# Patient Record
Sex: FEMALE | Race: WHITE | Employment: OTHER | URBAN - METROPOLITAN AREA
[De-identification: names, ages, dates, MRNs, and addresses within clinical notes are randomized per-mention and may not be internally consistent; named-entity substitution may affect disease eponyms.]

---

## 2017-03-01 ENCOUNTER — HOSPITAL ENCOUNTER (OUTPATIENT)
Dept: MAMMOGRAPHY | Facility: CLINIC | Age: 77
Discharge: HOME | End: 2017-03-01
Attending: FAMILY MEDICINE | Admitting: FAMILY MEDICINE
Payer: MEDICARE

## 2017-03-01 DIAGNOSIS — R92.0 BREAST MICROCALCIFICATION, MAMMOGRAPHIC: ICD-10-CM

## 2017-03-01 PROCEDURE — G0206 DX MAMMO INCL CAD UNI: HCPCS | Mod: LT

## 2017-09-26 ENCOUNTER — HOSPITAL ENCOUNTER (OUTPATIENT)
Dept: MAMMOGRAPHY | Facility: CLINIC | Age: 77
Discharge: HOME | End: 2017-09-26
Attending: FAMILY MEDICINE | Admitting: FAMILY MEDICINE
Payer: MEDICARE

## 2017-09-26 DIAGNOSIS — R92.0 BREAST MICROCALCIFICATION, MAMMOGRAPHIC: ICD-10-CM

## 2017-09-26 PROCEDURE — G0206 DX MAMMO INCL CAD UNI: HCPCS

## 2017-10-20 ENCOUNTER — APPOINTMENT (OUTPATIENT)
Dept: GENERAL RADIOLOGY | Facility: CLINIC | Age: 77
DRG: 286 | End: 2017-10-20
Attending: EMERGENCY MEDICINE
Payer: MEDICARE

## 2017-10-20 ENCOUNTER — TRANSFERRED RECORDS (OUTPATIENT)
Dept: HEALTH INFORMATION MANAGEMENT | Facility: CLINIC | Age: 77
End: 2017-10-20

## 2017-10-20 ENCOUNTER — HOSPITAL ENCOUNTER (INPATIENT)
Facility: CLINIC | Age: 77
LOS: 5 days | Discharge: HOME | DRG: 286 | End: 2017-10-25
Attending: EMERGENCY MEDICINE | Admitting: INTERNAL MEDICINE
Payer: MEDICARE

## 2017-10-20 DIAGNOSIS — I48.91 ATRIAL FIBRILLATION WITH RAPID VENTRICULAR RESPONSE (H): ICD-10-CM

## 2017-10-20 DIAGNOSIS — I48.91 ATRIAL FIBRILLATION (H): ICD-10-CM

## 2017-10-20 DIAGNOSIS — N30.00 ACUTE CYSTITIS WITHOUT HEMATURIA: ICD-10-CM

## 2017-10-20 DIAGNOSIS — I10 ESSENTIAL HYPERTENSION: ICD-10-CM

## 2017-10-20 DIAGNOSIS — I25.10 CORONARY ARTERY DISEASE INVOLVING NATIVE HEART WITHOUT ANGINA PECTORIS, UNSPECIFIED VESSEL OR LESION TYPE: ICD-10-CM

## 2017-10-20 DIAGNOSIS — R00.0 TACHYCARDIA INDUCED CARDIOMYOPATHY (H): ICD-10-CM

## 2017-10-20 DIAGNOSIS — E78.5 HYPERLIPIDEMIA LDL GOAL <70: ICD-10-CM

## 2017-10-20 DIAGNOSIS — I43 TACHYCARDIA INDUCED CARDIOMYOPATHY (H): ICD-10-CM

## 2017-10-20 DIAGNOSIS — I50.9 ACUTE ON CHRONIC CONGESTIVE HEART FAILURE, UNSPECIFIED CONGESTIVE HEART FAILURE TYPE: ICD-10-CM

## 2017-10-20 DIAGNOSIS — I50.41 ACUTE COMBINED SYSTOLIC AND DIASTOLIC CHF, NYHA CLASS 3 (H): Primary | ICD-10-CM

## 2017-10-20 LAB
ANION GAP SERPL CALCULATED.3IONS-SCNC: 7 MMOL/L (ref 3–14)
BASOPHILS # BLD AUTO: 0.1 10E9/L (ref 0–0.2)
BASOPHILS NFR BLD AUTO: 0.6 %
BUN SERPL-MCNC: 11 MG/DL (ref 7–30)
CALCIUM SERPL-MCNC: 8.7 MG/DL (ref 8.5–10.1)
CHLORIDE SERPL-SCNC: 101 MMOL/L (ref 94–109)
CO2 SERPL-SCNC: 26 MMOL/L (ref 20–32)
CREAT SERPL-MCNC: 0.75 MG/DL (ref 0.52–1.04)
DIFFERENTIAL METHOD BLD: NORMAL
EOSINOPHIL # BLD AUTO: 0.3 10E9/L (ref 0–0.7)
EOSINOPHIL NFR BLD AUTO: 3.1 %
ERYTHROCYTE [DISTWIDTH] IN BLOOD BY AUTOMATED COUNT: 13.2 % (ref 10–15)
GFR SERPL CREATININE-BSD FRML MDRD: 75 ML/MIN/1.7M2
GLUCOSE SERPL-MCNC: 101 MG/DL (ref 70–99)
HCT VFR BLD AUTO: 41.9 % (ref 35–47)
HGB BLD-MCNC: 13.8 G/DL (ref 11.7–15.7)
IMM GRANULOCYTES # BLD: 0 10E9/L (ref 0–0.4)
IMM GRANULOCYTES NFR BLD: 0.3 %
INR PPP: 1.99 (ref 0.86–1.14)
LYMPHOCYTES # BLD AUTO: 1.7 10E9/L (ref 0.8–5.3)
LYMPHOCYTES NFR BLD AUTO: 17.4 %
MAGNESIUM SERPL-MCNC: 2 MG/DL (ref 1.6–2.3)
MCH RBC QN AUTO: 32.8 PG (ref 26.5–33)
MCHC RBC AUTO-ENTMCNC: 32.9 G/DL (ref 31.5–36.5)
MCV RBC AUTO: 100 FL (ref 78–100)
MONOCYTES # BLD AUTO: 0.8 10E9/L (ref 0–1.3)
MONOCYTES NFR BLD AUTO: 7.8 %
NEUTROPHILS # BLD AUTO: 7.1 10E9/L (ref 1.6–8.3)
NEUTROPHILS NFR BLD AUTO: 70.8 %
NRBC # BLD AUTO: 0 10*3/UL
NRBC BLD AUTO-RTO: 0 /100
NT-PROBNP SERPL-MCNC: 7683 PG/ML (ref 0–1800)
PLATELET # BLD AUTO: 249 10E9/L (ref 150–450)
POTASSIUM SERPL-SCNC: 4.1 MMOL/L (ref 3.4–5.3)
RBC # BLD AUTO: 4.21 10E12/L (ref 3.8–5.2)
SODIUM SERPL-SCNC: 134 MMOL/L (ref 133–144)
TROPONIN I SERPL-MCNC: <0.015 UG/L (ref 0–0.04)
TSH SERPL DL<=0.005 MIU/L-ACNC: 2.32 MU/L (ref 0.4–4)
WBC # BLD AUTO: 10 10E9/L (ref 4–11)

## 2017-10-20 PROCEDURE — 96361 HYDRATE IV INFUSION ADD-ON: CPT

## 2017-10-20 PROCEDURE — 71020 XR CHEST 2 VW: CPT

## 2017-10-20 PROCEDURE — 96365 THER/PROPH/DIAG IV INF INIT: CPT

## 2017-10-20 PROCEDURE — 25000132 ZZH RX MED GY IP 250 OP 250 PS 637: Mod: GY | Performed by: INTERNAL MEDICINE

## 2017-10-20 PROCEDURE — 96375 TX/PRO/DX INJ NEW DRUG ADDON: CPT

## 2017-10-20 PROCEDURE — 25000132 ZZH RX MED GY IP 250 OP 250 PS 637: Mod: GY | Performed by: EMERGENCY MEDICINE

## 2017-10-20 PROCEDURE — 84484 ASSAY OF TROPONIN QUANT: CPT | Performed by: EMERGENCY MEDICINE

## 2017-10-20 PROCEDURE — 25000125 ZZHC RX 250: Performed by: EMERGENCY MEDICINE

## 2017-10-20 PROCEDURE — 84443 ASSAY THYROID STIM HORMONE: CPT | Performed by: EMERGENCY MEDICINE

## 2017-10-20 PROCEDURE — 93005 ELECTROCARDIOGRAM TRACING: CPT

## 2017-10-20 PROCEDURE — 83880 ASSAY OF NATRIURETIC PEPTIDE: CPT | Performed by: EMERGENCY MEDICINE

## 2017-10-20 PROCEDURE — 99285 EMERGENCY DEPT VISIT HI MDM: CPT | Mod: 25

## 2017-10-20 PROCEDURE — 85025 COMPLETE CBC W/AUTO DIFF WBC: CPT | Performed by: EMERGENCY MEDICINE

## 2017-10-20 PROCEDURE — A9270 NON-COVERED ITEM OR SERVICE: HCPCS | Mod: GY | Performed by: EMERGENCY MEDICINE

## 2017-10-20 PROCEDURE — 99223 1ST HOSP IP/OBS HIGH 75: CPT | Mod: AI | Performed by: INTERNAL MEDICINE

## 2017-10-20 PROCEDURE — 12000007 ZZH R&B INTERMEDIATE

## 2017-10-20 PROCEDURE — 85610 PROTHROMBIN TIME: CPT | Performed by: EMERGENCY MEDICINE

## 2017-10-20 PROCEDURE — 80048 BASIC METABOLIC PNL TOTAL CA: CPT | Performed by: EMERGENCY MEDICINE

## 2017-10-20 PROCEDURE — 25000128 H RX IP 250 OP 636: Performed by: EMERGENCY MEDICINE

## 2017-10-20 PROCEDURE — 83735 ASSAY OF MAGNESIUM: CPT | Performed by: EMERGENCY MEDICINE

## 2017-10-20 PROCEDURE — 96366 THER/PROPH/DIAG IV INF ADDON: CPT

## 2017-10-20 PROCEDURE — A9270 NON-COVERED ITEM OR SERVICE: HCPCS | Mod: GY | Performed by: INTERNAL MEDICINE

## 2017-10-20 RX ORDER — POTASSIUM CHLORIDE 1500 MG/1
20-40 TABLET, EXTENDED RELEASE ORAL
Status: DISCONTINUED | OUTPATIENT
Start: 2017-10-20 | End: 2017-10-25 | Stop reason: HOSPADM

## 2017-10-20 RX ORDER — AMOXICILLIN 250 MG
1-2 CAPSULE ORAL 2 TIMES DAILY PRN
Status: DISCONTINUED | OUTPATIENT
Start: 2017-10-20 | End: 2017-10-25 | Stop reason: HOSPADM

## 2017-10-20 RX ORDER — POLYETHYLENE GLYCOL 3350 17 G/17G
17 POWDER, FOR SOLUTION ORAL DAILY PRN
Status: DISCONTINUED | OUTPATIENT
Start: 2017-10-20 | End: 2017-10-25 | Stop reason: HOSPADM

## 2017-10-20 RX ORDER — POTASSIUM CHLORIDE 7.45 MG/ML
10 INJECTION INTRAVENOUS
Status: DISCONTINUED | OUTPATIENT
Start: 2017-10-20 | End: 2017-10-25 | Stop reason: HOSPADM

## 2017-10-20 RX ORDER — ONDANSETRON 2 MG/ML
4 INJECTION INTRAMUSCULAR; INTRAVENOUS EVERY 6 HOURS PRN
Status: DISCONTINUED | OUTPATIENT
Start: 2017-10-20 | End: 2017-10-25 | Stop reason: HOSPADM

## 2017-10-20 RX ORDER — ACETAMINOPHEN 500 MG
1000 TABLET ORAL EVERY 4 HOURS PRN
Status: DISCONTINUED | OUTPATIENT
Start: 2017-10-20 | End: 2017-10-20

## 2017-10-20 RX ORDER — DILTIAZEM HYDROCHLORIDE 5 MG/ML
10 INJECTION INTRAVENOUS ONCE
Status: COMPLETED | OUTPATIENT
Start: 2017-10-20 | End: 2017-10-20

## 2017-10-20 RX ORDER — POTASSIUM CHLORIDE 29.8 MG/ML
20 INJECTION INTRAVENOUS
Status: DISCONTINUED | OUTPATIENT
Start: 2017-10-20 | End: 2017-10-25 | Stop reason: HOSPADM

## 2017-10-20 RX ORDER — MECLIZINE HCL 12.5 MG 12.5 MG/1
12.5 TABLET ORAL 3 TIMES DAILY PRN
Status: DISCONTINUED | OUTPATIENT
Start: 2017-10-20 | End: 2017-10-25 | Stop reason: HOSPADM

## 2017-10-20 RX ORDER — WARFARIN SODIUM 2.5 MG/1
2.5 TABLET ORAL ONCE
Status: COMPLETED | OUTPATIENT
Start: 2017-10-20 | End: 2017-10-20

## 2017-10-20 RX ORDER — OXYCODONE HYDROCHLORIDE 5 MG/1
5 TABLET ORAL EVERY 4 HOURS PRN
Status: DISCONTINUED | OUTPATIENT
Start: 2017-10-20 | End: 2017-10-25 | Stop reason: HOSPADM

## 2017-10-20 RX ORDER — FUROSEMIDE 10 MG/ML
40 INJECTION INTRAMUSCULAR; INTRAVENOUS DAILY
Status: DISCONTINUED | OUTPATIENT
Start: 2017-10-21 | End: 2017-10-23

## 2017-10-20 RX ORDER — METOPROLOL TARTRATE 50 MG
50 TABLET ORAL 2 TIMES DAILY
Status: DISCONTINUED | OUTPATIENT
Start: 2017-10-20 | End: 2017-10-24

## 2017-10-20 RX ORDER — DILTIAZEM HYDROCHLORIDE 180 MG/1
180 CAPSULE, COATED, EXTENDED RELEASE ORAL DAILY
Status: DISCONTINUED | OUTPATIENT
Start: 2017-10-21 | End: 2017-10-24

## 2017-10-20 RX ORDER — NALOXONE HYDROCHLORIDE 0.4 MG/ML
.1-.4 INJECTION, SOLUTION INTRAMUSCULAR; INTRAVENOUS; SUBCUTANEOUS
Status: DISCONTINUED | OUTPATIENT
Start: 2017-10-20 | End: 2017-10-24

## 2017-10-20 RX ORDER — WARFARIN SODIUM 5 MG/1
5 TABLET ORAL ONCE
Status: DISCONTINUED | OUTPATIENT
Start: 2017-10-20 | End: 2017-10-20

## 2017-10-20 RX ORDER — FUROSEMIDE 10 MG/ML
20 INJECTION INTRAMUSCULAR; INTRAVENOUS ONCE
Status: COMPLETED | OUTPATIENT
Start: 2017-10-20 | End: 2017-10-20

## 2017-10-20 RX ORDER — ACETAMINOPHEN 325 MG/1
650 TABLET ORAL EVERY 4 HOURS PRN
Status: DISCONTINUED | OUTPATIENT
Start: 2017-10-20 | End: 2017-10-23

## 2017-10-20 RX ORDER — POTASSIUM CL/LIDO/0.9 % NACL 10MEQ/0.1L
10 INTRAVENOUS SOLUTION, PIGGYBACK (ML) INTRAVENOUS
Status: DISCONTINUED | OUTPATIENT
Start: 2017-10-20 | End: 2017-10-25 | Stop reason: HOSPADM

## 2017-10-20 RX ORDER — POTASSIUM CHLORIDE 1.5 G/1.58G
20-40 POWDER, FOR SOLUTION ORAL
Status: DISCONTINUED | OUTPATIENT
Start: 2017-10-20 | End: 2017-10-25 | Stop reason: HOSPADM

## 2017-10-20 RX ORDER — SIMVASTATIN 10 MG
10 TABLET ORAL AT BEDTIME
Status: DISCONTINUED | OUTPATIENT
Start: 2017-10-20 | End: 2017-10-23

## 2017-10-20 RX ORDER — ONDANSETRON 4 MG/1
4 TABLET, ORALLY DISINTEGRATING ORAL EVERY 6 HOURS PRN
Status: DISCONTINUED | OUTPATIENT
Start: 2017-10-20 | End: 2017-10-25 | Stop reason: HOSPADM

## 2017-10-20 RX ORDER — MAGNESIUM SULFATE HEPTAHYDRATE 40 MG/ML
4 INJECTION, SOLUTION INTRAVENOUS EVERY 4 HOURS PRN
Status: DISCONTINUED | OUTPATIENT
Start: 2017-10-20 | End: 2017-10-25 | Stop reason: HOSPADM

## 2017-10-20 RX ADMIN — DILTIAZEM HYDROCHLORIDE 10 MG: 5 INJECTION INTRAVENOUS at 18:49

## 2017-10-20 RX ADMIN — SODIUM CHLORIDE 500 ML: 9 INJECTION, SOLUTION INTRAVENOUS at 17:58

## 2017-10-20 RX ADMIN — FUROSEMIDE 20 MG: 10 INJECTION, SOLUTION INTRAVENOUS at 18:49

## 2017-10-20 RX ADMIN — OXYCODONE HYDROCHLORIDE 5 MG: 5 TABLET ORAL at 22:32

## 2017-10-20 RX ADMIN — METOPROLOL TARTRATE 50 MG: 50 TABLET, FILM COATED ORAL at 22:17

## 2017-10-20 RX ADMIN — WARFARIN SODIUM 2.5 MG: 2.5 TABLET ORAL at 22:17

## 2017-10-20 RX ADMIN — DILTIAZEM HYDROCHLORIDE 5 MG/HR: 5 INJECTION INTRAVENOUS at 19:38

## 2017-10-20 RX ADMIN — ACETAMINOPHEN 1000 MG: 500 TABLET, FILM COATED ORAL at 18:39

## 2017-10-20 RX ADMIN — SIMVASTATIN 10 MG: 10 TABLET, FILM COATED ORAL at 22:17

## 2017-10-20 RX ADMIN — DILTIAZEM HYDROCHLORIDE 10 MG: 5 INJECTION INTRAVENOUS at 17:58

## 2017-10-20 ASSESSMENT — ENCOUNTER SYMPTOMS
SHORTNESS OF BREATH: 1
ABDOMINAL PAIN: 1
DIFFICULTY URINATING: 0
CHILLS: 1
BLOOD IN STOOL: 0
DYSURIA: 0
LIGHT-HEADEDNESS: 1
COUGH: 1
APPETITE CHANGE: 1
FREQUENCY: 0
DIARRHEA: 1
HEMATURIA: 0

## 2017-10-20 NOTE — LETTER
Transition Communication Hand-off for Care Transitions to Next Level of Care Provider    Hand-off for Care Transitions to Next Level of Care Provider  Name: Em Richmond  : 1940  MRN #: [id number]  Reason for Hospitalization:  Atrial fibrillation (H) [I48.91]  Atrial fibrillation with rapid ventricular response (H) [I48.91]  Acute on chronic congestive heart failure, unspecified congestive heart failure type (H) [I50.9]  Admit Date/Time: 10/20/2017  5:12 PM  Discharge Date: 10/25/2017    Reason for Communication Hand-off Referral: Admission diagnoses: CHF    Discharge Plan:  Discharged to: Home with support                   Patient agreeable to post-hospital support suggestions:  Yes  Discharge Plan:             Patient is on new medications:   Yes   atorvastatin 40 MG tablet. Take 1 tablet (40 mg) by mouth daily   ciprofloxacin 250 MG tablet. Take 1 tablet (250 mg) by mouth 2 times daily for 7  Days   furosemide 40 MG tablet. Take 0.5 tablets (20 mg) by mouth daily   lisinopril 5 MG tablet. Take 0.5 tablets (2.5 mg) by mouth daily   metoprolol 25 MG 24 hr tablet. Take 3 tablets (75 mg) by mouth daily           MTM follow up recommended: No           Tel-Assurance program:  Accepted   Follow-up specialty is recommended: Yes. Follow up with Cardiology as scheduled below.   Follow-up plan:  Future Appointments  Date Time Provider Department Center   2017 10:00 AM RSCC DEVICE RM [facility]   2017 1:00 PM RU LAB LAB P [facility]   2017 1:50 PM Ying Oreilly APRN CNP [facility]   2017 9:30 AM RSCCECHO2 [facility]   2017 10:30 AM RU LAB LAB P [facility]   2017 2:45 PM Den Pickard MD [facility]     Any outstanding tests or procedures:  Holter Monitor to be placed on 2017 at 10am.     Radiology & Cardiology Orders     Future Labs/Procedures Complete By Expires    Holter Monitor 24 hour - Adult  2017 (Approximate) 10/25/2018     Echocardiogram  2017 (Approximate) 10/24/2018    Process Instructions:    Administration of IV contrast will be tailored to this examination per the appropriate written protocol listed in the Echocardiography department Protocol Book, or by the supervising Cardiologist. This may result in an order change.    Use of contrast is at the discretion of the supervising Cardiologist.    Scheduling Instructions:    Please call your clinic of choice to schedule this procedure:    [facility]:   [phone]  [facility]:   [phone]  [facility]:  [phone]  [facility]): [phone] (Friday)  [facility]:   [phone]  [facility]:  [phone]  [facility]): [phone]  [facility] [phone]    Comments:    Administration of IV contrast will be tailored to this examination per the appropriate written protocol listed in the Echocardiography department Protocol Book, or by the supervising Cardiologist. This may result in an order change.    Use of contrast is at the discretion of the supervising Cardiologist.        Referrals     Future Labs/Procedures    Follow-Up with Cardiac Advanced Practice Provider     Follow-Up with Cardiologist           Key Recommendations: CHF teaching completed on action plan, patient weights are done daily, observes a low salt diet and feels like the plan will be helpful.  We spoke briefly about medicaiton and she was placed on a diuretic at discharge.  We talked about CORE and why it might be helpful in the future. She has agreed to a Tele-monitoring program.     Communicated handoff via EPIC Comm Mgt to Dr. Arielle Leger's CC at [phone].      Rosalia Oreilly, RN, BSN, CTS  [facility]  [phone]    AVS/Discharge Summary is the source of truth; this is a helpful guide for improved communication of patient story

## 2017-10-20 NOTE — IP AVS SNAPSHOT
[facility] [facility]    [address]    [facility] [address]    Phone:  [phone]    Fax:  [phone]                                       After Visit Summary   10/20/2017    Em Richmond    MRN: [id number]           After Visit Summary Signature Page     I have received my discharge instructions, and my questions have been answered. I have discussed any challenges I see with this plan with the nurse or doctor.    ..........................................................................................................................................  Patient/Patient Representative Signature      ..........................................................................................................................................  Patient Representative Print Name and Relationship to Patient    ..................................................               ................................................  Date                                            Time    ..........................................................................................................................................  Reviewed by Signature/Title    ...................................................              ..............................................  Date                                                            Time

## 2017-10-20 NOTE — IP AVS SNAPSHOT
MRN:[id number]                      After Visit Summary   10/20/2017    Em Richmond    MRN: [id number]           Thank you!     Thank you for choosing [facility] for your care. Our goal is always to provide you with excellent care. Hearing back from our patients is one way we can continue to improve our services. Please take a few minutes to complete the written survey that you may receive in the mail after you visit. If you would like to speak to someone directly about your visit please contact Patient Relations at [phone]. Thank you!          Patient Information     Date Of Birth          1940        Designated Caregiver       Most Recent Value    Caregiver    Will someone help with your care after discharge? yes    Name of designated caregiver Carson    Phone number of caregiver [phone]    Caregiver address [address] [place] [address]      About your hospital stay     You were admitted on:  October 20, 2017 You last received care in the:  [facility] Medical Surgical    You were discharged on:  October 25, 2017        Reason for your hospital stay       Your symptoms of shortness of breath and cough were due to Heart Failure. That had come on due to the persistent rapid heart rate. With good heart rate control and treatment of your excess volume, you should do well.                  Who to Call     For medical emergencies, please call 911.  For non-urgent questions about your medical care, please call your primary care provider or clinic, [phone]          Attending Provider     Provider Specialty    Lloyd Abdi MD Emergency Medicine    Sandhya, Neeraj Landaverde MD Internal Medicine       Primary Care Provider Office Phone # Fax #    Arielle Leger [phone]      After Care Instructions     Activity       Your activity upon discharge: activity as tolerated            Diet       Follow this diet upon discharge:    2 gm NA Diet            Transitions 30-Day Tel-Assurance       You will receive a phone call for enrollment following hospital discharge. If you have not received a phone call within 24 hours to enroll, please call [phone].                  Follow-up Appointments     Follow-up and recommended labs and tests        Follow up with PMD in the next 2 weeks for routine post-hospital meeting.  Please also follow up with your regular INR clinic for recheck this week.                  Your next 10 appointments already scheduled     Nov 06, 2017 10:00 AM CST   Holter Monitor with RS[facility] DEVICE [facility] [place] ([facility])    [address]  [facility] [id number] [phone]           LOCATION - [address], [address] [place] [address] **Please check-in at the [place] Registration Office, [address], in the [facility] building. When you are finished registering, please go to [address] and have a seat.            Nov 13, 2017  1:00 PM CST   LAB with RU LAB   [facility] ([facility])    [address]  [facility] [id number] [phone]           Patient must bring picture ID. Patient should be prepared to give a urine specimen  Please do not eat 10-12 hours before your appointment if you are coming in fasting for labs on lipids, cholesterol, or glucose (sugar). Pregnant women should follow their Care Team instructions. Water with medications is okay. Do not drink coffee or other fluids. If you have concerns about taking  your medications, please ask at office or if scheduling via [employer], send a message by clicking on Secure Messaging, Message Your Care Team.            Nov 13, 2017  1:50 PM CST   Return Discharge with DOMINIK Javier CNP   [facility] ([facility])    [address]  [facility]  [address]   [phone]            Dec 04, 2017  9:30 AM CST   Ech Complete with [facility] ([facility])    [address]  [facility] [id number] [phone]           1. Please bring or wear a comfortable two-piece outfit. 2. You may eat, drink and take your normal medicines. 3. For any questions that cannot be answered, please contact the ordering physician ***Please check-in at the [place] Registration Office located in [address] in the [facility] building. When you are finished registering, please go to [address] and have a seat. The technician will call your name for the test.            Dec 04, 2017 10:30 AM CST   LAB with RU LAB   [facility] ([facility])    [address]  [facility] [id number] [phone]           Patient must bring picture ID. Patient should be prepared to give a urine specimen  Please do not eat 10-12 hours before your appointment if you are coming in fasting for labs on lipids, cholesterol, or glucose (sugar). Pregnant women should follow their Care Team instructions. Water with medications is okay. Do not drink coffee or other fluids. If you have concerns about taking  your medications, please ask at office or if scheduling via [employer], send a message by clicking on Secure Messaging, Message Your Care Team.            Dec 19, 2017  2:45 PM CST   Return Visit with Den Pickard MD   [facility] HEART [facility] ([facility])    [address]  [facility] [address] [phone]              Additional Services     Follow-Up with Cardiac Advanced Practice Provider           Follow-Up with Cardiologist                 Future tests that were ordered for you     Basic metabolic panel           Holter Monitor 24 hour - Adult           Echocardiogram       Administration of IV  "contrast will be tailored to this examination per the appropriate written protocol listed in the Echocardiography department Protocol Book, or by the supervising Cardiologist. This may result in an order change.    Use of contrast is at the discretion of the supervising Cardiologist.            Lipid Profile                 Warfarin Instruction     You have started taking a medicine called warfarin. This is a blood-thinning medicine (anticoagulant). It helps prevent and treat blood clots.      Before leaving the hospital, make sure you know how much to take and how long to take it.      You will need regular blood tests to make sure your blood is clotting safely. It is very important to see your doctor for regular blood tests.    Talk to your doctor before taking any new medicine (this includes over-the-counter drugs and herbal products). Many medicines can interact with warfarin. This may cause more bleeding or too much clotting.     Eating a lot of vitamin K--found in green, leafy vegetables--can change the way warfarin works in your body. Do NOT avoid these foods. Instead, try to eat the same amount each day.     Bleeding is the most common side-effect of warfarin. You may notice bleeding gums, a bloody nose, bruises and bleeding longer when you cut yourself. See a doctor at once if:   o You cough up blood  o You find blood in your stool (poop)  o You have a deep cut, or a cut that bleeds longer than 10 minutes   o You have a bad cut, hard fall, accident or hit your head (go to urgent care or the emergency room).    For women who can get pregnant: This medicine can harm an unborn baby. Be very careful not to get pregnant while taking this medicine. If you think you might be pregnant, call your doctor right away.    For more information, read \"Guide to Warfarin Therapy,  the booklet you received in the hospital.        General Recommendations To Control Heart Failure When You Get Home       Heart Failure " Instructions for Patients and Families: Please read and check off each of these important instructions as you do them when you get home.          Weight and Symptoms       ___ Put a scale in your bathroom.    ___ Post a weight chart or calendar next to your scale.    ___ Weigh yourself everyday as soon as you get up in the morning (before breakfast).  You should only be wearing your pajamas.  Write your weight on the chart/calendar.  ___ Bring your weight chart/calendar with you to all appointments.  ___ Call your doctor or nurse practitioner if you gain 2 pounds (in 1 day) or 5 pounds in (1 week) from your goal  good  weight.  Your good weight is also called your  dry  weight.  Your doctor or nurse will tell you what your good weight should be.    ___ Call your doctor or nurse practitioner if you have shortness of breath that gets worse over time, leg swelling or fatigue.       Medications and Diet       ___ Make sure to take your medication as prescribed.    ___ Bring a current list of your medication and all of your medicine bottles with you to all appointments.    ___ Limit fluids if you still have swelling or shortness of breath, or if your doctor tells you to do so.    ___ Eat less than 2000 mg of sodium (salt) every day. Read food labels, and do not add salt to meals. Remember, if you eat less salt you retain less fluid.  ___ Follow a heart healthy diet that is low in saturated fat.        Activity and Suggested Lifestyle Changes      ___ Stay active. Talk to your doctor about an exercise program that is safe for your heart.  ___ Stop smoking. Reduce alcohol use.      ___ Lose weight if you are overweight. Extra weight puts a lot of stress on the heart.                 Control for Leg Swelling     ___ Keep your legs elevated (raised) as needed for swelling. If swelling is uncomfortable or elevation doesn t help, ask your doctor about using ACE wraps or support stockings.        What is the C.O.R.E.  Clinic?  Cardiomyopathy  Optimization  Rehabilitation  Education    The C.O.R.E. Clinic is a heart failure specialty clinic within [facility].  It is an outpatient disease management program that is based on a phase-by-phase approach, which is tailored to each patient s individual needs.  The cardiologist, nurse practitioner, physician assistant and nurses provide an ongoing outpatient care and treatment plan that guides heart failure and cardiomyopathy patients from evaluation and education to stabilization. This team works with your current primary care doctor and cardiologist to help you:    - Avoid hospitalizations  - Slow the progression of the disease  - Improve length and quality of life  - Know who and when to call if heart failure symptoms appear  - Receive easy access to quality health care and advice  - Better understand your condition and treatment  - Decrease the tremendous cost burden of heart failure care  - Detect future heart problems before they become life threatening                                 Follow-up Appointments     ___ An appointment with the C.O.R.E. Clinic may already have been made for you (see section   Your next appointments already scheduled ).  If you have a question about your appointment, would like to speak with a C.O.R.E. nurse, or would like to become a C.O.R.E. Clinic patient, please call one of the following locations:     - [facility]):                                             [phone]  - [facility]):                                            [phone]  - [facility] ([place]):                 [phone]      ___ Your C.O.R.E. Clinic Team will continue to educate you on your heart failure and may adjust medications based on your vital signs, lab work, and how you are feeling.  Therefore, it is very important to bring the following to all C.O.R.E. appointments:    - An  "accurate list of your medications  - Your medicine bottles  - Your weight chart/calendar                   Pending Results     Date and Time Order Name Status Description    10/25/2017 1039 Urine Culture Aerobic Bacterial In process             Statement of Approval     Ordered          10/25/17 4632  I have reviewed and agree with all the recommendations and orders detailed in this document.  EFFECTIVE NOW     Approved and electronically signed by:  Moses Win MD             Admission Information     Date & Time Provider Department Dept. Phone    10/20/2017 Neeraj Arthur MD [place] [address] Medical Surgical [phone]      Your Vitals Were     Blood Pressure Pulse Temperature Respirations Height Weight    110/73 (BP Location: Left arm) 90 96.4  F (35.8  C) (Oral) 18 1.651 m (5' 5\") 83.9 kg (184 lb 14.4 oz)    Pulse Oximetry BMI (Body Mass Index)                96% 30.77 kg/m2          MyChart Information     [employer] lets you send messages to your doctor, view your test results, renew your prescriptions, schedule appointments and more. To sign up, go to [email or web address] . Click on \"Log in\" on the left side of the screen, which will take you to the Welcome page. Then click on \"Sign up Now\" on the right side of the page.     You will be asked to enter the access code listed below, as well as some personal information. Please follow the directions to create your username and password.     Your access code is: [email or web address]  Expires: 2018  2:00 PM     Your access code will  in 90 days. If you need help or a new code, please call your [facility] clinic or [phone].        Care EveryWhere ID     This is your Care EveryWhere ID. This could be used by other organizations to access your [facility] medical records  [id number]        Equal Access to Services     LUZ MARTINEZ : Cody Barbosa, consuelo turk, qamayrata scout means " ah. So [facility] [phone].    ATENCIÓN: Si andre savage, tiene a delgadillo disposición servicios gratuitos de asistencia lingüística. Jovon al [phone].    We comply with applicable federal civil rights laws and [place] laws. We do not discriminate on the basis of race, color, national origin, age, disability, sex, sexual orientation, or gender identity.               Review of your medicines      START taking        Dose / Directions    atorvastatin 40 MG tablet   Commonly known as:  LIPITOR   Used for:  Hyperlipidemia LDL goal <70, Coronary artery disease involving native heart without angina pectoris, unspecified vessel or lesion type        Dose:  40 mg   Take 1 tablet (40 mg) by mouth daily   Quantity:  30 tablet   Refills:  0       ciprofloxacin 250 MG tablet   Commonly known as:  CIPRO        Dose:  250 mg   Take 1 tablet (250 mg) by mouth 2 times daily for 7 days   Quantity:  14 tablet   Refills:  0       furosemide 40 MG tablet   Commonly known as:  LASIX        Dose:  20 mg   Take 0.5 tablets (20 mg) by mouth daily   Quantity:  30 tablet   Refills:  0       lisinopril 5 MG tablet   Commonly known as:  PRINIVIL/ZESTRIL        Dose:  2.5 mg   Take 0.5 tablets (2.5 mg) by mouth daily   Quantity:  30 tablet   Refills:  0       metoprolol 25 MG 24 hr tablet   Commonly known as:  TOPROL-XL        Dose:  75 mg   Take 3 tablets (75 mg) by mouth daily   Quantity:  30 tablet   Refills:  0         CONTINUE these medicines which may have CHANGED, or have new prescriptions. If we are uncertain of the size of tablets/capsules you have at home, strength may be listed as something that might have changed.        Dose / Directions    warfarin 5 MG tablet   Commonly known as:  COUMADIN   This may have changed:    - medication strength  - when to take this  - additional instructions        Dose:  2.5 mg   Take 0.5 tablets (2.5 mg) by mouth daily   Quantity:  30 tablet   Refills:  0         CONTINUE these medicines which have  NOT CHANGED        Dose / Directions    meclizine 12.5 MG tablet   Commonly known as:  ANTIVERT   Used for:  Labyrinthitis, bilateral        Dose:  12.5 mg   Take 1 tablet (12.5 mg) by mouth 3 times daily as needed for dizziness   Quantity:  60 tablet   Refills:  1       MULTIPLE VITAMINS PO        Dose:  1 tablet   Take 1 tablet by mouth daily   Refills:  0       OMEPRAZOLE PO        Dose:  20 mg   Take 20 mg by mouth daily as needed   Refills:  0       PAROXETINE HCL PO        Dose:  30 mg   Take 30 mg by mouth daily   Refills:  0       VITAMIN D3 PO        Dose:  2000 Units   Take 2,000 Units by mouth daily   Refills:  0         STOP taking     diltiazem 180 MG 24 hr ER beaded capsule   Commonly known as:  TIAZAC           SIMVASTATIN PO                Where to get your medicines      These medications were sent to [facility] [address] [facility]  [address] [facility] [address]     Phone:  [phone]     atorvastatin 40 MG tablet    ciprofloxacin 250 MG tablet    furosemide 40 MG tablet    lisinopril 5 MG tablet    metoprolol 25 MG 24 hr tablet         Some of these will need a paper prescription and others can be bought over the counter. Ask your nurse if you have questions.     You don't need a prescription for these medications     warfarin 5 MG tablet               ANTIBIOTIC INSTRUCTION     You've Been Prescribed an Antibiotic - Now What?  Your healthcare team thinks that you or your loved one might have an infection. Some infections can be treated with antibiotics, which are powerful, life-saving drugs. Like all medications, antibiotics have side effects and should only be used when necessary. There are some important things you should know about your antibiotic treatment.      Your healthcare team may run tests before you start taking an antibiotic.    Your team may take samples (e.g., from your blood, urine or other areas) to run tests to look for  bacteria. These test can be important to determine if you need an antibiotic at all and, if you do, which antibiotic will work best.      Within a few days, your healthcare team might change or even stop your antibiotic.    Your team may start you on an antibiotic while they are working to find out what is making you sick.    Your team might change your antibiotic because test results show that a different antibiotic would be better to treat your infection.    In some cases, once your team has more information, they learn that you do not need an antibiotic at all. They may find out that you don't have an infection, or that the antibiotic you're taking won't work against your infection. For example, an infection caused by a virus can't be treated with antibiotics. Staying on an antibiotic when you don't need it is more likely to be harmful than helpful.      You may experience side effects from your antibiotic.    Like all medications, antibiotics have side effects. Some of these can be serious.    Let you healthcare team know if you have any known allergies when you are admitted to the hospital.    One significant side effect of nearly all antibiotics is the risk of severe and sometimes deadly diarrhea caused by Clostridium difficile (C. Difficile). This occurs when a person takes antibiotics because some good germs are destroyed. Antibiotic use allows C. diificile to take over, putting patients at high risk for this serious infection.    As a patient or caregiver, it is important to understand your or your loved one's antibiotic treatment. It is especially important for caregivers to speak up when patients can't speak for themselves. Here are some important questions to ask your healthcare team.    What infection is this antibiotic treating and how do you know I have that infection?    What side effects might occur from this antibiotic?    How long will I need to take this antibiotic?    Is it safe to take this  antibiotic with other medications or supplements (e.g., vitamins) that I am taking?     Are there any special directions I need to know about taking this antibiotic? For example, should I take it with food?    How will I be monitored to know whether my infection is responding to the antibiotic?    What tests may help to make sure the right antibiotic is prescribed for me?      Information provided by:  www.cdc.gov/getsmart  U.S. Department of Health and Human Services  Centers for disease Control and Prevention  National Center for Emerging and Zoonotic Infectious Diseases  Division of Healthcare Quality Promotion         Protect others around you: Learn how to safely use, store and throw away your medicines at www.disposemymeds.org.             Medication List: This is a list of all your medications and when to take them. Check marks below indicate your daily home schedule. Keep this list as a reference.      Medications           Morning Afternoon Evening Bedtime As Needed    atorvastatin 40 MG tablet   Commonly known as:  LIPITOR   Take 1 tablet (40 mg) by mouth daily   Last time this was given:  40 mg on 10/25/2017  8:39 AM            Resume tomorrow                       ciprofloxacin 250 MG tablet   Commonly known as:  CIPRO   Take 1 tablet (250 mg) by mouth 2 times daily for 7 days                       Start tonight               furosemide 40 MG tablet   Commonly known as:  LASIX   Take 0.5 tablets (20 mg) by mouth daily   Last time this was given:  40 mg on 10/24/2017 10:15 AM            Resume tomorrow                       lisinopril 5 MG tablet   Commonly known as:  PRINIVIL/ZESTRIL   Take 0.5 tablets (2.5 mg) by mouth daily   Last time this was given:  2.5 mg on 10/25/2017  8:39 AM            Resume tomorrow                       meclizine 12.5 MG tablet   Commonly known as:  ANTIVERT   Take 1 tablet (12.5 mg) by mouth 3 times daily as needed for dizziness                                   metoprolol  25 MG 24 hr tablet   Commonly known as:  TOPROL-XL   Take 3 tablets (75 mg) by mouth daily   Last time this was given:  75 mg on 10/25/2017  8:39 AM            Resume tomorrow                       MULTIPLE VITAMINS PO   Take 1 tablet by mouth daily            Resume anytime                       OMEPRAZOLE PO   Take 20 mg by mouth daily as needed                                   PAROXETINE HCL PO   Take 30 mg by mouth daily   Last time this was given:  30 mg on 10/24/2017  3:05 PM                                VITAMIN D3 PO   Take 2,000 Units by mouth daily   Last time this was given:  2,000 Units on 10/25/2017  8:39 AM            Resume tomorrow                       warfarin 5 MG tablet   Commonly known as:  COUMADIN   Take 0.5 tablets (2.5 mg) by mouth daily   Last time this was given:  5 mg on 10/24/2017  6:50 PM                    Resume tonight

## 2017-10-20 NOTE — ED PROVIDER NOTES
History     Chief Complaint:  Shortness of Breath    HPI   Em Richmond is a 77 year old female on bloodthinners who presents to the emergency department today with shortness of breath. The patient reports she had shortness of breath on and off for a while, but it got worse and has been constant since she took a trip to [place] 2 weeks ago. Her symptoms are aggravated with activity and are associated with chest pain under left breast, nausea and loss of appetite, sweats, lightheadedness, chills, non-productive cough for the past month, abdominal pain, diarrhea which has been present for months but gotten worse recently, some left leg swelling, and waking up at night secondary to orthopnea. Patient denies changes in urination, dysuria, hematuria, blood in stool, and smoking. Of note, her daughter says the patient has been forgetful and forgetting words for the past year.    Cardiac/PE/DVT Risk Factors:  History of hypertension - Positve  History of hyperlipidemia - Positive  History of diabetes - Negative  History of smoking - Positive  Personal history of PE/DVT - Negative  Family history of PE/DVT - Postive  Family history of heart complications - Positive  Recent travel - Positive  Recent surgery - Negative  Other immobilizations - Positive-plane ride  Cancer - Negative    Allergies:  Sulfa Drugs    Medications:    Antivert  Coumadin  Simvastatin  Paroxetine  Tiazac  Omeprazole  Cholecalciferol    Past Medical History:    Syncope  Atrial fibrillation    Past Surgical History:    History reviewed. No pertinent past surgical history.    Family History:    Diabetes Mellitus  Colon Cancer    Social History:  The patient was accompanied to the ED by daughter.  Smoking Status: No  Alcohol Use: Yes  Marital Status:        Review of Systems   Constitutional: Positive for appetite change and chills.   Respiratory: Positive for cough and shortness of breath.    Cardiovascular: Positive for chest pain  (under left breast) and leg swelling (left).   Gastrointestinal: Positive for abdominal pain and diarrhea. Negative for blood in stool.   Genitourinary: Negative for decreased urine volume, difficulty urinating, dysuria, frequency and hematuria.   Neurological: Positive for light-headedness.     Physical Exam     Patient Vitals for the past 24 hrs:   BP Temp Temp src Pulse Heart Rate Resp SpO2   10/20/17 2053 - - - - 111 - -   10/20/17 2045 156/83 - - - 140 13 96 %   10/20/17 2000 113/81 - - - - - -   10/20/17 1945 (!) 138/91 - - - 138 13 95 %   10/20/17 1845 (!) 128/104 - - - - - -   10/20/17 1830 (!) 121/108 - - - - - -   10/20/17 1815 (!) 118/91 - - - 138 20 99 %   10/20/17 1800 128/73 - - - 141 25 94 %   10/20/17 1707 (!) 143/103 97  F (36.1  C) Oral 126 - 20 98 %     Physical Exam  Constitutional: Well developed, nontox appearance, appears somewhat winded  Head: Atraumatic.   Mouth/Throat: Oropharynx is clear and moist.   Neck:  no stridor  Eyes: no scleral icterus  Cardiovascular:  irregularly irregular tachycardia, 2+ bilat radial pulses  Pulmonary/Chest: nml resp effort, Clear BS bilat  Abdominal: ND, +BS, soft, NT, no rebound or guarding   : no CVA tenderness bilat  Ext: moderate pitting edema on bilateral lower extremities, WWP  Neurological: A&O, symmetric facies, moves ext x4  Skin: Skin is warm and dry.   Psychiatric: Behavior is normal. Thought content normal.   Nursing note and vitals reviewed.      Emergency Department Course   ECG:  Indication: Shortness of breath  Completed at 1725.  Read at 1736.   Atrial flutter with rapid ventricular response  Abnormal ECG  Rate 1434 bpm. DC interval *. QRS duration 74. QT/QTc 324/500. P-R-T axes * 19 80.    Imaging:  Radiology findings were communicated with the patient and family who voiced understanding of the findings.  XR Chest 2 Views  IMPRESSION:   1. Pulmonary vascular congestion. No focal infiltrate.  2. Elevated right hemidiaphragm with slightly  blunted right  costophrenic angle due to fluid or thickened pleura.  Report per radiology     Laboratory:  Laboratory findings were communicated with the patient and family who voiced understanding of the findings.  CBC: AWNL (WBC 10.0, HGB 13.8, )  BMP: Glucose 101(H) o/w WNL (Creatinine 0.75)  Troponin (Collected 1724 ): <0.015  TSH with free T4 reflex: 2.32  N-terminal Pro BNP: 7683 (H)  INR: 1.99 (H)    Interventions:  1758: Cardizem 10mg IV  1839: Tylenol 1,000 mg PO  1849: Lasix 20 mg IV   1849: Cardizem 10mg IV  1938: Cardizem 5mg IV     Emergency Department Course:  Past medical records, nursing notes, and vitals reviewed.  1730: I performed an exam of the patient and obtained history, as documented above.  IV inserted and blood drawn.   Above interventions provided.   The patient was sent for a XR while in the emergency department, findings above.   1945: Findings and plan explained to the Patient and daughter who consents to admission. Discussed the patient with Dr. Arthur, who will admit the patient to a Cardiac Telemetry bed for further monitoring, evaluation, and treatment.  I personally reviewed the laboratory results with the Patient and daughter and answered all related questions prior to admission.      Impression & Plan       Medical Decision Making:  Em Richmond is a 77 year old female presenting with shortness of breath.     Differential diagnosis includes congestive heat failure, afibrillation with RVR, pneumonia, pneumothorax, shortness of breath not otherwise specified. The patient appears clinically volume overloaded. EKG significant for afib with RVR as noted. Chest X-ray is significant for vascular congestion. Prior to chest X-ray the patient was initially given a small amount of IV fluid for her afib with RVR but that was stopped shortly thereafter. She was given Diltiazem boluses x2 with transient improvement in her heart rate. She was subsequently started on a Diltiazem  infusion and given Lasix as noted above. The patient was admitted to the hospitalist service on cardiac tele for further treatment of her congestive heart failure exacerbation and rate control. Her BNP was elevated consistent with a CHF exacerbation. The patient was consulted on results, diagnosis, and disposition. She is understanding and agreeable to plan. Admitted in stable condition.     Critical care time 30 minutes    Diagnosis:    ICD-10-CM   1. Atrial fibrillation with rapid ventricular response (H) I48.91   2. Acute on chronic congestive heart failure, unspecified congestive heart failure type (H) I50.9   3. Atrial fibrillation (H) I48.91       Disposition:  Admitted to Cardiac Telemetry    Scribe Disclosure:  Yoly KAMINSKI, am serving as a scribe at 5:30 PM on 10/20/2017 to document services personally performed by Lloyd Abdi MD based on my observations and the provider's statements to me.     10/20/2017   [facility] EMERGENCY DEPARTMENT       Lloyd Abdi MD  10/21/17 1126

## 2017-10-20 NOTE — ED NOTES
Pt sent over from clinic today for A fib with RVR. Pt complaining of shortness of breath, states it has been going on for a long time but is worse lately. Pt denies any chest pain or any other complaints.

## 2017-10-21 ENCOUNTER — APPOINTMENT (OUTPATIENT)
Dept: CARDIOLOGY | Facility: CLINIC | Age: 77
DRG: 286 | End: 2017-10-21
Attending: INTERNAL MEDICINE
Payer: MEDICARE

## 2017-10-21 LAB
ANION GAP SERPL CALCULATED.3IONS-SCNC: 4 MMOL/L (ref 3–14)
BUN SERPL-MCNC: 13 MG/DL (ref 7–30)
CALCIUM SERPL-MCNC: 8.5 MG/DL (ref 8.5–10.1)
CHLORIDE SERPL-SCNC: 104 MMOL/L (ref 94–109)
CO2 SERPL-SCNC: 29 MMOL/L (ref 20–32)
CREAT SERPL-MCNC: 0.82 MG/DL (ref 0.52–1.04)
ERYTHROCYTE [DISTWIDTH] IN BLOOD BY AUTOMATED COUNT: 13.2 % (ref 10–15)
GFR SERPL CREATININE-BSD FRML MDRD: 68 ML/MIN/1.7M2
GLUCOSE SERPL-MCNC: 115 MG/DL (ref 70–99)
HCT VFR BLD AUTO: 38.1 % (ref 35–47)
HGB BLD-MCNC: 12.3 G/DL (ref 11.7–15.7)
INR PPP: 1.83 (ref 0.86–1.14)
MAGNESIUM SERPL-MCNC: 2.2 MG/DL (ref 1.6–2.3)
MCH RBC QN AUTO: 32.8 PG (ref 26.5–33)
MCHC RBC AUTO-ENTMCNC: 32.3 G/DL (ref 31.5–36.5)
MCV RBC AUTO: 102 FL (ref 78–100)
PLATELET # BLD AUTO: 228 10E9/L (ref 150–450)
POTASSIUM SERPL-SCNC: 4.9 MMOL/L (ref 3.4–5.3)
RBC # BLD AUTO: 3.75 10E12/L (ref 3.8–5.2)
SODIUM SERPL-SCNC: 137 MMOL/L (ref 133–144)
WBC # BLD AUTO: 6.8 10E9/L (ref 4–11)

## 2017-10-21 PROCEDURE — 80048 BASIC METABOLIC PNL TOTAL CA: CPT | Performed by: INTERNAL MEDICINE

## 2017-10-21 PROCEDURE — A9270 NON-COVERED ITEM OR SERVICE: HCPCS | Mod: GY | Performed by: INTERNAL MEDICINE

## 2017-10-21 PROCEDURE — 40000264 ECHO COMPLETE WITH LUMASON

## 2017-10-21 PROCEDURE — 83735 ASSAY OF MAGNESIUM: CPT | Performed by: INTERNAL MEDICINE

## 2017-10-21 PROCEDURE — 25000132 ZZH RX MED GY IP 250 OP 250 PS 637: Mod: GY | Performed by: INTERNAL MEDICINE

## 2017-10-21 PROCEDURE — 94660 CPAP INITIATION&MGMT: CPT

## 2017-10-21 PROCEDURE — 40000275 ZZH STATISTIC RCP TIME EA 10 MIN

## 2017-10-21 PROCEDURE — 25000128 H RX IP 250 OP 636: Performed by: INTERNAL MEDICINE

## 2017-10-21 PROCEDURE — 12000007 ZZH R&B INTERMEDIATE

## 2017-10-21 PROCEDURE — 36415 COLL VENOUS BLD VENIPUNCTURE: CPT | Performed by: INTERNAL MEDICINE

## 2017-10-21 PROCEDURE — 25500064 ZZH RX 255 OP 636: Performed by: INTERNAL MEDICINE

## 2017-10-21 PROCEDURE — 93306 TTE W/DOPPLER COMPLETE: CPT | Mod: 26 | Performed by: INTERNAL MEDICINE

## 2017-10-21 PROCEDURE — 85610 PROTHROMBIN TIME: CPT | Performed by: INTERNAL MEDICINE

## 2017-10-21 PROCEDURE — 99233 SBSQ HOSP IP/OBS HIGH 50: CPT | Performed by: INTERNAL MEDICINE

## 2017-10-21 PROCEDURE — 85027 COMPLETE CBC AUTOMATED: CPT | Performed by: INTERNAL MEDICINE

## 2017-10-21 RX ORDER — WARFARIN SODIUM 5 MG/1
5 TABLET ORAL
Status: COMPLETED | OUTPATIENT
Start: 2017-10-21 | End: 2017-10-21

## 2017-10-21 RX ADMIN — SULFUR HEXAFLUORIDE 2 ML: KIT at 12:45

## 2017-10-21 RX ADMIN — FUROSEMIDE 40 MG: 10 INJECTION, SOLUTION INTRAVENOUS at 08:30

## 2017-10-21 RX ADMIN — PAROXETINE HYDROCHLORIDE 30 MG: 20 TABLET, FILM COATED ORAL at 14:03

## 2017-10-21 RX ADMIN — VITAMIN D, TAB 1000IU (100/BT) 2000 UNITS: 25 TAB at 08:11

## 2017-10-21 RX ADMIN — ACETAMINOPHEN 650 MG: 325 TABLET, FILM COATED ORAL at 16:29

## 2017-10-21 RX ADMIN — WARFARIN SODIUM 5 MG: 5 TABLET ORAL at 17:49

## 2017-10-21 RX ADMIN — METOPROLOL TARTRATE 50 MG: 50 TABLET, FILM COATED ORAL at 20:00

## 2017-10-21 RX ADMIN — ACETAMINOPHEN 650 MG: 325 TABLET, FILM COATED ORAL at 08:11

## 2017-10-21 RX ADMIN — METOPROLOL TARTRATE 50 MG: 50 TABLET, FILM COATED ORAL at 08:11

## 2017-10-21 RX ADMIN — SIMVASTATIN 10 MG: 10 TABLET, FILM COATED ORAL at 20:00

## 2017-10-21 RX ADMIN — DILTIAZEM HYDROCHLORIDE 180 MG: 180 CAPSULE, EXTENDED RELEASE ORAL at 08:11

## 2017-10-21 ASSESSMENT — ACTIVITIES OF DAILY LIVING (ADL)
ADLS_ACUITY_SCORE: 9
ADLS_ACUITY_SCORE: 11
ADLS_ACUITY_SCORE: 9

## 2017-10-21 NOTE — PROVIDER NOTIFICATION
Hospitalist paged.  Pt on cardizem gtt at 5mg/hr.  HR cont afib in 70s.  /44. Question continuing cardizem gtt or DC as HR more controlled.  IV infiltrated for second time this shift.  Flyer paged to come try place new IV.  Will continue to monitor this patient.

## 2017-10-21 NOTE — H&P
"[facility]  Hospitalist Admission Note  Name: Em Richmond    MRN: [id number]  YOB: 1940    Age: 77 year old  Date of admission: 10/20/2017  Primary care provider: Arielle Leger    Chief Complaint:  Shortness of breath    Assessment and Plan:   1.   Afib with RVR: hx paroxsymal afib.  Is on diltiazem 180mg XR daily for this.  She was on metoprolol, bu stopped it a few months ago.  Here with likely CHF and afib with RVR rates to 140s. Unclear if CHF and untreated triggering her afib with RVR or vice versa.  Started on dilt gtt.  Is on warfarin.  -continue dilt gtt  -continue po dilt 180mg XR daily  -start metoprolol 50mg bid  -TTE  -warfarin pharmD to dose    2.   Dyspnea on exertion, pulmonary HTN and possible CHF: sob that is worse on exertion for at least 3 months per patient's daughter.  The patient cannot walk to the mailbox and back or she would be to sob.  Suspect that the acute change leading to presentation is her afib with RVR with HR in 140s.  This is likely triggered by her pulmonary HTN due to untreated JERSON and possible CHF now.  She is volume overloaded on exam and will benefit from diuresis.  Her last TTE in care everywhere form 8/2017 may not actually be her study.  She said there was a problem with someone else with the same name.  Indeed the \"study site\" listed in the report is for [facility] and this is not where she had it done.  If it is her results then she has severe pulmonary HTN and RV failure.  This is quite possible given her non-compliance to CPAP  -TTE tomorrow  -would try to look into the previous TTE form 8/2017 further and try to confirm if they are her test results or not  -lasix given in ED.  40mg IV daily for tomorrow.  May need increase if not adequate UOP.  Daily weights and strict I/O    3.   JERSON: has a cpap, but has not been using for a few months as it is uncomfortable. Has pulmonary HTN on previous TTEs and I think this is a large " driving factor in her acute on chronic dyspnea and leg swelling.  Discussed at length the importance of getting to see her sleep clinic again as it has been a few years and that she needs to start treating this.  -cpap hs ordered for her  -very close follow up with her sleep clinic    4.   HTN: pta on diltiazem, but for afib.  DBP elevated here over 100.  Adding metoprolol as above    5.   Chest pain: reports L sided chest pain under L breast for over one year.  It is non-exertional.  Sharp in nature.  Doubt cardiac etiology.  Troponin negative.  Is being worked up in outpt setting with mammogram    6.   Anxiety: continue pta paxil    DVT Prophylaxis: Warfarin  Code Status: Full Code  FEN: 2g Na restriction  Discharge Dispo: home  Estimated Disch Date / # of Days until Disch: admit to inpatient for IV diuresis and dilt gtt.  Expect minimal 2 night stay      History of Present Illness:  Em Richmond is a 77 year old female with PMH including afib on warfarin, JERSON not using cpap, pulmonary HTN, anxiety, and HTN who presents with shortness of breath.  She has been having dyspnea on exertion and fatigue for the past 3 months at least.  She has not been using cpap for at least this long because the mask does not fit right and she is a side sleeper.  She went to [place] a couple weeks ago and felt sob there.  This has worsened since coming home.  Her leg swelling is much worse as well.  She has noticed that for a few days her pulse on her BP cuff is reading 140-150.  She does have a sharp stabbing pain under her L breast, but this has been there for a year and is not exertional.  She does not eat a low Na diet.      History obtained from patient, medical record, and from Dr. Abdi in the emergency department.  Patient presenting with afib with RVR with HR in 140-150 range.  She has elevated BNP and crackles on exam along with LE edema.  Clinically with CHF so given 20mg IV lasix.  Also started on diltiazem gtt.  Admit to inpatient for IV diuresis and HR control.     Past Medical History reviewed:  afib  JERSON  Pulmonary HTN  Anxiety  HTN    Past Surgical History reviewed:  No past surgical history on file.     Social History reviewed:  Former smoker  Occasional etoh    Family History reviewed:  Brother with CAD, HTN, HLD, DM, colon cancer  Sister with HTN and colon cancer  Father with CAD and colon cancer  Mother with CAD and DM    Allergies:  Allergies   Allergen Reactions     Sulfa Drugs      Medications:  Prescriptions Prior to Admission   Medication Sig Dispense Refill Last Dose     meclizine (ANTIVERT) 12.5 MG tablet Take 1 tablet (12.5 mg) by mouth 3 times daily as needed for dizziness 60 tablet 1      Warfarin Sodium (COUMADIN PO) Take 2.5 mg by mouth On MWF and 5 mg ROW   10/19/2017 at Unknown time     SIMVASTATIN PO Take 10 mg by mouth At Bedtime    10/19/2017 at Unknown time     PAROXETINE HCL PO Take 30 mg by mouth daily    10/19/2017 at 1400     diltiazem (TIAZAC) 180 MG CP24 Take 1 capsule by mouth daily   10/19/2017 at am     OMEPRAZOLE PO Take 20 mg by mouth daily as needed    6/20/2014 at am     MULTIPLE VITAMINS PO Take 1 tablet by mouth daily   10/20/2017 at Unknown time     Cholecalciferol (VITAMIN D3 PO) Take 2,000 Units by mouth daily    10/20/2017 at Unknown time     Review of Systems:  A Comprehensive greater than 10 system review of systems was carried out.  Pertinent positives and negatives are noted above.  Otherwise negative.     Physical Exam:  Blood pressure 113/81, pulse 126, temperature 97  F (36.1  C), temperature source Oral, resp. rate 13, SpO2 95 %.  Wt Readings from Last 1 Encounters:   06/20/14 83.9 kg (185 lb)     Exam:  Constitutional: Awake, NAD  Eyes: sclera white   HEENT:  MMM  Respiratory: crackles noted bilaterally, no wheeze  Cardiovascular: irregularly irregular rhythm.  Tachycardic.  No murmur  GI: non-tender, not distended, bowel sounds present  Skin: no rash or lesions,  acyanotic  Musculoskeletal/extremities: 1+ bilateral LE edema  Neurologic: A&O, speech clear, strength and light touch sensation grossly normal  Psychiatric: anxious    Lab and imaging data personally reviewed:  Labs:    Recent Labs  Lab 10/20/17  1724   WBC 10.0   HGB 13.8   HCT 41.9             Recent Labs  Lab 10/20/17  1724      POTASSIUM 4.1   CHLORIDE 101   CO2 26   ANIONGAP 7   *   BUN 11   CR 0.75   GFRESTIMATED 75   GFRESTBLACK >90   ALFIE 8.7       Recent Labs  Lab 10/20/17  1724   NTBNPI 7683*       Recent Labs  Lab 10/20/17  1724   INR 1.99*       Recent Labs  Lab 10/20/17  1724   TROPI <0.015     TSH 2.32  Mg 2.0    EKG:  afib with RVR    Imaging:  Recent Results (from the past 24 hour(s))   XR Chest 2 Views    Narrative    CHEST TWO VIEWS    10/20/2017 6:26 PM     HISTORY: Shortness of breath    COMPARISON: 10/29/2014.    FINDINGS: Elevated right hemidiaphragm, more pronounced than on the  prior study. Blunted right costophrenic angle due to fluid or  thickened pleura, also new. Heart at upper limits normal in size.  There is pulmonary vascular congestion that was not present on the  prior study. No focal infiltrates.      Impression    IMPRESSION:   1. Pulmonary vascular congestion. No focal infiltrate.  2. Elevated right hemidiaphragm with slightly blunted right  costophrenic angle due to fluid or thickened pleura.    MD Neeraj SALAZAR MD  Hospitalist  [facility]

## 2017-10-21 NOTE — PROGRESS NOTES
[facility]    Hospitalist Progress Note  Name: Em Richmond    MRN: [id number]  Provider: Josette Nolan MD  Date of Service: 10/21/2017    Assessment & Plan   Summary of Stay: Em Richmond is a 77 year old female who presented with shortness of breath and   admitted on 10/20/2017 for CHF and Afib with RVR.    1.   Afib with RVR: hx paroxsymal afib.    -- PTA  on diltiazem 180mg XR daily for this.    -- She was on metoprolol, bu stopped it a few months ago.    -- IN ED was in afib with RVR rates to 140s. Unclear if CHF and untreated triggering her afib with RVR or vice versa.    --ON warfarin for anticoagulation  -- dilt gtt weaned off  --continue po dilt 180mg XR daily  -- re start metoprolol 50mg bid  -- TTE pending  -- warfarin pharmD to dose     2.   Dyspnea on exertion, pulmonary HTN and possible CHF:   --sob that is worse on exertion for at least 3 months per patient's daughter.    -- Likely  acute change leading to presentation is her afib with RVR with HR in 140s.    -- possibly triggered by her pulmonary HTN due to untreated JERSON and possible CHF now.    -- She was volume overloaded on exam on admission   --TTE Pending  --lasix 40mg IV today  Will follow response  -- Daily weights and strict I/O     3.   JERSON: has a cpap, but has not been using for a few months as it is uncomfortable.   --Has pulmonary HTN on previous TTEs and could be the  driving factor in her acute on chronic dyspnea and leg swelling.    --cpap hs ordered for her  -- close follow up with her sleep clinic on discharge     4.   HTN: pta on diltiazem, but for afib.  DBP elevated here over 100.    --Addedmetoprolol as above     5.   Chest pain: reports L sided chest pain under L breast for over one year.    --It is non-exertional.  Sharp in nature.  Doubt cardiac etiology.  Troponin negative.   -- Is being worked up in outpt setting with mammogram     6.   Anxiety: continue pta paxil      DVT Prophylaxis:  Warfarin  Code Status: Full Code    Disposition: Expected discharge in 2-3 days to home      Interval History   SOB has improved. No cp, no palp, no cough, no fever or chills. Review of all other systems negative    -Data reviewed today: I reviewed all new labs and imaging reports over the last 24 hours. I personally reviewed no images or EKG's today.    Physical Exam   Temp: 97  F (36.1  C) Temp src: Oral BP: 132/68 Pulse: 113 Heart Rate: 92 Resp: 20 SpO2: 97 % O2 Device: Nasal cannula Oxygen Delivery: 2 LPM  Vitals:    10/20/17 2317   Weight: 86.7 kg (191 lb 3.2 oz)     Vital Signs with Ranges  Temp:  [97  F (36.1  C)-98.3  F (36.8  C)] 97  F (36.1  C)  Pulse:  [101-126] 113  Heart Rate:  [] 92  Resp:  [10-25] 20  BP: (112-156)/() 132/68  SpO2:  [94 %-99 %] 97 %  I/O last 3 completed shifts:  In: 775 [P.O.:600; I.V.:175]  Out: 200 [Urine:200]      GEN:  Alert, oriented x 3, appears comfortable, NAD.  HEENT:  Normocephalic/atraumatic, no scleral icterus, no nasal discharge, mouth moist.  CV:  Regular rate and rhythm, no murmur or JVD.  S1 + S2 noted, no S3 or S4.  LUNGS:  Bibasilar crackles.  Symmetric chest rise on inhalation noted.  ABD:  Active bowel sounds, soft, non-tender/non-distended.  No rebound/guarding/rigidity.  EXT:  + edema.  No cyanosis.  No joint synovitis noted.  SKIN:  Dry to touch, no exanthems noted in the visualized areas.    Medications     Warfarin Therapy Reminder       - MEDICATION INSTRUCTIONS -         cholecalciferol (vitamin D3) tablet 2,000 Units  2,000 Units Oral Daily     diltiazem  180 mg Oral Daily     PARoxetine (PAXIL) tablet 30 mg  30 mg Oral Daily     simvastatin (ZOCOR) tablet 10 mg  10 mg Oral At Bedtime     furosemide  40 mg Intravenous Daily     metoprolol  50 mg Oral BID     Data       Recent Labs  Lab 10/21/17  0735 10/20/17  1724   WBC 6.8 10.0   HGB 12.3 13.8   HCT 38.1 41.9   * 100    249       Recent Labs  Lab 10/21/17  0735 10/20/17  9690     134   POTASSIUM 4.9 4.1   CHLORIDE 104 101   CO2 29 26   ANIONGAP 4 7   * 101*   BUN 13 11   CR 0.82 0.75   GFRESTIMATED 68 75   GFRESTBLACK 82 >90   ALFIE 8.5 8.7     No results for input(s): CULT in the last 168 hours.    Recent Labs  Lab 10/20/17  1724   NTBNPI 7683*     No results for input(s): AST, ALT, GGT, ALKPHOS, BILITOTAL, BILICONJ, BILIDIRECT, BETH in the last 168 hours.    Invalid input(s): BILIRUBININDIRECT    Recent Labs  Lab 10/21/17  0735 10/20/17  1724   INR 1.83* 1.99*     No results for input(s): LACT in the last 168 hours.  No results for input(s): CHOL, HDL, LDL, TRIG, CHOLHDLRATIO in the last 168 hours.    Recent Labs  Lab 10/20/17  1724   TROPI <0.015     No results for input(s): COLOR, APPEARANCE, URINEGLC, URINEBILI, URINEKETONE, SG, UBLD, URINEPH, PROTEIN, UROBILINOGEN, NITRITE, LEUKEST, RBCU, WBCU in the last 168 hours.    Recent Results (from the past 24 hour(s))   XR Chest 2 Views    Narrative    CHEST TWO VIEWS    10/20/2017 6:26 PM     HISTORY: Shortness of breath    COMPARISON: 10/29/2014.    FINDINGS: Elevated right hemidiaphragm, more pronounced than on the  prior study. Blunted right costophrenic angle due to fluid or  thickened pleura, also new. Heart at upper limits normal in size.  There is pulmonary vascular congestion that was not present on the  prior study. No focal infiltrates.      Impression    IMPRESSION:   1. Pulmonary vascular congestion. No focal infiltrate.  2. Elevated right hemidiaphragm with slightly blunted right  costophrenic angle due to fluid or thickened pleura.    DAVID AUSTIN MD

## 2017-10-21 NOTE — PHARMACY-ANTICOAGULATION SERVICE
Clinical Pharmacy - Warfarin Dosing Consult     Pharmacy has been consulted to manage this patient s warfarin therapy.  Indication: Atrial Fibrillation  Therapy Goal: INR 2-3  Warfarin Prior to Admission: Yes  Warfarin PTA Regimen: 2.5 mg MWF and 5 mg ROW  Dose Comments: 2.5mg tonight    INR   Date Value Ref Range Status   10/20/2017 1.99 (H) 0.86 - 1.14 Final   06/21/2014 2.36 (H) 0.86 - 1.14 Final     Pharmacy will monitor Em Richmond daily and order warfarin doses to achieve specified goal.      Please contact pharmacy as soon as possible if the warfarin needs to be held for a procedure or if the warfarin goals change.

## 2017-10-21 NOTE — ED NOTES
[facility]  ED Nurse Handoff Report      ED Chief Complaint:  Shortness of Breath     HPI   Em Richmond is a 77 year old female on bloodthinners who presents to the emergency department today with shortness of breath. The patient reports she had shortness of breath on and off for a while, but it got worse and has been constant since she took a trip to [place] 2 weeks ago. Her symptoms are aggravated with activity and are associated with chest pain under left breast, nausea and loss of appetite, sweats, lightheadedness, chills, non-productive cough for the past month, abdominal pain, diarrhea which has been present for months but gotten worse recently, some left leg swelling, and waking up at night secondary to orthopnea. Patient denies changes in urination, dysuria, hematuria, blood in stool, and smoking. Of note, her daughter says the patient has been forgetful and forgetting words for the past year.    Chief complaint: Shortness of Breath  ED Diagnosis:   Final diagnoses:   Atrial fibrillation with rapid ventricular response (H)   Acute on chronic congestive heart failure, unspecified congestive heart failure type (H)   Atrial fibrillation (H)     Allergies:   Allergies   Allergen Reactions     Sulfa Drugs        Code Status: Full Code  Activity level - Baseline/Home:  Independent. Activity Level - Current:   Stand with Assist. Lift room needed: No. Bariatric: No   Needed: No   Isolation: No. Infection: Not Applicable.     Vital Signs:   Vitals:    10/20/17 1815 10/20/17 1830 10/20/17 1845 10/20/17 1945   BP: (!) 118/91 (!) 121/108 (!) 128/104 (!) 138/91   Pulse:       Resp: 20   13   Temp:       TempSrc:       SpO2: 99%   95%       Cardiac Rhythm:  A-Fib with RVR     Pain level: 0-10 Pain Scale: 0  Patient confused: No. Patient Falls Risk: Yes.   Elimination Status: Has voided   Patient Report - Initial Complaint: Shortness of Breath.   Focused Assessment:    17:45 Cardiac  Cardiac - Cardiac WDL:  WDL except Cardiac Comment: Patient denies any chest pain, but does have shortness of breath.   Review of Systems (Cardiac) - Cardiac Signs/Symptoms: shortness of breath  Chest Pain Assessment - Rating (0-10): 0  Cardiac Monitoring - EKG Monitoring: Yes Cardiac Regularity: Irregular Cardiac Rhythm: Atrial fibrillation MB    17:45 Respiratory Respiratory - Respiratory WDL:  WDL except; all (Patient reports 3 weeks of shortness of breath, worsening, first with activity and now at rest. ) Rhythm/Pattern, Respiratory: shortness of breath reported         Tests Performed: Blood Work, EKG, Chest X-Ray  Abnormal Results:   Labs Ordered and Resulted from Time of ED Arrival Up to the Time of Departure from the ED   BASIC METABOLIC PANEL - Abnormal; Notable for the following:        Result Value    Glucose 101 (*)     All other components within normal limits   NT PROBNP INPATIENT - Abnormal; Notable for the following:     N-Terminal Pro BNP Inpatient 7683 (*)     All other components within normal limits   INR - Abnormal; Notable for the following:     INR 1.99 (*)     All other components within normal limits   CBC WITH PLATELETS DIFFERENTIAL   TROPONIN I   TSH WITH FREE T4 REFLEX   MAGNESIUM   STRICT INTAKE AND OUTPUT     XR Chest 2 Views   Preliminary Result   IMPRESSION:    1. Pulmonary vascular congestion. No focal infiltrate.   2. Elevated right hemidiaphragm with slightly blunted right   costophrenic angle due to fluid or thickened pleura.        Treatments provided: Diltiazem Bolus and Drip, Tylenol, Lasix.  Family Comments: Daughter at Bedside.  OBS brochure/video discussed/provided to patient:  N/A  ED Medications:   Medications   acetaminophen (TYLENOL) tablet 1,000 mg (1,000 mg Oral Given 10/20/17 1839)   diltiazem (CARDIZEM) 125 mg in NaCl 0.9 % 125 mL infusion (5 mg/hr Intravenous New Bag 10/20/17 1938)   diltiazem (CARDIZEM) injection 10 mg (10 mg Intravenous Given 10/20/17 1758)    diltiazem (CARDIZEM) injection 10 mg (10 mg Intravenous Given 10/20/17 1849)   furosemide (LASIX) injection 20 mg (20 mg Intravenous Given 10/20/17 1849)     Drips infusing:  Yes-Diltiazem at 5 mg/hr.   For the majority of the shift, the patient's behavior Green. Interventions performed were N/A.     Severe Sepsis OR Septic Shock Diagnosis Present: No      ED Nurse Name/Phone Number: Mago Mehta,   8:09 PM  RECEIVING UNIT ED HANDOFF REVIEW    Above ED Nurse Handoff Report was reviewed: Yes  Reviewed by: Rhonda Gasca on October 20, 2017 at 8:56 PM

## 2017-10-21 NOTE — PLAN OF CARE
Problem: Patient Care Overview  Goal: Plan of Care/Patient Progress Review  Outcome: No Change  Pt A&Ox4 but forgetful.  VSS. Denied chest pain.  Reported 8-9/10 coccyx pain from prior fall, ordered a coccyx pillow to sit on, received one 5mg OxyIR for that with relief.  Was initially on cardizem gtt at 5mg/hr due to uncont afib. HR trended down into 70s cont afib on tele, blood pressures stable.  Pt did have two IVs infiltrate overnight. Flyers placed new IV. Hospitalist was paged, informed of IV infiltrates and asked if we should continue cardizem drip with HR more controlled.  Dr. Bess gave order to stop cardizem drip and to just monitor HR on telemetry.  See prior note. Pt remains controlled afib in 70/80s.  On 2L NC, not baseline, using in place of Cpap.  DILLON noted.  +4 bowel sounds, last BM on 10/19/17. Up SBA to bathroom.  Plan: needs Echo, IV lasix, montior HR on telemetry, f/u with sleep clinic due to SA and better fitting mask.  Will continue to monitor this patient.

## 2017-10-21 NOTE — PHARMACY-ADMISSION MEDICATION HISTORY
Admission medication history interview status for this patient is complete. See [facility] admission navigator for allergy information, prior to admission medications and immunization status.     Medication history interview source(s):Patient and Family  Medication history resources (including written lists, pill bottles, clinic record):med list  Primary pharmacy:[facility]    Changes made to PTA medication list:  Added: -  Deleted: -  Changed: vit d to 2000 units qd, paxil to 30 mg qd, warfarin 2.5 mg MWF and 5 mg ROW, omeprazole to PRN    Actions taken by pharmacist (provider contacted, etc):None     Additional medication history information:None    Medication reconciliation/reorder completed by provider prior to medication history? No    Do you take OTC medications (eg tylenol, ibuprofen, fish oil, eye/ear drops, etc)? Yes(Y/N)    For patients on insulin therapy: no (Y/N)  Lantus/levemir/NPH/Mix 70/30 dose:   (Y/N) (see Med list for doses)   Sliding scale Novolog Y/N  If Yes, do you have a baseline novolog pre-meal dose:  units with meals  Patients eat three meals a day:   Y/N    How many episodes of hypoglycemia do you have per week: _______  How many missed doses do you have per week: ______  How many times do you check your blood glucose per day: _______   Any Barriers to therapy - Be specific :  cost of medications, comfortable with giving injections (if applicable), comfortable and confident with current diabetes regimen: Y/N ______________      Prior to Admission medications    Medication Sig Last Dose Taking? Auth Provider   Warfarin Sodium (COUMADIN PO) Take 2.5 mg by mouth On MWF and 5 mg ROW 10/19/2017 at Unknown time Yes Reported, Patient   SIMVASTATIN PO Take 10 mg by mouth At Bedtime  10/19/2017 at Unknown time Yes Reported, Patient   PAROXETINE HCL PO Take 30 mg by mouth daily  10/19/2017 at 1400 Yes Reported, Patient   diltiazem (TIAZAC) 180 MG CP24 Take 1 capsule by mouth daily 10/19/2017 at am Yes  Unknown, Entered By History   OMEPRAZOLE PO Take 20 mg by mouth daily as needed   Yes Unknown, Entered By History   MULTIPLE VITAMINS PO Take 1 tablet by mouth daily 10/20/2017 at Unknown time Yes Unknown, Entered By History   Cholecalciferol (VITAMIN D3 PO) Take 2,000 Units by mouth daily  10/20/2017 at Unknown time Yes Unknown, Entered By History   meclizine (ANTIVERT) 12.5 MG tablet Take 1 tablet (12.5 mg) by mouth 3 times daily as needed for dizziness   Anurag Arguello MD

## 2017-10-21 NOTE — PLAN OF CARE
Problem: Patient Care Overview  Goal: Plan of Care/Patient Progress Review  Pain: No c/o pain this shift  LOC: Alert and oriented.  Forgetful at times.  Mobility: SBA  Lungs: DILLON.  97% 2L  Tele: a-fib cvr. Rates in the 80's - 90's  GI: 2 gram NA diet .Had loose stool x1.  Will collect specimen if she has another loose stool.  : voiding without difficulty  IV: PIV right hand saline locked  Other: Continue coumadin, IV lasix, PO cardizem, metoprolol per orders.  Daily weights. Echo done today

## 2017-10-22 LAB
ANION GAP SERPL CALCULATED.3IONS-SCNC: 6 MMOL/L (ref 3–14)
BASOPHILS # BLD AUTO: 0.1 10E9/L (ref 0–0.2)
BASOPHILS NFR BLD AUTO: 0.8 %
BUN SERPL-MCNC: 14 MG/DL (ref 7–30)
CALCIUM SERPL-MCNC: 8.4 MG/DL (ref 8.5–10.1)
CHLORIDE SERPL-SCNC: 103 MMOL/L (ref 94–109)
CO2 SERPL-SCNC: 28 MMOL/L (ref 20–32)
CREAT SERPL-MCNC: 0.75 MG/DL (ref 0.52–1.04)
DIFFERENTIAL METHOD BLD: ABNORMAL
EOSINOPHIL # BLD AUTO: 0.3 10E9/L (ref 0–0.7)
EOSINOPHIL NFR BLD AUTO: 4 %
ERYTHROCYTE [DISTWIDTH] IN BLOOD BY AUTOMATED COUNT: 13.1 % (ref 10–15)
GFR SERPL CREATININE-BSD FRML MDRD: 75 ML/MIN/1.7M2
GLUCOSE SERPL-MCNC: 106 MG/DL (ref 70–99)
HCT VFR BLD AUTO: 38.7 % (ref 35–47)
HGB BLD-MCNC: 12.8 G/DL (ref 11.7–15.7)
IMM GRANULOCYTES # BLD: 0 10E9/L (ref 0–0.4)
IMM GRANULOCYTES NFR BLD: 0.4 %
INR PPP: 1.89 (ref 0.86–1.14)
LYMPHOCYTES # BLD AUTO: 1.6 10E9/L (ref 0.8–5.3)
LYMPHOCYTES NFR BLD AUTO: 19.8 %
MCH RBC QN AUTO: 33.3 PG (ref 26.5–33)
MCHC RBC AUTO-ENTMCNC: 33.1 G/DL (ref 31.5–36.5)
MCV RBC AUTO: 101 FL (ref 78–100)
MONOCYTES # BLD AUTO: 0.6 10E9/L (ref 0–1.3)
MONOCYTES NFR BLD AUTO: 7.1 %
NEUTROPHILS # BLD AUTO: 5.6 10E9/L (ref 1.6–8.3)
NEUTROPHILS NFR BLD AUTO: 67.9 %
NRBC # BLD AUTO: 0 10*3/UL
NRBC BLD AUTO-RTO: 0 /100
PLATELET # BLD AUTO: 202 10E9/L (ref 150–450)
POTASSIUM SERPL-SCNC: 3.8 MMOL/L (ref 3.4–5.3)
RBC # BLD AUTO: 3.84 10E12/L (ref 3.8–5.2)
SODIUM SERPL-SCNC: 137 MMOL/L (ref 133–144)
WBC # BLD AUTO: 8.3 10E9/L (ref 4–11)

## 2017-10-22 PROCEDURE — 12000007 ZZH R&B INTERMEDIATE

## 2017-10-22 PROCEDURE — 99233 SBSQ HOSP IP/OBS HIGH 50: CPT | Performed by: INTERNAL MEDICINE

## 2017-10-22 PROCEDURE — 25000128 H RX IP 250 OP 636: Performed by: INTERNAL MEDICINE

## 2017-10-22 PROCEDURE — 85025 COMPLETE CBC W/AUTO DIFF WBC: CPT | Performed by: INTERNAL MEDICINE

## 2017-10-22 PROCEDURE — 25000132 ZZH RX MED GY IP 250 OP 250 PS 637: Mod: GY | Performed by: INTERNAL MEDICINE

## 2017-10-22 PROCEDURE — 99223 1ST HOSP IP/OBS HIGH 75: CPT | Performed by: INTERNAL MEDICINE

## 2017-10-22 PROCEDURE — A9270 NON-COVERED ITEM OR SERVICE: HCPCS | Mod: GY | Performed by: INTERNAL MEDICINE

## 2017-10-22 PROCEDURE — 36415 COLL VENOUS BLD VENIPUNCTURE: CPT | Performed by: INTERNAL MEDICINE

## 2017-10-22 PROCEDURE — 94660 CPAP INITIATION&MGMT: CPT

## 2017-10-22 PROCEDURE — 40000275 ZZH STATISTIC RCP TIME EA 10 MIN

## 2017-10-22 PROCEDURE — 80048 BASIC METABOLIC PNL TOTAL CA: CPT | Performed by: INTERNAL MEDICINE

## 2017-10-22 PROCEDURE — 85610 PROTHROMBIN TIME: CPT | Performed by: INTERNAL MEDICINE

## 2017-10-22 RX ORDER — CALCIUM CARBONATE 500 MG/1
500-1000 TABLET, CHEWABLE ORAL
Status: DISCONTINUED | OUTPATIENT
Start: 2017-10-22 | End: 2017-10-23

## 2017-10-22 RX ORDER — LISINOPRIL 5 MG/1
5 TABLET ORAL DAILY
Status: DISCONTINUED | OUTPATIENT
Start: 2017-10-22 | End: 2017-10-24

## 2017-10-22 RX ORDER — WARFARIN SODIUM 3 MG/1
6 TABLET ORAL
Status: DISCONTINUED | OUTPATIENT
Start: 2017-10-22 | End: 2017-10-22

## 2017-10-22 RX ADMIN — FUROSEMIDE 40 MG: 10 INJECTION, SOLUTION INTRAVENOUS at 10:06

## 2017-10-22 RX ADMIN — METOPROLOL TARTRATE 50 MG: 50 TABLET, FILM COATED ORAL at 10:06

## 2017-10-22 RX ADMIN — VITAMIN D, TAB 1000IU (100/BT) 2000 UNITS: 25 TAB at 10:06

## 2017-10-22 RX ADMIN — DILTIAZEM HYDROCHLORIDE 180 MG: 180 CAPSULE, EXTENDED RELEASE ORAL at 10:06

## 2017-10-22 RX ADMIN — ACETAMINOPHEN 650 MG: 325 TABLET, FILM COATED ORAL at 15:45

## 2017-10-22 RX ADMIN — PAROXETINE HYDROCHLORIDE 30 MG: 20 TABLET, FILM COATED ORAL at 13:50

## 2017-10-22 RX ADMIN — METOPROLOL TARTRATE 50 MG: 50 TABLET, FILM COATED ORAL at 21:07

## 2017-10-22 RX ADMIN — ASPIRIN 325 MG: 325 TABLET, DELAYED RELEASE ORAL at 17:53

## 2017-10-22 RX ADMIN — SIMVASTATIN 10 MG: 10 TABLET, FILM COATED ORAL at 21:07

## 2017-10-22 ASSESSMENT — ACTIVITIES OF DAILY LIVING (ADL)
ADLS_ACUITY_SCORE: 9

## 2017-10-22 NOTE — PLAN OF CARE
Problem: Patient Care Overview  Goal: Plan of Care/Patient Progress Review  Pain: No c/o pain this shift  LOC: Alert and oriented.  Forgetful at times.  Mobility: SBA  Lungs: DILLON.  98 RA  Tele: a-fib cvr. Rates in the 80's - 90's  GI: 2 gram NA diet  : voiding without difficulty  IV: PIV right hand saline locked  Other: Continue coumadin, IV lasix, lisinopril, PO cardizem, metoprolol per orders.  Daily weights. Cardiology consult today.  Angiogram tomorrow.  Angio video watched.  CHF education given to pt

## 2017-10-22 NOTE — PROGRESS NOTES
"[facility]    Hospitalist Progress Note    Date of Service (when I saw the patient): 10/22/2017    Assessment & Plan   Em Richmond is a 77 year old female who was admitted on 10/20/2017. With progressive dyspnea on exertion    Summary:Dyspnea on exertion, due to systolic heart failure. The cause of the heart failure is not determined at this time  afib with rapid ventricular response, better, INR is still subtherapeutic  Obstructive sleep apnea, the nasal Cpap mask bothers her nose so she doesn't use it. Needs to see her sleep doctor as an outpatient and get that corrected  Chest pain, likely not cardiac  Abdominal pain and \"gas\", could be non-ulcer dyspepsia,      What I did today:  -Reviewed the chart. Did a history and exam. Started Lisinopril, discussed low sodium diet and the need to get the Cpap mask corrected and use the apparatus nightly  Asked cardiology to see her about a repeat non-exercise stress test or an angiogram. Had an angiogram in 2003 and nuclear stress test in 2005, both results archived so not available today.     -    -      DVT Prophylaxis: Warfarin  Code Status: Full Code    Disposition: Expected discharge to be determined once CHF is controlled and the cause of the systolic heart failure is known.    Benito Elizondo MD    Interval History   Mrs. Richmond is a 76 yo woman who has had dyspnea, particularly on exertion, for the last months. She does have some non-descript chest pain under her left breast at times, never with activity, usually in bed. Some edema and two pillow orthopnea or late. History of stress test and cath in 2005 and 2003 respectively. Also has history of JERSON, mask bothers her nose so she doesn't use it.  grocery shops and buys low sodium foods but she likes to add salt at the table. No neuro symptoms other than trouble remembering names.   No head aches,  Occasional abdominal pain for which she takes a PPI prn. Helps. Bowels ok, bladder " frequent, no dysuria.     -Data reviewed today: I reviewed all new labs and imaging results over the last 24 hours. I personally reviewed the chest x-ray image(s) showing pulmonary vascular congestion, heart size normal, elevated right hemidiaphragm with slight blunting of the right CP angle..    Physical Exam   Temp: 97.9  F (36.6  C) Temp src: Oral BP: 141/65   Heart Rate: 105 Resp: 18 SpO2: 98 % O2 Device: None (Room air) Oxygen Delivery: 1 LPM  Vitals:    10/20/17 2317 10/22/17 0600   Weight: 86.7 kg (191 lb 3.2 oz) 85.2 kg (187 lb 12.8 oz)     Vital Signs with Ranges  Temp:  [96.4  F (35.8  C)-98.3  F (36.8  C)] 97.9  F (36.6  C)  Heart Rate:  [] 105  Resp:  [18] 18  BP: (111-141)/(65-94) 141/65  SpO2:  [94 %-99 %] 98 %  I/O last 3 completed shifts:  In: 1010 [P.O.:1010]  Out: 1525 [Urine:1525]    Constitutional: Alert, cooperative , weight down 1.5 kg since admission, I&Os only -500ccs.    Respiratory: Clear to A&P  Cardiovascular: irregularly irregular rhythm, variable S1, normal S2, soft S3, 1 mm of ankle edema. Pulses 2+ at ankles. Rhythm strip shows afib/flutter with controlled rate  GI: not tender to palpation, normal bowel sounds  Skin/Integumen: no obvious rashes.  Other:      Medications     Warfarin Therapy Reminder       - MEDICATION INSTRUCTIONS -         lisinopril  5 mg Oral Daily     cholecalciferol (vitamin D3) tablet 2,000 Units  2,000 Units Oral Daily     diltiazem  180 mg Oral Daily     PARoxetine (PAXIL) tablet 30 mg  30 mg Oral Daily     simvastatin (ZOCOR) tablet 10 mg  10 mg Oral At Bedtime     furosemide  40 mg Intravenous Daily     metoprolol  50 mg Oral BID       Data     Recent Labs  Lab 10/22/17  0716 10/21/17  0735 10/20/17  1724   WBC 8.3 6.8 10.0   HGB 12.8 12.3 13.8   * 102* 100    228 249   INR 1.89* 1.83* 1.99*    137 134   POTASSIUM 3.8 4.9 4.1   CHLORIDE 103 104 101   CO2 28 29 26   BUN 14 13 11   CR 0.75 0.82 0.75   ANIONGAP 6 4 7   ALFIE 8.4* 8.5 8.7    * 115* 101*   TROPI  --   --  <0.015       Imaging:  No results found for this or any previous visit (from the past 24 hour(s)).

## 2017-10-22 NOTE — CONSULTS
Care Transition Initial Assessment - RN    Reason For Consult: care coordination/care conference   Met with: Patient.    DATA   Active Problems:    Atrial fibrillation with rapid ventricular response (H)       Cognitive Status: awake, alert and oriented.  [facility]  Primary Care MD Name: Ar[facility]  Contact information and PCP information verified: Yes    Lives With: spouse     Insurance concerns: No Insurance issues identified    ASSESSMENT  Patient currently receives the following services:  NONE       Identified issues/concerns regarding health management: CPap mash replacement or fit for a new one, we discussed the 1800# on her machine and how she can order online or ask if they have home assessment rover that might be able to come out and fit her for a mask.    CHF teaching completed on action plan, patient wgts are done daily, watches a low salt diet and feels like the plan will be helpful.  We spoke briefly about medicaiton and she doesn't take a diuretic at this time.  We talked about CORE and why it might be helpful in the future.     is going to bring in her Cpap to see if our mask will fit her machine.        PLAN  Financial costs for the patient include NONE .  Patient given options and choices for discharge NONE .  Patient/family is agreeable to the plan?  Yes: HOME  Patient anticipates discharging to HOME .        Patient anticipates needs for home equipment: No  Discharge Planner   Appointments: Dr. Leger with [facility] in [place] Oct. 30th @1863.    Care  (CTS) will continue to follow as needed.    Katy Juarez RN BSN   Float Pool RN  RN Care Coordinator  [facility]   [phone]

## 2017-10-22 NOTE — PROGRESS NOTES
RT Note:    Patient wore CPAP until around 0200 then requested to keep off per RN.    Vilma Conner, RT 10/22/2017, 3:31 AM

## 2017-10-22 NOTE — PLAN OF CARE
Problem: Patient Care Overview  Goal: Plan of Care/Patient Progress Review  Outcome: Improving  A&O, VSS, up with SBA, Tele Afib CVR, HR 70-80, on oral Diltiazem and Metoprolol, prn Tylenol for generalized pain, CPAP @hs. Pt sl confused over night. Woke up wondering where she was, stated that she was looking for the kitchen.  Pt oriented on further assessment, she attributes to sleeping hard. Pt removed BIPAP around 130 am, refused to wear again.

## 2017-10-22 NOTE — CONSULTS
[facility]    Cardiology Consultation     Date of Admission:  10/20/2017    Assessment & Plan   Em Richmond is a 77 year old female who was admitted on 10/20/2017.    Impression  1. Rapid atrial fibrillation  At this time, patient noticed that her pulse rate was high and was more symptomatic with shortness of breath over the last few months. She had stopped her metoprolol as blood pressures were running low. Now with intravenous diltiazem bolus and oral diltiazem as well as reinitiation of metoprolol,  pulse rate has improved. She is on Coumadin but INR is subtherapeutic. I would hold off Coumadin today as we will plan for coronary angiography tomorrow for ruling out coronary artery disease. See discussion below. Continue to titrate metoprolol and diltiazem for better rate control although if EF continues to be low, diltiazem would not be an ideal rate control medication for her.    2. New cardiomyopathy  This was ordered an echo done yesterday. Ejection fraction is 30-35%. I think it is reasonable to do a coronary angiography to rule out severe coronary artery disease.Risks and benefits of left heart catheterization and coronary angiogram were discussed with the patient in detail. 0.1-0.3% (for diagnostic angio) and 1-2% (for PCI)  risk of stroke, MI, death, emergent bypass for diagnostic angio, risk of contrast induced allergic reaction, renal dysfunction, vascular complications were discussed. Pros and cons of bare metal Vs drug eluting stents was discussed. Patient understands and wishes to proceed with it.  Lisinopril has been added to her regimen. This should be titrated as able. Importance of salt and fluid restriction was discussed to decrease symptoms of heart failure.  We should consider right heart catheterization given evidence of pulmonary hypertension on echo.The risks and benefits of right heart catheterization with or without vasodilator study including risks of (0.1% -0.3%)  bleeding, infection, death, arrhythmias, pulmonary artery rupture, etc were discussed with patient and he agrees to proceed with it. Initial troponin was negative this admission.      3. Acute systolic heart failure  On intravenous Lasix. Switch to by mouth tomorrow. N-terminal proBNP and chest x-ray showed pulmonary congestion. There is also evidence of right hemidiaphragm elevation which may quantitate due to chronic shortness of breath. Eventually, she will be better off with a long-acting metoprolol than metoprolol tartrate.  She should also benefit from outpatient PFTs to assess for any underlying lung parenchymal issues and see the effects of elevated right hemidiaphragm.    Adi Romeo MD    Primary Care Physician   Arielle Leger    Reason for Consult   Reason for consult: I was asked by hospitalist to evaluate this patient for new cardiomyopathy and rapid atrial fibrillation.    History of Present Illness   Em Richmond is a 77 year old female with PMH including afib on warfarin, JERSON not regularly using cpap, pulmonary HTN, anxiety, and HTN who presents with shortness of breath.    She has been having dyspnea on exertion and fatigue for the past 5 years and increased forward last 3 months at least.  She has not been using cpap for at least this long because the mask does not fit right and she is a side sleeper.  She went to [place] a couple weeks ago and felt sob there.  This has worsened since coming home.  She has noticed that for a few days her pulse on her BP cuff is reading 140-150.  She does have a sharp stabbing pain under her L breast, but this occurs at nighttime and not with exertion.   Because of the symptoms, she came the emergency room and was noted with atrial fibrillation with RVR with heart rates in 140-150 range. Earlier this year, she has stopped her metoprolol as upper pressures were somewhat low. She is continued on diltiazem.    She has elevated BNP and crackles  on exam along with LE edema. in the emergency room, she was given 20mg IV lasix.  Also started on diltiazem gtt. Admit to inpatient for IV diuresis and HR control.    She has had no previous coronary angiography. An echocardiogram in 2014 showed normal LV systolic function. I reviewed her care everywhere and there were 2 echocardiograms from [facility] that showed severe pulmonary hypertension and one of them was done in [place], [place]. However, she claims that she's never been to [facility] or had echo at [facility]. This appears to be some error.    She is on Coumadin but her INR today is 1.8 and subtherapeutic. She is taking Coumadin for at least 10 years.    Patient Active Problem List   Diagnosis     Atrial fibrillation (H)     Syncope     Atrial fibrillation with rapid ventricular response (H)   Hypertension  Sleep apnea  GERD  Vertigo    Past Medical History   I have reviewed this patient's medical history and updated it with pertinent information if needed.   Past Medical History:   Diagnosis Date     Atrial fibrillation (H)        Past Surgical History   No pertinent past surgical history.    Prior to Admission Medications   Prior to Admission Medications   Prescriptions Last Dose Informant Patient Reported? Taking?   Cholecalciferol (VITAMIN D3 PO) 10/20/2017 at Unknown time  Yes Yes   Sig: Take 2,000 Units by mouth daily    MULTIPLE VITAMINS PO 10/20/2017 at Unknown time  Yes Yes   Sig: Take 1 tablet by mouth daily   OMEPRAZOLE PO   Yes Yes   Sig: Take 20 mg by mouth daily as needed    PAROXETINE HCL PO 10/19/2017 at 1400  Yes Yes   Sig: Take 30 mg by mouth daily    SIMVASTATIN PO 10/19/2017 at Unknown time  Yes Yes   Sig: Take 10 mg by mouth At Bedtime    Warfarin Sodium (COUMADIN PO) 10/19/2017 at Unknown time  Yes Yes   Sig: Take 2.5 mg by mouth On MWF and 5 mg ROW   diltiazem (TIAZAC) 180 MG CP24 10/19/2017 at am  Yes Yes   Sig: Take 1 capsule by mouth daily   meclizine (ANTIVERT) 12.5 MG  "tablet   No Yes   Sig: Take 1 tablet (12.5 mg) by mouth 3 times daily as needed for dizziness      Facility-Administered Medications: None     Current Facility-Administered Medications   Medication Dose Route Frequency     lisinopril  5 mg Oral Daily     warfarin  6 mg Oral ONCE at 18:00     cholecalciferol (vitamin D3) tablet 2,000 Units  2,000 Units Oral Daily     diltiazem  180 mg Oral Daily     PARoxetine (PAXIL) tablet 30 mg  30 mg Oral Daily     simvastatin (ZOCOR) tablet 10 mg  10 mg Oral At Bedtime     furosemide  40 mg Intravenous Daily     metoprolol  50 mg Oral BID     Current Facility-Administered Medications   Medication Last Rate     Warfarin Therapy Reminder       - MEDICATION INSTRUCTIONS -       Allergies   Allergies   Allergen Reactions     Sulfa Drugs        Social History     smoked for a few years more than 30 years ago.    Family History   Mother had atrial fibrillation    Review of Systems   The comprehensive 10 point Review of Systems is negative other than noted in the HPI or here.     Physical Exam   Vital Signs with Ranges  Temp:  [96.4  F (35.8  C)-98.3  F (36.8  C)] 97.9  F (36.6  C)  Heart Rate:  [] 86  Resp:  [18] 18  BP: (102-141)/(61-94) 102/61  SpO2:  [94 %-99 %] 95 %  Wt Readings from Last 4 Encounters:   10/22/17 85.2 kg (187 lb 12.8 oz)   06/20/14 83.9 kg (185 lb)     I/O last 3 completed shifts:  In: 1010 [P.O.:1010]  Out: 1525 [Urine:1525]      Vitals: /61  Pulse 113  Temp 97.9  F (36.6  C) (Oral)  Resp 18  Ht 1.651 m (5' 5\")  Wt 85.2 kg (187 lb 12.8 oz)  SpO2 95%  BMI 31.25 kg/m2    Constitutional:   alert     Eyes:   extra-ocular muscles intact     ENT:   normocepalic, without obvious abnormality     Neck:   jugular venous distention present 10 cm above sternal notch     Hematologic / Lymphatic:   no cervical lymphadenopathy     Lungs:   clear to auscultation     Cardiovascular:   irregularly irregular rhythm and no murmur noted     Abdomen:   soft and " non-distended     Musculoskeletal:   full range of motion noted     Neurologic:   Motor Exam:  moves all extremities well and symmetrically     Neuropsychiatric:   Orientation: oriented to self, place, time and situation         Recent Labs  Lab 10/20/17  1724   TROPI <0.015         Recent Labs  Lab 10/22/17  0716 10/21/17  0735 10/20/17  1724   WBC 8.3 6.8 10.0   HGB 12.8 12.3 13.8   * 102* 100    228 249   INR 1.89* 1.83* 1.99*    137 134   POTASSIUM 3.8 4.9 4.1   CHLORIDE 103 104 101   CO2 28 29 26   BUN 14 13 11   CR 0.75 0.82 0.75   GFRESTIMATED 75 68 75   GFRESTBLACK >90 82 >90   ANIONGAP 6 4 7   ALFIE 8.4* 8.5 8.7   * 115* 101*   TROPI  --   --  <0.015     No results for input(s): CHOL, HDL, LDL, TRIG, CHOLHDLRATIO in the last 30612 hours.    Recent Labs  Lab 10/22/17  0716 10/21/17  0735 10/20/17  1724   WBC 8.3 6.8 10.0   HGB 12.8 12.3 13.8   HCT 38.7 38.1 41.9   * 102* 100    228 249     No results for input(s): PH, PHV, PO2, PO2V, SAT, PCO2, PCO2V, HCO3, HCO3V in the last 168 hours.    Recent Labs  Lab 10/20/17  1724   NTBNPI [id number]*     No results for input(s): DD in the last 168 hours.  No results for input(s): SED, CRP in the last 168 hours.    Recent Labs  Lab 10/22/17  0716 10/21/17  0735 10/20/17  1724    228 249       Recent Labs  Lab 10/20/17  1724   TSH 2.32     No results for input(s): COLOR, APPEARANCE, URINEGLC, URINEBILI, URINEKETONE, SG, UBLD, URINEPH, PROTEIN, UROBILINOGEN, NITRITE, LEUKEST, RBCU, WBCU in the last 168 hours.    Imaging:  Recent Results (from the past 48 hour(s))   XR Chest 2 Views    Narrative    CHEST TWO VIEWS    10/20/2017 6:26 PM     HISTORY: Shortness of breath    COMPARISON: 10/29/2014.    FINDINGS: Elevated right hemidiaphragm, more pronounced than on the  prior study. Blunted right costophrenic angle due to fluid or  thickened pleura, also new. Heart at upper limits normal in size.  There is pulmonary vascular congestion  that was not present on the  prior study. No focal infiltrates.      Impression    IMPRESSION:   1. Pulmonary vascular congestion. No focal infiltrate.  2. Elevated right hemidiaphragm with slightly blunted right  costophrenic angle due to fluid or thickened pleura.    DAVID AUSTIN MD       Echo:  No results found for this or any previous visit (from the past 4320 hour(s)).     EKG was reviewed by me and revealed atrial fibrillation with rapid ventricular rate.

## 2017-10-22 NOTE — PLAN OF CARE
Problem: Patient Care Overview  Goal: Plan of Care/Patient Progress Review  A&O, VSS, up with SBA, Tele Afib CVR, HR mid 80's, on oral Diltiazem and Metoprolol, prn Tylenol for generalized pain, CPAP @hs, Troponin negative, will continue with POC.

## 2017-10-23 ENCOUNTER — APPOINTMENT (OUTPATIENT)
Dept: CARDIOLOGY | Facility: CLINIC | Age: 77
DRG: 286 | End: 2017-10-23
Attending: INTERNAL MEDICINE
Payer: MEDICARE

## 2017-10-23 LAB
ANION GAP SERPL CALCULATED.3IONS-SCNC: 7 MMOL/L (ref 3–14)
BUN SERPL-MCNC: 20 MG/DL (ref 7–30)
CALCIUM SERPL-MCNC: 8.9 MG/DL (ref 8.5–10.1)
CHLORIDE SERPL-SCNC: 103 MMOL/L (ref 94–109)
CO2 BLDCOV-SCNC: 28 MMOL/L (ref 21–28)
CO2 SERPL-SCNC: 28 MMOL/L (ref 20–32)
CREAT SERPL-MCNC: 0.8 MG/DL (ref 0.52–1.04)
GFR SERPL CREATININE-BSD FRML MDRD: 70 ML/MIN/1.7M2
GLUCOSE SERPL-MCNC: 95 MG/DL (ref 70–99)
INR PPP: 1.74 (ref 0.86–1.14)
INTERPRETATION ECG - MUSE: NORMAL
LACTATE BLD-SCNC: 0.5 MMOL/L (ref 0.7–2.1)
PCO2 BLDV: 44 MM HG (ref 40–50)
PH BLDV: 7.41 PH (ref 7.32–7.43)
PO2 BLDV: 32 MM HG (ref 25–47)
POTASSIUM SERPL-SCNC: 4.2 MMOL/L (ref 3.4–5.3)
SAO2 % BLDV FROM PO2: 61 %
SODIUM SERPL-SCNC: 138 MMOL/L (ref 133–144)

## 2017-10-23 PROCEDURE — 93460 R&L HRT ART/VENTRICLE ANGIO: CPT

## 2017-10-23 PROCEDURE — A9270 NON-COVERED ITEM OR SERVICE: HCPCS | Mod: GY | Performed by: INTERNAL MEDICINE

## 2017-10-23 PROCEDURE — 25000128 H RX IP 250 OP 636: Performed by: INTERNAL MEDICINE

## 2017-10-23 PROCEDURE — 25000132 ZZH RX MED GY IP 250 OP 250 PS 637: Mod: GY | Performed by: INTERNAL MEDICINE

## 2017-10-23 PROCEDURE — 12000007 ZZH R&B INTERMEDIATE

## 2017-10-23 PROCEDURE — 99152 MOD SED SAME PHYS/QHP 5/>YRS: CPT | Mod: 59 | Performed by: INTERNAL MEDICINE

## 2017-10-23 PROCEDURE — 27210946 ZZH KIT HC TOTES DISP CR8

## 2017-10-23 PROCEDURE — 25000132 ZZH RX MED GY IP 250 OP 250 PS 637: Mod: GY | Performed by: NURSE PRACTITIONER

## 2017-10-23 PROCEDURE — 80048 BASIC METABOLIC PNL TOTAL CA: CPT | Performed by: INTERNAL MEDICINE

## 2017-10-23 PROCEDURE — 36415 COLL VENOUS BLD VENIPUNCTURE: CPT | Performed by: INTERNAL MEDICINE

## 2017-10-23 PROCEDURE — 27210856 ZZH ACCESS HEART CATH CR2

## 2017-10-23 PROCEDURE — 25000125 ZZHC RX 250: Performed by: INTERNAL MEDICINE

## 2017-10-23 PROCEDURE — C1769 GUIDE WIRE: HCPCS

## 2017-10-23 PROCEDURE — 93460 R&L HRT ART/VENTRICLE ANGIO: CPT | Mod: 26 | Performed by: INTERNAL MEDICINE

## 2017-10-23 PROCEDURE — 93005 ELECTROCARDIOGRAM TRACING: CPT

## 2017-10-23 PROCEDURE — 27210741 ZZH BALLOON TIP PRESSURE CR6

## 2017-10-23 PROCEDURE — C1894 INTRO/SHEATH, NON-LASER: HCPCS

## 2017-10-23 PROCEDURE — 99232 SBSQ HOSP IP/OBS MODERATE 35: CPT | Mod: 25 | Performed by: INTERNAL MEDICINE

## 2017-10-23 PROCEDURE — 25000128 H RX IP 250 OP 636

## 2017-10-23 PROCEDURE — 85610 PROTHROMBIN TIME: CPT | Performed by: INTERNAL MEDICINE

## 2017-10-23 PROCEDURE — 82803 BLOOD GASES ANY COMBINATION: CPT

## 2017-10-23 PROCEDURE — 27210827 ZZH KIT ACIST INJECTOR CR6

## 2017-10-23 PROCEDURE — 40000275 ZZH STATISTIC RCP TIME EA 10 MIN

## 2017-10-23 PROCEDURE — 99233 SBSQ HOSP IP/OBS HIGH 50: CPT | Performed by: INTERNAL MEDICINE

## 2017-10-23 PROCEDURE — 27210807 ZZH SHEATH CR6

## 2017-10-23 PROCEDURE — 27210742 ZZH CATH CR1

## 2017-10-23 PROCEDURE — 4A023N8 MEASUREMENT OF CARDIAC SAMPLING AND PRESSURE, BILATERAL, PERCUTANEOUS APPROACH: ICD-10-PCS | Performed by: INTERNAL MEDICINE

## 2017-10-23 PROCEDURE — 94660 CPAP INITIATION&MGMT: CPT

## 2017-10-23 PROCEDURE — 93010 ELECTROCARDIOGRAM REPORT: CPT | Performed by: INTERNAL MEDICINE

## 2017-10-23 PROCEDURE — 99152 MOD SED SAME PHYS/QHP 5/>YRS: CPT

## 2017-10-23 PROCEDURE — 99153 MOD SED SAME PHYS/QHP EA: CPT

## 2017-10-23 PROCEDURE — 83605 ASSAY OF LACTIC ACID: CPT

## 2017-10-23 PROCEDURE — A9270 NON-COVERED ITEM OR SERVICE: HCPCS | Mod: GY | Performed by: NURSE PRACTITIONER

## 2017-10-23 PROCEDURE — B2111ZZ FLUOROSCOPY OF MULTIPLE CORONARY ARTERIES USING LOW OSMOLAR CONTRAST: ICD-10-PCS | Performed by: INTERNAL MEDICINE

## 2017-10-23 RX ORDER — FUROSEMIDE 40 MG
40 TABLET ORAL DAILY
Status: DISCONTINUED | OUTPATIENT
Start: 2017-10-24 | End: 2017-10-24

## 2017-10-23 RX ORDER — POTASSIUM CHLORIDE 7.45 MG/ML
10 INJECTION INTRAVENOUS
Status: DISCONTINUED | OUTPATIENT
Start: 2017-10-23 | End: 2017-10-23 | Stop reason: HOSPADM

## 2017-10-23 RX ORDER — PROMETHAZINE HYDROCHLORIDE 25 MG/ML
6.25-25 INJECTION, SOLUTION INTRAMUSCULAR; INTRAVENOUS EVERY 4 HOURS PRN
Status: DISCONTINUED | OUTPATIENT
Start: 2017-10-23 | End: 2017-10-23 | Stop reason: HOSPADM

## 2017-10-23 RX ORDER — PROTAMINE SULFATE 10 MG/ML
25-100 INJECTION, SOLUTION INTRAVENOUS EVERY 5 MIN PRN
Status: DISCONTINUED | OUTPATIENT
Start: 2017-10-23 | End: 2017-10-23 | Stop reason: HOSPADM

## 2017-10-23 RX ORDER — LIDOCAINE HYDROCHLORIDE 10 MG/ML
1-10 INJECTION, SOLUTION INFILTRATION; PERINEURAL
Status: COMPLETED | OUTPATIENT
Start: 2017-10-23 | End: 2017-10-23

## 2017-10-23 RX ORDER — NITROGLYCERIN 5 MG/ML
100-500 VIAL (ML) INTRAVENOUS
Status: COMPLETED | OUTPATIENT
Start: 2017-10-23 | End: 2017-10-23

## 2017-10-23 RX ORDER — NITROGLYCERIN 0.4 MG/1
0.4 TABLET SUBLINGUAL EVERY 5 MIN PRN
Status: DISCONTINUED | OUTPATIENT
Start: 2017-10-23 | End: 2017-10-23 | Stop reason: HOSPADM

## 2017-10-23 RX ORDER — EPINEPHRINE 1 MG/ML
0.3 INJECTION, SOLUTION, CONCENTRATE INTRAVENOUS
Status: DISCONTINUED | OUTPATIENT
Start: 2017-10-23 | End: 2017-10-23 | Stop reason: HOSPADM

## 2017-10-23 RX ORDER — ASPIRIN 81 MG/1
81-324 TABLET, CHEWABLE ORAL
Status: DISCONTINUED | OUTPATIENT
Start: 2017-10-23 | End: 2017-10-23 | Stop reason: HOSPADM

## 2017-10-23 RX ORDER — CLOPIDOGREL BISULFATE 75 MG/1
75 TABLET ORAL
Status: DISCONTINUED | OUTPATIENT
Start: 2017-10-23 | End: 2017-10-23 | Stop reason: HOSPADM

## 2017-10-23 RX ORDER — POTASSIUM CHLORIDE 1500 MG/1
20 TABLET, EXTENDED RELEASE ORAL
Status: DISCONTINUED | OUTPATIENT
Start: 2017-10-23 | End: 2017-10-23 | Stop reason: HOSPADM

## 2017-10-23 RX ORDER — NITROGLYCERIN 20 MG/100ML
.07-2 INJECTION INTRAVENOUS CONTINUOUS PRN
Status: DISCONTINUED | OUTPATIENT
Start: 2017-10-23 | End: 2017-10-23 | Stop reason: HOSPADM

## 2017-10-23 RX ORDER — HEPARIN SODIUM 1000 [USP'U]/ML
1000-10000 INJECTION, SOLUTION INTRAVENOUS; SUBCUTANEOUS EVERY 5 MIN PRN
Status: DISCONTINUED | OUTPATIENT
Start: 2017-10-23 | End: 2017-10-23 | Stop reason: HOSPADM

## 2017-10-23 RX ORDER — NALOXONE HYDROCHLORIDE 0.4 MG/ML
0.4 INJECTION, SOLUTION INTRAMUSCULAR; INTRAVENOUS; SUBCUTANEOUS EVERY 5 MIN PRN
Status: DISCONTINUED | OUTPATIENT
Start: 2017-10-23 | End: 2017-10-23 | Stop reason: HOSPADM

## 2017-10-23 RX ORDER — PHENYLEPHRINE HCL IN 0.9% NACL 1 MG/10 ML
20-100 SYRINGE (ML) INTRAVENOUS
Status: DISCONTINUED | OUTPATIENT
Start: 2017-10-23 | End: 2017-10-23 | Stop reason: HOSPADM

## 2017-10-23 RX ORDER — CLOPIDOGREL 300 MG/1
300-600 TABLET, FILM COATED ORAL
Status: DISCONTINUED | OUTPATIENT
Start: 2017-10-23 | End: 2017-10-23 | Stop reason: HOSPADM

## 2017-10-23 RX ORDER — SODIUM CHLORIDE 9 MG/ML
INJECTION, SOLUTION INTRAVENOUS CONTINUOUS
Status: DISCONTINUED | OUTPATIENT
Start: 2017-10-23 | End: 2017-10-24

## 2017-10-23 RX ORDER — DEXTROSE MONOHYDRATE 25 G/50ML
12.5-5 INJECTION, SOLUTION INTRAVENOUS EVERY 30 MIN PRN
Status: DISCONTINUED | OUTPATIENT
Start: 2017-10-23 | End: 2017-10-23 | Stop reason: HOSPADM

## 2017-10-23 RX ORDER — ASPIRIN 81 MG/1
81 TABLET ORAL DAILY
Status: DISCONTINUED | OUTPATIENT
Start: 2017-10-24 | End: 2017-10-25 | Stop reason: HOSPADM

## 2017-10-23 RX ORDER — ASPIRIN 325 MG
325 TABLET ORAL
Status: DISCONTINUED | OUTPATIENT
Start: 2017-10-23 | End: 2017-10-23 | Stop reason: HOSPADM

## 2017-10-23 RX ORDER — DOPAMINE HYDROCHLORIDE 160 MG/100ML
2-20 INJECTION, SOLUTION INTRAVENOUS CONTINUOUS PRN
Status: DISCONTINUED | OUTPATIENT
Start: 2017-10-23 | End: 2017-10-23 | Stop reason: HOSPADM

## 2017-10-23 RX ORDER — LORAZEPAM 0.5 MG/1
0.5 TABLET ORAL
Status: DISCONTINUED | OUTPATIENT
Start: 2017-10-23 | End: 2017-10-23 | Stop reason: HOSPADM

## 2017-10-23 RX ORDER — HEPARIN SODIUM 1000 [USP'U]/ML
INJECTION, SOLUTION INTRAVENOUS; SUBCUTANEOUS
Status: COMPLETED
Start: 2017-10-23 | End: 2017-10-23

## 2017-10-23 RX ORDER — WARFARIN SODIUM 2.5 MG/1
2.5 TABLET ORAL
Status: COMPLETED | OUTPATIENT
Start: 2017-10-23 | End: 2017-10-23

## 2017-10-23 RX ORDER — LIDOCAINE 40 MG/G
CREAM TOPICAL
Status: DISCONTINUED | OUTPATIENT
Start: 2017-10-23 | End: 2017-10-23 | Stop reason: HOSPADM

## 2017-10-23 RX ORDER — VERAPAMIL HYDROCHLORIDE 2.5 MG/ML
INJECTION, SOLUTION INTRAVENOUS
Status: DISCONTINUED
Start: 2017-10-23 | End: 2017-10-23 | Stop reason: HOSPADM

## 2017-10-23 RX ORDER — LIDOCAINE HYDROCHLORIDE 10 MG/ML
30 INJECTION, SOLUTION EPIDURAL; INFILTRATION; INTRACAUDAL; PERINEURAL
Status: DISCONTINUED | OUTPATIENT
Start: 2017-10-23 | End: 2017-10-23 | Stop reason: HOSPADM

## 2017-10-23 RX ORDER — VERAPAMIL HYDROCHLORIDE 2.5 MG/ML
1-2.5 INJECTION, SOLUTION INTRAVENOUS
Status: COMPLETED | OUTPATIENT
Start: 2017-10-23 | End: 2017-10-23

## 2017-10-23 RX ORDER — PROTAMINE SULFATE 10 MG/ML
1-5 INJECTION, SOLUTION INTRAVENOUS
Status: DISCONTINUED | OUTPATIENT
Start: 2017-10-23 | End: 2017-10-23 | Stop reason: HOSPADM

## 2017-10-23 RX ORDER — ONDANSETRON 2 MG/ML
4 INJECTION INTRAMUSCULAR; INTRAVENOUS EVERY 4 HOURS PRN
Status: DISCONTINUED | OUTPATIENT
Start: 2017-10-23 | End: 2017-10-23 | Stop reason: HOSPADM

## 2017-10-23 RX ORDER — CALCIUM CARBONATE 500 MG/1
500-1000 TABLET, CHEWABLE ORAL
Status: DISCONTINUED | OUTPATIENT
Start: 2017-10-23 | End: 2017-10-25 | Stop reason: HOSPADM

## 2017-10-23 RX ORDER — NALOXONE HYDROCHLORIDE 0.4 MG/ML
.1-.4 INJECTION, SOLUTION INTRAMUSCULAR; INTRAVENOUS; SUBCUTANEOUS
Status: DISCONTINUED | OUTPATIENT
Start: 2017-10-23 | End: 2017-10-25 | Stop reason: HOSPADM

## 2017-10-23 RX ORDER — ADENOSINE 3 MG/ML
12-12000 INJECTION, SOLUTION INTRAVENOUS
Status: DISCONTINUED | OUTPATIENT
Start: 2017-10-23 | End: 2017-10-23 | Stop reason: HOSPADM

## 2017-10-23 RX ORDER — DIPHENHYDRAMINE HYDROCHLORIDE 50 MG/ML
25-50 INJECTION INTRAMUSCULAR; INTRAVENOUS
Status: DISCONTINUED | OUTPATIENT
Start: 2017-10-23 | End: 2017-10-23 | Stop reason: HOSPADM

## 2017-10-23 RX ORDER — METHYLPREDNISOLONE SODIUM SUCCINATE 125 MG/2ML
125 INJECTION, POWDER, LYOPHILIZED, FOR SOLUTION INTRAMUSCULAR; INTRAVENOUS
Status: DISCONTINUED | OUTPATIENT
Start: 2017-10-23 | End: 2017-10-23 | Stop reason: HOSPADM

## 2017-10-23 RX ORDER — FENTANYL CITRATE 50 UG/ML
INJECTION, SOLUTION INTRAMUSCULAR; INTRAVENOUS
Status: DISCONTINUED
Start: 2017-10-23 | End: 2017-10-23 | Stop reason: HOSPADM

## 2017-10-23 RX ORDER — HEPARIN SODIUM 1000 [USP'U]/ML
INJECTION, SOLUTION INTRAVENOUS; SUBCUTANEOUS
Status: DISCONTINUED
Start: 2017-10-23 | End: 2017-10-23 | Stop reason: HOSPADM

## 2017-10-23 RX ORDER — NIFEDIPINE 10 MG/1
10 CAPSULE ORAL
Status: DISCONTINUED | OUTPATIENT
Start: 2017-10-23 | End: 2017-10-23 | Stop reason: HOSPADM

## 2017-10-23 RX ORDER — METOPROLOL TARTRATE 1 MG/ML
5 INJECTION, SOLUTION INTRAVENOUS EVERY 5 MIN PRN
Status: DISCONTINUED | OUTPATIENT
Start: 2017-10-23 | End: 2017-10-23 | Stop reason: HOSPADM

## 2017-10-23 RX ORDER — ATORVASTATIN CALCIUM 40 MG/1
40 TABLET, FILM COATED ORAL DAILY
Status: DISCONTINUED | OUTPATIENT
Start: 2017-10-23 | End: 2017-10-25 | Stop reason: HOSPADM

## 2017-10-23 RX ORDER — NICARDIPINE HYDROCHLORIDE 2.5 MG/ML
100 INJECTION INTRAVENOUS
Status: DISCONTINUED | OUTPATIENT
Start: 2017-10-23 | End: 2017-10-23 | Stop reason: HOSPADM

## 2017-10-23 RX ORDER — PRASUGREL 10 MG/1
10-60 TABLET, FILM COATED ORAL
Status: DISCONTINUED | OUTPATIENT
Start: 2017-10-23 | End: 2017-10-23 | Stop reason: HOSPADM

## 2017-10-23 RX ORDER — LIDOCAINE 40 MG/G
CREAM TOPICAL
Status: DISCONTINUED | OUTPATIENT
Start: 2017-10-23 | End: 2017-10-25 | Stop reason: HOSPADM

## 2017-10-23 RX ORDER — LORAZEPAM 2 MG/ML
0.5 INJECTION INTRAMUSCULAR
Status: DISCONTINUED | OUTPATIENT
Start: 2017-10-23 | End: 2017-10-23 | Stop reason: HOSPADM

## 2017-10-23 RX ORDER — MORPHINE SULFATE 2 MG/ML
1-2 INJECTION, SOLUTION INTRAMUSCULAR; INTRAVENOUS EVERY 5 MIN PRN
Status: DISCONTINUED | OUTPATIENT
Start: 2017-10-23 | End: 2017-10-23 | Stop reason: HOSPADM

## 2017-10-23 RX ORDER — BUPIVACAINE HYDROCHLORIDE 2.5 MG/ML
1-10 INJECTION, SOLUTION EPIDURAL; INFILTRATION; INTRACAUDAL
Status: DISCONTINUED | OUTPATIENT
Start: 2017-10-23 | End: 2017-10-23 | Stop reason: HOSPADM

## 2017-10-23 RX ORDER — ENALAPRILAT 1.25 MG/ML
1.25-2.5 INJECTION INTRAVENOUS
Status: DISCONTINUED | OUTPATIENT
Start: 2017-10-23 | End: 2017-10-23 | Stop reason: HOSPADM

## 2017-10-23 RX ORDER — NITROGLYCERIN 5 MG/ML
100-200 VIAL (ML) INTRAVENOUS
Status: DISCONTINUED | OUTPATIENT
Start: 2017-10-23 | End: 2017-10-23 | Stop reason: HOSPADM

## 2017-10-23 RX ORDER — FLUMAZENIL 0.1 MG/ML
0.2 INJECTION, SOLUTION INTRAVENOUS
Status: ACTIVE | OUTPATIENT
Start: 2017-10-23 | End: 2017-10-24

## 2017-10-23 RX ORDER — NITROGLYCERIN 5 MG/ML
VIAL (ML) INTRAVENOUS
Status: DISCONTINUED
Start: 2017-10-23 | End: 2017-10-23 | Stop reason: HOSPADM

## 2017-10-23 RX ORDER — NALOXONE HYDROCHLORIDE 0.4 MG/ML
.2-.4 INJECTION, SOLUTION INTRAMUSCULAR; INTRAVENOUS; SUBCUTANEOUS
Status: ACTIVE | OUTPATIENT
Start: 2017-10-23 | End: 2017-10-24

## 2017-10-23 RX ORDER — FENTANYL CITRATE 50 UG/ML
25-50 INJECTION, SOLUTION INTRAMUSCULAR; INTRAVENOUS
Status: DISCONTINUED | OUTPATIENT
Start: 2017-10-23 | End: 2017-10-23 | Stop reason: HOSPADM

## 2017-10-23 RX ORDER — SODIUM NITROPRUSSIDE 25 MG/ML
100-200 INJECTION INTRAVENOUS
Status: DISCONTINUED | OUTPATIENT
Start: 2017-10-23 | End: 2017-10-23 | Stop reason: HOSPADM

## 2017-10-23 RX ORDER — ATROPINE SULFATE 0.1 MG/ML
0.5 INJECTION INTRAVENOUS EVERY 5 MIN PRN
Status: ACTIVE | OUTPATIENT
Start: 2017-10-23 | End: 2017-10-24

## 2017-10-23 RX ORDER — FENTANYL CITRATE 50 UG/ML
25-50 INJECTION, SOLUTION INTRAMUSCULAR; INTRAVENOUS
Status: ACTIVE | OUTPATIENT
Start: 2017-10-23 | End: 2017-10-24

## 2017-10-23 RX ORDER — LORAZEPAM 2 MG/ML
.5-2 INJECTION INTRAMUSCULAR EVERY 4 HOURS PRN
Status: DISCONTINUED | OUTPATIENT
Start: 2017-10-23 | End: 2017-10-23 | Stop reason: HOSPADM

## 2017-10-23 RX ORDER — SODIUM CHLORIDE 9 MG/ML
INJECTION, SOLUTION INTRAVENOUS CONTINUOUS
Status: DISCONTINUED | OUTPATIENT
Start: 2017-10-23 | End: 2017-10-23 | Stop reason: HOSPADM

## 2017-10-23 RX ORDER — ACETAMINOPHEN 325 MG/1
325-650 TABLET ORAL EVERY 4 HOURS PRN
Status: DISCONTINUED | OUTPATIENT
Start: 2017-10-23 | End: 2017-10-25 | Stop reason: HOSPADM

## 2017-10-23 RX ORDER — IOPAMIDOL 755 MG/ML
100 INJECTION, SOLUTION INTRAVASCULAR ONCE
Status: COMPLETED | OUTPATIENT
Start: 2017-10-23 | End: 2017-10-23

## 2017-10-23 RX ORDER — FLUMAZENIL 0.1 MG/ML
0.2 INJECTION, SOLUTION INTRAVENOUS
Status: DISCONTINUED | OUTPATIENT
Start: 2017-10-23 | End: 2017-10-23 | Stop reason: HOSPADM

## 2017-10-23 RX ORDER — FUROSEMIDE 10 MG/ML
20-100 INJECTION INTRAMUSCULAR; INTRAVENOUS
Status: DISCONTINUED | OUTPATIENT
Start: 2017-10-23 | End: 2017-10-23 | Stop reason: HOSPADM

## 2017-10-23 RX ORDER — HYDRALAZINE HYDROCHLORIDE 20 MG/ML
10-20 INJECTION INTRAMUSCULAR; INTRAVENOUS
Status: DISCONTINUED | OUTPATIENT
Start: 2017-10-23 | End: 2017-10-23 | Stop reason: HOSPADM

## 2017-10-23 RX ORDER — DOBUTAMINE HYDROCHLORIDE 200 MG/100ML
2-20 INJECTION INTRAVENOUS CONTINUOUS PRN
Status: DISCONTINUED | OUTPATIENT
Start: 2017-10-23 | End: 2017-10-23 | Stop reason: HOSPADM

## 2017-10-23 RX ORDER — ATROPINE SULFATE 0.1 MG/ML
.5-1 INJECTION INTRAVENOUS
Status: DISCONTINUED | OUTPATIENT
Start: 2017-10-23 | End: 2017-10-23 | Stop reason: HOSPADM

## 2017-10-23 RX ORDER — HYDROCODONE BITARTRATE AND ACETAMINOPHEN 5; 325 MG/1; MG/1
1-2 TABLET ORAL EVERY 4 HOURS PRN
Status: DISCONTINUED | OUTPATIENT
Start: 2017-10-23 | End: 2017-10-25 | Stop reason: HOSPADM

## 2017-10-23 RX ORDER — POTASSIUM CHLORIDE 29.8 MG/ML
20 INJECTION INTRAVENOUS
Status: DISCONTINUED | OUTPATIENT
Start: 2017-10-23 | End: 2017-10-23 | Stop reason: HOSPADM

## 2017-10-23 RX ADMIN — CALCIUM CARBONATE (ANTACID) CHEW TAB 500 MG 1000 MG: 500 CHEW TAB at 12:12

## 2017-10-23 RX ADMIN — IOPAMIDOL 45 ML: 755 INJECTION, SOLUTION INTRAVASCULAR at 16:15

## 2017-10-23 RX ADMIN — FUROSEMIDE 40 MG: 10 INJECTION, SOLUTION INTRAVENOUS at 08:55

## 2017-10-23 RX ADMIN — LISINOPRIL 5 MG: 5 TABLET ORAL at 08:55

## 2017-10-23 RX ADMIN — METOPROLOL TARTRATE 50 MG: 50 TABLET, FILM COATED ORAL at 08:59

## 2017-10-23 RX ADMIN — MIDAZOLAM 1 MG: 1 INJECTION INTRAMUSCULAR; INTRAVENOUS at 16:57

## 2017-10-23 RX ADMIN — LIDOCAINE HYDROCHLORIDE 5 ML: 10 INJECTION, SOLUTION EPIDURAL; INFILTRATION; INTRACAUDAL; PERINEURAL at 16:56

## 2017-10-23 RX ADMIN — SODIUM CHLORIDE: 9 INJECTION, SOLUTION INTRAVENOUS at 18:50

## 2017-10-23 RX ADMIN — VITAMIN D, TAB 1000IU (100/BT) 2000 UNITS: 25 TAB at 08:55

## 2017-10-23 RX ADMIN — VERAPAMIL HYDROCHLORIDE 2.5 MG: 2.5 INJECTION, SOLUTION INTRAVENOUS at 16:54

## 2017-10-23 RX ADMIN — SODIUM CHLORIDE: 9 INJECTION, SOLUTION INTRAVENOUS at 12:13

## 2017-10-23 RX ADMIN — ACETAMINOPHEN 650 MG: 325 TABLET, FILM COATED ORAL at 21:56

## 2017-10-23 RX ADMIN — FENTANYL CITRATE 25 MCG: 50 INJECTION INTRAMUSCULAR; INTRAVENOUS at 16:57

## 2017-10-23 RX ADMIN — METOPROLOL TARTRATE 50 MG: 50 TABLET, FILM COATED ORAL at 21:56

## 2017-10-23 RX ADMIN — HEPARIN SODIUM 10000 UNITS: 1000 INJECTION, SOLUTION INTRAVENOUS; SUBCUTANEOUS at 16:44

## 2017-10-23 RX ADMIN — MIDAZOLAM 0.5 MG: 1 INJECTION INTRAMUSCULAR; INTRAVENOUS at 17:31

## 2017-10-23 RX ADMIN — DILTIAZEM HYDROCHLORIDE 180 MG: 180 CAPSULE, EXTENDED RELEASE ORAL at 08:55

## 2017-10-23 RX ADMIN — CALCIUM CARBONATE (ANTACID) CHEW TAB 500 MG 1000 MG: 500 CHEW TAB at 00:00

## 2017-10-23 RX ADMIN — FENTANYL CITRATE 25 MCG: 50 INJECTION INTRAMUSCULAR; INTRAVENOUS at 17:32

## 2017-10-23 RX ADMIN — PAROXETINE HYDROCHLORIDE 30 MG: 20 TABLET, FILM COATED ORAL at 15:38

## 2017-10-23 RX ADMIN — ATORVASTATIN CALCIUM 40 MG: 40 TABLET, FILM COATED ORAL at 12:13

## 2017-10-23 RX ADMIN — NITROGLYCERIN 200 MCG: 5 INJECTION, SOLUTION INTRAVENOUS at 16:54

## 2017-10-23 RX ADMIN — MIDAZOLAM 0.5 MG: 1 INJECTION INTRAMUSCULAR; INTRAVENOUS at 17:38

## 2017-10-23 RX ADMIN — HEPARIN SODIUM 5000 UNITS: 1000 INJECTION, SOLUTION INTRAVENOUS; SUBCUTANEOUS at 17:32

## 2017-10-23 RX ADMIN — WARFARIN SODIUM 2.5 MG: 2.5 TABLET ORAL at 21:56

## 2017-10-23 RX ADMIN — ASPIRIN 325 MG: 325 TABLET, DELAYED RELEASE ORAL at 08:55

## 2017-10-23 ASSESSMENT — ACTIVITIES OF DAILY LIVING (ADL)
ADLS_ACUITY_SCORE: 11
ADLS_ACUITY_SCORE: 11
ADLS_ACUITY_SCORE: 9
ADLS_ACUITY_SCORE: 11
ADLS_ACUITY_SCORE: 11

## 2017-10-23 NOTE — PLAN OF CARE
Problem: Patient Care Overview  Goal: Plan of Care/Patient Progress Review  Outcome: No Change     Dx: A-Fib RVR              Hx: A-Fib, JERSON, HTN, and Anxiety  Tele: A-Fib CVR  Assessment: VSS. A.O x 4. Denies SOB/CP/Pain. Saline Locked. +2 edema BLE. SBA. CPAP throughout night. Complained of gas, relieved w/ PO Tums. Angio this AM to r/o CAD.    Plan: Continue POC.  D/C home once CHF is controlled and cause of systolic heart failure is known.

## 2017-10-23 NOTE — PLAN OF CARE
Problem: Cardiac: Heart Failure (Adult)  Goal: Signs and Symptoms of Listed Potential Problems Will be Absent, Minimized or Managed (Cardiac: Heart Failure)  Signs and symptoms of listed potential problems will be absent, minimized or managed by discharge/transition of care (reference Cardiac: Heart Failure (Adult) CPG).   A&O, forgetful, up with SBA, Tele Afib CVR, po Cardizem and Metoprolol, prn Tylenol for HA, Cardio following, Coronary Angiogram tomorrow, NPO at midnight, will continue with POC.

## 2017-10-23 NOTE — PROGRESS NOTES
Cardiology Progress Note  DOMINIK Chaudhry          Assessment and Plan:   Admit (10/20) 3 mo hx of progressive SOB, worsened after trip this past week, sharp resting CP.  Evidence of Afib with RVR, acute systolic HF exacerbation w/ new decline in LVEF    PMH:  Afib, untreated JERSON, PHTN, HTN    Acute Systolic HF exacerbation/Bi-Ventricular dysfunction  -BNP 7600/pulmonary vascular congestion/LVEF 30-35%(global), mild RVD/RVE, mild PHTN,   -etiology unclear:  Ischemia to be r/o  ?tachymediated  -improved breathing w/ diuresis  -(weight down 4lb)  Weight pending today  -(I=O)  -lisinopril & Metoprolol XR added  -Plan for RHC & LHC today.  Did receive IV lasix 40mg this am  INR 1.7  -No ETOH discussed  (she drinks 2-3 glasses wine/day)      PAF:  -previous hx.    -RVR on admit (140 bpm)  -recently stopped Metoprolol due to soft SBPs, but has stayed on Diltiazem  -remains in Afib with CVR since admit w/o symptoms  (70-80 bpm)  -chronic warfarin (INR was subtherapeutic on admit & held for angio)  -prefer weaning off Diltiazem and increasing Metoprolol XR due to HF    Atypical CP:  -R/O for MI  -no previous hx of CAD, but multiple CVRF  -Plan for coronary angiogram today  -bblocker/ASA/statin    Mixed Hyperlipidemia:  -recent   -will change Simvastatin to Atorvastatin    HTN:  -BP now controlled    Untreated JERSON  -difficulty getting used to CPAP mask               Interval History:   Still some SOB w/ activity  No orthopnea  Denies palpitations/CP                Medications:       sodium chloride (PF)  3 mL Intracatheter Q8H     lisinopril  5 mg Oral Daily     cholecalciferol (vitamin D3) tablet 2,000 Units  2,000 Units Oral Daily     diltiazem  180 mg Oral Daily     PARoxetine (PAXIL) tablet 30 mg  30 mg Oral Daily     simvastatin (ZOCOR) tablet 10 mg  10 mg Oral At Bedtime     furosemide  40 mg Intravenous Daily     metoprolol  50 mg Oral BID            Physical Exam:   Blood pressure 132/83, pulse 113,  "temperature 96.9  F (36.1  C), temperature source Oral, resp. rate 16, height 1.651 m (5' 5\"), weight 85.2 kg (187 lb 12.8 oz), SpO2 93 %.  Wt Readings from Last 3 Encounters:   10/22/17 85.2 kg (187 lb 12.8 oz)   06/20/14 83.9 kg (185 lb)     I/O last 3 completed shifts:  In: 360 [P.O.:360]  Out: 750 [Urine:750]    CONST:  Alert and oriented  NAD  LUNGS:  CTA bilat  CARDIO:  Irreg, Irreg  S1, S2  Distant heart tones  ABD:  obese  EXT:  1+DP bilat  Trace ankle edema bilat           Data:   TELE:  Afib  HR 80 bpm    CBC    Recent Labs  Lab 10/22/17  0716 10/21/17  0735   WBC 8.3 6.8   HGB 12.8 12.3    228       BMP    Recent Labs  Lab 10/23/17  0638 10/22/17  0716 10/21/17  0735 10/20/17  1724    137 137 134   POTASSIUM 4.2 3.8 4.9 4.1   CHLORIDE 103 103 104 101   ALFIE 8.9 8.4* 8.5 8.7   CO2 28 28 29 26   BUN 20 14 13 11   CR 0.80 0.75 0.82 0.75   GLC 95 106* 115* 101*     No results for input(s): CHOL, HDL, LDL, TRIG, CHOLHDLRATIO in the last 99445 hours.    TROP  Lab Results   Component Value Date    TROPI <0.015 10/20/2017    TROPI <0.012 06/20/2014    TROPONIN <0.07 12/20/2005       BNP    Recent Labs  Lab 10/20/17  1724   NTBNPI 7683*             "

## 2017-10-23 NOTE — PROGRESS NOTES
"[facility]  Hospitalist Progress Note  Moses Win MD 10/23/2017    Reason for Stay (Diagnosis): A fib with RVR assoc with mixed diastolic and systolic heart failure         Assessment and Plan:      Summary of Stay: Em Richmond is a 77 year old female came to attention on 10/20/2017 with shortness of breath due to CHF. She was found to bein A fib with RVR and Echo revealed severely depressed LV function.     By history, the patient recalls rapid HR for months at least.     Problem List:   1. Acute Systolic Heart Failure thought due to tachycardia-induced cardiomyopathy. EF 30-35%. On Diuretic, B-blocker and ACEI.   2. A fib with RVR. Controlled rate with Metoprolol 50 mg BID and Diltiazem 24 hr 180 mg daily.  3. JERSON, poor compliance with CPAP.    PLAN:  1. Cor angio today.  2. Switch to oral Furosemide.   3. On oral meds for rate control.   4. Cont to titrate up on ACEI.     Addendum: Cor Angio shows trivial CAD and normal Pulmonary Pressures but decreased cardiac output and index.       DVT Prophylaxis: Warfarin  Code Status: Full Code  Discharge Dispo: Home expected.   Estimated Disch Date / # of Days until Disch: likely tomorrow.        Interval History (Subjective):      Chart reviewed, pt interviewed.      I spent time with family today, explaining the results of Left heart results and preliminarily of Right heart cath results.   Pt is concerned about weakness and severe DILLON.   Eating ok. No N/V/D/C.                   Physical Exam:      Last Vital Signs:  /83 (BP Location: Left arm)  Pulse 113  Temp 96.9  F (36.1  C) (Oral)  Resp 16  Ht 1.651 m (5' 5\")  Wt 85.2 kg (187 lb 12.8 oz)  SpO2 93%  BMI 31.25 kg/m2    I/O last 3 completed shifts:  In: 360 [P.O.:360]  Out: 1550 [Urine:1550]    Constitutional: Awake, alert, cooperative, no apparent distress   Respiratory: Clear to auscultation bilaterally, no crackles or wheezing   Cardiovascular: IRRIR and no murmur noted "   Abdomen: Normal bowel sounds, soft, non-distended, non-tender   Skin: No rashes, no cyanosis, dry to touch   Neuro: Alert and oriented x3, no weakness, numbness, memory loss   Extremities: Trace edema.   Other(s):        All other systems: Negative          Medications:      All current medications were reviewed with changes reflected in problem list.         Data:      All new lab and imaging data was reviewed.   Labs/Imaging:  Results for orders placed or performed during the hospital encounter of 10/20/17 (from the past 24 hour(s))   INR   Result Value Ref Range    INR 1.74 (H) 0.86 - 1.14   Basic metabolic panel   Result Value Ref Range    Sodium 138 133 - 144 mmol/L    Potassium 4.2 3.4 - 5.3 mmol/L    Chloride 103 94 - 109 mmol/L    Carbon Dioxide 28 20 - 32 mmol/L    Anion Gap 7 3 - 14 mmol/L    Glucose 95 70 - 99 mg/dL    Urea Nitrogen 20 7 - 30 mg/dL    Creatinine 0.80 0.52 - 1.04 mg/dL    GFR Estimate 70 >60 mL/min/1.7m2    GFR Estimate If Black 85 >60 mL/min/1.7m2    Calcium 8.9 8.5 - 10.1 mg/dL   ISTAT gases lactate sylwia POCT   Result Value Ref Range    Ph Venous 7.41 7.32 - 7.43 pH    PCO2 Venous 44 40 - 50 mm Hg    PO2 Venous 32 25 - 47 mm Hg    Bicarbonate Venous 28 21 - 28 mmol/L    O2 Sat Venous 61 %    Lactic Acid 0.5 (L) 0.7 - 2.1 mmol/L   Inpatient Coronary Angiography Adult Order    Narrative    Procedure  1-right heart catheterization with thermal dilution cardiac output and  pulmonary artery oxygen sampling  2-left heart catheterization  3-selective left and right coronary angiography    CLINICAL DATA:  77-year-old woman presents with acute systolic heart failure. She is  found to have ejection fraction of around 35%, whereas it was normal 3  years ago. She undergoes further evaluation for etiology and  management of her cardiomyopathy and heart failure    Catheterization results  1-mild coronary disease, no hemodynamically significant or culprit  lesions.  -Left main coronary artery. Short,  normal in caliber and appearance  -Left anterior descending. Type III LAD. Average caliber. Moderate  proximal calcification. There is 25% focal mid irregularity. There is  diffuse apical 30% irregularity. 2 major diagonals and septals are  normal in appearance  -Left circumflex coronary. Nondominant dominant. Small first, modest  second third and large fourth marginals. Average caliber. Focal 20%  irregularity at the origin of the second marginal. Trivial distal  irregularity. Marginals are all essentially normal in appearance  -Right coronary. Morphologically dominant. Arises and courses  normally. Normal PDA, small posterolateral system. Focal 20-30%  proximal and minimal mid irregularity. Normal distal system    2-Right heart catheterization.  -Right atrial pressure mean 12  -Right ventricular pressure 35/13  -Pulmonary artery pressure 32/19 (24)  -Pulmonary catheter wedge pressure  =  -/17 (15)  -thermal dilution cardiac output/index 2.93/1.53.  -Pulmonary artery oxygen saturation 61% with an aortic oxygen  saturation of 90%-estimated Scott cardiac output 3.5/index 1.8    3. Left heart catheterization. Left ventricular pressure 134/13. No  gradient across aortic valve.    Comment-  This is a nonischemic cardiomyopathy. Further management will be  further cardiology consult service    Catheterization procedure  -Right internal jugular access with ultrasound guidance for the 7  [place] sheath.  -Right heart catheterization with 7 [place] Rousseau Sancho catheter with  thermal dilution cardiac output and pulmonary artery oxygen sampling  -Right radial access - 6 [place] slender glide sheath. Anticoagulation  with heparin  -Selective left and right coronary angiography with a 5 [place] Danish  catheter. Left heart catheterization with a 5 [place] Danish catheter  -Contrast total 45 cc [id number]  -Fluoroscopy time 3.3 minutes  -Conscious sedation used throughout under my supervision. There was  continuous oximetry, hemodynamic,  and patient monitoring by myself and  the staff. Total sedation time 69 minutes. 2 mg of Versed and 50 mcg  of fentanyl IV. No sedation complications  -Attempted the procedure catheters were removed. Radial sheath removed  and hemostasis obtained with a TR band. Right internal jugular line  removed and hemostasis obtained with local compression. Patient  hemodynamically and rhythmically stable on transfer back to the care  unit.    KRYS EASTON MD

## 2017-10-23 NOTE — PLAN OF CARE
Problem: Cardiac: Heart Failure (Adult)  Goal: Signs and Symptoms of Listed Potential Problems Will be Absent, Minimized or Managed (Cardiac: Heart Failure)  Signs and symptoms of listed potential problems will be absent, minimized or managed by discharge/transition of care (reference Cardiac: Heart Failure (Adult) CPG).   Outcome: Improving  Tele A-fib CVR. BP hypotensive at times. See below. A&OX4. Denies dyspnea or chest pain. Lung sounds clear. 1+ edema to bilateral LE's. Ambulates with standby assist. Awaiting to be taken to cath lab. Will likely discharge tomorrow pending cath lab results.            10/23/17 1227   Vital Signs   BP 96/56

## 2017-10-24 PROBLEM — I43 TACHYCARDIA INDUCED CARDIOMYOPATHY (H): Status: ACTIVE | Noted: 2017-10-24

## 2017-10-24 PROBLEM — I10 ESSENTIAL HYPERTENSION: Status: ACTIVE | Noted: 2017-10-24

## 2017-10-24 PROBLEM — I50.21 ACUTE SYSTOLIC CONGESTIVE HEART FAILURE (H): Status: ACTIVE | Noted: 2017-10-24

## 2017-10-24 PROBLEM — I50.41: Status: ACTIVE | Noted: 2017-10-24

## 2017-10-24 PROBLEM — R00.0 TACHYCARDIA INDUCED CARDIOMYOPATHY (H): Status: ACTIVE | Noted: 2017-10-24

## 2017-10-24 LAB — INR PPP: 1.71 (ref 0.86–1.14)

## 2017-10-24 PROCEDURE — 12000007 ZZH R&B INTERMEDIATE

## 2017-10-24 PROCEDURE — 25000132 ZZH RX MED GY IP 250 OP 250 PS 637: Mod: GY | Performed by: INTERNAL MEDICINE

## 2017-10-24 PROCEDURE — 36415 COLL VENOUS BLD VENIPUNCTURE: CPT | Performed by: INTERNAL MEDICINE

## 2017-10-24 PROCEDURE — 99207 ZZC CDG-MDM COMPONENT: MEETS MODERATE - UP CODED: CPT | Performed by: INTERNAL MEDICINE

## 2017-10-24 PROCEDURE — A9270 NON-COVERED ITEM OR SERVICE: HCPCS | Mod: GY | Performed by: INTERNAL MEDICINE

## 2017-10-24 PROCEDURE — 99232 SBSQ HOSP IP/OBS MODERATE 35: CPT | Performed by: INTERNAL MEDICINE

## 2017-10-24 PROCEDURE — A9270 NON-COVERED ITEM OR SERVICE: HCPCS | Mod: GY | Performed by: NURSE PRACTITIONER

## 2017-10-24 PROCEDURE — 99233 SBSQ HOSP IP/OBS HIGH 50: CPT | Performed by: INTERNAL MEDICINE

## 2017-10-24 PROCEDURE — 25000132 ZZH RX MED GY IP 250 OP 250 PS 637: Mod: GY | Performed by: NURSE PRACTITIONER

## 2017-10-24 PROCEDURE — 25000125 ZZHC RX 250: Performed by: INTERNAL MEDICINE

## 2017-10-24 PROCEDURE — 85610 PROTHROMBIN TIME: CPT | Performed by: INTERNAL MEDICINE

## 2017-10-24 RX ORDER — METOPROLOL TARTRATE 50 MG
50 TABLET ORAL 2 TIMES DAILY
Qty: 60 TABLET | Refills: 0 | Status: SHIPPED | OUTPATIENT
Start: 2017-10-24 | End: 2017-10-25

## 2017-10-24 RX ORDER — WARFARIN SODIUM 5 MG/1
5 TABLET ORAL
Status: COMPLETED | OUTPATIENT
Start: 2017-10-24 | End: 2017-10-24

## 2017-10-24 RX ORDER — LISINOPRIL 2.5 MG/1
2.5 TABLET ORAL DAILY
Status: DISCONTINUED | OUTPATIENT
Start: 2017-10-25 | End: 2017-10-25 | Stop reason: HOSPADM

## 2017-10-24 RX ORDER — LISINOPRIL 5 MG/1
5 TABLET ORAL DAILY
Qty: 30 TABLET | Refills: 0 | Status: SHIPPED | OUTPATIENT
Start: 2017-10-24 | End: 2017-10-25

## 2017-10-24 RX ORDER — FUROSEMIDE 40 MG
40 TABLET ORAL DAILY
Qty: 30 TABLET | Refills: 0 | Status: SHIPPED | OUTPATIENT
Start: 2017-10-24 | End: 2017-10-25

## 2017-10-24 RX ORDER — FUROSEMIDE 20 MG
20 TABLET ORAL DAILY
Status: DISCONTINUED | OUTPATIENT
Start: 2017-10-25 | End: 2017-10-24

## 2017-10-24 RX ADMIN — ACETAMINOPHEN 650 MG: 325 TABLET, FILM COATED ORAL at 04:26

## 2017-10-24 RX ADMIN — WARFARIN SODIUM 5 MG: 5 TABLET ORAL at 18:50

## 2017-10-24 RX ADMIN — ONDANSETRON 4 MG: 4 TABLET, ORALLY DISINTEGRATING ORAL at 08:07

## 2017-10-24 RX ADMIN — FUROSEMIDE 40 MG: 40 TABLET ORAL at 10:15

## 2017-10-24 RX ADMIN — VITAMIN D, TAB 1000IU (100/BT) 2000 UNITS: 25 TAB at 10:15

## 2017-10-24 RX ADMIN — ACETAMINOPHEN 650 MG: 325 TABLET, FILM COATED ORAL at 15:27

## 2017-10-24 RX ADMIN — PAROXETINE HYDROCHLORIDE 30 MG: 20 TABLET, FILM COATED ORAL at 15:05

## 2017-10-24 RX ADMIN — LISINOPRIL 5 MG: 5 TABLET ORAL at 10:15

## 2017-10-24 RX ADMIN — ASPIRIN 81 MG: 81 TABLET, COATED ORAL at 10:15

## 2017-10-24 RX ADMIN — ACETAMINOPHEN 650 MG: 325 TABLET, FILM COATED ORAL at 08:07

## 2017-10-24 RX ADMIN — ATORVASTATIN CALCIUM 40 MG: 40 TABLET, FILM COATED ORAL at 10:15

## 2017-10-24 RX ADMIN — ACETAMINOPHEN 650 MG: 325 TABLET, FILM COATED ORAL at 20:44

## 2017-10-24 RX ADMIN — METOPROLOL SUCCINATE 75 MG: 25 TABLET, EXTENDED RELEASE ORAL at 12:37

## 2017-10-24 ASSESSMENT — ACTIVITIES OF DAILY LIVING (ADL)
ADLS_ACUITY_SCORE: 10
ADLS_ACUITY_SCORE: 11
ADLS_ACUITY_SCORE: 11
ADLS_ACUITY_SCORE: 10
ADLS_ACUITY_SCORE: 10
ADLS_ACUITY_SCORE: 11

## 2017-10-24 NOTE — PLAN OF CARE
Problem: Patient Care Overview  Goal: Plan of Care/Patient Progress Review  Outcome: Improving  Pt had diagnostic right radial angio today, arm board applied upon arrival to the floor. TR band off, bandaid in place. Tele Afib CVR. Pt alert and oriented x4 but forgetful and confused at times. Switched from IV lasix to oral starting tomorrow. 2.5mg coumadin given tonight. Plan to D/C tomorrow. Transfers SBA.

## 2017-10-24 NOTE — PROGRESS NOTES
Cardiology Progress Note  DOMINIK Chaudhry          Assessment and Plan:   Admit (10/20) 3 mo hx of progressive SOB, worsened after trip this past week, sharp resting CP.  Evidence of Afib with RVR, acute systolic HF exacerbation w/ new decline in LVEF    PMH:  Afib, untreated JERSON, PHTN, HTN    Acute Systolic HF exacerbation/Bi-Ventricular dysfunction  -BNP 7600/pulmonary vascular congestion/LVEF 30-35%(global), mild RVD/RVE, mild PHTN,   -etiology unclear:  Likely tachymediated  -(10/23) cath r/o significant obstructive CAD.  RHC: mild PHTN/NL wedge/ low CI  -appears euvolemic  ? Need for home lasix  -(weight down 6lb total)    -(-500 OP overnight)  -will change Lopressor to Toprol 75mg in am and stop Diltiazem  -No ETOH discussed/low salt diet  (she drinks 2-3 glasses wine/day)  -Home soon  F/U w/ [facility] MELISSA in 2 wks with BMP and ECHO, lipid panel, OV with Dr. Romeo in 6 wks.    PAF:  - Afib w/ RVR on admit  -Persistent since admit (asymptomatic)  -recently self-stopped Metoprolol   -Metoprolol added back w/ good HR control past few days  Also on Dilitiazem  -chronic warfarin   -ambulate pt in [place] and assess HR/symptoms.  -Has HR monitor at home.  Pt to call w/ HRs>100bpm for prolonged periods    Atypical CP:  -no significant obstructive CAD per cath  -bblocker/ASA/statin    Mixed Hyperlipidemia:  -recent   -Simvastatin changed to Atorvastatin    HTN:  -BP now controlled    Untreated JERSON  -difficulty getting used to CPAP mask               Interval History:    No orthopnea  Denies palpitations/CP                Medications:       atorvastatin  40 mg Oral Daily     sodium chloride (PF)  3 mL Intracatheter Q8H     aspirin EC  81 mg Oral Daily     furosemide  40 mg Oral Daily     lisinopril  5 mg Oral Daily     cholecalciferol (vitamin D3) tablet 2,000 Units  2,000 Units Oral Daily     diltiazem  180 mg Oral Daily     PARoxetine (PAXIL) tablet 30 mg  30 mg Oral Daily     metoprolol  50 mg Oral BID            " Physical Exam:   Blood pressure 107/62, pulse 90, temperature 97.4  F (36.3  C), temperature source Oral, resp. rate 16, height 1.651 m (5' 5\"), weight 84.3 kg (185 lb 14.4 oz), SpO2 93 %.  Wt Readings from Last 3 Encounters:   10/24/17 84.3 kg (185 lb 14.4 oz)   06/20/14 83.9 kg (185 lb)     I/O last 3 completed shifts:  In: 253 [P.O.:250; I.V.:3]  Out: 850 [Urine:850]    CONST:  Alert and oriented  NAD  LUNGS:  CTA bilat  CARDIO:  Irreg, Irreg  S1, S2  Distant heart tones  ABD:  obese  EXT:  1+DP bilat  Trace ankle edema bilat           Data:   TELE:  Afib  HR 80 bpm    CBC    Recent Labs  Lab 10/22/17  0716 10/21/17  0735   WBC 8.3 6.8   HGB 12.8 12.3    228       BMP    Recent Labs  Lab 10/23/17  0638 10/22/17  0716 10/21/17  0735 10/20/17  1724    137 137 134   POTASSIUM 4.2 3.8 4.9 4.1   CHLORIDE 103 103 104 101   ALFIE 8.9 8.4* 8.5 8.7   CO2 28 28 29 26   BUN 20 14 13 11   CR 0.80 0.75 0.82 0.75   GLC 95 106* 115* 101*     No results for input(s): CHOL, HDL, LDL, TRIG, CHOLHDLRATIO in the last 38243 hours.    TROP  Lab Results   Component Value Date    TROPI <0.015 10/20/2017    TROPI <0.012 06/20/2014    TROPONIN <0.07 12/20/2005       BNP    Recent Labs  Lab 10/20/17  1724   NTBNPI [id number]*             "

## 2017-10-24 NOTE — PROGRESS NOTES
"[facility]  Hospitalist Progress Note  Moses Win MD 10/24/2017    Reason for Stay (Diagnosis): A fib with RVR assoc with mixed diastolic and systolic heart failure         Assessment and Plan:      Summary of Stay: Em Richmond is a 77 year old female came to attention on 10/20/2017 with shortness of breath due to CHF. She was found to bein A fib with RVR and Echo revealed severely depressed LV function.     By history, the patient recalls rapid HR for months at least.     Problem List:   1. Acute Systolic Heart Failure thought due to tachycardia-induced cardiomyopathy. EF 30-35%. On Diuretic, B-blocker and ACEI.   2. A fib with RVR. Controlled rate with Metoprolol 50 mg BID and Diltiazem 24 hr 180 mg daily, but with depressed LV function will try to titrate up on metoprolol.  3. JERSON, poor compliance with CPAP.  4. New, mild hypotension today.    PLAN:  1. Cor angio yesterday as noted.  2. Switched to oral Furosemide today.   3. On oral meds for rate control: stop Dilt and increase metoprolol per cards   4. Cont to titrate up on ACEI as melvin.   5. Will have pt follow up with Cardiac Rehab    DVT Prophylaxis: Warfarin  Code Status: Full Code  Discharge Dispo: Home expected.   Estimated Disch Date / # of Days until Disch: likely tomorrow.        Interval History (Subjective):      Chart reviewed, pt interviewed.      Today pt repots feeling \"punk\". No energy, nausea, weak.   Eating ok. No N/V/D/C.                   Physical Exam:      Last Vital Signs:  BP 92/64 (BP Location: Left arm)  Pulse 90  Temp 97  F (36.1  C) (Oral)  Resp 16  Ht 1.651 m (5' 5\")  Wt 84.3 kg (185 lb 14.4 oz)  SpO2 96%  BMI 30.94 kg/m2    I/O last 3 completed shifts:  In: 953 [P.O.:950; I.V.:3]  Out: 50 [Urine:50]    Constitutional: Awake, alert, cooperative, no apparent distress   Respiratory: Clear to auscultation bilaterally, no crackles or wheezing   Cardiovascular: IRRIR and no murmur noted   Abdomen: Normal " bowel sounds, soft, non-distended, non-tender   Skin: No rashes, no cyanosis, dry to touch   Neuro: Alert and oriented x3, no weakness, numbness, memory loss   Extremities: Trace edema.   Other(s):        All other systems: Negative          Medications:      All current medications were reviewed with changes reflected in problem list.         Data:      All new lab and imaging data was reviewed.   Labs/Imaging:  Results for orders placed or performed during the hospital encounter of 10/20/17 (from the past 24 hour(s))   EKG 12-lead, complete   Result Value Ref Range    Interpretation ECG Click View Image link to view waveform and result    INR   Result Value Ref Range    INR 1.71 (H) 0.86 - 1.14

## 2017-10-24 NOTE — PLAN OF CARE
Problem: Patient Care Overview  Goal: Plan of Care/Patient Progress Review  Outcome: Improving  Tele A-fib CVR. BP hypotensive. See below. Diltiazem dc'd, lasix and lisinopril dose decreased. A&OX4 but very forgetful. Complains of mild headache. Complained of nausea this morning, which resolved without intervention. Small amount of bruising present to angiogram site. Gait appears slightly unsteady. PT consult ordered. Patient will likely discharge home vs TCU tomorrow.            10/24/17 1018 10/24/17 1122 10/24/17 1222   Vital Signs   /60 (!) 89/50 104/68

## 2017-10-24 NOTE — PLAN OF CARE
Problem: Cardiac: Heart Failure (Adult)  Goal: Signs and Symptoms of Listed Potential Problems Will be Absent, Minimized or Managed (Cardiac: Heart Failure)  Signs and symptoms of listed potential problems will be absent, minimized or managed by discharge/transition of care (reference Cardiac: Heart Failure (Adult) CPG).   Outcome: Improving  AOX4, VSS with low grade temp, tylenol administered this shift. Ambulates with assist of one. Tele- A Fib CVR. No hematoma noted on the right side of the neck. Right arm angio site CDI with armboard on, CMS intact. Calls appropriately.

## 2017-10-25 VITALS
OXYGEN SATURATION: 96 % | SYSTOLIC BLOOD PRESSURE: 110 MMHG | HEIGHT: 65 IN | DIASTOLIC BLOOD PRESSURE: 73 MMHG | TEMPERATURE: 96.4 F | RESPIRATION RATE: 18 BRPM | HEART RATE: 90 BPM | BODY MASS INDEX: 30.81 KG/M2 | WEIGHT: 184.9 LBS

## 2017-10-25 PROBLEM — N30.00 ACUTE CYSTITIS WITHOUT HEMATURIA: Status: ACTIVE | Noted: 2017-10-25

## 2017-10-25 LAB
ALBUMIN UR-MCNC: 30 MG/DL
ANION GAP SERPL CALCULATED.3IONS-SCNC: 6 MMOL/L (ref 3–14)
APPEARANCE UR: ABNORMAL
BACTERIA #/AREA URNS HPF: ABNORMAL /HPF
BILIRUB UR QL STRIP: NEGATIVE
BUN SERPL-MCNC: 14 MG/DL (ref 7–30)
CALCIUM SERPL-MCNC: 8.3 MG/DL (ref 8.5–10.1)
CHLORIDE SERPL-SCNC: 101 MMOL/L (ref 94–109)
CO2 SERPL-SCNC: 30 MMOL/L (ref 20–32)
COLOR UR AUTO: ABNORMAL
CREAT SERPL-MCNC: 0.76 MG/DL (ref 0.52–1.04)
ERYTHROCYTE [DISTWIDTH] IN BLOOD BY AUTOMATED COUNT: 12.9 % (ref 10–15)
GFR SERPL CREATININE-BSD FRML MDRD: 74 ML/MIN/1.7M2
GLUCOSE SERPL-MCNC: 116 MG/DL (ref 70–99)
GLUCOSE UR STRIP-MCNC: NEGATIVE MG/DL
HCT VFR BLD AUTO: 37.7 % (ref 35–47)
HGB BLD-MCNC: 12.6 G/DL (ref 11.7–15.7)
HGB UR QL STRIP: ABNORMAL
INR PPP: 1.84 (ref 0.86–1.14)
INTERPRETATION ECG - MUSE: NORMAL
KETONES UR STRIP-MCNC: NEGATIVE MG/DL
LEUKOCYTE ESTERASE UR QL STRIP: ABNORMAL
MAGNESIUM SERPL-MCNC: 2.2 MG/DL (ref 1.6–2.3)
MCH RBC QN AUTO: 32.9 PG (ref 26.5–33)
MCHC RBC AUTO-ENTMCNC: 33.4 G/DL (ref 31.5–36.5)
MCV RBC AUTO: 98 FL (ref 78–100)
MUCOUS THREADS #/AREA URNS LPF: PRESENT /LPF
NITRATE UR QL: NEGATIVE
PH UR STRIP: 6 PH (ref 5–7)
PLATELET # BLD AUTO: 198 10E9/L (ref 150–450)
POTASSIUM SERPL-SCNC: 4 MMOL/L (ref 3.4–5.3)
RBC # BLD AUTO: 3.83 10E12/L (ref 3.8–5.2)
RBC #/AREA URNS AUTO: 20 /HPF (ref 0–2)
SODIUM SERPL-SCNC: 137 MMOL/L (ref 133–144)
SOURCE: ABNORMAL
SP GR UR STRIP: 1.01 (ref 1–1.03)
SQUAMOUS #/AREA URNS AUTO: 2 /HPF (ref 0–1)
TRANS CELLS #/AREA URNS HPF: 3 /HPF (ref 0–1)
UROBILINOGEN UR STRIP-MCNC: 4 MG/DL (ref 0–2)
WBC # BLD AUTO: 9.3 10E9/L (ref 4–11)
WBC #/AREA URNS AUTO: >182 /HPF (ref 0–2)
WBC CLUMPS #/AREA URNS HPF: PRESENT /HPF
YEAST #/AREA URNS HPF: ABNORMAL /HPF

## 2017-10-25 PROCEDURE — 36415 COLL VENOUS BLD VENIPUNCTURE: CPT | Performed by: INTERNAL MEDICINE

## 2017-10-25 PROCEDURE — 99239 HOSP IP/OBS DSCHRG MGMT >30: CPT | Performed by: INTERNAL MEDICINE

## 2017-10-25 PROCEDURE — A9270 NON-COVERED ITEM OR SERVICE: HCPCS | Mod: GY | Performed by: INTERNAL MEDICINE

## 2017-10-25 PROCEDURE — 87186 SC STD MICRODIL/AGAR DIL: CPT | Performed by: INTERNAL MEDICINE

## 2017-10-25 PROCEDURE — 81001 URINALYSIS AUTO W/SCOPE: CPT | Performed by: NURSE PRACTITIONER

## 2017-10-25 PROCEDURE — 85610 PROTHROMBIN TIME: CPT | Performed by: INTERNAL MEDICINE

## 2017-10-25 PROCEDURE — 87088 URINE BACTERIA CULTURE: CPT | Performed by: INTERNAL MEDICINE

## 2017-10-25 PROCEDURE — A9270 NON-COVERED ITEM OR SERVICE: HCPCS | Mod: GY | Performed by: NURSE PRACTITIONER

## 2017-10-25 PROCEDURE — 87086 URINE CULTURE/COLONY COUNT: CPT | Performed by: INTERNAL MEDICINE

## 2017-10-25 PROCEDURE — 25000132 ZZH RX MED GY IP 250 OP 250 PS 637: Mod: GY | Performed by: INTERNAL MEDICINE

## 2017-10-25 PROCEDURE — 83735 ASSAY OF MAGNESIUM: CPT | Performed by: INTERNAL MEDICINE

## 2017-10-25 PROCEDURE — 85027 COMPLETE CBC AUTOMATED: CPT | Performed by: INTERNAL MEDICINE

## 2017-10-25 PROCEDURE — 99232 SBSQ HOSP IP/OBS MODERATE 35: CPT | Performed by: INTERNAL MEDICINE

## 2017-10-25 PROCEDURE — 25000132 ZZH RX MED GY IP 250 OP 250 PS 637: Mod: GY | Performed by: NURSE PRACTITIONER

## 2017-10-25 PROCEDURE — 40000893 ZZH STATISTIC PT IP EVAL DEFER: Performed by: PHYSICAL THERAPIST

## 2017-10-25 PROCEDURE — 80048 BASIC METABOLIC PNL TOTAL CA: CPT | Performed by: INTERNAL MEDICINE

## 2017-10-25 RX ORDER — FUROSEMIDE 40 MG
20 TABLET ORAL DAILY
Qty: 30 TABLET | Refills: 0 | Status: ON HOLD | OUTPATIENT
Start: 2017-10-25 | End: 2017-11-12

## 2017-10-25 RX ORDER — CIPROFLOXACIN 250 MG/1
250 TABLET, FILM COATED ORAL 2 TIMES DAILY
Qty: 14 TABLET | Refills: 0 | Status: SHIPPED | OUTPATIENT
Start: 2017-10-25 | End: 2017-11-01

## 2017-10-25 RX ORDER — WARFARIN SODIUM 5 MG/1
5 TABLET ORAL
Status: DISCONTINUED | OUTPATIENT
Start: 2017-10-25 | End: 2017-10-25 | Stop reason: HOSPADM

## 2017-10-25 RX ORDER — LISINOPRIL 5 MG/1
2.5 TABLET ORAL DAILY
Qty: 30 TABLET | Refills: 0 | Status: ON HOLD | OUTPATIENT
Start: 2017-10-25 | End: 2017-11-06

## 2017-10-25 RX ORDER — METOPROLOL SUCCINATE 25 MG/1
75 TABLET, EXTENDED RELEASE ORAL DAILY
Qty: 30 TABLET | Refills: 0 | Status: ON HOLD | OUTPATIENT
Start: 2017-10-25 | End: 2017-11-06

## 2017-10-25 RX ORDER — WARFARIN SODIUM 5 MG/1
2.5 TABLET ORAL DAILY
Qty: 30 TABLET | Status: ON HOLD
Start: 2017-10-25 | End: 2017-11-06

## 2017-10-25 RX ORDER — ATORVASTATIN CALCIUM 40 MG/1
40 TABLET, FILM COATED ORAL DAILY
Qty: 30 TABLET | Refills: 0 | Status: ON HOLD | OUTPATIENT
Start: 2017-10-25 | End: 2017-11-12

## 2017-10-25 RX ADMIN — VITAMIN D, TAB 1000IU (100/BT) 2000 UNITS: 25 TAB at 08:39

## 2017-10-25 RX ADMIN — ASPIRIN 81 MG: 81 TABLET, COATED ORAL at 08:39

## 2017-10-25 RX ADMIN — ATORVASTATIN CALCIUM 40 MG: 40 TABLET, FILM COATED ORAL at 08:39

## 2017-10-25 RX ADMIN — METOPROLOL SUCCINATE 75 MG: 25 TABLET, EXTENDED RELEASE ORAL at 08:39

## 2017-10-25 RX ADMIN — LISINOPRIL 2.5 MG: 2.5 TABLET ORAL at 08:39

## 2017-10-25 ASSESSMENT — ACTIVITIES OF DAILY LIVING (ADL)
ADLS_ACUITY_SCORE: 10
ADLS_ACUITY_SCORE: 11

## 2017-10-25 NOTE — PHARMACY-ANTICOAGULATION SERVICE
Clinical Pharmacy- Warfarin Discharge Note  This patient is currently on warfarin for the treatment of Atrial fibrillation.  INR Goal= 2-3      Anticoagulation Dose History     Recent Dosing and Labs Latest Ref Rng & Units 6/21/2014 10/20/2017 10/21/2017 10/22/2017 10/23/2017 10/24/2017 10/25/2017    Warfarin 2.5 mg - - 2.5 mg - - 2.5 mg - -    Warfarin 5 mg - - - 5 mg - - 5 mg -    INR 0.86 - 1.14 2.36(H) 1.99(H) 1.83(H) 1.89(H) 1.74(H) 1.71(H) 1.84(H)          Agree with discharging the patient on a warfarin regimen of 2.5 mg daily. Note, receiving Cipro.   INR to be checked at clinic later this week.

## 2017-10-25 NOTE — DISCHARGE SUMMARY
Patient discharged at 1511. All discharge instructions provided, including information on new medications. Patient verbalizes understanding. Discharge instructions explained to patient's  as well. Patient taken home by .

## 2017-10-25 NOTE — PROGRESS NOTES
"Cardiology Progress Note  DOMINIK Chaudhry          Assessment and Plan:   Admit (10/20) 3 mo hx of progressive SOB, worsened after trip this past week, sharp resting CP.  Evidence of Afib with RVR, acute systolic HF exacerbation w/ new decline in LVEF    PMH:  Afib, untreated JERSON, PHTN, HTN    Acute Systolic HF exacerbation/Bi-Ventricular dysfunction  -BNP 7600/pulmonary vascular congestion/LVEF 30-35%(global), mild RVD/RVE, mild PHTN,   -etiology unclear:  Likely tachymediated  -(10/23) cath r/o significant obstructive CAD.  RHC: mild PHTN/NL wedge/ low CI  -appears euvolemic    -(weight down 7lb total)    191lb--> 184lb  -(-400 OP overnight)  -Toprol & low dose Lisinopril have been started cautious due to transient hypotension yest likely r/t fasting.  -No ETOH discussed/low salt diet  (she drinks 2-3 glasses wine/day)  -Home today  F/U w/ J SHERRON Oreilly on 11/13 at 1:00 (labs) 1:50 (OV)   -ECHO 12/4/17 @ 9:30am and lipid panel 10:30am  Office visit with Neeraj on 12/19/17 @ 2:45pm    Persistent Afib  - Afib w/ RVR on admit.  No previous hx known, but did admit to palpitations for several months  -recently self-stopped Metoprolol   -Diltiazem changed to Toprol 75mg  HRs mainly <100 bpm this am  (May need 50mg BID)  -chronic warfarin   -Outpt Holter 11/6/17 @ 10:00am     Atypical CP:  -no significant obstructive CAD per cath  -bblocker/ASA/statin    Mixed Hyperlipidemia:  -recent   -Simvastatin changed to Atorvastatin    HTN:  -BP now controlled    Untreated JERSON  -difficulty getting used to CPAP mask               Interval History:    \"I think I have a urinary infection\"  Previous hx of multiple UTIs No orthopnea  Denies palpitations/CP  Transient hypotension yest resolved                Medications:       metoprolol  75 mg Oral Daily     lisinopril  2.5 mg Oral Daily     atorvastatin  40 mg Oral Daily     sodium chloride (PF)  3 mL Intracatheter Q8H     aspirin EC  81 mg Oral Daily     cholecalciferol " "(vitamin D3) tablet 2,000 Units  2,000 Units Oral Daily     PARoxetine (PAXIL) tablet 30 mg  30 mg Oral Daily            Physical Exam:   Blood pressure 110/73, pulse 90, temperature 96.4  F (35.8  C), temperature source Oral, resp. rate 18, height 1.651 m (5' 5\"), weight 83.9 kg (184 lb 14.4 oz), SpO2 96 %.  Wt Readings from Last 3 Encounters:   10/25/17 83.9 kg (184 lb 14.4 oz)   06/20/14 83.9 kg (185 lb)     I/O last 3 completed shifts:  In: 940 [P.O.:940]  Out: 400 [Urine:400]    CONST:  Alert and oriented  NAD  LUNGS:  CTA bilat  CARDIO:  Irreg, Irreg  S1, S2  Distant heart tones  ABD:  obese  EXT:  1+DP bilat  Trace ankle edema bilat           Data:   TELE:  Afib  HR 80 bpm    CBC    Recent Labs  Lab 10/25/17  0735 10/22/17  0716   WBC 9.3 8.3   HGB 12.6 12.8    202       BMP    Recent Labs  Lab 10/25/17  0735 10/23/17  0638 10/22/17  0716 10/21/17  0735    138 137 137   POTASSIUM 4.0 4.2 3.8 4.9   CHLORIDE 101 103 103 104   ALFIE 8.3* 8.9 8.4* 8.5   CO2 30 28 28 29   BUN 14 20 14 13   CR 0.76 0.80 0.75 0.82   * 95 106* 115*     No results for input(s): CHOL, HDL, LDL, TRIG, CHOLHDLRATIO in the last 08566 hours.    TROP  Lab Results   Component Value Date    TROPI <0.015 10/20/2017    TROPI <0.012 06/20/2014    TROPONIN <0.07 12/20/2005       BNP    Recent Labs  Lab 10/20/17  1724   NTBNPI [id number]*             "

## 2017-10-25 NOTE — PLAN OF CARE
Problem: Patient Care Overview  Goal: Plan of Care/Patient Progress Review  Outcome: Improving  VS - BP 96/62, 92/64, C/O headache, PRN tylenol given, Tele Afib CVR. A/Ox4 but forgetful. Continues oral metoprolol, coumadin. Wearing CPAP at night. Transfers SBA. Possible DC home tomorrow.

## 2017-10-25 NOTE — DISCHARGE INSTRUCTIONS
You have a follow up appointment with Dr. Arielle Leger at the [facility] on Monday, 10/30/17 at 1:35pm.

## 2017-10-25 NOTE — PLAN OF CARE
Problem: Cardiac: Heart Failure (Adult)  Goal: Signs and Symptoms of Listed Potential Problems Will be Absent, Minimized or Managed (Cardiac: Heart Failure)  Signs and symptoms of listed potential problems will be absent, minimized or managed by discharge/transition of care (reference Cardiac: Heart Failure (Adult) CPG).   Outcome: Improving  AOx4, forgetful.VSS with low grade temp. Up with SBA.  Denies pain. Tele- A-Fib with RVR. Slept well. Possible discharge today.

## 2017-10-25 NOTE — PLAN OF CARE
Problem: Patient Care Overview  Goal: Plan of Care/Patient Progress Review     PT:  Orders received.  Pt admitted with SOB due to CHF.  Noted plan for d/c home this afternoon.  Mobility screen completed, pt able to ambulate into hallway x 125ft indep with no AD.  Tolerated well with no SOB, dizziness, or chest pain/pressure.  No need for skilled PT eval prior to d/c home, do not anticipate difficulty with stairs at home.  Advised pt to continue working on progressive home walking program as tolerated, monitor symptoms.  Will complete PT orders, safe for d/c home from mobility standpoint.

## 2017-10-25 NOTE — PLAN OF CARE
Problem: Cardiac: Heart Failure (Adult)  Goal: Signs and Symptoms of Listed Potential Problems Will be Absent, Minimized or Managed (Cardiac: Heart Failure)  Signs and symptoms of listed potential problems will be absent, minimized or managed by discharge/transition of care (reference Cardiac: Heart Failure (Adult) CPG).   Outcome: Improving  VSS. A&OX4. Tele A-fib CVR. Denies chest pain or dyspnea. Lung sounds clear. Ambulates with standby assist. Complained of burning with urination. UA sent. Results variable. Awaiting discharge orders from MD. Will likely discharge sometime this afternoon.

## 2017-10-25 NOTE — DISCHARGE SUMMARY
[facility] Discharge Summary    Em Richmond MRN# [id number]   Age: 77 year old YOB: 1940     Date of Admission:  10/20/2017  Date of Discharge::  10/25/2017  Admitting Physician:  Neeraj Arthur MD  Discharge Physician:  Moses Win MD     Home clinic: [facility]          Admission Diagnoses:   Atrial fibrillation (H) [I48.91]  Atrial fibrillation with rapid ventricular response (H) [I48.91]  Acute on chronic congestive heart failure, unspecified congestive heart failure type (H) [I50.9]          Discharge Diagnosis:   Principal Problem:    Acute combined systolic and diastolic CHF, NYHA class 3 (H)  Active Problems:    Atrial fibrillation with rapid ventricular response (H)    Essential hypertension    Tachycardia induced cardiomyopathy (H)    Acute cystitis without hematuria            Procedures:   CXR  VQ scan  Echocardiogram.        Interpretation Summary  Left ventricular systolic function is moderately reduced.  The visual ejection fraction is estimated at 30-35%.  Mildly decreased right ventricular systolic function  Right ventricular systolic pressure is elevated, consistent with mild  pulmonary hypertension.  EF lower than in 2014. The study was technically difficult.    Coronary Angiogram and Right Heart Catheterization:      Catheterization results  1-mild coronary disease, no hemodynamically significant or culprit  lesions.  -Left main coronary artery. Short, normal in caliber and appearance  -Left anterior descending. Type III LAD. Average caliber. Moderate  proximal calcification. There is 25% focal mid irregularity. There is  diffuse apical 30% irregularity. 2 major diagonals and septals are  normal in appearance  -Left circumflex coronary. Nondominant dominant. Small first, modest  second third and large fourth marginals. Average caliber. Focal 20%  irregularity at the origin of the second marginal. Trivial distal  irregularity. Marginals  are all essentially normal in appearance  -Right coronary. Morphologically dominant. Arises and courses  normally. Normal PDA, small posterolateral system. Focal 20-30%  proximal and minimal mid irregularity. Normal distal system    2-Right heart catheterization.  -Right atrial pressure mean 12  -Right ventricular pressure 35/13  -Pulmonary artery pressure 32/19 (24)  -Pulmonary catheter wedge pressure  =  -/17 (15)  -thermal dilution cardiac output/index 2.93/1.53.  -Pulmonary artery oxygen saturation 61% with an aortic oxygen  saturation of 90%-estimated Scott cardiac output 3.5/index 1.8    3. Left heart catheterization. Left ventricular pressure 134/13. No  gradient across aortic valve.          Medications Prior to Admission:     Prescriptions Prior to Admission   Medication Sig Dispense Refill Last Dose     meclizine (ANTIVERT) 12.5 MG tablet Take 1 tablet (12.5 mg) by mouth 3 times daily as needed for dizziness 60 tablet 1      Warfarin Sodium (COUMADIN PO) Take 2.5 mg by mouth On MWF and 5 mg ROW   10/19/2017 at Unknown time     PAROXETINE HCL PO Take 30 mg by mouth daily    10/19/2017 at 1400     OMEPRAZOLE PO Take 20 mg by mouth daily as needed    6/20/2014 at am     MULTIPLE VITAMINS PO Take 1 tablet by mouth daily   10/20/2017 at Unknown time     Cholecalciferol (VITAMIN D3 PO) Take 2,000 Units by mouth daily    10/20/2017 at Unknown time     [DISCONTINUED] SIMVASTATIN PO Take 10 mg by mouth At Bedtime    10/19/2017 at Unknown time     [DISCONTINUED] diltiazem (TIAZAC) 180 MG CP24 Take 1 capsule by mouth daily   10/19/2017 at am             Discharge Medications:     Current Discharge Medication List      START taking these medications    Details   atorvastatin (LIPITOR) 40 MG tablet Take 1 tablet (40 mg) by mouth daily  Qty: 30 tablet, Refills: 0    Associated Diagnoses: Hyperlipidemia LDL goal <70; Coronary artery disease involving native heart without angina pectoris, unspecified vessel or lesion type       lisinopril (PRINIVIL/ZESTRIL) 5 MG tablet Take 0.5 tablets (2.5 mg) by mouth daily  Qty: 30 tablet, Refills: 0    Associated Diagnoses: Acute combined systolic and diastolic CHF, NYHA class 3 (H); Essential hypertension      metoprolol (TOPROL-XL) 25 MG 24 hr tablet Take 3 tablets (75 mg) by mouth daily  Qty: 30 tablet, Refills: 0    Associated Diagnoses: Atrial fibrillation with rapid ventricular response (H)      furosemide (LASIX) 40 MG tablet Take 0.5 tablets (20 mg) by mouth daily  Qty: 30 tablet, Refills: 0    Associated Diagnoses: Acute combined systolic and diastolic CHF, NYHA class 3 (H)      ciprofloxacin (CIPRO) 250 MG tablet Take 1 tablet (250 mg) by mouth 2 times daily for 7 days  Qty: 14 tablet, Refills: 0    Associated Diagnoses: Acute cystitis without hematuria         CONTINUE these medications which have NOT CHANGED    Details   meclizine (ANTIVERT) 12.5 MG tablet Take 1 tablet (12.5 mg) by mouth 3 times daily as needed for dizziness  Qty: 60 tablet, Refills: 1    Associated Diagnoses: Labyrinthitis, bilateral      Warfarin Sodium (COUMADIN PO) Take 2.5 mg by mouth DAILY      PAROXETINE HCL PO Take 30 mg by mouth daily       OMEPRAZOLE PO Take 20 mg by mouth daily as needed       MULTIPLE VITAMINS PO Take 1 tablet by mouth daily      Cholecalciferol (VITAMIN D3 PO) Take 2,000 Units by mouth daily          STOP taking these medications       SIMVASTATIN PO Comments:   Reason for Stopping:         diltiazem (TIAZAC) 180 MG CP24 Comments:   Reason for Stopping:                     Consultations:   Consultation during this admission received from cardiology          Hospital Course:   Em Richmond is a 77 year old female came to attention on 10/20/2017 with shortness of breath due to CHF. She was found to be in A fib with RVR and Echo revealed severely depressed LV function. By history, the patient recalls rapid HR for months at least and ultimately this fits well with what appears to be a  "tachycardia-induced cardiomyopathy.     Ms. Richmond underwent right and left heart catheterizations after initial heart rate slowing with dilt and metoprolol. She was noted to have mild, non-occlusive CAD and Pulm HTN probably due to LV failure. She has done well with titration and adjustment of multiple medications.     On the last hospital day, Ms. Richmond complained of dysuria and was found to have a \"positive UA\". She will be treated empirically with Cipro 250 mg BID for 7 days, pending culture.     /73 (BP Location: Left arm)  Pulse 90  Temp 96.4  F (35.8  C) (Oral)  Resp 18  Ht 1.651 m (5' 5\")  Wt 83.9 kg (184 lb 14.4 oz)  SpO2 96%  BMI 30.77 kg/m2  Alert, coherent in NAD.   COR: IRRIR, no murmur  Chest: no wheeze or rales. Good air entry bilat.  Abd: soft, NTND  Extrem: no pitting edema.          Discharge Instructions and Follow-Up:   Discharge diet: Low salt   Discharge activity: Activity as tolerated  Pt is a candidate for Cardiac Rehab if she wishes.   Discharge follow-up: Follow up with Cardiology as indicated in their notes.  Pt is encouraged to have INR rechecked this week.            Discharge Disposition:   Discharged to home      Attestation:  I have reviewed today's vital signs, notes, medications, labs and imaging.  Total time: 45 minutes    Moses Win MD     "

## 2017-10-25 NOTE — PROGRESS NOTES
Respiratory Therapy    Patient was taken off of CPAP around 0300 this morning because we had a respiratory distress patient come in and required the BIPAP machine. Patient has home machine, but will stay off for the night because she plans to DC tomorrow. RT to follow.    Eleni Dumas RRT  10/25/2017

## 2017-10-26 ENCOUNTER — TELEPHONE (OUTPATIENT)
Dept: CARDIOLOGY | Facility: CLINIC | Age: 77
End: 2017-10-26

## 2017-10-26 ENCOUNTER — CARE COORDINATION (OUTPATIENT)
Dept: CARDIOLOGY | Facility: CLINIC | Age: 77
End: 2017-10-26

## 2017-10-26 NOTE — TELEPHONE ENCOUNTER
Pt's family called in stating when Pt was D/C she did not receive warfarin/coumadin and was told by D/C nurse did not need it any longer.  Per D/C it says Pt is to be on medication coumadin/warfarin at 2.5 mg a day. Pt also on Cipro and to see anticoagulation nurse for INR's. Family denies knowing any of this and Pt did not have coumadin yesterday and has no medication for today.  Called to unit spoke with charge Nurse and explained issue and that Pt went home with out medication and asked it be addressed. JW May RN

## 2017-10-26 NOTE — PROGRESS NOTES
Called patient's  [facility]arson to discuss any post hospital d/c questions he may have, review medication changes, and confirm f/u appts. Patient's  denied any questions regarding new medications or changes to some of patient's current medications that patient was taking prior to admission. Patient's  denied that patient had any SOB, chest pain, or light headedness. RN confirmed with patient's  that patient has an apt scheduled on 11/6/17 for holter monitor placement and on 11/13/17 with MELISSA Ying Oreilly (1pm lab and 150pm with MELISSA). Patient's  advised to call clinic with any cardiac related questions or concerns prior to patient's apt on 11/13/17. Patient's  verbalized understanding and agreed with plan.

## 2017-10-26 NOTE — PROGRESS NOTES
Hand-off for Care Transitions to Next Level of Care Provider  Name: Em Richmond  : 1940  MRN #: [id number]  Reason for Hospitalization:  Atrial fibrillation (H) [I48.91]  Atrial fibrillation with rapid ventricular response (H) [I48.91]  Acute on chronic congestive heart failure, unspecified congestive heart failure type (H) [I50.9]  Admit Date/Time: 10/20/2017  5:12 PM  Discharge Date: 10/25/2017    Reason for Communication Hand-off Referral: Admission diagnoses: CHF    Discharge Plan:  Discharged to: Home with support                   Patient agreeable to post-hospital support suggestions:  Yes  Discharge Plan:             Patient is on new medications:   Yes   atorvastatin 40 MG tablet. Take 1 tablet (40 mg) by mouth daily   ciprofloxacin 250 MG tablet. Take 1 tablet (250 mg) by mouth 2 times daily for 7  Days   furosemide 40 MG tablet. Take 0.5 tablets (20 mg) by mouth daily   lisinopril 5 MG tablet. Take 0.5 tablets (2.5 mg) by mouth daily   metoprolol 25 MG 24 hr tablet. Take 3 tablets (75 mg) by mouth daily           MTM follow up recommended: No           Tel-[employer] program:  Accepted   Follow-up specialty is recommended: Yes. Follow up with Cardiology as scheduled below.   Follow-up plan:  Future Appointments  Date Time Provider Department Center   2017 10:00 AM [facility] DEVICE RM [facility]ARS [facility]   2017 1:00 PM RU LAB [facility]   2017 1:50 PM Ying Oreilly APRN CNP [facility]   2017 9:30 AM [id number] [facility]   2017 10:30 AM RU LAB [facility]   2017 2:45 PM Den Pickard MD [facility]     Any outstanding tests or procedures:  Holter Monitor to be placed on 2017 at 10am.     Radiology & Cardiology Orders     Future Labs/Procedures Complete By Expires    Holter Monitor 24 hour - Adult  2017 (Approximate) 10/25/2018    Echocardiogram  2017 (Approximate) 10/24/2018    Process Instructions:    Administration  of IV contrast will be tailored to this examination per the appropriate written protocol listed in the Echocardiography department Protocol Book, or by the supervising Cardiologist. This may result in an order change.    Use of contrast is at the discretion of the supervising Cardiologist.    Scheduling Instructions:    Please call your clinic of choice to schedule this procedure:    [facility]:   [phone]  [facility]:   [phone]  [facility]:  [phone]  [facility]): [phone] (Friday)  [facility]:   [phone]  [facility]:  [phone]  [facility]): [phone]  [facility] [phone]    Comments:    Administration of IV contrast will be tailored to this examination per the appropriate written protocol listed in the Echocardiography department Protocol Book, or by the supervising Cardiologist. This may result in an order change.    Use of contrast is at the discretion of the supervising Cardiologist.        Referrals     Future Labs/Procedures    Follow-Up with Cardiac Advanced Practice Provider     Follow-Up with Cardiologist           Key Recommendations: CHF teaching completed on action plan, patient weights are done daily, observes a low salt diet and feels like the plan will be helpful.  We spoke briefly about medicaiton and she was placed on a diuretic at discharge.  We talked about CORE and why it might be helpful in the future. She has agreed to a Tele-monitoring program.     Communicated handoff via EPIC Comm Mgt to Dr. Arielle Leger's CC at [phone].      Rosalia Oreilly, RN, BSN, CTS  [facility]  [phone]

## 2017-10-27 LAB
BACTERIA SPEC CULT: ABNORMAL
Lab: ABNORMAL
SPECIMEN SOURCE: ABNORMAL

## 2017-11-05 ENCOUNTER — HOSPITAL ENCOUNTER (INPATIENT)
Facility: CLINIC | Age: 77
LOS: 7 days | Discharge: NURSING, REHAB OR LONG-TERM CARE | DRG: 208 | End: 2017-11-13
Attending: EMERGENCY MEDICINE | Admitting: INTERNAL MEDICINE
Payer: MEDICARE

## 2017-11-05 ENCOUNTER — APPOINTMENT (OUTPATIENT)
Dept: GENERAL RADIOLOGY | Facility: CLINIC | Age: 77
DRG: 208 | End: 2017-11-05
Attending: EMERGENCY MEDICINE
Payer: MEDICARE

## 2017-11-05 ENCOUNTER — APPOINTMENT (OUTPATIENT)
Dept: CT IMAGING | Facility: CLINIC | Age: 77
DRG: 208 | End: 2017-11-05
Attending: EMERGENCY MEDICINE
Payer: MEDICARE

## 2017-11-05 DIAGNOSIS — I48.20 CHRONIC ATRIAL FIBRILLATION (H): ICD-10-CM

## 2017-11-05 DIAGNOSIS — I50.9 CONGESTIVE HEART FAILURE, UNSPECIFIED CONGESTIVE HEART FAILURE CHRONICITY, UNSPECIFIED CONGESTIVE HEART FAILURE TYPE: ICD-10-CM

## 2017-11-05 DIAGNOSIS — J96.01 ACUTE RESPIRATORY FAILURE WITH HYPOXIA (H): ICD-10-CM

## 2017-11-05 DIAGNOSIS — E87.20 ACIDOSIS: ICD-10-CM

## 2017-11-05 DIAGNOSIS — J96.01 ACUTE RESPIRATORY FAILURE WITH HYPOXEMIA (H): Primary | ICD-10-CM

## 2017-11-05 DIAGNOSIS — K80.20 CALCULUS OF GALLBLADDER WITHOUT CHOLECYSTITIS WITHOUT OBSTRUCTION: ICD-10-CM

## 2017-11-05 LAB
APTT PPP: 47 SEC (ref 22–37)
FLUAV+FLUBV AG SPEC QL: NEGATIVE
FLUAV+FLUBV AG SPEC QL: NEGATIVE
INTERPRETATION ECG - MUSE: NORMAL
LACTATE BLD-SCNC: 10.5 MMOL/L (ref 0.7–2)
SPECIMEN SOURCE: NORMAL
TROPONIN I SERPL-MCNC: <0.015 UG/L (ref 0–0.04)

## 2017-11-05 PROCEDURE — 94002 VENT MGMT INPAT INIT DAY: CPT

## 2017-11-05 PROCEDURE — 83690 ASSAY OF LIPASE: CPT | Performed by: EMERGENCY MEDICINE

## 2017-11-05 PROCEDURE — 80053 COMPREHEN METABOLIC PANEL: CPT | Performed by: EMERGENCY MEDICINE

## 2017-11-05 PROCEDURE — 25000128 H RX IP 250 OP 636: Performed by: EMERGENCY MEDICINE

## 2017-11-05 PROCEDURE — 83880 ASSAY OF NATRIURETIC PEPTIDE: CPT | Performed by: EMERGENCY MEDICINE

## 2017-11-05 PROCEDURE — 85730 THROMBOPLASTIN TIME PARTIAL: CPT | Performed by: EMERGENCY MEDICINE

## 2017-11-05 PROCEDURE — 40000276 ZZH STATISTIC RCP TIME ED VENT EA 10 MIN

## 2017-11-05 PROCEDURE — 84443 ASSAY THYROID STIM HORMONE: CPT | Performed by: EMERGENCY MEDICINE

## 2017-11-05 PROCEDURE — 99291 CRITICAL CARE FIRST HOUR: CPT | Mod: 25

## 2017-11-05 PROCEDURE — 87040 BLOOD CULTURE FOR BACTERIA: CPT | Performed by: EMERGENCY MEDICINE

## 2017-11-05 PROCEDURE — 93005 ELECTROCARDIOGRAM TRACING: CPT

## 2017-11-05 PROCEDURE — 25000125 ZZHC RX 250: Performed by: EMERGENCY MEDICINE

## 2017-11-05 PROCEDURE — 96361 HYDRATE IV INFUSION ADD-ON: CPT

## 2017-11-05 PROCEDURE — 83605 ASSAY OF LACTIC ACID: CPT | Performed by: EMERGENCY MEDICINE

## 2017-11-05 PROCEDURE — 40000275 ZZH STATISTIC RCP TIME EA 10 MIN

## 2017-11-05 PROCEDURE — 71260 CT THORAX DX C+: CPT

## 2017-11-05 PROCEDURE — 84484 ASSAY OF TROPONIN QUANT: CPT | Performed by: EMERGENCY MEDICINE

## 2017-11-05 PROCEDURE — 85025 COMPLETE CBC W/AUTO DIFF WBC: CPT | Performed by: EMERGENCY MEDICINE

## 2017-11-05 PROCEDURE — 5A1945Z RESPIRATORY VENTILATION, 24-96 CONSECUTIVE HOURS: ICD-10-PCS | Performed by: HOSPITALIST

## 2017-11-05 PROCEDURE — 87804 INFLUENZA ASSAY W/OPTIC: CPT | Performed by: EMERGENCY MEDICINE

## 2017-11-05 PROCEDURE — 40000986 XR CHEST PORT 1 VW

## 2017-11-05 PROCEDURE — 99292 CRITICAL CARE ADDL 30 MIN: CPT

## 2017-11-05 PROCEDURE — 85610 PROTHROMBIN TIME: CPT | Performed by: EMERGENCY MEDICINE

## 2017-11-05 RX ORDER — PROPOFOL 10 MG/ML
INJECTION, EMULSION INTRAVENOUS
Status: COMPLETED
Start: 2017-11-05 | End: 2017-11-06

## 2017-11-05 RX ORDER — LEVOFLOXACIN 5 MG/ML
750 INJECTION, SOLUTION INTRAVENOUS ONCE
Status: COMPLETED | OUTPATIENT
Start: 2017-11-05 | End: 2017-11-06

## 2017-11-05 RX ORDER — SODIUM CHLORIDE 9 MG/ML
1000 INJECTION, SOLUTION INTRAVENOUS CONTINUOUS
Status: DISCONTINUED | OUTPATIENT
Start: 2017-11-05 | End: 2017-11-06

## 2017-11-05 RX ORDER — IOPAMIDOL 755 MG/ML
71 INJECTION, SOLUTION INTRAVASCULAR ONCE
Status: COMPLETED | OUTPATIENT
Start: 2017-11-05 | End: 2017-11-05

## 2017-11-05 RX ORDER — SODIUM CHLORIDE, SODIUM LACTATE, POTASSIUM CHLORIDE, CALCIUM CHLORIDE 600; 310; 30; 20 MG/100ML; MG/100ML; MG/100ML; MG/100ML
INJECTION, SOLUTION INTRAVENOUS CONTINUOUS
Status: DISCONTINUED | OUTPATIENT
Start: 2017-11-05 | End: 2017-11-06

## 2017-11-05 RX ADMIN — IOPAMIDOL 71 ML: 755 INJECTION, SOLUTION INTRAVENOUS at 23:59

## 2017-11-05 RX ADMIN — SODIUM CHLORIDE, POTASSIUM CHLORIDE, SODIUM LACTATE AND CALCIUM CHLORIDE 1000 ML: 600; 310; 30; 20 INJECTION, SOLUTION INTRAVENOUS at 23:48

## 2017-11-05 RX ADMIN — SODIUM CHLORIDE 95 ML: 9 INJECTION, SOLUTION INTRAVENOUS at 23:59

## 2017-11-05 NOTE — IP AVS SNAPSHOT
` `           [facility] C[facility]: [phone]                 INTERAGENCY TRANSFER FORM - NOTES (H&P, Discharge Summary, Consults, Procedures, Therapies)   2017                    Hospital Admission Date: 2017  EM DORMAN   : 1940  Sex: Female        Patient PCP Information     Provider PCP Type    Arielle Leger MD General         History & Physicals      H&P by Santi Terrazas MD at 2017  3:05 AM     Author:  Santi Terrazas MD Service:  ICU Author Type:  Physician    Filed:  2017  6:50 AM Date of Service:  2017  3:05 AM Creation Time:  2017  2:48 AM    Status:  Addendum :  Santi Terrazas MD (Physician)         [facility]   CRITICAL CARE ADMISSION/CONSULTATION    Em Dorman MRN: [id number]  1940  Date of Admission:2017          HPI   Em Dorman[RC1.1] is[RC1.2] a 77[RC1.1]F w[RC1.2]ith significant history for HFrEF (EF 35%), Afib, HTN who is brought in by EMS for progressive dyspnea.[RC1.1]   - Family called EMS due to progressive dyspnea since recent discharge from [facility]. Additionally has been having abdominal pain, nausea and had emesis prior to transport. Denies[RC1.2] chest pain, fever, chills, or[RC1.1] hematemesis/[RC1.3]hematochezia.[RC1.1] Found to be hypoxic (SpO2 70%) on EMS arrival and subsequently intubated in the field.  - Recently[RC1.2] at [RC1.1]-[RC1.2][facility] for CHF and afib w/ RVR from 10/20-[RC1.1]. She had[RC1.2] an extensive cardiac w/u including TTE and RHC/LHC. Her CDM was presumed 2/2 tachycardia-induced. She was discharged with diuretics and cipro for UTI.[RC1.1]   - In the[RC1.2] ED, she had lactic acidosis of ~10, SHUN, elevated nt-pro BNP, elevated transaminases,bili and lipase, and supratherapeutic INR. CT chest demonstrated areas of interstitial edema w/o focal consolidation. US abdomen showed 3cm gallstone with thickened GB wall but not distended. The CBD  was mildly dilated 0.9cm. She was treated for presumed sepsis and given vanco, zosyn and levo. She has femoral TLC placed by ED.[RC1.1]   - Awaiting transfer[RC1.2] to ICU for further evaluation and management.          Past Medical History:[RC1.1]      Past Medical History:   Diagnosis Date     Atrial fibrillation (H)[RC1.4]     Non-ischemic CDM          Past Surgical History:[RC1.1]    No past surgical history on file.[RC1.4]         Social History:[RC1.1]     Social History   Substance Use Topics     Smoking status: Not on file     Smokeless tobacco: Not on file     Alcohol use Not on file[RC1.4]            Family History:[RC1.1]   No family history on file.[RC1.4]          Allergies:   Please see allergy list which was reviewed this admission.         Medications:[RC1.1]       phytonadione  10 mg Intravenous Once     sodium chloride 0.9%  1,000 mL Intravenous Once     levofloxacin  750 mg Intravenous Once     vancomycin (VANCOCIN) IV  1,750 mg Intravenous Once[RC1.4]          Review of Systems:   Unable to obtain due to critical illness.          Physical Examination:[RC1.1]   Temp:  [95.5  F (35.3  C)-97.9  F (36.6  C)] 97.3  F (36.3  C)  Pulse:  [137] 137  Heart Rate:  [105-151] 138  Resp:  [12-70] 14  BP: ()/() 98/77  FiO2 (%):  [100 %] 100 %  SpO2:  [78 %-100 %] 98 %[RC1.4]    Intake/Output Summary (Last 24 hours) at 11/06/17 0249  Last data filed at 11/06/17 0237   Gross per 24 hour   Intake             2646 ml   Output                0 ml   Net             2646 ml[RC1.1]     Wt Readings from Last 4 Encounters:   11/05/17 88.2 kg (194 lb 7.1 oz)   10/25/17 83.9 kg (184 lb 14.4 oz)   06/20/14 83.9 kg (185 lb)     BP - Mean:  [] 88  Ventilation Mode: CMV/AC  FiO2 (%): 100 %  Rate Set (breaths/minute): 14 breaths/min  Tidal Volume Set (mL): 550 mL  PEEP (cm H2O): 5 cmH2O  Oxygen Concentration (%): 100 %  Resp: 14    Recent Labs  Lab 11/06/17  0049   PH 7.16*   PCO2 44   PO2 213*   HCO3 16*    O2PER 100[RC1.4]     GEN: intubated, sedated  HEENT: head ncat, sclera anicteric, OP patent, trachea midline   PULM: unlabored synchronous with vent, clear anteriorly    CV/COR: RRR S1S2 no gallop,  No rub, no murmur  ABD: soft nontender, hypoactive bowel sounds, no mass  EXT:  Edema   warm  NEURO: grossly intact  SKIN: no obvious rash[RC1.1]  BEDSIDE US: No pericardial effusion. IVC 2.1cm w/o respiratory variation. LV function depressed (Est. <20%). No hydronephrosis.[RC1.3]       Assessment and plan :[RC1.1]   1. Acute hypoxemic respiratory failure  2. Decompensated CHF   3. SHUN   4. Acute liver injury 2/2 #2 vs biliary occlusion  5. Lactic acidosis 2/2 #4 vs sepsis[RC1.5]     Neurology/Psychiatry/Pain/Sedation:   1.[RC1.1] Sedation for vent synchrony. Given labile BP, will pursue versed and fentanyl for sedation to keep RASS -2 to -3.[RC1.5]     Cardiovascular/Hemodynamics[RC1.1]/[RC1.5]Ventilator Management[RC1.1]/[RC1.5]Renal/[RC1.1]GI/ID[RC1.5]  1.[RC1.1] Acute hypoxemic respiratory failure 2/2 sepsis[RC1.5] and/or[RC1.3] decompensated CHF[RC1.5] in setting of afib and non-ischemic CDM[RC1.3].[RC1.5] She[RC1.6] is easy to ventilate and oxygenate. Hemodynamically stable, however bedside US concerning for worsening systolic function.  Elevated pro-BNP >30,000. Will get formal TTE and cycle biomarkers. It is uncertain if her decompensation is also 2/2 sepsis likely from biliary source. There seems to be a recent history of increasing nausea and vomiting per family and given US report; cholecystitis and/or choledocholithiasis is certainly of concern. She is currently on broad spectrum abx to cover biliary source and will get surgery and GI to consult in am. Could narrow abx coverage to zosyn if GI source is more apparent, will leave this up to am team. If[RC1.3] hypotensive[RC1.6], will start levophe[RC1.3]d[RC1.6] given that I suspect she has been well resuscitated per bedside US.[RC1.3] She might also benefit  from inotrope and will consider dobutamine as well. Could consider RHC in am to help assess volume status and guide pressor therapy.[RC1.6] Check TSH for completeness.[RC1.3]   2.[RC1.1] Afib w/ RVR. Would hold off on rate controlling agents now given soft BP. Start amio bolus and gtt.   3. SHUN. Cont to watch. She did receive IV contrast in ED.[RC1.3]     Other  1. Nutrition: NPO for now.   2. Endocrine: ICU glucose/insulin protocol   3. Hematology/oncology: INR reversed with vitamin K in ED. Recheck INR. When <2, will place on heparin gtt.[RC1.5]     Disposition/Code Status/Other  1.[RC1.1] Critically ill with respiratory failure 2/2 cardiac vs sepsis. Family updated at bedside.[RC1.3]   2. Code:[RC1.1] Full[RC1.5]    ICU Prophylaxis:   1. DVT: mechanical  2. VAP: HOB 30 degrees, chlorhexidine rinse  3. Stress Ulcer:[RC1.1] H2 Blocker[RC1.5]   4. Restraints: Nonviolent soft two point restraints required and necessary for patient safety and continued cares and good effect as patient continues to pull at necessary lines, tubes despite education and distraction. Will readdress daily.   5. IV Access - central access required and necessary for continued patient cares  6. Feeding - NPO    I have personally reviewed the daily labs, imaging studies, cultures and discussed the case with referring physician and consulting physicians.     This patient is critically ill and I have provided 30 minutes of critical care time (excluding procedures) on November 6, 2017.     Santi Terrazas MD   Critical Care Staff  [phone]         Data:   All data and imaging reviewed     ROUTINE ICU LABS (Last four results)  CMP[RC1.1]  Recent Labs  Lab 11/05/17  2315   *   POTASSIUM 4.8   CHLORIDE 99   CO2 15*   ANIONGAP 18*   *   BUN 23   CR 1.20*   GFRESTIMATED 44*   GFRESTBLACK 53*   ALFIE 8.3*   PROTTOTAL 8.2   ALBUMIN 2.9*   BILITOTAL 1.8*   ALKPHOS 405*   AST 1480*   *[RC1.4]     CBC[RC1.1]  Recent Labs  Lab 11/05/17  5388    WBC 11.8*   RBC 4.18   HGB 13.5   HCT 41.5   MCV 99   MCH 32.3   MCHC 32.5   RDW 13.1   [RC1.4]     INR[RC1.1]  Recent Labs  Lab 11/05/17  2315   INR 3.72*[RC1.4]     Arterial Blood Gas[RC1.1]  Recent Labs  Lab 11/06/17  0049   PH 7.16*   PCO2 44   PO2 213*   HCO3 16*   O2PER 100[RC1.4]       All cultures:[RC1.1]  No results for input(s): CULT in the last 168 hours.[RC1.4]  Recent Results (from the past 24 hour(s))   Chest  XR, 1 view PORTABLE    Narrative    CHEST 2 VIEWS   11/5/2017 11:29 PM     HISTORY: Endotracheal tube placement.    COMPARISON: 10/20/2017.    FINDINGS: An endotracheal tube is present with distal tube tip  projecting over the mid trachea. An enteric drainage tube is present  with distal tube tip not visualized, but at least to the gastric body.  Hypoinflated lungs. Mild hazy and interstitial opacities within  central aspects of both lungs. Probable mild cardiomegaly.  Atherosclerotic  calcification in the thoracic aorta.      Impression    IMPRESSION:   1. An endotracheal tube is present with distal tube tip projecting  over the mid trachea.  2. An enteric drainage tube is present with distal tip at least to the  gastric body.  3. Mild hazy and interstitial opacities in the central aspect of both  lungs. These are nonspecific, but most likely relate to pulmonary  edema. An infectious or inflammatory process could have a similar  appearance.   Abdomen US, limited (RUQ only)    Narrative    ULTRASOUND ABDOMEN LIMITED RIGHT UPPER QUADRANT   11/6/2017 2:06 AM     HISTORY: Elevated liver function tests and alkaline phosphatase.    COMPARISON: 12/22/2009-CT chest, abdomen and pelvis.    FINDINGS: Normal hepatic echogenicity. 1.8 cm hypoechoic lesion in the  left lobe of the liver, most likely a cyst. 2.8 cm gallstone within a  nondistended gallbladder. Moderate diffuse gallbladder wall thickening  and edema. The ultrasound technologist reports that the patient has  focal tenderness over the  gallbladder. No intrahepatic biliary  dilatation. The common duct is mildly dilated, measuring 0.9 cm in  diameter. The right kidney has normal size and echogenicity, measuring  11.4 cm in length. No right intrarenal collecting system dilatation,  calculi or masses. A trace amount of perihepatic free fluid.      Impression    IMPRESSION:   1. 3 cm gallstone within a nondistended and moderately thick-walled  and edematous gallbladder. Additionally, the ultrasound technologist  reports that the patient has focal tenderness over the gallbladder.  These findings are not convincing for acute cholecystitis, but it  cannot be excluded. If there is clinical concern for acute  cholecystitis, a HIDA scan could be considered for further evaluation.  2. Mild dilatation of the common duct, which measures 0.9 cm in  diameter. No intrahepatic biliary dilatation.  3. A trace amount of perihepatic free fluid.     Interpretation Summary TTE 10/21/17      Left ventricular systolic function is moderately reduced.  The visual ejection fraction is estimated at 30-35%.  Mildly decreased right ventricular systolic function  Right ventricular systolic pressure is elevated, consistent with mild  pulmonary hypertension.  EF lower than in 2014. The study was technically difficult.  _____________________________________________________________________________     10/20/17 Heart Catheterization results  1-mild coronary disease, no hemodynamically significant or culprit  lesions.  -Left main coronary artery. Short, normal in caliber and appearance  -Left anterior descending. Type III LAD. Average caliber. Moderate  proximal calcification. There is 25% focal mid irregularity. There is  diffuse apical 30% irregularity. 2 major diagonals and septals are  normal in appearance  -Left circumflex coronary. Nondominant dominant. Small first, modest  second third and large fourth marginals. Average caliber. Focal 20%  irregularity at the origin of the  second marginal. Trivial distal  irregularity. Marginals are all essentially normal in appearance  -Right coronary. Morphologically dominant. Arises and courses  normally. Normal PDA, small posterolateral system. Focal 20-30%  proximal and minimal mid irregularity. Normal distal system    2-Right heart catheterization.  -Right atrial pressure mean 12  -Right ventricular pressure 35/13  -Pulmonary artery pressure 32/19 (24)  -Pulmonary catheter wedge pressure  =  -/17 (15)  -thermal dilution cardiac output/index 2.93/1.53.  -Pulmonary artery oxygen saturation 61% with an aortic oxygen  saturation of 90%-estimated Scott cardiac output 3.5/index 1.8    3. Left heart catheterization. Left ventricular pressure 134/13. No  gradient across aortic valve.    Comment-  This is a nonischemic cardiomyopathy. Further management will be  further cardiology consult service[RC1.1]               Revision History        User Key Date/Time User Provider Type Action    > [N/A] 11/6/2017  6:50 AM Santi Terrazas MD Physician Addend     RC1.6 11/6/2017  6:49 AM Santi Terrazas MD Physician Addend     RC1.2 11/6/2017  5:15 AM Santi Terrazas MD Physician Addend     RC1.3 11/6/2017  5:05 AM Santi Terrazas MD Physician Sign     RC1.5 11/6/2017  3:19 AM Santi Terrazas MD Physician      RC1.4 11/6/2017  2:49 AM Santi Terrazas MD Physician      RC1.1 11/6/2017  2:48 AM Santi Terrazas MD Physician                      Discharge Summaries      Discharge Summaries by Heather Ferrera MD at 11/13/2017 10:33 AM     Author:  Heather Ferrera MD Service:  Hospitalist Author Type:  Physician    Filed:  11/13/2017 10:33 AM Date of Service:  11/13/2017 10:33 AM Creation Time:  11/13/2017 10:32 AM    Status:  Signed :  Heather Ferrera MD (Physician)         Discharge Summary    Em Richmond MRN# [id number]   YOB: 1940 Age: 77 year old     Date of Admission:  11/5/2017  Date of Discharge:  11/13/2017  Admitting  "Physician:  Santi Terrazas MD  Discharge Physician:  Heather Frerera MD Pager [phone] Office [phone]  Discharging Service:  [facility] Hospitalist Service     Primary Provider: Arielle Leger          Discharge Diagnosis:   See problem-oriented hospital course for diagnoses addressed during this hospital stay.             Discharge Disposition:   Discharged to nursing home          Condition on Discharge:   Discharge condition: Stable   Discharge vitals: Blood pressure 132/85, pulse 83, temperature 97.6  F (36.4  C), temperature source Oral, resp. rate 20, height 1.651 m (5' 5\"), weight 82.1 kg (181 lb), SpO2 95 %.   Code status on discharge: Full Code          Discharge Medications:      Em Richmond   Home Medication Instructions TODD:[id number]    Printed on:11/12/17 1600   Medication Information                      aspirin 81 MG chewable tablet  Take 1 tablet (81 mg) by mouth daily             atorvastatin (LIPITOR) 40 MG tablet  Take 1 tablet (40 mg) by mouth daily             Cholecalciferol (VITAMIN D3 PO)  Take 2,000 Units by mouth daily              furosemide (LASIX) 20 MG tablet  Take 1 tablet (20 mg) by mouth every 24 hours             furosemide (LASIX) 40 MG tablet  Take 1 tablet (40 mg) by mouth daily             lisinopril (PRINIVIL/ZESTRIL) 5 MG tablet  Take 1 tablet (5 mg) by mouth daily             meclizine (ANTIVERT) 12.5 MG tablet  Take 1 tablet (12.5 mg) by mouth 3 times daily as needed for dizziness             metoprolol (TOPROL-XL) 25 MG 24 hr tablet  Take 3 tablets (75 mg) by mouth 2 times daily             MULTIPLE VITAMINS PO  Take 1 tablet by mouth daily             OMEPRAZOLE PO  Take 20 mg by mouth daily as needed              PAROXETINE HCL PO  Take 30 mg by mouth daily              WARFARIN SODIUM PO  Take by mouth daily 5 mg Tu/Th/Su; 2.5 mg AOD                  Consultations:   Cardiology  GI        Brief Hx and Hospital Course:   Em Richmond is a 77-year-old " female with a past medical history significant for nonischemic cardiomyopathy, chronic atrial fibrillation, gastroesophageal reflux disease, anxiety, pulmonary hypertension, obesity and obstructive sleep apnea who was admitted on 11/06/2017 due to acute hypoxic respiratory failure requiring intubation in the setting of cardiogenic shock secondary to atrial fibrillation with rapid ventricular response and decompensated nonischemic cardiomyopathy with pulmonary edema. The patient was transferred out of the ICU on 11/7 as he was extubated and off pressors.     Acute hypoxic respiratory failure, requiring intubation secondary to cardiogenic shock in the setting of decompensated systolic congestive heart failure with pulmonary edema and atrial fibrillation with rapid ventricular response. Improved a lot. Sating well on RA. Only mild sob with activity.  - Cont diuretic/bb/acei..       Acute on chronic nonischemic cardiomyopathy with pulmonary edema: weight is down.  Sating well on RA. No peripheral edema this am.  - Cont lasix 40 mg in am and 20 mg in the afternoon..  - Cont BB/ACEI.  -  Low salt diet.  - daily weights. I/Os.  - F/U with cardiology..    Chronic atrial fibrillation with rapid ventricular response: rate controlled. Was on amiodarone drip in the ICU. Then digoxin.  - Cont metoprolol succinate 75 mg daily (titrate as needed).  - Cont coumadin.    Acute kidney injury: due to shock/chf. resolved.     GI issues: shock liver, gallbladder edema and borderline common bile duct dilation:. US showed CBD CBD dilatation. MRCP did not show.    LFT's are improving. The elevated LFTs are likely due passive congestion and hypoperfusion following cardiogenic shock and pulmonary edema. Was followed by GI in the hospital.  - LFT in 1-2 weeks.  - Hold statin and resume once LFT normalize.    Pt is being discharged to NH in stable conditions..       DISCHARGE EXAM:   Temp:  [97.6  F (36.4  C)-98.1  F (36.7  C)] 97.6  F (36.4   C)  Pulse:  [83] 83  Heart Rate:  [71-92] 91  Resp:  [18-20] 20  BP: (100-132)/(68-85) 132/85  SpO2:  [92 %-96 %] 95 %  Wt Readings from Last 5 Encounters:   11/12/17 82.1 kg (181 lb)   10/25/17 83.9 kg (184 lb 14.4 oz)   06/20/14 83.9 kg (185 lb)     Constitutional: Awake, alert, cooperative, no apparent distress    Lungs: Clear to auscultation bilaterally, no crackles or wheezing    Cardiovascular: Regular rate and rhythm, normal S1 and S2, and no murmur noted   Abdomen: Normal bowel sounds, soft, non-distended, non-tender   Skin: No rashes, no cyanosis, no edema   Other:           Pertinent Tests and Procedures:   TTE  MRCP  US abdomen             Pending Results:   none          Discharge Instructions and Follow-Up:   Discharge diet:   Active Diet Order      2 Gram Sodium Diet      Advance Diet as Tolerated   Discharge activity: Activity as tolerated   Discharge follow-up: Follow up with NH provider  Follow up with cardiology.   Outpatient therapy: None    Home Care agency: None    Supplies and equipment: None   Lines and drains: None    Wound care: None   Other instructions: None      More than 30 minutes were required for coordination of care for this discharge,.          Procedures / Labs / Imaging:     Results for orders placed or performed during the hospital encounter of 11/05/17   Chest  XR, 1 view PORTABLE    Narrative    CHEST 2 VIEWS   11/5/2017 11:29 PM     HISTORY: Endotracheal tube placement.    COMPARISON: 10/20/2017.    FINDINGS: An endotracheal tube is present with distal tube tip  projecting over the mid trachea. An enteric drainage tube is present  with distal tube tip not visualized, but at least to the gastric body.  Hypoinflated lungs. Mild hazy and interstitial opacities within  central aspects of both lungs. Probable mild cardiomegaly.  Atherosclerotic  calcification in the thoracic aorta.      Impression    IMPRESSION:   1. An endotracheal tube is present with distal tube tip  projecting  over the mid trachea.  2. An enteric drainage tube is present with distal tip at least to the  gastric body.  3. Mild hazy and interstitial opacities in the central aspect of both  lungs. These are nonspecific, but most likely relate to pulmonary  edema. An infectious or inflammatory process could have a similar  appearance.    GERSON BHAKTA MD   Chest CT, IV contrast only - PE protocol    Narrative    CT CHEST WITH CONTRAST   11/6/2017 12:38 AM     HISTORY: Respiratory failure.    COMPARISON: 12/20/2005.    TECHNIQUE: Following the uneventful administration of 71 mL Isovue-370  intravenous contrast, helical sections were acquired through the lungs  according to the pulmonary embolism protocol. Coronal reconstructions  were generated. Radiation dose for this scan was reduced using  automated exposure control, adjustment of the mA and/or kV according  to the patient's size, or iterative reconstruction technique.    FINDINGS: The central pulmonary arteries are mildly large in caliber.  No visualized pulmonary emboli. The thoracic aorta is normal in  caliber. Atherosclerotic calcification in the thoracic aorta and  coronary arteries. Moderate cardiomegaly.    An endotracheal tube is present with distal tip in the mid trachea. An  enteric drainage tube is present with distal tip not visualized, but  at least to the gastric body. Mild groundglass opacities and  interstitial thickening in the upper and mid lungs bilaterally. Very  small right pleural effusion with adjacent atelectasis. Atelectasis is  present in the dependent aspect of the left lower lobe. No left  pleural or pericardial effusion. Several mildly enlarged mediastinal  lymph nodes. For example, there is a 1.6 x 1.5 cm lower right  paratracheal lymph node (series 7, image 55).    Scan through the upper abdomen is unremarkable.      Impression    IMPRESSION:   1. Mild groundglass opacities and interstitial thickening in the upper  and mid lungs  bilaterally. This is nonspecific, but could relate to  interstitial pulmonary edema.  2. Very small right pleural effusion.  3. Several nonspecific mildly enlarged mediastinal lymph nodes.  4. Moderate cardiomegaly.  5. No visualized pulmonary embolus.    GERSON BHAKTA MD   Abdomen US, limited (RUQ only)    Narrative    ULTRASOUND ABDOMEN LIMITED RIGHT UPPER QUADRANT   11/6/2017 2:06 AM     HISTORY: Elevated liver function tests and alkaline phosphatase.    COMPARISON: 12/22/2009 - CT chest, abdomen and pelvis.    FINDINGS: Normal hepatic echogenicity. 1.8 cm hypoechoic lesion in the  left lobe of the liver, most likely a cyst. 2.8 cm gallstone within a  nondistended gallbladder. Moderate diffuse gallbladder wall thickening  and edema. The ultrasound technologist reports that the patient has  focal tenderness over the gallbladder. No intrahepatic biliary  dilatation. The common duct is mildly dilated, measuring 0.9 cm in  diameter. The right kidney has normal size and echogenicity, measuring  11.4 cm in length. No right intrarenal collecting system dilatation,  calculi or masses. A trace amount of perihepatic free fluid.      Impression    IMPRESSION:   1. 3 cm gallstone within a nondistended, moderately thick-walled and  edematous gallbladder. Additionally, the ultrasound technologist  reports that the patient has focal tenderness over the gallbladder.  These findings are not convincing for acute cholecystitis, but it  cannot be excluded. If there is a clinical concern for acute  cholecystitis, a HIDA scan could be considered for further evaluation.  2. Mild dilatation of the common duct, which measures 0.9 cm in  diameter. No intrahepatic biliary dilatation.  3. A trace amount of perihepatic free fluid.    GERSON BHAKTA MD   MR Abdomen MRCP w/o Contast & w Recon    Narrative    MR ABDOMEN MRCP WITHOUT CONTRAST WITH RECON 11/6/2017 6:50 PM     HISTORY: Evaluate for choledocholithiasis.     TECHNIQUE:   Multisequence, multiplanar imaging is performed through  the biliary system. Patient was on respirator therefore pre and  postcontrast imaging through the liver is unable to be obtained.    FINDINGS:   Abdomen: Limited evaluation of the biliary system is performed.  Gallbladder is distended with large gallstone near the gallbladder  neck as described on recent ultrasound report from 11/6/2017. Repeated  attempts at obtaining adequate MRCP images were performed with series  2000 being the best data set. This coronal sequence with heavy T2  weighting shows no evidence of intrahepatic or common bile duct  dilatation. No evidence of an intraductal stone. No pancreatic ductal  dilatation is evident. Abnormal T2 signal is noted likely relating to  trace ascites. Free pelvic fluid is noted on series 701, image 21. No  evidence of hydronephrosis. T2 hyperintense liver lesion on series  601, image 15 is most likely the cyst seen on prior ultrasound.      Impression    IMPRESSION: Limited exam due to respiratory motion artifact in this  intubated patient. No evidence of intrahepatic or common bile duct  dilatation. No evidence of an obstructing common bile duct stone.  Cholelithiasis as seen on prior ultrasound. Trace ascites and free  pelvic fluid is noted. No hydronephrosis.      OUSMANE DUNN MD   CBC with platelets differential   Result Value Ref Range    WBC 11.8 (H) 4.0 - 11.0 10e9/L    RBC Count 4.18 3.8 - 5.2 10e12/L    Hemoglobin 13.5 11.7 - 15.7 g/dL    Hematocrit 41.5 35.0 - 47.0 %    MCV 99 78 - 100 fl    MCH 32.3 26.5 - 33.0 pg    MCHC 32.5 31.5 - 36.5 g/dL    RDW 13.1 10.0 - 15.0 %    Platelet Count 322 150 - 450 10e9/L    Diff Method Manual Differential     % Neutrophils 69.5 %    % Lymphocytes 18.5 %    % Monocytes 6.0 %    % Eosinophils 2.0 %    % Basophils 0.5 %    % Myelocytes 0.5 %    % Plasma Cells 3.0 %    Absolute Neutrophil 8.2 1.6 - 8.3 10e9/L    Absolute Lymphocytes 2.2 0.8 - 5.3 10e9/L    Absolute  Monocytes 0.7 0.0 - 1.3 10e9/L    Absolute Eosinophils 0.2 0.0 - 0.7 10e9/L    Absolute Basophils 0.1 0.0 - 0.2 10e9/L    Absolute Myelocytes 0.1 (H) 0 10e9/L    Absolute Blasts 0.1 (H) 0 10e9/L    Absolute Plasma Cells 0.4 (H) 0 10e9/L    RBC Morphology Normal     Platelet Estimate Confirming automated cell count    INR   Result Value Ref Range    INR 3.72 (H) 0.86 - 1.14   Partial thromboplastin time   Result Value Ref Range    PTT 47 (H) 22 - 37 sec   Comprehensive metabolic panel   Result Value Ref Range    Sodium 132 (L) 133 - 144 mmol/L    Potassium 4.8 3.4 - 5.3 mmol/L    Chloride 99 94 - 109 mmol/L    Carbon Dioxide 15 (L) 20 - 32 mmol/L    Anion Gap 18 (H) 3 - 14 mmol/L    Glucose 122 (H) 70 - 99 mg/dL    Urea Nitrogen 23 7 - 30 mg/dL    Creatinine 1.20 (H) 0.52 - 1.04 mg/dL    GFR Estimate 44 (L) >60 mL/min/1.7m2    GFR Estimate If Black 53 (L) >60 mL/min/1.7m2    Calcium 8.3 (L) 8.5 - 10.1 mg/dL    Bilirubin Total 1.8 (H) 0.2 - 1.3 mg/dL    Albumin 2.9 (L) 3.4 - 5.0 g/dL    Protein Total 8.2 6.8 - 8.8 g/dL    Alkaline Phosphatase 405 (H) 40 - 150 U/L     (HH) 0 - 50 U/L    AST 1480 (HH) 0 - 45 U/L   Lactic acid whole blood   Result Value Ref Range    Lactic Acid 10.5 (HH) 0.7 - 2.0 mmol/L   Troponin I   Result Value Ref Range    Troponin I ES <0.015 0.000 - 0.045 ug/L   Nt probnp inpatient (BNP)   Result Value Ref Range    N-Terminal Pro BNP Inpatient 56044 (H) 0 - 1800 pg/mL   Blood gas arterial   Result Value Ref Range    pH Arterial 7.16 (LL) 7.35 - 7.45 pH    pCO2 Arterial 44 35 - 45 mm Hg    pO2 Arterial 213 (H) 80 - 105 mm Hg    Bicarbonate Arterial 16 (L) 21 - 28 mmol/L    Base Deficit Art 12.8 mmol/L    FIO2 100    Lactic acid   Result Value Ref Range    Lactic Acid 6.7 (HH) 0.7 - 2.0 mmol/L   Lipase   Result Value Ref Range    Lipase 455 (H) 73 - 393 U/L   CBC with platelets   Result Value Ref Range    WBC 15.6 (H) 4.0 - 11.0 10e9/L    RBC Count 3.89 3.8 - 5.2 10e12/L    Hemoglobin 12.6  11.7 - 15.7 g/dL    Hematocrit 37.8 35.0 - 47.0 %    MCV 97 78 - 100 fl    MCH 32.4 26.5 - 33.0 pg    MCHC 33.3 31.5 - 36.5 g/dL    RDW 13.2 10.0 - 15.0 %    Platelet Count 230 150 - 450 10e9/L   Comprehensive metabolic panel   Result Value Ref Range    Sodium 131 (L) 133 - 144 mmol/L    Potassium 5.1 3.4 - 5.3 mmol/L    Chloride 101 94 - 109 mmol/L    Carbon Dioxide 17 (L) 20 - 32 mmol/L    Anion Gap 13 3 - 14 mmol/L    Glucose 114 (H) 70 - 99 mg/dL    Urea Nitrogen 23 7 - 30 mg/dL    Creatinine 0.84 0.52 - 1.04 mg/dL    GFR Estimate 66 >60 mL/min/1.7m2    GFR Estimate If Black 79 >60 mL/min/1.7m2    Calcium 7.6 (L) 8.5 - 10.1 mg/dL    Bilirubin Total 2.3 (H) 0.2 - 1.3 mg/dL    Albumin 2.2 (L) 3.4 - 5.0 g/dL    Protein Total 6.3 (L) 6.8 - 8.8 g/dL    Alkaline Phosphatase 314 (H) 40 - 150 U/L    ALT 1890 (HH) 0 - 50 U/L    AST 3720 (HH) 0 - 45 U/L   Magnesium   Result Value Ref Range    Magnesium 1.7 1.6 - 2.3 mg/dL   Phosphorus   Result Value Ref Range    Phosphorus 5.0 (H) 2.5 - 4.5 mg/dL   Calcium ionized whole blood   Result Value Ref Range    Calcium Ionized Whole Blood 4.3 (L) 4.4 - 5.2 mg/dL   Lactic acid whole blood   Result Value Ref Range    Lactic Acid 5.6 (HH) 0.7 - 2.0 mmol/L   Lipase   Result Value Ref Range    Lipase 189 73 - 393 U/L   Ammonia   Result Value Ref Range    Ammonia 72 (H) 10 - 50 umol/L   INR   Result Value Ref Range    INR 5.26 (HH) 0.86 - 1.14   Procalcitonin   Result Value Ref Range    Procalcitonin 0.38 ng/ml   Troponin I   Result Value Ref Range    Troponin I ES 0.020 0.000 - 0.045 ug/L   CK total   Result Value Ref Range    CK Total 66 30 - 225 U/L   Glucose by meter   Result Value Ref Range    Glucose 117 (H) 70 - 99 mg/dL   TSH   Result Value Ref Range    TSH 4.78 (H) 0.40 - 4.00 mU/L   TSH with free T4 reflex (Add on or recollect)   Result Value Ref Range    TSH 1.75 0.40 - 4.00 mU/L   Blood gas arterial and oxyhgb   Result Value Ref Range    pH Arterial 7.19 (LL) 7.35 - 7.45 pH     pCO2 Arterial 44 35 - 45 mm Hg    pO2 Arterial 115 (H) 80 - 105 mm Hg    Bicarbonate Arterial 17 (L) 21 - 28 mmol/L    Oxyhemoglobin Arterial 96 92 - 100 %    Base Deficit Art 11.3 mmol/L   Bilirubin direct   Result Value Ref Range    Bilirubin Direct 1.4 (H) 0.0 - 0.2 mg/dL   Blood gas arterial with oxyhemoglobin (AM Draw)   Result Value Ref Range    pH Arterial Canceled, Test credited 7.35 - 7.45 pH    pCO2 Arterial Canceled, Test credited 35 - 45 mm Hg    pO2 Arterial Canceled, Test credited 80 - 105 mm Hg    Bicarbonate Arterial Canceled, Test credited 21 - 28 mmol/L    Oxyhemoglobin Arterial Canceled, Test credited 92 - 100 %    Base Excess Art Canceled, Test credited mmol/L    Base Deficit Art Canceled, Test credited mmol/L   Calcium ionized whole blood   Result Value Ref Range    Calcium Ionized Whole Blood 4.2 (L) 4.4 - 5.2 mg/dL   CBC with platelets differential   Result Value Ref Range    WBC 12.4 (H) 4.0 - 11.0 10e9/L    RBC Count 3.61 (L) 3.8 - 5.2 10e12/L    Hemoglobin 11.7 11.7 - 15.7 g/dL    Hematocrit 34.4 (L) 35.0 - 47.0 %    MCV 95 78 - 100 fl    MCH 32.4 26.5 - 33.0 pg    MCHC 34.0 31.5 - 36.5 g/dL    RDW 13.0 10.0 - 15.0 %    Platelet Count 217 150 - 450 10e9/L    Diff Method Automated Method     % Neutrophils 85.1 %    % Lymphocytes 11.0 %    % Monocytes 2.8 %    % Eosinophils 0.2 %    % Basophils 0.3 %    % Immature Granulocytes 0.6 %    Nucleated RBCs 0 0 /100    Absolute Neutrophil 10.6 (H) 1.6 - 8.3 10e9/L    Absolute Lymphocytes 1.4 0.8 - 5.3 10e9/L    Absolute Monocytes 0.4 0.0 - 1.3 10e9/L    Absolute Eosinophils 0.0 0.0 - 0.7 10e9/L    Absolute Basophils 0.0 0.0 - 0.2 10e9/L    Abs Immature Granulocytes 0.1 0 - 0.4 10e9/L    Absolute Nucleated RBC 0.0     Ovalocytes Slight     Platelet Estimate Confirming automated cell count    Comprehensive metabolic panel   Result Value Ref Range    Sodium 131 (L) 133 - 144 mmol/L    Potassium 4.6 3.4 - 5.3 mmol/L    Chloride 101 94 - 109 mmol/L     Carbon Dioxide 14 (L) 20 - 32 mmol/L    Anion Gap 16 (H) 3 - 14 mmol/L    Glucose 138 (H) 70 - 99 mg/dL    Urea Nitrogen 24 7 - 30 mg/dL    Creatinine 0.83 0.52 - 1.04 mg/dL    GFR Estimate 67 >60 mL/min/1.7m2    GFR Estimate If Black 81 >60 mL/min/1.7m2    Calcium 7.8 (L) 8.5 - 10.1 mg/dL    Bilirubin Total 2.1 (H) 0.2 - 1.3 mg/dL    Albumin 2.1 (L) 3.4 - 5.0 g/dL    Protein Total 6.1 (L) 6.8 - 8.8 g/dL    Alkaline Phosphatase 292 (H) 40 - 150 U/L    ALT 2858 (HH) 0 - 50 U/L    AST 6218 (HH) 0 - 45 U/L   INR   Result Value Ref Range    INR 4.83 (H) 0.86 - 1.14   Lactic acid whole blood   Result Value Ref Range    Lactic Acid 4.4 (HH) 0.7 - 2.0 mmol/L   Magnesium   Result Value Ref Range    Magnesium 1.7 1.6 - 2.3 mg/dL   Phosphorus   Result Value Ref Range    Phosphorus 4.0 2.5 - 4.5 mg/dL   Bilirubin direct   Result Value Ref Range    Bilirubin Direct 1.1 (H) 0.0 - 0.2 mg/dL   Blood gas arterial with oxyhemoglobin (AM Draw)   Result Value Ref Range    pH Arterial 7.39 7.35 - 7.45 pH    pCO2 Arterial 28 (L) 35 - 45 mm Hg    pO2 Arterial 105 80 - 105 mm Hg    Bicarbonate Arterial 17 (L) 21 - 28 mmol/L    Oxyhemoglobin Arterial 97 92 - 100 %    Base Deficit Art 6.8 mmol/L     *Note: Due to a large number of results and/or encounters for the requested time period, some results have not been displayed. A complete set of results can be found in Results Review.[AH1.1]          Revision History        User Key Date/Time User Provider Type Action    > AH1.1 11/13/2017 10:33 AM Heather Ferrera MD Physician Sign                     Consult Notes      Consults signed by Moses Green MD at 11/6/2017  1:59 PM      Author:  Moses Green MD Service:  Cardiology Author Type:  Physician    Filed:  11/6/2017  1:59 PM Date of Service:  11/6/2017 11:34 AM Creation Time:  11/6/2017 12:40 PM    Status:  Signed :  Moses Green MD (Physician)         CARDIOLOGY CONSULTATION         REQUESTING PHYSICIAN:  Hospitalist Service       PRIMARY CARE PHYSICIAN:  Arielle Leger      INDICATION FOR CARDIAC CONSULTATION:  Congestive heart failure.      Dear Doctors:        It is my pleasure to see your patient, Em Richmond who is a very pleasant 77-year-old female patient.  This patient was only discharged just over a week ago from [facility].  At that time the patient had a full cardiac workup.  She was noted to have a drop in her ejection fraction from previously.  Her echocardiogram in 2014 showed an ejection fraction of 60% to 65%.  Echocardiography at [facility] on 10/21 showed that the EF had dropped to 30% to 35%.  There was mild to moderate tricuspid regurgitation, mild to moderate mitral regurgitation.  This patient has a history of chronic atrial fibrillation.  The patient had coronary angiography performed on 10/23 because the ejection fraction had dropped.  The coronary angiogram showed only mild coronary artery disease with no stenosis greater than 30%.  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  The patient was reported on history to have stopped her metoprolol succinate medication so it was felt that possibly the faster ventricular rate caused the cardiomyopathy.  With that background in mind the patient was admitted this morning with progressively increasing shortness of breath, nausea and vomiting and was found to be markedly hypoxic with O2 saturations of 70%.  The patient was intubated in the field.  There have been no reports of chest pain or fevers or chills.  The patient was found to have significant lactic acidosis.  Her proBNP was raised.  She had a supratherapeutic INR.  Her liver function tests are extremely abnormal with the ALT at 1890, the AST at 3720.  This morning they have gone even higher with an ALT of 2858 and an  AST of 6218.  The troponin was in the normal range at 0.020.  ProBNP was significantly raised at 36,488.  The 12-lead electrocardiogram revealed that the patient was in atrial flutter with a ventricular rate of 131 beats per minute.  Her last EKG on 10/23 prior to this admission showed atrial fibrillation with a controlled ventricular rate at 75 beats per minute.  Imaging studies on admission showed that there were hazy interstitial opacities in the central aspects of both lungs and while they were nonspecific, felt most likely to be related to pulmonary edema.  CT of the chest which was performed in the early hours this morning showed mild ground glass opacities and interstitial thickening in the upper and mid lungs bilaterally.  Again it was felt to be nonspecific but could be related to interstitial pulmonary edema.  Several nonspecific mildly enlarged mediastinal lymph nodes were noted.  On admission, the patient has been noted to be afebrile, temperature at 10:15 was 99 degrees Fahrenheit.  The patient was significantly hypotensive and because of that she was started on Levophed.  Intravenous amiodarone was used to control the ventricular rate.  At present, she is in atrial fibrillation and the ventricular rate is around 104 beats per minute oleg.  The patient is ventilated.  Of note, the patient's white cell count was raised on admission at 15.6 with a hemoglobin of 12.6.  So far none of the cultures have come back.  Her INR was raised as I mentioned at 5.26.      PAST MEDICAL HISTORY:     1.  Cardiomyopathy.    2.  Atrial fibrillation with rapid ventricular response.  The atrial fibrillation is chronic.   3.  Pulmonary hypertension.    4.  Obstructive sleep apnea.   5.  Anxiety.   6.  Essential hypertension.      PAST SURGICAL HISTORY:  None.      FAMILY HISTORY:  Brother has coronary artery disease.  Father with coronary artery disease, mother coronary artery disease.  Her father had colonic cancer and  sister had colonic cancer.      SOCIAL HISTORY:  She is a former smoker.  Occasionally uses alcohol.      CURRENT MEDICATIONS:     1.  Amiodarone bolus plus infusion.     2.  Calcium gluconate intravenously.     3.  Famotidine 20 mg IV q.12 hours.     4.  Levofloxacin 750 mg intravenously once.   5.  Vitamin K10 mg.     6.  Zosyn 4.5 grams intravenously q.6 h.   7.  Vancomycin 1750 mg intravenously once.    8.  Acetylcysteine intravenously q.4 h.     9.  Dobutamine infusion.    10.  Norepinephrine infusion which has been held at present.      ALLERGIES:  Sulfa drugs.      REVIEW OF SYSTEMS:  Review of systems is unavailable from the patient as the patient is sedated and orally intubated.      PHYSICAL EXAMINATION:   GENERAL:  She is a moderately obese patient.  She is unresponsive and sedated on the ventilator.   VITAL SIGNS:  Her blood pressure at present is 98/59, her pulse is 97 beats per minute, atrial fibrillation.  Her temperature is 99 degrees.  Her oxygen saturations are 93%.  She is on the ventilator.   HEENT:  Revealed that her jugular venous pulse is not raised.  Her carotids are normal with no bruits.  She has normal facial symmetry.  She has an endotracheal tube in her mouth.   HEART:  Sounds 1 variable, heart sound 2 normal, no murmurs heard.   CHEST:  Clear to percussion and auscultation with transmitted sounds from the ventilator.   ABDOMEN:  Reveals moderate truncal obesity.   EXTREMITIES:  Femoral pulses are 1+.  No peripheral edema noted.  She moves all 4.  Her peripheries, her feet feel warm, her hands feel cold.   SKIN:  No obvious skin lesions noted.  She is unconscious and not orientated to time, place and person.      LABORATORY INVESTIGATIONS:  Sodium is low at 131, potassium is 4.6, BUN is 24, creatinine is 0.83, calcium 7.8, magnesium was 1.7.  Albumin is significantly low at 2.1, total protein low at 6.1, alkaline phosphatase is raised at 293, bilirubin raised at 2.1, ALT 2858, AST 6218,  direct bilirubin raised at 1.1.  Ionized calcium low at 4.2.  Lactic acid raised at 4.4, glucose is raised at 138.  White cell count raised at 12.4, hemoglobin 11.3, platelet count is 217,000.  PCO2 is low at 28, pO2 is 105, pH is 7.38.      IMPRESSION:     1.  The physical findings at present are consistent with congestive heart failure with interstitial pulmonary edema.  However, it is unusual that this patient who was doing well when she was discharged on 10/27 which is 1 week ago with good rate control, good medical therapy for cardiomyopathy and no significant coronary artery disease would suddenly deteriorate rapidly like this, requiring intubation and showing evidence of shock liver and hypotension.  This is certainly an unusual situation in a patient who, as I said,  was otherwise quite stable.  Her ventricular rate was well controlled when she was discharged and she is on good medical therapy for this so it does appear that the atrial fibrillation rate has picked up, possibly due to congestive heart failure or some other intercurrent illness.  It is odd that she would develop cardiogenic shock given that her myocardial perfusion is good and she is on good medical therapy for her atrial fibrillation.   2.  Atrial fibrillation, chronic.  Ventricular rate is being appropriately and well controlled with amiodarone.   3.  Hypotension.  At present she is not on pressers.  The Levophed has been held.   4.  Raised white cell count.   5.  Evidence of hepatic congestion and/or shock.        PLAN:  At this time she hemodynamically is being maintained with pressers.  Her respiratory status is being maintained with the ventilator.  Her ventricular rate is reasonably well controlled with intravenous amiodarone.  What we do not know at this stage is what her ejection fraction is and we are waiting the results of the echocardiogram.  It appears likely that the patient will require diuresis but I would initially await the  results of the echocardiogram.  We are also awaiting the results of cultures at this stage.  We will follow this patient closely and as soon as I see the results of the echocardiogram, I will make further adjustments if needed.       It has been pleasure to be involved in the care of this very nice patient.         MOSES CENTENO MD, [facility]             D: 2017 11:34   T: 2017 12:39   MT: LE      Name:     EM DORMAN   MRN:      [id number]        Account:       [id number]   :      1940           Consult Date:  2017      Document: [id number]       cc: Arielle Leger MD[KF1.1]        Revision History        User Key Date/Time User Provider Type Action    > KF1.1 2017  1:59 PM Moses Green MD Physician Sign     [N/A] 2017 12:40 PM Moses Green MD Physician Edit            Consults by Gertrudis Vazquez PA-C at 2017  9:51 AM     Author:  Gertrudis Vazquez PA-C Service:  Surgery Author Type:  Physician Assistant - C    Filed:  2017 11:09 AM Date of Service:  2017  9:51 AM Creation Time:  2017  9:51 AM    Status:  Attested :  Gertrudis Vazquez PA-C (Physician Assistant - C)    Cosigner:  Sabas Herrera MD at 2017 11:56 AM         Consult Orders:    1. Surgery General IP Consult: Patient to be seen: Routine - within 24 hours; cholecystitis; Consultant may enter orders: Yes [[id number]] ordered by Santi Terrazas MD at 17 0447           Attestation signed by Sabas Herrera MD at 2017 11:56 AM        Physician Attestation   Sabas KAMINSKI, saw and evaluated Em Dorman as part of a shared visit.  I have reviewed and discussed with the advanced practice provider their history, physical and plan.    I personally reviewed the vital signs, medications, labs and imaging.    My key history or physical exam findings: history does not suggest  cholecystsitis, more likely edema and elevated lft's due to pump problem and hepatic congestion    Key management decisions made by me: await MRCP, could perform HIDA as alternative, not a surgical candidate, if necessary a perc laina tube could be placed    Sabas Herrera  Date of Service (when I saw the patient): 11/06/17                               General Surgery Consultation    Em Richmond MRN#: [id number]   Age: 77 year old YOB: 1940     Date of Admission:          11/5/2017  Reason for consult: Choleycstitis   Surgeon:      Sabas Herrera MD   Requesting provider:     Santi Terrazas MD            History of Present Illness:   This patient is a 77 year old  female with a PMH significant for heart failure and Afib on coumadin who was brought in by EMS to [facility] last night for progressive dyspnea following a recent hospitalization for CHF and Afib with RVR. Patient was found to have elevated LFTs, so an abdominal ultrasound was ordered which revealed gallbladder wall thickening, a 3cm gallstone within the edematous gallbladder, and sonographic Nicholas sign. We were consulted, as well as gastroenterology, for evaluation of these findings.    Labs are significant for an ALT of 2,858; AST of 6,218; Total bilirubin of 2.1, direct bilirubin of 1.1, Alk Phos of 292, lactic acid of 4.4, lipase of 189, WBC of 12.4, INR of 4.83. ALT, AST, Alk Phos have all increased from last night. Total bilirubin, direct bilirubin and lactic acid have decreased slightly since initial labs last night.    History is obtained from the chart. Patient is intubated and sedated; no family is present.           Past Medical History:   Atrial fibrillation  Obstructive sleep apnea  Congestive heart failure  Hypertension  Anxiety   Pulmonary hypertension          Past Surgical History:   No past surgical history on file.         Medications:     Prior to Admission medications    Medication Sig Start Date End  Date Taking? Authorizing Provider   WARFARIN SODIUM PO Take by mouth daily 5 mg Tu/Th/Su; 2.5 mg AOD   Yes Unknown, Entered By History   METOPROLOL SUCCINATE ER PO Take 100 mg by mouth daily   Yes Unknown, Entered By History   atorvastatin (LIPITOR) 40 MG tablet Take 1 tablet (40 mg) by mouth daily 10/25/17  Yes Moses Win MD   furosemide (LASIX) 40 MG tablet Take 0.5 tablets (20 mg) by mouth daily 10/25/17  Yes Moses Win MD   meclizine (ANTIVERT) 12.5 MG tablet Take 1 tablet (12.5 mg) by mouth 3 times daily as needed for dizziness 6/21/14  Yes Anurag Arguello MD   PAROXETINE HCL PO Take 30 mg by mouth daily    Yes Reported, Patient   OMEPRAZOLE PO Take 20 mg by mouth daily as needed    Yes Unknown, Entered By History   MULTIPLE VITAMINS PO Take 1 tablet by mouth daily   Yes Unknown, Entered By History   Cholecalciferol (VITAMIN D3 PO) Take 2,000 Units by mouth daily    Yes Unknown, Entered By History            Allergies:     Allergies   Allergen Reactions     Sulfa Drugs             Social History:     Social History   Substance Use Topics     Smoking status: Previous smoker, currently nonsmoker     Smokeless tobacco: Not on file     Alcohol use Occasional             Family History:   POSITIVE for colon cancer. No other pertinent family history.          Review of Systems:   Brief ROS is negative other than noted in the HPI.         Physical Exam:   Blood pressure 90/47, pulse 132, temperature 98.2  F (36.8  C), temperature source Bladder, resp. rate 24, weight 90.8 kg (200 lb 2.8 oz), SpO2 90 %.  I/O last 3 completed shifts:  In: 2871.92 [I.V.:2871.92]  Out: 250 [Urine:250]    General - intubated and sedated  Head - normocephalic, atrumatic  Eyes - pupils equally round and reactive to light, scant scleral icterus  Respiratory: on ventilator  Abdomen - Obese, soft, does not endorse any tenderness but is quite sedated and not responding to noxious stimuli right now; abdomen is nondistended, +bowel  sounds, no masses or organomegaly palpated  Extremities - warm with edema  Neurologic - unable to assess due to sedation            Data:   Labs:  Recent Labs   Lab Test  11/06/17 0700 11/06/17 0440 11/05/17   2315   WBC  12.4*  15.6*  11.8*   HGB  11.7  12.6  13.5   HCT  34.4*  37.8  41.5   PLT  217  230  322     Recent Labs   Lab Test  11/06/17 0700 11/06/17 0440 11/05/17   2315   POTASSIUM  4.6  5.1  4.8   CHLORIDE  101  101  99   CO2  14*  17*  15*   BUN  24  23  23   CR  0.83  0.84  1.20*     Recent Labs   Lab Test  11/06/17 0700 11/06/17 0440 11/05/17 2315   BILITOTAL  2.1*  2.3*  1.8*   ALT  2858*  1890*  769*   AST  6218*  3720*  1480*   ALKPHOS  292*  314*  405*   LIPASE   --   189  455*     Recent Labs   Lab Test  11/06/17 0700 11/06/17 0440 11/05/17 2315 06/20/14   1605   INR  4.83*  5.26*  3.72*   < >  2.43*   PTT   --    --   47*   --   39*    < > = values in this interval not displayed.     Recent Labs   Lab Test  11/06/17   0700  11/06/17   0440  11/05/17   2315  10/25/17   0735   ALFIE  7.8*  7.6*  8.3*  8.3*   PHOS  4.0  5.0*   --    --    MAG  1.7  1.7   --   2.2     Recent Labs   Lab Test  11/06/17 0700 11/06/17 0440  11/05/17   2315  10/25/17   1039   06/20/14   1720   ANIONGAP  16*  13  18*   --    < >   --    PROTEIN   --    --    --   30*   --   Negative   ALBUMIN  2.1*  2.2*  2.9*   --    --    --     < > = values in this interval not displayed.       Ultrasound of the abdomen:   IMPRESSION:   1. 3 cm gallstone within a nondistended, moderately thick-walled and  edematous gallbladder. Additionally, the ultrasound technologist  reports that the patient has focal tenderness over the gallbladder.  These findings are not convincing for acute cholecystitis, but it  cannot be excluded. If there is a clinical concern for acute  cholecystitis, a HIDA scan could be considered for further evaluation.  2. Mild dilatation of the common duct, which measures 0.9 cm  in  diameter. No intrahepatic biliary dilatation.  3. A trace amount of perihepatic free fluid.      EKG: Complete; See Chart         Assessment:   Em Richmond is a 77 year old female with multiple acute medical issues with lactic acidosis vs sepsis with possible acute cholecystitis         Plan:   Given ultrasound findings, cholecystitis is possible source for patient's lactic acidosis[LF1.1] and leukocytosis[LF1.2]. Patient is clearly too ill for any surgical intervention.[LF1.1] Her[LF1.2] elevated LFTs[LF1.1] may be due to her CHF[LF1.2] resulting in acute liver failure,[LF1.1] although[LF1.2] patient's[LF1.1] total bilirubin is[LF1.2] relatively low and stable[LF1.1].  Agree with MRCP,[LF1.2] if patient is stable enough for transfer.[LF1.1] We will await these results.[LF1.2] HIDA scan[LF1.1] could be considered[LF1.2] to further assess for cholecystitis and need for cholecystostomy tube placement[LF1.1], pending MRCP[LF1.2].[LF1.1] Continue Zosyn.[LF1.3] We will follow along.[LF1.2]       I will discuss the patient's history, physical exam, and plan with Dr. Herrera who will independently interview and examine the patient and make any additional recommendations as appropriate. Thank you very much for this consultation.        Gertrudis Vazquez PA-C  [facility]  [phone]    Pager: [email or web address] (7am - 4pm)[LF1.1]          Revision History        User Key Date/Time User Provider Type Action    > LF1.2 11/6/2017 11:09 AM Gertrudis Vazquez PA-C Physician Assistant - C Sign     LF1.3 11/6/2017 10:37 AM Gertrudis Vazquez PA-C Physician Assistant - C Sign     LF1.1 11/6/2017 10:11 AM Gertrudis Vazquez PA-C Physician Assistant - C Sign            Consults by Ilene Alicea MD at 11/6/2017  9:06 AM     Author:  Ilene Alicea MD Service:  Gastroenterology Author Type:  Physician    Filed:  11/6/2017 10:47 AM Date of Service:  11/6/2017  9:06 AM  "Creation Time:  11/6/2017  9:03 AM    Status:  Addendum :  Ilene Alicea MD (Physician)     Consult Orders:    1. Gastroenterology IP Consult: evaluation for ercp in patient with suspected choledocholithiasis; Consultant may enter orders: Yes; Patient to be seen: Routine - within 24 hours; Requested Clinic/Group: [id number] [[id number]] ordered by Santi Terrazas MD at 11/06/17 0447                [facility]  Gastroenterology Consultation    Em Richmond  [address]  [place] [address]  77 year old female    Admission Date/Time: 11/5/2017  Primary Care Provider: Arielle Leger    We were asked to see the patient in consultation by Dr. Terrazas for evaluation of elevated liver function tests.        HPI:  Em Richmond is a 77 year old female who has a PMH significant for atrial fibrillation on warfarin, JERSON not on CPAP, pulmonary HTN, anxiety, and HTN who was recently admitted to [facility] from 10/20-10/25 for shortness of breath.  She was diagnosed with CHF at that time, and noted to have an EF of 35%.   During that stay she underwent a CXR, VQ scan, right heart cath and ECHO.  She was discharged on Lipitor, lisinopril, metoprolol, furosemide, and cipro.  Warfarin was continued as well.      The patient then represented to [facility] on 11/5.  It was noted that the patient had not been in \"great health\" per the family upon reviewing the chart.  Her heart rate had been variable and she had noted fatigue with ambulation.  She significantly worsened yesterday and EMS was called.  The patient was found alert but unable to speak or walk.  She was SOB.  She was tachycardic in the 160s.  O2 sat's were around 70%.  She was intubated.  Family also reported abdominal pain, nausea and vomiting.      On evaluation it was noted that sodium was low at 132, creatinine at 1.20, tbili at 1.8, alp at 405, ALT at 769, and AST at 1480.  Lactic acid was at 10.5, BNP at 44[address].  Lactic acid has " subsequently improved to 4.4, however liver enzymes continued to climb.  Currently Tbili is 2.1, ALP at 292, ALT at 2858, AST 6218.  WBC was 11.8 on admission, increased to 15.6 and is now at 12.4.  Plts are normal at 217k.  INR on admission was at 3.72, increased to 5.26 and is now at 4.83.  The patient was given Vitamin K.      Patient is currently intubated and sedated so any history is obtained from the chart.  Family is not present.        ROS: A comprehensive ten point review of systems was negative aside from those in mentioned in the HPI.      MEDICATIONS:   No current facility-administered medications on file prior to encounter.   Current Outpatient Prescriptions on File Prior to Encounter:  atorvastatin (LIPITOR) 40 MG tablet Take 1 tablet (40 mg) by mouth daily   furosemide (LASIX) 40 MG tablet Take 0.5 tablets (20 mg) by mouth daily   meclizine (ANTIVERT) 12.5 MG tablet Take 1 tablet (12.5 mg) by mouth 3 times daily as needed for dizziness   PAROXETINE HCL PO Take 30 mg by mouth daily    OMEPRAZOLE PO Take 20 mg by mouth daily as needed    MULTIPLE VITAMINS PO Take 1 tablet by mouth daily   Cholecalciferol (VITAMIN D3 PO) Take 2,000 Units by mouth daily    [DISCONTINUED] warfarin (COUMADIN) 5 MG tablet Take 0.5 tablets (2.5 mg) by mouth daily       ALLERGIES:   Allergies   Allergen Reactions     Sulfa Drugs        PMH:    afib  JERSON  Pulmonary HTN  Anxiety  HTN  CHF    PSH:  NONE      SOCIAL HISTORY: Former smoker but has quit.  Only occasional ETOH consumption.        FAMILY HISTORY:  Strong family history of colon CA      PHYSICAL EXAM:   BP 90/47  Pulse 132  Temp 98.2  F (36.8  C) (Bladder)  Resp 24  Wt 90.8 kg (200 lb 2.8 oz)  SpO2 90%  BMI 33.31 kg/m2    Constitutional: intubated  Cardiovascular: RRR, normal S1 and S2, no r/c/g/m  Respiratory: CTAB  Head: Normocephalic. Atraumatic.    Neck: Neck supple. No adenopathy. Thyroid symmetric, normal size, trachea midline  Eyes:  PERRL, mild  icterus  ENT: Hearing adequate, pharynx normal without erythema or exudate  Abdomen:   Auscultation: +BS  Appearance: normal  Palpation: soft, nontender  NEURO: cannot assess  SKIN: no suspicious lesions or rashes  LYMPH:   anterior cervical: no adenopathy  posterior cervical: no adenopathy  supraclavicular: no adenopathy          ADDITIONAL COMMENTS:   I reviewed the patient's new clinical lab test results.   Recent Labs   Lab Test  11/06/17   0700 11/06/17 0440  11/05/17   2315   WBC  12.4*  15.6*  11.8*   HGB  11.7  12.6  13.5   MCV  95  97  99   PLT  217  230  322   INR  4.83*  5.26*  3.72*     Recent Labs   Lab Test  11/06/17   0700 11/06/17 0440  11/05/17   2315   NA  131*  131*  132*   POTASSIUM  4.6  5.1  4.8   CHLORIDE  101  101  99   CO2  14*  17*  15*   BUN  24  23  23   CR  0.83  0.84  1.20*   ANIONGAP  16*  13  18*   ALFIE  7.8*  7.6*  8.3*   GLC  138*  114*  122*     Recent Labs   Lab Test  11/06/17   0700 11/06/17   0440  11/05/17   2315  10/25/17   1039  06/20/14   1720   ALBUMIN  2.1*  2.2*  2.9*   --    --    BILITOTAL  2.1*  2.3*  1.8*   --    --    ALT  2858*  1890*  769*   --    --    AST  6218*  3720*  1480*   --    --    ALKPHOS  292*  314*  405*   --    --    PROTEIN   --    --    --   30*  Negative   LIPASE   --   189  455*   --    --              .    CONSULTATION ASSESSMENT AND PLAN:    Active Problems:  Acute respiratory failure with hypoxemia (H)  Possible acute liver failure    Assessment: 77 year old female with PMH as per HPI, admitted with shortness of breath and general decline since discharge from [facility] on 10/25.  Intubated on arrival and labs revealed multiple abnormalities.  INR was 3.72 on admission and increased to above 5 without any additional doses of coumadin.  INR partially improved with Vitamin K down to 4.[AF1.1]8.[AF1.2]  Labs are concerning for acute liver failure, which may be on the basis of ischemic hepatopathy from hypotension, in conjunction with passive  congestion and possibly sepsis (seems less likely given lack of fever or significant WBC).[AF1.1]  It is reassuring however that the total bilirubin is stable and not increasing significantly.[AF1.2]    Plan:   -INR and LFTs [id number][AF1.1] for now[AF1.2]  -Start acetylcysteine protocol--continue even if tylenol level is negative given concern for acute liver failure.  Acetylcysteine has been shown to add benefit in the setting of acute liver failure even if not from Tylenol  -If concern for biliary obstruction (ALT/AST not consistent with obstruction, far too high), patient will need cholecystotomy tube as she is far too ill for ERCP  -MRCP today to assess for biliary obstruction--this seems unlikely[AF1.1] given only mild elevation in ALP and tbili,[AF1.2] but if present patient will need emergent C tube        I will discuss the patient's findings and plan with Dr. Alicea who will independently examine the patient and add further recommendations as necessary.          Ekaterina Hyatt, [facility]  Office:  [phone] call if needed after 5PM  Cell:  [phone], not available after 5PM at this number[AF1.1]    Staff addendum: 77 yof with hx of CHF admitted for respiratory failure, recent hospitalization last month. Had some complaints of abd pain. Found to have elevated liver tests, abd pain. U/s showed CBD dilation, gallstone, thickened GB wall. INR elevated despite holding warfarin, partially reversed with Vit K today. Pt intubated and unable to give hx. Found to be in decompensated CHR. Currently on dobutamine and amiodarone gtts.[CB1.1]     BP 90/47  Pulse 132  Temp 99  F (37.2  C)  Resp (!) 32  Wt 90.8 kg (200 lb 2.8 oz)  SpO2 93%  BMI 33.31 kg/m2[CB1.2]  Gen: sedated intubated.   Cor: Tachy IRIR  Abd: S NT ND+bs    A/P: 77 yof with markedly elevated liver tests consistent with ischemia[CB1.1], passive congestion. Elevated INR concerning for acute liver failure in setting of  held warfarin.[CB1.3] . Some concern for possible biliary sepsis given elevated WBC, biliary dilation, gallstones. Pt not stable enough for EUS or ERCP at this time. Discussed with[CB1.1] Dr. Moya[CB1.3] utility of percutaneous cholecystectomy tube vs MRCP. Because of lack of high suspicion for biliary source will proceed with MRCP. Unable to do percutaneous tube at this time due to high INR anyway  -Continue ABX  -Await MRCP  -Follow liver tests  -Recommend further dosing of Vit K to reverse INR further in case intervention needed  -Acetylcysteine ok    Ilene Alicea MD  [facility]  Pager [phone]  Office [phone][CB1.1]         Revision History        User Key Date/Time User Provider Type Action    > CB1.3 11/6/2017 10:47 AM Ilene Alicea MD Physician Addend     CB1.2 11/6/2017 10:45 AM Ilene Alicea MD Physician Sign     CB1.1 11/6/2017 10:37 AM Ilene Alicea MD Physician      AF1.2 11/6/2017  9:47 AM Ekaterina Hyatt PA-C Physician Assistant Sign     AF1.1 11/6/2017  9:33 AM Ekaterina Hyatt PA-C Physician Assistant Sign                     Progress Notes - Physician (Notes from 11/10/17 through 11/13/17)      Progress Notes by Kamini Richmond LICSW at 11/13/2017 11:05 AM     Author:  Kamini Richmond LICSW Service:  Social Work Author Type:      Filed:  11/13/2017 11:25 AM Date of Service:  11/13/2017 11:05 AM Creation Time:  11/13/2017 11:05 AM    Status:  Addendum :  Kamini Richmond LICSW ()         SW:  D:  Received discharge orders for patient.  Bed available at [facility] for today.  Patient's daughter will transport around 13:00 today.  Patient and family informed of the plan and in agreement to the plan.  Updated [facility] and faxed the orders and the PAS.[SJ1.1]  Call placed to [facility] to cancel the bed.[SJ1.2]      PAS-RR    D: Per DHS regulation, SW completed and submitted PAS-RR to [place]  Board on Aging Direct Connect via the Senior LinkAge Line.  PAS-RR confirmation # is : [id number].    I: SW spoke with patient and daughter and they are aware a PAS-RR has been submitted.  SW reviewed with patient and daughter that they may be contacted for a follow up appointment within 10 days of hospital discharge if their SNF stay is < 30 days.  Contact information for Senior LinkAge Line was also provided.    A: Patient and daughter verbalized understanding.    P: Further questions may be directed to [facility] LinkAge Line at #[phone], option #4 for PAS-RR staff.[SJ1.1]       Revision History        User Key Date/Time User Provider Type Action    > SJ1.2 11/13/2017 11:25 AM Kamini Richmond LICSW  Addend     SJ1.1 11/13/2017 11:11 AM Kamini Richmond LICSW  Sign            Progress Notes by Kamini Richmond LICSW at 11/13/2017 10:06 AM     Author:  Kamini Richmond LICSW Service:  Social Work Author Type:      Filed:  11/13/2017 10:07 AM Date of Service:  11/13/2017 10:06 AM Creation Time:  11/13/2017 10:06 AM    Status:  Signed :  Kamini Richmond LICSW ()         SW:  D:  Received a call back from [facility].  The have a bed available for patient today.  P:  Will continue to follow.[SJ1.1]     Revision History        User Key Date/Time User Provider Type Action    > SJ1.1 11/13/2017 10:07 AM Kamini Richmond LICSW  Sign            Progress Notes by Heather Ferrera MD at 11/12/2017 10:29 AM     Author:  Heather Ferrera MD Service:  Hospitalist Author Type:  Physician    Filed:  11/12/2017  3:52 PM Date of Service:  11/12/2017 10:29 AM Creation Time:  11/12/2017 10:29 AM    Status:  Addendum :  Heather Ferrera MD (Physician)         [facility]    Hospitalist Progress Note    Assessment & Plan   Em Richmond is a 77-year-old female with a past medical history significant for nonischemic cardiomyopathy,  chronic atrial fibrillation, gastroesophageal reflux disease, anxiety, pulmonary hypertension, obesity and obstructive sleep apnea who was admitted on 11/06/2017 due to acute hypoxic respiratory failure requiring intubation in the setting of cardiogenic shock secondary to atrial fibrillation with rapid ventricular response and decompensated nonischemic cardiomyopathy with pulmonary edema and shock liver.   The patient has successfully been extubated,  no longer requiring pressors, including Levophed and dobutamine.  The patient had been placed on an amiodarone drip per Cardiology and has since been discontinued and the patient has been started on metoprolol XL 25 mg 2 times daily and had previously received digoxin 250 mcg on 11/07/2017.  The patient was transferred to [facility] with close monitoring, including continuous telemetry on 11/7.  Cardiology Service will continue to follow    Acute hypoxic respiratory failure, requiring intubation secondary to cardiogenic shock in the setting of decompensated systolic congestive heart failure and atrial fibrillation with rapid ventricular response. Sob with activity.   - Cont diuretic/bb, oxygen support.   - Cont diuretic.  - Supportive care.      Acute on chronic nonischemic cardiomyopathy with pulmonary edema: weight is down.  Sating well on RA.[AH1.1] No peripheral edema this am.[AH1.2]  - Cont lasix 40 mg[AH1.1] in am and 20 mg in the afternoon.[AH1.3].  - Cont BB[AH1.1]/ACEI[AH1.4].  -  Low salt diet.  - daily weights. I/Os.  - Monitor BMP.  - Replace electrolytes as indicated.  - Appreciate cards assistance.    Chronic atrial fibrillation with rapid ventricular response: rate controlled.   - Plan per cards (on coumadin, BB)    Acute kidney injury:  resolved.   -  Monitor BMP     Shock liver in the setting of cardiogenic shock:  The patient was managed with a NAC drip.  LFT's are improving.  - Monitor LFTs.     Gastroesophageal reflux disease:    - Will continue prior to  admit omeprazole[AH1.1].[AH1.2]    Generalized anxiety disorder:    - Cont PTA paroxetine 30 mg daily.     Obesity with associated obstructive sleep apnea:  Patient is compliant with home CPAP.    - Cont CPAP.    Gallbladder edema and borderline common bile duct dilation:  Patient underwent an MRCP and has been followed by Gastroenterology.  There is a high suspicion that shock liver with hepatic congestion and ischemia likely resulted in these ultrasound findings.    - Will continue with supportive care.     Deep venous thrombosis prophylaxis:  on coumadin.    DISPOSITION: to TCU[AH1.1] likely in am.[AH1.2]    Interval History   Pt seen and examined. Sating well on RA.[AH1.1] Less e[AH1.2]xertional sob.    Physical Exam   Temp: 98.2  F (36.8  C) Temp src: Oral BP: 138/84 Pulse: 79 Heart Rate: 83 Resp: 18 SpO2: 93 % O2 Device: None (Room air)    Vitals:    11/10/17 0500 11/11/17 0500 11/12/17 0500   Weight: 83.4 kg (183 lb 13.8 oz) 82.5 kg (181 lb 14.1 oz) 82.1 kg (181 lb)     Vital Signs with Ranges  Temp:  [97.9  F (36.6  C)-98.2  F (36.8  C)] 98.2  F (36.8  C)  Pulse:  [79-93] 79  Heart Rate:  [] 83  Resp:  [16-18] 18  BP: (109-139)/(73-84) 138/84  SpO2:  [92 %-95 %] 93 %  I/O last 3 completed shifts:  In: 553 [P.O.:550; I.V.:3]  Out: 2625 [Urine:2625]    Constitutional:Awake, alert, cooperative, no apparent distress  Respiratory: Clear to auscultation bilaterally, no crackles or wheezing  Cardiovascular: Regular rate and rhythm, normal S1 and S2, and no murmur noted  GI: Normal bowel sounds, soft, non-distended, non-tender  Skin/Integumen:[AH1.1] no edema.[AH1.2]  Other:     Medications     Warfarin Therapy Reminder         lisinopril  5 mg Oral Daily     furosemide  40 mg Oral Daily     furosemide  20 mg Oral Q24H     warfarin  2.5 mg Oral ONCE at 18:00     aspirin  81 mg Oral Daily     sodium chloride (PF)  3 mL Intracatheter Q8H     metoprolol  75 mg Oral BID     omeprazole (priLOSEC) CR capsule 20 mg   20 mg Oral QAM     PARoxetine (PAXIL) tablet 30 mg  30 mg Oral Daily     heparin  5,000 Units Subcutaneous Q8H       Data     Recent Labs  Lab 11/12/17  0530 11/11/17  1212 11/11/17  0525 11/10/17  0554  11/08/17  0500  11/07/17  0415  11/06/17  0700 11/06/17  0440 11/05/17  2315   WBC  --   --   --   --   --  8.6  --  9.2  --  12.4* 15.6* 11.8*   HGB  --   --   --   --   --  10.7*  --  11.4*  --  11.7 12.6 13.5   MCV  --   --   --   --   --  96  --  94  --  95 97 99   PLT  --  202  --   --   --  260  --  225  --  217 230 322   INR 2.62*  --  2.29* 1.78*  < >  --   --  1.92*  < > 4.83* 5.26* 3.72*     --  137 137  --  141  < > 137  --  131* 131* 132*   POTASSIUM 3.2*  --  3.4 3.4  < > 3.6  < > 2.8*  --  4.6 5.1 4.8   CHLORIDE 97  --  99 99  --  107  < > 104  --  101 101 99   CO2 35*  --  34* 30  --  27  < > 24  --  14* 17* 15*   BUN 12  --  10 7  --  12  < > 17  --  24 23 23   CR 0.82  --  0.69 0.66  --  0.86  < > 0.80  --  0.83 0.84 1.20*   ANIONGAP 6  --  4 8  --  7  < > 9  --  16* 13 18*   ALFIE 8.3*  --  8.6 8.1*  --  7.7*  < > 7.3*  --  7.8* 7.6* 8.3*   GLC 94  --  111* 112*  --  111*  < > 97  --  138* 114* 122*   ALBUMIN 2.3*  --   --  2.1*  < > 2.2*  --  1.9*  < > 2.1* 2.2* 2.9*   PROTTOTAL 6.7*  --   --  6.7*  < > 6.1*  --  5.9*  < > 6.1* 6.3* 8.2   BILITOTAL 1.1  --   --  1.7*  < > 1.3  --  1.4*  < > 2.1* 2.3* 1.8*   ALKPHOS 251*  --   --  215*  < > 209*  --  226*  < > 292* 314* 405*   *  --   --  895*  < > 1650*  --  2474*  < > 2858* 1890* 769*   *  --   --  712*  < > 1509*  --  3199*  < > 6218* 3720* 1480*   LIPASE  --   --   --   --   --   --   --   --   --   --  189 455*   TROPI  --   --   --   --   --   --   --   --   --   --  0.020 <0.015   < > = values in this interval not displayed.    No results found for this or any previous visit (from the past 24 hour(s)).[AH1.1]     Revision History        User Key Date/Time User Provider Type Action    > AH1.3 11/12/2017  3:52 PM Heather Ferrera  "MD TORSTEN Physician Addend     AH1.2 11/12/2017 10:35 AM Heather Ferrera MD Physician Sign     AH1.4 11/12/2017 10:31 AM Heather Ferrera MD Physician      AH1.1 11/12/2017 10:29 AM Heather Ferrera MD Physician             Progress Notes by Angela Villela RD, LD at 11/12/2017  1:26 PM     Author:  Angela Villela RD, LD Service:  Nutrition Author Type:  Registered Dietitian    Filed:  11/12/2017  1:29 PM Date of Service:  11/12/2017  1:26 PM Creation Time:  11/12/2017  1:26 PM    Status:  Signed :  Angela Villela RD, LD (Registered Dietitian)         BRIEF NUTRITION ASSESSMENT      REASON FOR ASSESSMENT:  LOS    NUTRITION HISTORY:  Patient states that her appetite and intake are good at baseline.  \"I like food\".    CURRENT DIET AND INTAKE:  Diet:  2 gram Na               Patient reports that her appetite and intake are much improved from earlier this admit.  She is not eating % of meals.  Breakfast today was an omelet, yogurt, blueberry muffin, OJ, and coffee.  Lunch consisted of a tuna fish sandwich, pineapple, and coffee.     ANTHROPOMETRICS:  Height: 5'5\"  Weight: 82.1 kg (181#)(11/12)  BMI: 30.12 kg/m2  IBW:  56.8 kg   Weight Status: Obesity Grade I BMI 30-34.9  %IBW: 145%  Weight History:[KK1.1]   Wt Readings from Last 10 Encounters:   11/12/17 82.1 kg (181 lb)   10/25/17 83.9 kg (184 lb 14.4 oz)   06/20/14 83.9 kg (185 lb)[KK1.2]   Patient states that she has diuresed down to her UBW  Noted she was 194# on admit (up due to fluids)      LABS:  Labs noted    MALNUTRITION:  Patient does not meet two of the following criteria necessary for diagnosing malnutrition: significant weight loss, reduced intake, subcutaneous fat loss, muscle loss or fluid retention    NUTRITION INTERVENTION:  Nutrition Diagnosis:  No nutrition diagnosis at this time.    Implementation:  Nutrition Education:  Per Provider order if indicated.    FOLLOW UP/MONITORING:   Will re-evaluate in 7 - 10 days, or sooner, if " re-consulted.    Angela Villela RD, LD, [facility]   Clinical Dietitian - [facility][KK1.1]                Revision History        User Key Date/Time User Provider Type Action    > KK1.2 11/12/2017  1:29 PM Angela Villela RD, [place] Registered Dietitian Sign     KK1.1 11/12/2017  1:26 PM Angela Villela RD, LD Registered Dietitian             Progress Notes by Milton Leslie MD at 11/12/2017  5:00 AM     Author:  Milton Leslie MD Service:  Cardiology Author Type:  Physician    Filed:  11/12/2017  1:26 PM Date of Service:  11/12/2017  5:00 AM Creation Time:  11/12/2017  5:00 AM    Status:  Signed :  Milton Leslie MD (Physician)         [facility]  Cardiology Progress Note    Date of Service (when I saw the patient):[AM1.1] 11/12/2017[AM1.2]  Primary Cardiologist: Dr. Pickard[AM1.1]     Assessment & Plan[AM1.2]   Em Richmond is a 77 year old female who was admitted on 11/5/2017 with SOB and weight gain. She was recently admitted at [facility] with CHF exacerbation and hypoxemic respiratory failure.  Patient is making a good recovery.    Acute Respiratory Failure (resolved)  Acute on Chronic Systolic CHF, LVEF 31%  Mild pulmonary hypertension  Mild to Moderate Tricuspid Regurgitation    Atrial Fibrillation   --[AM1.1] decrease[AM1.3] Lasix to Furosemide 40 mg[AM1.1] in the am and 20 mg in the pm[AM1.3]   -- continue Metoprolol succinate 75 mg BID   -- continue Coumadin for CVA prophylaxis   --[AM1.1] adding[AM1.3] low dose ACEI with Lisinopril 5 mg PO daily    Mild CAD, non-obstructive  - start daily aspirin    [facility]:   Ying Pickard[AM1.1]         Interval History[AM1.2]   No acute overnight events.  Patient makes no complaints.[AM1.1]    Physical Exam   Temp: 97.9  F (36.6  C) Temp src: Oral BP: 139/78 Pulse: 79 Heart Rate: 89 Resp: 16 SpO2: 93 % O2 Device: None (Room air)    Vitals:    11/08/17 0000 11/10/17 0500  11/11/17 0500   Weight: 89.5 kg (197 lb 5 oz) 83.4 kg (183 lb 13.8 oz) 82.5 kg (181 lb 14.1 oz)[AM1.2]     Gen- NAD, lying supine in bed  Neck- JVP[AM1.1] non-elevated today[AM1.4]  Resp- Fine crackles at the bases bilaterally  Card- Irregular rhythm, normal. No murmurs  Abd- Soft, nontender  Ext- No edema[AM1.1]    Medications     Warfarin Therapy Reminder         aspirin  81 mg Oral Daily     furosemide  40 mg Oral BID     sodium chloride (PF)  3 mL Intracatheter Q8H     metoprolol  75 mg Oral BID     omeprazole (priLOSEC) CR capsule 20 mg  20 mg Oral QAM     PARoxetine (PAXIL) tablet 30 mg  30 mg Oral Daily     heparin  5,000 Units Subcutaneous Q8H       Data[AM1.2]   Reviewed.[AM1.1]              Revision History        User Key Date/Time User Provider Type Action    > AM1.4 11/12/2017  1:26 PM Milton Leslie MD Physician Sign     AM1.3 11/12/2017  6:15 AM Milton Leslie MD Physician      AM1.2 11/12/2017  5:01 AM Milton Leslie MD Physician      AM1.1 11/12/2017  5:00 AM Milton Leslie MD Physician             Progress Notes by Milton Leslie MD at 11/11/2017  6:11 AM     Author:  Milton Leslie MD Service:  Cardiology Author Type:  Physician    Filed:  11/11/2017  4:43 PM Date of Service:  11/11/2017  6:11 AM Creation Time:  11/11/2017  6:11 AM    Status:  Signed :  Milton Leslie MD (Physician)         [facility]  Cardiology Progress Note    Date of Service (when I saw the patient): 11/11/2017  Primary Cardiologist: Dr. Pickard     Assessment & Plan   Em Richmond is a 77 year old female who was admitted on 11/5/2017 with SOB and weight gain. She was recently admitted at [facility] with CHF exacerbation.      Acute on Chronic Systolic CHF   Chronic Atrial Fibrillation   --[AM1.1] increasing Lasix to[AM1.2] PO Furosemide[AM1.1] 40[AM1.2] mg BID   -- continue rate control strategy with Metoprolol succinate 75 mg BID   -- continue Coumadin  for CVA prophylaxis     Acute Respiratory Failure (resolved)    Mild CAD, non-obstructive  - start daily aspirin    Mild to Moderate Tricuspid Regurgitation      Heart Follow Up:   Ying Pickard         Interval History   No acute overnight events.[AM1.1]  Patient makes not complaints aside from feeling weak with ambulation.[AM1.2]    Physical Exam   Temp: 97.9  F (36.6  C) Temp src: Oral BP: 137/69   Heart Rate: 89 Resp: 18 SpO2: 93 % O2 Device: None (Room air)    Vitals:    11/08/17 0000 11/10/17 0500 11/11/17 0500   Weight: 89.5 kg (197 lb 5 oz) 83.4 kg (183 lb 13.8 oz) 82.5 kg (181 lb 14.1 oz)     Gen- NAD  Neck-[AM1.1] JVP elevated at 12 cm of water[AM1.2]  Resp- Fine crackles at the bases bilaterally  Card- Irregular rhythm,[AM1.1] normal[AM1.2].[AM1.1] No murmurs[AM1.2]  Abd- Soft, nontender  Ext- No edema    Medications     Warfarin Therapy Reminder         furosemide  20 mg Oral BID     metoprolol  75 mg Oral BID     omeprazole (priLOSEC) CR capsule 20 mg  20 mg Oral QAM     PARoxetine (PAXIL) tablet 30 mg  30 mg Oral Daily     heparin  5,000 Units Subcutaneous Q8H       Data   Reviewed.[AM1.1]              Revision History        User Key Date/Time User Provider Type Action    > AM1.2 11/11/2017  4:43 PM Milton Leslie MD Physician Sign     AM1.1 11/11/2017  6:11 AM Milton Leslie MD Physician             Progress Notes by Heather Ferrera MD at 11/11/2017 10:55 AM     Author:  Heather Ferrera MD Service:  Hospitalist Author Type:  Physician    Filed:  11/11/2017 11:22 AM Date of Service:  11/11/2017 10:55 AM Creation Time:  11/11/2017 10:55 AM    Status:  Signed :  Heather Ferrera MD (Physician)         [facility]    Hospitalist Progress Note    Assessment & Plan   Em Richmond is a 77-year-old female with a past medical history significant for nonischemic cardiomyopathy, chronic atrial fibrillation, gastroesophageal reflux disease, anxiety, pulmonary  hypertension, obesity and obstructive sleep apnea who was admitted on 11/06/2017 due to acute hypoxic respiratory failure requiring intubation in the setting of cardiogenic shock secondary to atrial fibrillation with rapid ventricular response and decompensated nonischemic cardiomyopathy with pulmonary edema and shock liver.   The patient has successfully been extubated,  no longer requiring pressors, including Levophed and dobutamine.  The patient had been placed on an amiodarone drip per Cardiology and has since been discontinued and the patient has been started on metoprolol XL 25 mg 2 times daily and had previously received digoxin 250 mcg on 11/07/2017.  The patient was transferred to [facility] with close monitoring, including continuous telemetry on 11/7.  Cardiology Service will continue to follow    Acute hypoxic respiratory failure, requiring intubation secondary to cardiogenic shock in the setting of decompensated systolic congestive heart failure and atrial fibrillation with rapid ventricular response. Sob with activity.   - Cont diuretic/bb, oxygen support.   - Cont diuretic.  - Supportive care.      Acute on chronic nonischemic cardiomyopathy with pulmonary edema: weight is down. Net neg in the past 24 hrs  Is about 1.2 L. Breathing much better. Sating well on RA.[AH1.1] Mild peripheral edema this am.[AH1.2]  - Cont diuresis per cards ([AH1.1]increase dose to 40 mg BID)[AH1.2]  - Cont BB.  -  Low salt diet.  - daily weights. I/Os.  - Monitor BMP.  - Replace electrolytes as indicated.    Chronic atrial fibrillation with rapid ventricular response: rate controlled.   - Plan per cards (on coumadin, BB)    Acute kidney injury:  resolved.   -  Monitor BMP     Shock liver in the setting of cardiogenic shock:  The patient was managed with a NAC drip.  LFT's are improving.  - Monitor LFTs.     Gastroesophageal reflux disease:    - Will continue prior to admit omeprazole, but schedule this instead of as needed at 20  mg daily.     Generalized anxiety disorder:    - Cont PTA paroxetine 30 mg daily.     Obesity with associated obstructive sleep apnea:  Patient is compliant with home CPAP.    - Cont CPAP.    Gallbladder edema and borderline common bile duct dilation:  Patient underwent an MRCP and has been followed by Gastroenterology.  There is a high suspicion that shock liver with hepatic congestion and ischemia likely resulted in these ultrasound findings.    - Will continue with supportive care.     Deep venous thrombosis prophylaxis:  on coumadin.    DISPOSITION:[AH1.1] to TCU 1-2 days.[AH1.2]    Interval History   Pt seen and examined. Sating well on RA.[AH1.1] Exertional sob.[AH1.2]    Physical Exam   Temp: 98  F (36.7  C) Temp src: Oral BP: 142/71   Heart Rate: 84 Resp: 18 SpO2: 93 % O2 Device: None (Room air)    Vitals:    11/08/17 0000 11/10/17 0500 11/11/17 0500   Weight: 89.5 kg (197 lb 5 oz) 83.4 kg (183 lb 13.8 oz) 82.5 kg (181 lb 14.1 oz)     Vital Signs with Ranges  Temp:  [97.8  F (36.6  C)-98.4  F (36.9  C)] 98  F (36.7  C)  Heart Rate:  [84-99] 84  Resp:  [18] 18  BP: (122-142)/(65-79) 142/71  SpO2:  [93 %-95 %] 93 %  I/O last 3 completed shifts:  In: 730 [P.O.:730]  Out: 2000 [Urine:2000]    Constitutional:Awake, alert, cooperative, no apparent distress  Respiratory: Clear to auscultation bilaterally, no crackles or wheezing  Cardiovascular: Regular rate and rhythm, normal S1 and S2, and no murmur noted  GI: Normal bowel sounds, soft, non-distended, non-tender  Skin/Integumen:[AH1.1] mild BLE edema.[AH1.2]  Other:     Medications     Warfarin Therapy Reminder         aspirin  81 mg Oral Daily     warfarin  2.5 mg Oral ONCE at 18:00     furosemide  20 mg Oral BID     metoprolol  75 mg Oral BID     omeprazole (priLOSEC) CR capsule 20 mg  20 mg Oral QAM     PARoxetine (PAXIL) tablet 30 mg  30 mg Oral Daily     heparin  5,000 Units Subcutaneous Q8H       Data     Recent Labs  Lab 11/11/17  0525 11/10/17  0554  11/09/17  1802 11/09/17  0641  11/08/17  0500  11/07/17  0415  11/06/17  0700 11/06/17  0440 11/05/17  2315   WBC  --   --   --   --   --  8.6  --  9.2  --  12.4* 15.6* 11.8*   HGB  --   --   --   --   --  10.7*  --  11.4*  --  11.7 12.6 13.5   MCV  --   --   --   --   --  96  --  94  --  95 97 99   PLT  --   --   --   --   --  260  --  225  --  217 230 322   INR 2.29* 1.78*  --  1.68*  < >  --   --  1.92*  < > 4.83* 5.26* 3.72*    137  --   --   --  141  < > 137  --  131* 131* 132*   POTASSIUM 3.4 3.4 3.4 3.1*  --  3.6  < > 2.8*  --  4.6 5.1 4.8   CHLORIDE 99 99  --   --   --  107  < > 104  --  101 101 99   CO2 34* 30  --   --   --  27  < > 24  --  14* 17* 15*   BUN 10 7  --   --   --  12  < > 17  --  24 23 23   CR 0.69 0.66  --   --   --  0.86  < > 0.80  --  0.83 0.84 1.20*   ANIONGAP 4 8  --   --   --  7  < > 9  --  16* 13 18*   ALFIE 8.6 8.1*  --   --   --  7.7*  < > 7.3*  --  7.8* 7.6* 8.3*   * 112*  --   --   --  111*  < > 97  --  138* 114* 122*   ALBUMIN  --  2.1*  --  2.3*  --  2.2*  --  1.9*  < > 2.1* 2.2* 2.9*   PROTTOTAL  --  6.7*  --  6.7*  --  6.1*  --  5.9*  < > 6.1* 6.3* 8.2   BILITOTAL  --  1.7*  --  1.5*  --  1.3  --  1.4*  < > 2.1* 2.3* 1.8*   ALKPHOS  --  215*  --  199*  --  209*  --  226*  < > 292* 314* 405*   ALT  --  895*  --  1120*  --  1650*  --  2474*  < > 2858* 1890* 769*   AST  --  712*  --  749*  --  1509*  --  3199*  < > 6218* 3720* 1480*   LIPASE  --   --   --   --   --   --   --   --   --   --  189 455*   TROPI  --   --   --   --   --   --   --   --   --   --  0.020 <0.015   < > = values in this interval not displayed.    No results found for this or any previous visit (from the past 24 hour(s)).[AH1.1]     Revision History        User Key Date/Time User Provider Type Action    > AH1.2 11/11/2017 11:22 AM Heather Ferrera MD Physician Sign     AH1.1 11/11/2017 10:55 AM Heather Ferrera MD Physician             Progress Notes by Kamini Richmond LICSW at 11/10/2017  3:33 PM      Author:  Kamini Richmond LICSW Service:  Social Work Author Type:      Filed:  11/10/2017  3:42 PM Date of Service:  11/10/2017  3:33 PM Creation Time:  11/10/2017  3:33 PM    Status:  Signed :  Kamini Richmond LICSW ()         Care Transition Initial Assessment -   Reason For Consult: discharge planning  Met with: Patient, daughter Teresa, and granddaughter  Active Problems:    Acute respiratory failure with hypoxemia (H)    Acute pulmonary edema (H)    Secondary cardiomyopathy (H)         DATA  Lives With: spouse  Living Arrangements: house  Description of Support System: Supportive, Involved  Who is your support system?: Children,   Support Assessment: Adequate family and caregiver support.  Identified issues/concerns regarding health management:      Quality Of Family Relationships: supportive, involved  Transportation Available: car, family or friend will provide (Pt still drives; however, family can provide for now.)    Per social service protocol for discharge planning.  Patient was admitted on 11-5-17 with acute respiratory failure with hypoxemia.  The tentative date of discharge is 11-13-17.  Reviewed chart and spoke with patient, daughter, and grand-daughter regarding discharge plans.  Per patient and family report, patient lives with her  in a house with 8 steps to enter and 8 steps inside the house.  Patient has grab bars in the bathroom.  Patient still drives.  Patient is independent with meal prep. Housekeeping, laundry, medication management, and finances.  Reviewed the therapy discharge recommendations of tcu placement and patient and family are in agreement.  Patient states she would like to go to [place] or [facility] on discharge.  Patient states she would prefer a double room.  Referral sent, via discharge on the double, to check bed availability.  Received confirmation, via discharge on the double, that [facility] can accept patient.  Call placed to  [facility] to follow up on the referral.  Per Cyndie, they have not assessed patient yet.  We also received some [place] paperwork from patient's daughter that needs to be completed.  Paged Dr. Ferrera and placed the paperwork on the front of the chart.  Patient's family will transport when discharge is known.    ASSESSMENT  Cognitive Status:  awake, alert and oriented  Concerns to be addressed: discharge planning.     PLAN  Financial costs for the patient includes N/A.  Patient given options and choices for discharge TCU choices.  Patient/family is agreeable to the plan?  Yes  Patient Goals and Preferences: TCU on discharge.  Patient anticipates discharging to:  TCU.    Will confirm a bed, continue to follow, and assist with a safe discharge plan.[SJ1.1]    Kamini Richmond[SJ1.2], [facility], [facility]  [phone][SJ1.1]           Revision History        User Key Date/Time User Provider Type Action    > SJ1.2 11/10/2017  3:42 PM Kamini Richmond LICSW  Sign     SJ1.1 11/10/2017  3:33 PM Kamini Richmond LICSW              Progress Notes by Caitlin Vasquez PA-C at 11/10/2017 12:16 PM     Author:  Caitlin Vasquez PA-C Service:  Cardiology Author Type:  Physician Assistant - C    Filed:  11/10/2017  1:58 PM Date of Service:  11/10/2017 12:16 PM Creation Time:  11/10/2017 12:16 PM    Status:  Attested :  Caitlin Vasquez PA-C (Physician Assistant - C)    Cosigner:  Moses Green MD at 11/10/2017  2:32 PM        Attestation signed by Moses Green MD at 11/10/2017  2:32 PM        Physician Attestation   I, Moses Green MD, saw and evaluated Em Richmond as part of a shared visit.  I have reviewed and discussed with the advanced practice provider their history, physical and plan.    I personally reviewed the vital signs, medications, labs and imaging.    My key history or physical exam findings: moderately reduced left ventricular  systolic function due to cardiomyopathy. The coronary angiogram 2 weeks ago showed no evidence of flow limiting lesions. The patient was admitted in acute pulmonary edema requiring intubation. The ejection fraction was similar to the ejection fraction when she was discharged week and a half ago which is 30 to 35%. The patient has chronic atrial fibrillation. The ventricular rate is not ideally controlled yet but has significantly improved from admission. We are increasing the dose of the metoprolol succinate for better rate control. She has diuresed over 5 L net  out. The patient's mentation appears compromised. No formal diagnosis of dementia has been made.    Key management decisions made by me: Increase the dose of metoprolol succinate to 75 mg twice a day for better rate control.    Moses Green MD  Date of Service (when I saw the patient): 11/10/17                               [facility]  Cardiology Progress Note    Date of Service (when I saw the patient): 11/10/2017  Primary Cardiologist:[AB1.1] Dr. Pickard[AB1.2]     Assessment & Plan   Em Richmond is a 77 year old female who was admitted on 11/5/2017 with SOB and weight gain. She was recently admitted at [facility][AB1.1] with CHF exacerbation.[AB1.2]      Acute on Chronic Systolic CHF   -- During admission in [facility] EF was found to be reduced to 30-35%[AB1.1]  -- Admission BNP 36,500  -- I/O: -5.3 L since admission  -- Wt: -11 Ibs since admission    -- Switched to PO Furosemide 20 mg BID[AB1.3] today[AB1.4]    Chronic Atrial Fibrillation[AB1.1]   -- HR's suboptimal  -- Rate control strategy with Metoprolol succinate 37.5 mg BID   -- Coumadin for CVA prophylaxis[AB1.3]   -- Give additional Toprol 25 mg now and increase dose to 75 mg BID[AB1.4]    Acute Respiratory Failure (resolved)  -- Went into cardiogenic shock secondary to decompensated HF and required intubation   -- Now extubated and  stable[AB1.3]    Mild CAD   -- coronary angiogram 10/23 showed lesions no greater than 30%    Mild to Moderate Tricuspid[AB1.1] Regurgitation     [facility] Follow Up:   Ying Pickard[AB1.3]       Tele:[AB1.1] A Fib with  bpm[AB1.3]     Interval History[AB1.1]   Still gets SOB with activity. No palpitations. Does report left sided intermittent chest discomfort which has been going on for a few years. No other concerns.[AB1.2]     Physical Exam   Temp: 97.9  F (36.6  C) Temp src: Oral BP: 120/70 Pulse: 101 Heart Rate: 91 Resp: 18 SpO2: 92 % O2 Device: None (Room air)    Vitals:    11/07/17 0000 11/08/17 0000 11/10/17 0500   Weight: 89.5 kg (197 lb 5 oz) 89.5 kg (197 lb 5 oz) 83.4 kg (183 lb 13.8 oz)     Gen-[AB1.1] NAD[AB1.2]  Neck-[AB1.1] Normal JVD at 30 degree angle[AB1.2]   Resp-[AB1.1] Fine crackles at the bases bilaterally[AB1.2]  Card-[AB1.1] Irregular rhythm, tachycardic. No m,r,g[AB1.2]  Abd-[AB1.1] Soft, nontender[AB1.2]  Ext-[AB1.1] No edema[AB1.2]      Medications     Warfarin Therapy Reminder         warfarin  2.5 mg Oral ONCE at 18:00     furosemide  20 mg Oral BID     metoprolol  37.5 mg Oral BID     omeprazole (priLOSEC) CR capsule 20 mg  20 mg Oral QAM     PARoxetine (PAXIL) tablet 30 mg  30 mg Oral Daily     heparin  5,000 Units Subcutaneous Q8H       Data   Reviewed.       Caitlin Vasquez PA-C   11/10/2017  Pager: [phone][AB1.1]           Revision History        User Key Date/Time User Provider Type Action    > AB1.4 11/10/2017  1:58 PM Caitlin Vasquez PA-C Physician Assistant - C Sign     AB1.2 11/10/2017  1:19 PM Caitlin Vasquez PA-C Physician Assistant - JOSÉ ANTONIO Sign     AB1.3 11/10/2017 12:48 PM Caitlin Vasquez PA-C Physician Assistant - JOSÉ ANTONIO      AB1.1 11/10/2017 12:16 PM Caitlin Vasquez PA-C Physician Assistant - JOSÉ ANTONIO             Progress Notes by Jessica August OT at 11/10/2017  1:01 PM     Author:  Jessica August OT Service:  (none) Author  Type:  Occupational Therapist    Filed:  11/10/2017  1:01 PM Date of Service:  11/10/2017  1:01 PM Creation Time:  11/10/2017  1:01 PM    Status:  Signed :  Jessica August OT (Occupational Therapist)          11/10/17 1144   Quick Adds   Type of Visit Initial Occupational Therapy Evaluation   Living Environment   Lives With spouse   Living Arrangements house   Home Accessibility stairs to enter home;stairs within home;tub/shower is not walk in;grab bars present (bathtub)   Number of Stairs to Enter Home 8   Number of Stairs Within Home 8   Transportation Available car;family or friend will provide  (Pt still drives; however, family can provide for now.)   Self-Care   Usual Activity Tolerance good   Current Activity Tolerance moderate   Equipment Currently Used at Home grab bar   Functional Level Prior   Ambulation 0-->independent   Transferring 0-->independent   Toileting 0-->independent   Bathing 1-->assistive equipment   Dressing 0-->independent   Eating 0-->independent   Communication 0-->understands/communicates without difficulty   Swallowing 0-->swallows foods/liquids without difficulty   Cognition 0 - no cognition issues reported   Fall history within last six months no   General Information   Onset of Illness/Injury or Date of Surgery - Date 11/05/17   Referring Physician OBDULIA Pierre PA-C   Patient/Family Goals Statement Pt plans to discharge to rehab   Additional Occupational Profile Info/Pertinent History of Current Problem Admitted for respiratory failure due to CHF exacerbation resulting in intubation. Extubated on 11/7/17   Precautions/Limitations fall precautions   Heart Disease Risk Factors High blood pressure;Lack of physical activity;Dislipidemia;Overweight;Family history;Medical history;Age   Cognitive Status Examination   Orientation orientation to person, place and time   Level of Consciousness alert;confused   Able to Follow Commands WNL/WFL   Personal Safety (Cognitive) at risk  behaviors demonstrated;decreased insight to deficits;impulsive   Memory (will continue to monitor and assess as needed)   Pain Assessment   Patient Currently in Pain No   Range of Motion (ROM)   ROM Quick Adds No deficits were identified   ROM Comment B UE WNL   Strength   Manual Muscle Testing Quick Adds No deficits were identified   Strength Comments B UE 5/5 on MMT   Mobility   Bed Mobility Bed mobility skill: Rolling/Turning;Bed mobility skill: Scooting/Bridging;Bed mobility skill: Sit to supine;Bed mobility analysis;Bed mobility skill: Supine to sit   Bed Mobility Skill: Rolling/Turning   Level of [place] - Bed Mobility Skill Rolling Turning stand-by assist   Bed Mobility Skill: Scooting/Bridging   Level of [place]: Scooting/Bridging stand-by assist   Bed Mobility Skill: Sit to Supine   Level of [place]: Sit/Supine stand-by assist   Bed Mobility Skill: Supine to Sit   Level of [place]: Supine/Sit stand-by assist   Bed Mobility Analysis   Bed Mobility Limitations cognitive deficits   Impairments Contributing to Impaired Bed Mobility impaired balance   Transfer Skills   Transfer Transfer Safety Analysis Bed/Chair;Transfer Skill: Stand to Sit;Transfer Safety Analysis Sit/Stand   Transfer Skill: Bed to Chair/Chair to Bed   Level of [place]: Bed to Chair minimum assist (75% patients effort)   Transfer Safety Analysis Bed/Chair   Transfer Safety Concerns Noted decreased balance during turns   Impairments Contributing to Impaired Transfers impaired balance   Transfer Skill: Sit to Stand   Level of [place]: Sit/Stand stand-by assist   Transfer Safety Analysis Sit/Stand   Impaired Transfers: Sit/Stand impaired balance   Transfer Skill: Toilet Transfer   Level of [place]: Toilet minimum assist (75% patients effort)   Upper Body Dressing   Level of [place]: Dress Upper Body stand-by assist   Lower Body Dressing   Level of [place]: Dress Lower Body minimum assist (75% patients  "effort)   Toileting   Level of [place]: Toilet stand-by assist   Grooming   Level of [place]: Grooming minimum assist (75% patients effort)   Eating/Self Feeding   Level of [place]: Eating independent   Instrumental Activities of Daily Living (IADL)   Previous Responsibilities meal prep;housekeeping;laundry;medication management;finances;driving   Activities of Daily Living Analysis   Impairments Contributing to Impaired Activities of Daily Living cognition impaired;balance impaired   General Therapy Interventions   Planned Therapy Interventions ADL retraining;cognition;transfer training   Clinical Impression   Criteria for Skilled Therapeutic Interventions Met yes, treatment indicated   OT Diagnosis Decreased I and safety with ADLs   Influenced by the following impairments Impaired safety; Decreased balance and activity tolerance   Assessment of Occupational Performance 3-5 Performance Deficits   Identified Performance Deficits Dressing; Bathing; Toileting; Med mgmt   Clinical Decision Making (Complexity) Moderate complexity   Therapy Frequency 3 times/wk   Predicted Duration of Therapy Intervention (days/wks) 4 days   Anticipated Discharge Disposition Transitional Care Facility   Risks and Benefits of Treatment have been explained. Yes   Patient, Family & other staff in agreement with plan of care Yes   [facility] [employer] TM \"6 Clicks\"   2016, Trustees of [facility], under license to [employer].  All rights reserved.   6 Clicks Short Forms Daily Activity Inpatient Short Form   [facility] AM-PAC  \"6 Clicks\" Daily Activity Inpatient Short Form   1. Putting on and taking off regular lower body clothing? 3 - A Little   2. Bathing (including washing, rinsing, drying)? 3 - A Little   3. Toileting, which includes using toilet, bedpan or urinal? 3 - A Little   4. Putting on and taking off regular upper body clothing? 3 - A Little   5. Taking care of personal grooming such as brushing " teeth? 3 - A Little   6. Eating meals? 4 - None   Daily Activity Raw Score (Score out of 24.Lower scores equate to lower levels of function) 19   Total Evaluation Time   Total Evaluation Time (Minutes) 10[MM1.1]        Revision History        User Key Date/Time User Provider Type Action    > MM1.1 11/10/2017  1:01 PM Jessica August, OT Occupational Therapist Sign            Progress Notes by Heather Ferrera MD at 11/10/2017 11:04 AM     Author:  Heather Ferrera MD Service:  Hospitalist Author Type:  Physician    Filed:  11/10/2017 11:36 AM Date of Service:  11/10/2017 11:04 AM Creation Time:  11/10/2017 11:04 AM    Status:  Addendum :  Heather Ferrera MD (Physician)         [facility]    Hospitalist Progress Note    Assessment & Plan   Em Richmond is a 77-year-old female with a past medical history significant for nonischemic cardiomyopathy, chronic atrial fibrillation, gastroesophageal reflux disease, anxiety, pulmonary hypertension, obesity and obstructive sleep apnea who was admitted on 11/06/2017 due to acute hypoxic respiratory failure requiring intubation in the setting of cardiogenic shock secondary to atrial fibrillation with rapid ventricular response and decompensated nonischemic cardiomyopathy with pulmonary edema and shock liver.   The patient has successfully been extubated,  no longer requiring pressors, including Levophed and dobutamine.  The patient had been placed on an amiodarone drip per Cardiology and has since been discontinued and the patient has been started on metoprolol XL 25 mg 2 times daily and had previously received digoxin 250 mcg on 11/07/2017.  The patient was transferred to [facility] [place] with close monitoring, including continuous telemetry on 11/7.  Cardiology Service will continue to follow    Acute hypoxic respiratory failure, requiring intubation secondary to cardiogenic shock in the setting of decompensated systolic congestive heart failure and atrial  fibrillation with rapid ventricular response. Sob with activity.   - Cont diuretic/bb, oxygen support.[AH1.1]   - Cont diuretic.[AH1.2]  -[AH1.1] Supportive care.      Acute on chronic[AH1.2] nonischemic cardiomyopathy with pulmonary edema[AH1.1]: weight is down. Net neg in the past 24 hrs  Is about 1.2 L. Breathing much better.[AH1.2] Sating well on RA.[AH1.3] No peripheral edema is evident.[AH1.4]  - Cont diuresis per cards (chamge to PO[AH1.2] today[AH1.4]).  - Cont BB.  -[AH1.2]  Low salt diet.  - daily weights. I/Os.[AH1.1]  - Monitor BMP.  - Replace electrolytes as indicated.[AH1.5]    Chronic atrial fibrillation with rapid ventricular response: rate controlled.   - Plan per cards (on coumadin, BB)    Acute kidney injury:  resolved.   -  Monitor BMP     Shock liver in the setting of cardiogenic shock:  The patient was managed with a NAC drip.  LFT's are improving.  - Monitor LFTs.     Gastroesophageal reflux disease:[AH1.1]    -[AH1.6] Will continue prior to admit omeprazole, but schedule this instead of as needed at 20 mg daily.     Generalized anxiety disorder:    - Cont PTA paroxetine 30 mg daily.     Obesity with associated obstructive sleep apnea:  Patient is compliant with home CPAP.    - Cont CPAP.    Gallbladder edema and borderline common bile duct dilation:  Patient underwent an MRCP and has been followed by Gastroenterology.  There is a high suspicion that shock liver with hepatic congestion and ischemia likely resulted in these ultrasound findings.[AH1.1]    -[AH1.6] Will continue with supportive care.     Deep venous thrombosis prophylaxis:  on coumadin.    DISPOSITION: per clinical course. Can come out of IMN. Can transfer to [facility] [place].    Interval History   Pt seen and examined. Sob with activity[AH1.1] is improving. Sating well on RA.[AH1.3]    Physical Exam   Temp: 97.9  F (36.6  C) Temp src: Oral BP: 120/70 Pulse: 101 Heart Rate: 91 Resp: 18 SpO2: 92 % O2 Device: None (Room air)    Vitals:     11/07/17 0000 11/08/17 0000 11/10/17 0500   Weight: 89.5 kg (197 lb 5 oz) 89.5 kg (197 lb 5 oz) 83.4 kg (183 lb 13.8 oz)     Vital Signs with Ranges  Temp:  [97.9  F (36.6  C)-98.4  F (36.9  C)] 97.9  F (36.6  C)  Pulse:  [101-107] 101  Heart Rate:  [] 91  Resp:  [18-20] 18  BP: (120-140)/(70-81) 120/70  SpO2:  [90 %-94 %] 92 %  I/O last 3 completed shifts:  In: 750 [P.O.:750]  Out: 2075 [Urine:2075]    Constitutional:Awake, alert, cooperative, no apparent distress  Respiratory: Clear to auscultation bilaterally, no crackles or wheezing  Cardiovascular: Regular rate and rhythm, normal S1 and S2, and no murmur noted  GI: Normal bowel sounds, soft, non-distended, non-tender  Skin/Integumen: No rashes, no cyanosis, no edema  Other:     Medications     Warfarin Therapy Reminder         warfarin  2.5 mg Oral ONCE at 18:00     metoprolol  37.5 mg Oral BID     atorvastatin  40 mg Oral At Bedtime     omeprazole (priLOSEC) CR capsule 20 mg  20 mg Oral QAM     PARoxetine (PAXIL) tablet 30 mg  30 mg Oral Daily     heparin  5,000 Units Subcutaneous Q8H     furosemide  40 mg Intravenous BID       Data     Recent Labs  Lab 11/10/17  0554 11/09/17  1802 11/09/17  0641 11/08/17  1138 11/08/17  0500 11/07/17  1720  11/07/17  0415  11/06/17  0700 11/06/17  0440 11/05/17  2315   WBC  --   --   --   --  8.6  --   --  9.2  --  12.4* 15.6* 11.8*   HGB  --   --   --   --  10.7*  --   --  11.4*  --  11.7 12.6 13.5   MCV  --   --   --   --  96  --   --  94  --  95 97 99   PLT  --   --   --   --  260  --   --  225  --  217 230 322   INR 1.78*  --  1.68* 1.62*  --   --   --  1.92*  < > 4.83* 5.26* 3.72*     --   --   --  141 138  --  137  --  131* 131* 132*   POTASSIUM 3.4 3.4 3.1*  --  3.6 3.6  < > 2.8*  --  4.6 5.1 4.8   CHLORIDE 99  --   --   --  107 105  --  104  --  101 101 99   CO2 30  --   --   --  27 23  --  24  --  14* 17* 15*   BUN 7  --   --   --  12 14  --  17  --  24 23 23   CR 0.66  --   --   --  0.86 0.90  --  0.80   --  0.83 0.84 1.20*   ANIONGAP 8  --   --   --  7 10  --  9  --  16* 13 18*   ALFIE 8.1*  --   --   --  7.7* 7.4*  --  7.3*  --  7.8* 7.6* 8.3*   *  --   --   --  111* 133*  --  97  --  138* 114* 122*   ALBUMIN 2.1*  --  2.3*  --  2.2*  --   --  1.9*  < > 2.1* 2.2* 2.9*   PROTTOTAL 6.7*  --  6.7*  --  6.1*  --   --  5.9*  < > 6.1* 6.3* 8.2   BILITOTAL 1.7*  --  1.5*  --  1.3  --   --  1.4*  < > 2.1* 2.3* 1.8*   ALKPHOS 215*  --  199*  --  209*  --   --  226*  < > 292* 314* 405*   *  --  1120*  --  1650*  --   --  2474*  < > 2858* 1890* 769*   *  --  749*  --  1509*  --   --  3199*  < > 6218* 3720* 1480*   LIPASE  --   --   --   --   --   --   --   --   --   --  189 455*   TROPI  --   --   --   --   --   --   --   --   --   --  0.020 <0.015   < > = values in this interval not displayed.    No results found for this or any previous visit (from the past 24 hour(s)).[AH1.1]     Revision History        User Key Date/Time User Provider Type Action    > AH1.4 11/10/2017 11:36 AM Heather Ferrera MD Physician Addend     AH1.3 11/10/2017 11:31 AM Heather Ferrera MD Physician Sign     AH1.6 11/10/2017 11:15 AM Heather Ferrera MD Physician      1.5 11/10/2017 11:13 AM Heather Ferrera MD Physician      AH1.2 11/10/2017 11:10 AM Heather Ferrera MD Physician      AH1.1 11/10/2017 11:04 AM Heather Ferrera MD Physician                      Procedure Notes      Procedures by Santi Terrazas MD at 11/6/2017  5:09 AM     Author:  Santi Terrazas MD Service:  ICU Author Type:  Physician    Filed:  11/6/2017  5:09 AM Date of Service:  11/6/2017  5:09 AM Creation Time:  11/6/2017  5:08 AM    Status:  Signed :  Santi Terrazas MD (Physician)     Procedure Orders:    1. Insert arterial line [[id number]] ordered by Santi Terrazas MD at 11/06/17 0508            Post-procedure Diagnoses:    1. Acute respiratory failure with hypoxemia (H) [J96.01]               Procedure/Surgery Information   [facility]  "Hospital    Bedside Procedure Note  Date of Service (when I performed the procedure): 11/06/2017    Em Richmond is a 77 year old female patient.  1. Acute respiratory failure with hypoxia (H)    2. Calculus of gallbladder without cholecystitis without obstruction    3. Acidosis    4. Congestive heart failure, unspecified congestive heart failure chronicity, unspecified congestive heart failure type (H)      Past Medical History:   Diagnosis Date     Atrial fibrillation (H)      Temp: 96.8  F (36  C) Temp src: Bladder BP: 91/65 Pulse: 132 Heart Rate: 138 Resp: 15 SpO2: 98 % O2 Device: Mechanical Ventilator      Insert arterial line  Date/Time: 11/6/2017 5:08 AM  Performed by: GRACE ARRIOLA  Authorized by: GRACE ARRIOLA   Consent: Verbal consent obtained. Written consent obtained.  Consent given by: guardian  Procedure consent: procedure consent matches procedure scheduled  Relevant documents: relevant documents present and verified  Test results: test results available and properly labeled  Site marked: the operative site was marked  Imaging studies: imaging studies available  Required items: required blood products, implants, devices, and special equipment available  Patient identity confirmed: arm band, provided demographic data and hospital-assigned identification number  Time out: Immediately prior to procedure a \"time out\" was called to verify the correct patient, procedure, equipment, support staff and site/side marked as required.  Preparation: Patient was prepped and draped in the usual sterile fashion.  Indications: hemodynamic monitoring  Location: left radial  Anesthesia: local infiltration  Osmany's test normal: yes  Needle gauge: 20  Seldinger technique: Seldinger technique used  Number of attempts: 1  Post-procedure: line sutured and dressing applied  Post-procedure CMS: normal  Patient tolerance: Patient tolerated the procedure well with no immediate complications           Grace WATTS" Cho[RC1.1]     Revision History        User Key Date/Time User Provider Type Action    > RC1.1 11/6/2017  5:09 AM Santi Terrazas MD Physician Sign                     Progress Notes - Therapies (Notes from 11/10/17 through 11/13/17)      Progress Notes by Jessica August OT at 11/10/2017  1:01 PM     Author:  Jessica August OT Service:  (none) Author Type:  Occupational Therapist    Filed:  11/10/2017  1:01 PM Date of Service:  11/10/2017  1:01 PM Creation Time:  11/10/2017  1:01 PM    Status:  Signed :  Jessica August OT (Occupational Therapist)          11/10/17 1144   Quick Adds   Type of Visit Initial Occupational Therapy Evaluation   Living Environment   Lives With spouse   Living Arrangements house   Home Accessibility stairs to enter home;stairs within home;tub/shower is not walk in;grab bars present (bathtub)   Number of Stairs to Enter Home 8   Number of Stairs Within Home 8   Transportation Available car;family or friend will provide  (Pt still drives; however, family can provide for now.)   Self-Care   Usual Activity Tolerance good   Current Activity Tolerance moderate   Equipment Currently Used at Home grab bar   Functional Level Prior   Ambulation 0-->independent   Transferring 0-->independent   Toileting 0-->independent   Bathing 1-->assistive equipment   Dressing 0-->independent   Eating 0-->independent   Communication 0-->understands/communicates without difficulty   Swallowing 0-->swallows foods/liquids without difficulty   Cognition 0 - no cognition issues reported   Fall history within last six months no   General Information   Onset of Illness/Injury or Date of Surgery - Date 11/05/17   Referring Physician OBDULIA Pierre PA-C   Patient/Family Goals Statement Pt plans to discharge to rehab   Additional Occupational Profile Info/Pertinent History of Current Problem Admitted for respiratory failure due to CHF exacerbation resulting in intubation. Extubated on 11/7/17    Precautions/Limitations fall precautions   Heart Disease Risk Factors High blood pressure;Lack of physical activity;Dislipidemia;Overweight;Family history;Medical history;Age   Cognitive Status Examination   Orientation orientation to person, place and time   Level of Consciousness alert;confused   Able to Follow Commands WNL/WFL   Personal Safety (Cognitive) at risk behaviors demonstrated;decreased insight to deficits;impulsive   Memory (will continue to monitor and assess as needed)   Pain Assessment   Patient Currently in Pain No   Range of Motion (ROM)   ROM Quick Adds No deficits were identified   ROM Comment B UE WNL   Strength   Manual Muscle Testing Quick Adds No deficits were identified   Strength Comments B UE 5/5 on MMT   Mobility   Bed Mobility Bed mobility skill: Rolling/Turning;Bed mobility skill: Scooting/Bridging;Bed mobility skill: Sit to supine;Bed mobility analysis;Bed mobility skill: Supine to sit   Bed Mobility Skill: Rolling/Turning   Level of [place] - Bed Mobility Skill Rolling Turning stand-by assist   Bed Mobility Skill: Scooting/Bridging   Level of [place]: Scooting/Bridging stand-by assist   Bed Mobility Skill: Sit to Supine   Level of [place]: Sit/Supine stand-by assist   Bed Mobility Skill: Supine to Sit   Level of [place]: Supine/Sit stand-by assist   Bed Mobility Analysis   Bed Mobility Limitations cognitive deficits   Impairments Contributing to Impaired Bed Mobility impaired balance   Transfer Skills   Transfer Transfer Safety Analysis Bed/Chair;Transfer Skill: Stand to Sit;Transfer Safety Analysis Sit/Stand   Transfer Skill: Bed to Chair/Chair to Bed   Level of [place]: Bed to Chair minimum assist (75% patients effort)   Transfer Safety Analysis Bed/Chair   Transfer Safety Concerns Noted decreased balance during turns   Impairments Contributing to Impaired Transfers impaired balance   Transfer Skill: Sit to Stand   Level of [place]: Sit/Stand  "stand-by assist   Transfer Safety Analysis Sit/Stand   Impaired Transfers: Sit/Stand impaired balance   Transfer Skill: Toilet Transfer   Level of [place]: Toilet minimum assist (75% patients effort)   Upper Body Dressing   Level of [place]: Dress Upper Body stand-by assist   Lower Body Dressing   Level of [place]: Dress Lower Body minimum assist (75% patients effort)   Toileting   Level of [place]: Toilet stand-by assist   Grooming   Level of [place]: Grooming minimum assist (75% patients effort)   Eating/Self Feeding   Level of [place]: Eating independent   Instrumental Activities of Daily Living (IADL)   Previous Responsibilities meal prep;housekeeping;laundry;medication management;finances;driving   Activities of Daily Living Analysis   Impairments Contributing to Impaired Activities of Daily Living cognition impaired;balance impaired   General Therapy Interventions   Planned Therapy Interventions ADL retraining;cognition;transfer training   Clinical Impression   Criteria for Skilled Therapeutic Interventions Met yes, treatment indicated   OT Diagnosis Decreased I and safety with ADLs   Influenced by the following impairments Impaired safety; Decreased balance and activity tolerance   Assessment of Occupational Performance 3-5 Performance Deficits   Identified Performance Deficits Dressing; Bathing; Toileting; Med mgmt   Clinical Decision Making (Complexity) Moderate complexity   Therapy Frequency 3 times/wk   Predicted Duration of Therapy Intervention (days/wks) 4 days   Anticipated Discharge Disposition Transitional Care Facility   Risks and Benefits of Treatment have been explained. Yes   Patient, Family & other staff in agreement with plan of care Yes   [facility] TM \"6 Clicks\"   2016, Trustees of [facility], under license to [employer].  All rights reserved.   6 Clicks Short Forms Daily Activity Inpatient Short Form   [facility] AM-PAC  \"6 Clicks\" " Daily Activity Inpatient Short Form   1. Putting on and taking off regular lower body clothing? 3 - A Little   2. Bathing (including washing, rinsing, drying)? 3 - A Little   3. Toileting, which includes using toilet, bedpan or urinal? 3 - A Little   4. Putting on and taking off regular upper body clothing? 3 - A Little   5. Taking care of personal grooming such as brushing teeth? 3 - A Little   6. Eating meals? 4 - None   Daily Activity Raw Score (Score out of 24.Lower scores equate to lower levels of function) 19   Total Evaluation Time   Total Evaluation Time (Minutes) 10[MM1.1]        Revision History        User Key Date/Time User Provider Type Action    > MM1.1 11/10/2017  1:01 PM Jessica August OT Occupational Therapist Sign

## 2017-11-05 NOTE — IP AVS SNAPSHOT
` `     [facility] CARDIAC SPECIALTY CARE: [phone]            Medication Administration Report for Em [place] as of 11/13/17 1134   Legend:    Given Hold Not Given Due Canceled Entry Other Actions    Time Time (Time) Time  Time-Action       Inactive    Active    Linked        Medications 11/07/17 11/08/17 11/09/17 11/10/17 11/11/17 11/12/17 11/13/17    aspirin chewable tablet 81 mg  Dose: 81 mg Freq: DAILY Route: PO  Start: 11/11/17 0900        0813 (81 mg)-Given        0826 (81 mg)-Given        1007 (81 mg)-Given           benzocaine-menthol (CHLORASEPTIC) 6-10 MG lozenge 1-2 lozenge  Dose: 1-2 lozenge Freq: EVERY 1 HOUR PRN Route: BU  PRN Reason: sore throat  PRN Comment: dry/sore throat without fever  Start: 11/11/17 0306        0322 (1 lozenge)-Given             bisacodyl (DULCOLAX) EC tablet 5 mg  Dose: 5 mg Freq: DAILY PRN Route: PO  PRN Reason: constipation  Start: 11/06/17 0310   Admin Instructions: Do not crush or chew. Swallow whole. May titrate 1 tablet each day until response. Maximum of 3 tablets daily. Hold for loose stools. This is the first step of a three step constipation treatment.              Or  bisacodyl (DULCOLAX) EC tablet 10 mg  Dose: 10 mg Freq: DAILY PRN Route: PO  PRN Reason: constipation  Start: 11/06/17 0310   Admin Instructions: Do not crush or chew. Swallow whole. May titrate 1 tablet each day until response. Maximum of 3 tablets daily. Hold for loose stools. This is the first step of a three step constipation treatment.              Or  bisacodyl (DULCOLAX) EC tablet 15 mg  Dose: 15 mg Freq: DAILY PRN Route: PO  PRN Reason: constipation  Start: 11/06/17 0310   Admin Instructions: Do not crush or chew. Swallow whole. May titrate 1 tablet each day until response. Maximum of 3 tablets daily. Hold for loose stools. This is the first step of a three step constipation treatment.               furosemide (LASIX) tablet 20 mg  Dose: 20 mg Freq: EVERY 24 HOURS Route:  "PO  Start: 11/12/17 1600         1631 (20 mg)-Given        [ ] 1600           furosemide (LASIX) tablet 40 mg  Dose: 40 mg Freq: DAILY Route: PO  Start: 11/12/17 0900         0827 (40 mg)-Given        1007 (40 mg)-Given           HYDROmorphone (PF) (DILAUDID) injection 0.3-0.5 mg  Dose: 0.3-0.5 mg Freq: EVERY 1 HOUR PRN Route: IV  PRN Reason: moderate to severe pain  Start: 11/06/17 0917   Admin Instructions: Give IV Push undiluted up to 4 mg. Each 2mg over 2-5 minutes.     0032 (0.5 mg)-Given       0215 (0.5 mg)-Given       0545 (0.5 mg)-Given                 lidocaine (LMX4) cream  Freq: EVERY 1 HOUR PRN Route: Top  PRN Reason: pain  PRN Comment: with VAD insertion or accessing implanted port.  Start: 11/11/17 1210   Admin Instructions: Do NOT give if patient has a history of allergy to any local anesthetic or any \"govind\" product.  Apply 30 minutes prior to VAD insertion or port access. MAX Dose: 2.5 g (  of 5 g tube).               lisinopril (PRINIVIL/ZESTRIL) tablet 5 mg  Dose: 5 mg Freq: DAILY Route: PO  Start: 11/12/17 0900         0825 (5 mg)-Given        1007 (5 mg)-Given           magnesium sulfate 2 g in NS intermittent infusion ([employer] or [facility])  Dose: 2 g Freq: DAILY PRN Route: IV  PRN Reason: magnesium supplementation  Start: 11/06/17 0835   Admin Instructions: For Serum Mg++ 1.6 - 2 mg/dL  Give 2 g and recheck magnesium level next AM.      0751 (2 g)-New Bag           0743 (2 g)-New Bag        (1009)-Not Given [C]           magnesium sulfate 4 g in 100 mL sterile water (premade)  Dose: 4 g Freq: EVERY 4 HOURS PRN Route: IV  PRN Reason: magnesium supplementation  Start: 11/06/17 0835   Admin Instructions: For serum Mg++ less than 1.6 mg/dL  Give 4 g and recheck magnesium level 2 hours after dose, and next AM.     0526 (4 g)-New Bag          1312 (4 g)-New Bag              metoprolol (LOPRESSOR) injection 2.5 mg  Dose: 2.5 mg Freq: EVERY 4 HOURS PRN Route: IV  PRN Reason: other  PRN Comment: HR > " 120, hold for SBP < 90  Start: 11/08/17 2016   Admin Instructions: For ordered doses up to 15 mg, give IV Push undiluted over 5-10 minutes.               metoprolol (TOPROL-XL) 24 hr tablet 75 mg  Dose: 75 mg Freq: 2 TIMES DAILY Route: PO  Start: 11/10/17 2100   Admin Instructions: DO NOT CRUSH. Tablet may be split in half along score line.        2105 (75 mg)-Given        0813 (75 mg)-Given       2016 (75 mg)-Given        0825 (75 mg)-Given       2052 (75 mg)-Given        1007 (75 mg)-Given       [ ] 2100           naloxone (NARCAN) injection 0.1-0.4 mg  Dose: 0.1-0.4 mg Freq: EVERY 2 MIN PRN Route: IV  PRN Reason: opioid reversal  Start: 11/06/17 0311   Admin Instructions: For respiratory rate LESS than or EQUAL to 8.  Partial reversal dose:  0.1 mg titrated q 2 minutes for Analgesia Side Effects Monitoring Sedation Level of 3 (frequently drowsy, arousable, drifts to sleep during conversation).Full reversal dose:  0.4 mg bolus for Analgesia Side Effects Monitoring Sedation Level of 4 (somnolent, minimal or no response to stimulation).  Give IV Push undiluted up to 2mg. Give each 0.4mg over 15 seconds in emergency situations. For non-emergent situations further dilute in 9mL of NS to facilitate titration of response.               omeprazole (priLOSEC) CR capsule 20 mg  Dose: 20 mg Freq: EVERY MORNING Route: PO  Start: 11/09/17 0900      0830 (20 mg)-Given        0903 (20 mg)-Given        0813 (20 mg)-Given        0826 (20 mg)-Given        1007 (20 mg)-Given           PARoxetine (PAXIL) tablet 30 mg  Dose: 30 mg Freq: DAILY Route: PO  Start: 11/08/17 2030     2217 (30 mg)-Given        0829 (30 mg)-Given        0903 (30 mg)-Given        0814 (30 mg)-Given        0824 (30 mg)-Given        1007 (30 mg)-Given           potassium chloride (KLOR-CON) Packet 20-40 mEq  Dose: 20-40 mEq Freq: EVERY 2 HOURS PRN Route: ORAL OR FEED  PRN Reason: potassium supplementation  Start: 11/06/17 0835   Admin Instructions: Use if  unable to tolerate tablets.    If Serum K+ 3.4-4.0, dose = 20 mEq x1. Recheck K+ level the next AM.  If Serum K+ 3.0-3.3, dose = 60 mEq po total dose (40 mEq x 1 followed in 2 hours by 20 mEq X1). Recheck K+ level 4 hours after dose and the next AM.  If Serum K+ 2.5-2.9, dose = 80 mEq po total dose (40 mEq Q2H x2). Recheck K+ level 4 hours after dose and the next AM.  If Serum K+ less than 2.5, See IV order.  Dissolve packet contents in 4-8 ounces of cold water or juice.               potassium chloride 10 mEq in 100 mL intermittent infusion with 10 mg lidocaine  Dose: 10 mEq Freq: EVERY 1 HOUR PRN Route: IV  PRN Reason: potassium supplementation  Start: 11/06/17 0835   Admin Instructions: Infuse via PERIPHERAL LINE. Use potassium with lidocaine for pain with peripheral administration.  If Serum K+ 3.4-4.0, dose = 10 mEq/hr x2 doses. Recheck K+ level the next AM.  If Serum K+ 3.0-3.3, dose = 10 mEq/hr x4 doses (40 mEq IV total dose). Recheck K+ level 2 hours after dose and the next AM.  If Serum K+ less than 3.0, dose = 10 mEq/hr x6 doses (60 mEq IV total dose). Recheck K+ level 2 hours after dose and the next AM.               potassium chloride 10 mEq in 100 mL sterile water intermittent infusion (premix)  Dose: 10 mEq Freq: EVERY 1 HOUR PRN Route: IV  PRN Reason: potassium supplementation  Start: 11/06/17 0835   Admin Instructions: Infuse via PERIPHERAL LINE or CENTRAL LINE. Use for central line replacement if patient weight less than 65 kg, if patient is on TPN with high potassium content or if unit does not stock 20 mEq bags.  If Serum K+ 3.4-4.0, dose = 10 mEq/hr x2 doses. Recheck K+ level the next AM.  If Serum K+ 3.0-3.3, dose = 10 mEq/hr x4 doses (40 mEq IV total dose). Recheck K+ level 2 hours after dose and the next AM.  If Serum K+ less than 3.0, dose = 10 mEq/hr x6 doses (60 mEq IV total dose). Recheck K+ level 2 hours after dose and the next AM.               potassium chloride 20 mEq in 50 mL  intermittent infusion  Dose: 20 mEq Freq: EVERY 1 HOUR PRN Route: IV  PRN Reason: potassium supplementation  Start: 11/06/17 0835   Admin Instructions: Infuse via CENTRAL LINE Only.  May need EKG if less than 65 kg or on TPN - Max rate is 0.3 mEq/kg/hr for patients not on EKG monitoring.    If Serum K+ 3.4-4.0, dose = 20 mEq/hr x1 doses. Recheck K+ level the next AM.  If Serum K+ 3.0-3.3, dose = 20 mEq/hr x2 doses (40 mEq IV total dose).  Recheck K+ level 2 hours after dose and the next AM.  If Serum K+ less than 3.0, dose = 20 mEq/hr x3 doses (60 mEq IV total dose). Recheck K+ level 2 hours after dose and the next AM.     0526 (20 mEq)-New Bag       0644 (20 mEq)-New Bag       0756 (20 mEq)-New Bag       0915 (20 mEq)-New Bag       1306 (20 mEq)-New Bag       1844 (20 mEq)-New Bag        0906 (20 mEq)-New Bag                potassium chloride SA (K-DUR/KLOR-CON M) CR tablet 20-40 mEq  Dose: 20-40 mEq Freq: EVERY 2 HOURS PRN Route: PO  PRN Reason: potassium supplementation  Start: 11/06/17 0835   Admin Instructions: Use if able to take PO.   If Serum K+ 3.4-4.0, dose = 20 mEq x1. Recheck K+ level the next AM.  If Serum K+ 3.0-3.3, dose = 60 mEq po total dose (40 mEq x1 followed in 2 hours by 20 mEq x1). Recheck K+ level 4 hours after dose and the next AM.  If Serum K+ 2.5-2.9, dose = 80 mEq po total dose (40 mEq Q2H x2). Recheck K+ level 4 hours after dose and the next AM.  If Serum K+ less than 2.5, See IV order.  DO NOT CRUSH       1200 (40 mEq)-Given       1354 (20 mEq)-Given       1845 (20 mEq)-Given        1126 (20 mEq)-Given         0645 (40 mEq)-Given       0844 (20 mEq)-Given       1838 (20 mEq)-Given        1007 (20 mEq)-Given           potassium phosphate 15 mmol in D5W 250 mL intermittent infusion  Dose: 15 mmol Freq: DAILY PRN Route: IV  PRN Reason: phosphorous supplementation  Start: 11/06/17 0835   Admin Instructions: For serum phosphorus level 2-2.4  Do not infuse Phosphorus in the same line as TPN.    Give 15 mmol and recheck phosphorus level next AM.               potassium phosphate 20 mmol in D5W 250 mL intermittent infusion  Dose: 20 mmol Freq: EVERY 6 HOURS PRN Route: IV  PRN Reason: phosphorous supplementation  Start: 11/06/17 0835   Admin Instructions: For serum phosphorus level 1.1-1.9  For CENTRAL Line ONLY  Do not infuse Phosphorus in the same line as TPN.   Give 20 mmol and recheck phosphorus level 2 hours after last dose and next AM.               potassium phosphate 20 mmol in D5W 500 mL intermittent infusion  Dose: 20 mmol Freq: EVERY 6 HOURS PRN Route: IV  PRN Reason: phosphorous supplementation  Start: 11/06/17 0835   Admin Instructions: For serum phosphorus level 1.1-1.9  For Peripheral Line  Do not infuse Phosphorus in the same line as TPN.   Give 20 mmol and recheck phosphorus level 2 hours after last dose and next AM.               potassium phosphate 25 mmol in D5W 500 mL intermittent infusion  Dose: 25 mmol Freq: EVERY 8 HOURS PRN Route: IV  PRN Reason: phosphorous supplementation  Start: 11/06/17 0835   Admin Instructions: For serum phosphorus level less than 1.1  Do not infuse Phosphorus in the same line as TPN.   Give 25 mmol and recheck phosphorus level 2 hours after last dose and next AM.               sodium chloride (PF) 0.9% PF flush 3 mL  Dose: 3 mL Freq: EVERY 8 HOURS Route: IK  Start: 11/11/17 1215   Admin Instructions: And Q1H PRN, to lock peripheral IV dormant line.         1211 (3 mL)-Given       2016 (3 mL)-Given        0028 (3 mL)-Given              1259 (3 mL)-Given       2053 (3 mL)-Given        (0613)-Not Given       [ ] 1215       [ ] 2015           sodium chloride (PF) 0.9% PF flush 3 mL  Dose: 3 mL Freq: EVERY 1 HOUR PRN Route: IK  PRN Reason: line flush  Start: 11/11/17 1210   Admin Instructions: for peripheral IV flush post IV meds               Warfarin Therapy Reminder (Check START DATE - warfarin may be starting in the FUTURE)  Freq: CONTINUOUS PRN Route:  XX  Start: 11/08/17 1013   Admin Instructions: *Note to reorder warfarin daily*  Pharmacy Warfarin Dosing Service  Patient is on Warfarin Therapy - check for daily order              Future Medications  Medications 11/07/17 11/08/17 11/09/17 11/10/17 11/11/17 11/12/17 11/13/17       warfarin (COUMADIN) tablet 2.5 mg  Dose: 2.5 mg Freq: ONCE AT 6PM Route: PO  Start: 11/13/17 1800          [ ] 1800          Completed Medications  Medications 11/07/17 11/08/17 11/09/17 11/10/17 11/11/17 11/12/17 11/13/17         Dose: 20 mg Freq: ONCE Route: IV  Start: 11/11/17 1130   End: 11/11/17 1210   Admin Instructions: For ordered doses up to 40 mg, give IV Push undiluted over 1-2 minutes.         1209 (20 mg)-Given               Dose: 25 mg Freq: ONCE Route: PO  Start: 11/10/17 1400   End: 11/10/17 1416   Admin Instructions: DO NOT CRUSH. Tablet may be split in half along score line.        1416 (25 mg)-Given                Dose: 2.5 mg Freq: ONCE AT 6PM Route: PO  Start: 11/12/17 1800   End: 11/12/17 1827   Admin Instructions: INR = 2.62          1827 (2.5 mg)-Given              Dose: 2.5 mg Freq: ONCE AT 6PM Route: PO  Start: 11/11/17 1800   End: 11/11/17 1713   Admin Instructions: INR = 2.29         1713 (2.5 mg)-Given               Dose: 2.5 mg Freq: ONCE AT 6PM Route: PO  Start: 11/10/17 1800   End: 11/10/17 1750       1750 (2.5 mg)-Given             Discontinued Medications  Medications 11/07/17 11/08/17 11/09/17 11/10/17 11/11/17 11/12/17 11/13/17         Dose: 40 mg Freq: 2 TIMES DAILY. Route: IV  Start: 11/06/17 1600   End: 11/10/17 1135   Admin Instructions: For ordered doses up to 40 mg, give IV Push undiluted over 1-2 minutes.     0822 (40 mg)-Given       1545 (40 mg)-Given        0751 (40 mg)-Given       1606 (40 mg)-Given        0832 (40 mg)-Given       1645 (40 mg)-Given        0648 (40 mg)-Given              1135-Med Discontinued            Dose: 20 mg Freq: EVERY EVENING Route: PO  Start: 11/12/17 2000   End:  11/12/17 0636         0636-Med Discontinued          Dose: 20 mg Freq: DAILY Route: PO  Start: 11/12/17 0900   End: 11/12/17 0633         0633-Med Discontinued          Dose: 20 mg Freq: 2 TIMES DAILY Route: PO  Start: 11/12/17 0900   End: 11/12/17 0632   Admin Instructions: In am and afternoon at 4 PM          0632-Med Discontinued          Dose: 20 mg Freq: 2 TIMES DAILY Route: PO  Start: 11/10/17 1800   End: 11/11/17 1120   Admin Instructions: In am and afternoon at 4 PM        1609 (20 mg)-Given               0814 (20 mg)-Given       1120-Med Discontinued           Dose: 40 mg Freq: 2 TIMES DAILY Route: PO  Start: 11/11/17 1800   End: 11/12/17 0618   Admin Instructions: In am and afternoon at 4 PM         1713 (40 mg)-Given        0618-Med Discontinued          Dose: 5,000 Units Freq: EVERY 8 HOURS Route: SC  Start: 11/07/17 1400   End: 11/12/17 1149   Admin Instructions: High concentration HEParin. Not for line flush or cath care.     1321 (5,000 Units)-Given       2237 (5,000 Units)-Given        0509 (5,000 Units)-Given       1345 (5,000 Units)-Given       2217 (5,000 Units)-Given        0546 (5,000 Units)-Given       1354 (5,000 Units)-Given       2234 (5,000 Units)-Given        0641 (5,000 Units)-Given       1416 (5,000 Units)-Given       2105 (5,000 Units)-Given        0641 (5,000 Units)-Given       1336 (5,000 Units)-Given       (1338)-Not Given        0028 (5,000 Units)-Given       0824 (5,000 Units)-Given       1149-Med Discontinued          Dose: 37.5 mg Freq: 2 TIMES DAILY Route: PO  Start: 11/09/17 2100   End: 11/10/17 1353   Admin Instructions: DO NOT CRUSH. Tablet may be split in half along score line.       2033 (37.5 mg)-Given        0903 (37.5 mg)-Given       1353-Med Discontinued       Medications 11/07/17 11/08/17 11/09/17 11/10/17 11/11/17 11/12/17 11/13/17

## 2017-11-05 NOTE — IP AVS SNAPSHOT
"[facility] CARDIAC SPECIALTY CARE: [phone]                                              INTERAGENCY TRANSFER FORM - PHYSICIAN ORDERS   2017                    Hospital Admission Date: 2017  RICARDO DORMAN   : 1940  Sex: Female        Attending Provider: Santi Terrazas MD     Allergies:  Sulfa Drugs    Infection:  None   Service:  HOSPITALIST    Ht:  1.651 m (5' 5\")   Wt:  82.1 kg (181 lb)   Admission Wt:  88.2 kg (194 lb 7.1 oz)    BMI:  30.12 kg/m 2   BSA:  1.94 m 2            Patient PCP Information     Provider PCP Type    Arielle Leger MD General      ED Clinical Impression     Diagnosis Description Comment Added By Time Added    Acute respiratory failure with hypoxia (H) [J96.01] Acute respiratory failure with hypoxia (H) [J96.01]  Cami Bucio MD 2017  2:46 AM    Calculus of gallbladder without cholecystitis without obstruction [K80.20] Calculus of gallbladder without cholecystitis without obstruction [K80.20]  Cami Bucio MD 2017  2:46 AM    Acidosis [E87.2] Acidosis [E87.2]  Cami Bucio MD 2017  2:49 AM    Congestive heart failure, unspecified congestive heart failure chronicity, unspecified congestive heart failure type (H) [I50.9] Congestive heart failure, unspecified congestive heart failure chronicity, unspecified congestive heart failure type (H) [I50.9]  Cami Bucio MD 2017  2:50 AM      Hospital Problems as of 2017              Priority Class Noted POA    Acute respiratory failure with hypoxemia (H) Medium  2017 Yes    Acute pulmonary edema (H) Medium  2017 Unknown    Secondary cardiomyopathy (H) Medium  2017 Unknown      Non-Hospital Problems as of 2017              Priority Class Noted    Atrial fibrillation (H) Medium  Unknown    Syncope Medium  2014    Atrial fibrillation with rapid ventricular response (H) Medium  10/20/2017    Acute combined systolic and " diastolic CHF, NYHA class 3 (H) Medium  10/24/2017    Essential hypertension Medium  10/24/2017    Tachycardia induced cardiomyopathy (H) Medium  10/24/2017    Acute cystitis without hematuria Medium  10/25/2017      Code Status History     Date Active Date Inactive Code Status Order ID Comments User Context    10/24/2017  7:26 AM 11/6/2017  3:12 AM Full Code [id number]  Moses Win MD Outpatient    10/20/2017  9:50 PM 10/24/2017  7:26 AM Full Code [id number]  Neeraj Arthur MD Inpatient    6/21/2014 12:09 PM 10/20/2017  9:50 PM Full Code [id number]  Anurag Arguello MD Outpatient    6/20/2014  7:58 PM 6/21/2014 12:09 PM Full Code [id number]  Cyn Coon, PALarisaC ED         Medication Review      START taking        Dose / Directions Comments    aspirin 81 MG chewable tablet   Used for:  Acute respiratory failure with hypoxia (H)        Dose:  81 mg   Take 1 tablet (81 mg) by mouth daily   Quantity:  36 tablet   Refills:  0        lisinopril 5 MG tablet   Commonly known as:  PRINIVIL/ZESTRIL   Used for:  Acute respiratory failure with hypoxia (H)        Dose:  5 mg   Take 1 tablet (5 mg) by mouth daily   Quantity:  30 tablet   Refills:  0          CONTINUE these medications which may have CHANGED, or have new prescriptions. If we are uncertain of the size of tablets/capsules you have at home, strength may be listed as something that might have changed.        Dose / Directions Comments    * furosemide 20 MG tablet   Commonly known as:  LASIX   This may have changed:    - medication strength  - when to take this   Used for:  Acute respiratory failure with hypoxia (H)        Dose:  20 mg   Take 1 tablet (20 mg) by mouth every 24 hours   Quantity:  30 tablet   Refills:  0        * furosemide 40 MG tablet   Commonly known as:  LASIX   This may have changed:  You were already taking a medication with the same name, and this prescription was added. Make sure you understand how and when to take each.   Used for:   Acute respiratory failure with hypoxia (H)        Dose:  40 mg   Take 1 tablet (40 mg) by mouth daily   Quantity:  30 tablet   Refills:  0        metoprolol 25 MG 24 hr tablet   Commonly known as:  TOPROL-XL   This may have changed:    - medication strength  - how much to take  - when to take this   Used for:  Acute respiratory failure with hypoxia (H)        Dose:  75 mg   Take 3 tablets (75 mg) by mouth 2 times daily   Quantity:  30 tablet   Refills:  0        * Notice:  This list has 2 medication(s) that are the same as other medications prescribed for you. Read the directions carefully, and ask your doctor or other care provider to review them with you.      CONTINUE these medications which have NOT CHANGED        Dose / Directions Comments    meclizine 12.5 MG tablet   Commonly known as:  ANTIVERT   Used for:  Labyrinthitis, bilateral        Dose:  12.5 mg   Take 1 tablet (12.5 mg) by mouth 3 times daily as needed for dizziness   Quantity:  60 tablet   Refills:  1        MULTIPLE VITAMINS PO        Dose:  1 tablet   Take 1 tablet by mouth daily   Refills:  0        OMEPRAZOLE PO        Dose:  20 mg   Take 20 mg by mouth daily as needed   Refills:  0        PAROXETINE HCL PO        Dose:  30 mg   Take 30 mg by mouth daily   Refills:  0        VITAMIN D3 PO        Dose:  2000 Units   Take 2,000 Units by mouth daily   Refills:  0        WARFARIN SODIUM PO        Take by mouth daily 5 mg Tu/Th/Su; 2.5 mg AOD   Refills:  0          STOP taking     atorvastatin 40 MG tablet   Commonly known as:  LIPITOR                     Further instructions from your care team       Patient will discharge to [facility] today via family around 13:00.  [facility] phone number is [phone].    Summary of Visit     Reason for your hospital stay       Acute respiratory failure due to cardiogenic shock/pulmonary edema.             After Care     Activity - Up ad fernando           Advance Diet as Tolerated       Follow this diet  upon discharge: Orders Placed This Encounter      2 Gram Sodium Diet       Fall precautions           General info for SNF       Length of Stay Estimate: Short Term Care: Estimated # of Days <30  Condition at Discharge: Improving  Level of care:skilled   Rehabilitation Potential: Good  Admission H&P remains valid and up-to-date: Yes  Recent Chemotherapy: N/A  Use Nursing Home Standing Orders: Yes             Referrals     Occupational Therapy Adult Consult       Evaluate and treat as clinically indicated.    Reason:  deconditioning       Physical Therapy Adult Consult       Evaluate and treat as clinically indicated.    Reason:  deconditioning             Your next 10 appointments already scheduled     Nov 20, 2017  1:30 PM CST   LAB with RU LAB   [facility] ([facility])    [address]  [facility] [facility] [address]   [phone]           Please do not eat 10-12 hours before your appointment if you are coming in fasting for labs on lipids, cholesterol, or glucose (sugar). This does not apply to pregnant women. Water, hot tea and black coffee (with nothing added) are okay. Do not drink other fluids, diet soda or chew gum.            Nov 20, 2017  2:30 PM CST   Return Discharge with DOMINIK Javier CNP   [facility] ([facility])    [address]  [place] [address] [phone]            Dec 04, 2017  9:30 AM CST   Ech Complete with [id number] [facility] ([facility])    [address]  [place] [address]   [phone]           1. Please bring or wear a comfortable two-piece outfit. 2. You may eat, drink and take your normal medicines. 3. For any questions that cannot be answered, please contact the ordering physician ***Please check-in at the [place] Registration Office located in [address] in the [facility] building. When you are finished registering, please go to [address] and have a seat. The technician will call your name for the test.            Dec 04, 2017 10:30 AM CST   LAB with RU LAB   [facility])    [address]  [facility] [address]   [phone]           Please do not eat 10-12 hours before your appointment if you are coming in fasting for labs on lipids, cholesterol, or glucose (sugar). This does not apply to pregnant women. Water, hot tea and black coffee (with nothing added) are okay. Do not drink other fluids, diet soda or chew gum.            Dec 19, 2017  2:45 PM CST   Return Visit with Den Pickard MD   [facility] ([facility])    [address]  [place] [address]   [phone]              Follow-Up Appointment Instructions     Future Labs/Procedures    Follow Up and recommended labs and tests     Comments:    Follow up with detention physician.  The following labs/tests are recommended: INR in 2-3 days. LFTs in 1-2 weeks.  F/u with cardiology (please check with cardiology service)..  Resume atorvastatin once liver function test normalize.      Follow-Up Appointment Instructions     Follow Up and recommended labs and tests       Follow up with detention physician.  The following labs/tests are recommended: INR in 2-3 days. LFTs in 1-2 weeks.  F/u with cardiology (please check with cardiology service)..  Resume atorvastatin once liver function test normalize.             Statement of Approval     Ordered          11/13/17 1031  I have reviewed and agree with all the recommendations and orders detailed in this document.  EFFECTIVE NOW     Approved and electronically signed by:  Heather Ferrera MD

## 2017-11-05 NOTE — IP AVS SNAPSHOT
` ` Patient Information     Patient Name Sex     Em Richmond ([id number]) Female 1940       Room Bed    244-      Patient Demographics     Address Phone    [address] [address] [phone] (Home) *Preferred*  none (Work)      Patient Ethnicity & Race     Ethnic Group Patient Race    American White      Emergency Contact(s)     Name Relation Home Work Mobile    Carson Richmond Spouse [phone]      Teresa Rivera Daughter   [phone]      Documents on File        Status Date Received Description       Documents for the Patient    Affiliate Privacy placeholder   phase3    Consent for Services - Hospital/Clinic Received () 14     Consent for EHR Access Received 14     Privacy Notice - [place] Received 14     Insurance Card Received 14     External Medication Information Consent       Patient ID Received 14     [employer] Specified Other       HIM [employer]AN Authorization  10/22/14     Insurance Card Received 10/29/14     Consent for Services/Privacy Notice - Hospital/Clinic Received () 16     Patient ID Received 10/20/17     Insurance Card Received 16     Insurance Card Received 17     Consent for Services/Privacy Notice - Hospital/Clinic Received 17     Care Everywhere Prospective Auth Received 10/20/17        Documents for the Encounter    CMS IM for Patient Signature Received 17 1MM      Admission Information     Attending Provider Admitting Provider Admission Type Admission Date/Time    Santi Terrazas MD Cho, Roy Joseph, MD Emergency 17  2310    Discharge Date Hospital Service Auth/Cert Status Service Area     Hospitalist [facility] SERVICES    Unit Room/Bed Admission Status        CARDIAC SPEC CARE  Admission (Confirmed)       Admission     Complaint    Acute respiratory failure with hypoxemia (H)      Hospital Account     Name Acct ID Class Status Primary Coverage    Basilio  Em AMBRIZ [id number] Inpatient Open MEDICARE - MEDICARE FOR HB SUPPLEMENT            Guarantor Account (for Hospital Account #[id number])     Name Relation to Pt Service Area Active? Acct Type    Em Richmond Self FCS Yes Personal/Family    Address Phone          [address]  [place] [address] [phone](H)  none(O)              Coverage Information (for Hospital Account #[id number])     1. MEDICARE/MEDICARE FOR HB SUPPLEMENT     F/O Payor/Plan Precert #    MEDICARE/MEDICARE FOR HB SUPPLEMENT     Subscriber Subscriber #    Em Richmond [id number]    Address Phone    [employer] CLAIM[employer]  [address]  [place] [address] [phone]          2. MEDICA/MEDICA PRIME SOLUTION     F/O Payor/Plan Precert #    MEDICA/MEDICA PRIME SOLUTION     Subscriber Subscriber #    Em Richmond [id number]    Address Phone    [address] [address]  [place] [address] [phone]

## 2017-11-05 NOTE — IP AVS SNAPSHOT
"` `     [facility] CARDIAC SPECIALTY CARE: [phone]                                              INTERAGENCY TRANSFER FORM - NURSING   2017                    Hospital Admission Date: 2017  RICARDO DORMAN   : 1940  Sex: Female        Attending Provider: Santi Terrazas MD     Allergies:  Sulfa Drugs    Infection:  None   Service:  HOSPITALIST    Ht:  1.651 m (5' 5\")   Wt:  82.1 kg (181 lb)   Admission Wt:  88.2 kg (194 lb 7.1 oz)    BMI:  30.12 kg/m 2   BSA:  1.94 m 2            Patient PCP Information     Provider PCP Type    Arielle Leger MD General      Current Code Status     Date Active Code Status Order ID Comments User Context       2017  3:12 AM Full Code [id number]  Santi Terrazas MD ED       Code Status History     Date Active Date Inactive Code Status Order ID Comments User Context    10/24/2017  7:26 AM 2017  3:12 AM Full Code [id number]  Moses Win MD Outpatient    10/20/2017  9:50 PM 10/24/2017  7:26 AM Full Code [id number]  Neeraj Arthur MD Inpatient    2014 12:09 PM 10/20/2017  9:50 PM Full Code [id number]  Anurag Arguello MD Outpatient    2014  7:58 PM 2014 12:09 PM Full Code [id number]  Cyn Coon, PA-C ED      Advance Directives        Does patient have a scanned Advance Directive/ACP document in EPIC?           Yes        Hospital Problems as of 2017              Priority Class Noted POA    Acute respiratory failure with hypoxemia (H) Medium  2017 Yes    Acute pulmonary edema (H) Medium  2017 Unknown    Secondary cardiomyopathy (H) Medium  2017 Unknown      Non-Hospital Problems as of 2017              Priority Class Noted    Atrial fibrillation (H) Medium  Unknown    Syncope Medium  2014    Atrial fibrillation with rapid ventricular response (H) Medium  10/20/2017    Acute combined systolic and diastolic CHF, NYHA class 3 (H) Medium  10/24/2017    Essential hypertension Medium  " 10/24/2017    Tachycardia induced cardiomyopathy (H) Medium  10/24/2017    Acute cystitis without hematuria Medium  10/25/2017      Immunizations     None         END      ASSESSMENT     Discharge Profile Flowsheet     EXPECTED DISCHARGE     Skilled Nursing Facility  [facility] [phone], Fax: [phone] 11/13/17 1106    Expected Discharge Date  11/13/17 (TCU) 11/11/17 0955   PAS Number  [id number] 11/13/17 1105    DISCHARGE NEEDS ASSESSMENT     Senior Linkage Line Referral Placed  11/13/17 11/13/17 1105    Equipment Currently Used at Home  grab bar 11/10/17 1252   F/U Appointment Brochure Provided  10/22/17 10/22/17 1110    Transportation Available  car;family or friend will provide (Pt still drives; however, family can provide for now.) 11/10/17 1147   SKIN      # of Referrals Placed by CTS  External Care Coordination;Communication hand-offs to next level of Care Providers;Scheduled Follow-up appointments 11/13/17 1134   Inspection of bony prominences  Full 11/13/17 0945    GASTROINTESTINAL (ADULT,PEDIATRIC,OB)     Inspection under devices  Full 11/13/17 0945    GI WDL  WDL 11/13/17 0945   Skin WDL  ex 11/13/17 0945    Abdominal Appearance  obese 11/11/17 1018   Skin Color/Characteristics  bruised (ecchymotic) 11/13/17 0945    All Quadrants Bowel Sounds  audible and active in all quadrants 11/13/17 0945   Skin Moisture  dry 11/13/17 0945    Last Bowel Movement  11/10/17 11/11/17 1018   Skin Integrity  bruise(s) 11/13/17 0945    GI Signs/Symptoms  abdominal fullness 11/13/17 0945   Skin Temperature  other (see comments) 11/13/17 0945    Passing flatus  yes 11/13/17 0945   SAFETY      COMMUNICATION ASSESSMENT     Safety WDL  WDL 11/13/17 0945    Patient's communication style  spoken language (English or Bilingual) 11/08/17 1300   Safety Equipment  oxygen flowmeter 11/13/17 0945    FINAL RESOURCES     All Alarms  alarm(s) activated and audible 11/13/17 0945    Resources List  Skilled Nursing Facility  "11/13/17 1105                      Assessment WDL (Within Defined Limits) Definitions           Safety WDL     Effective: 09/28/15    Row Information: <b>WDL Definition:</b> Bed in low position, wheels locked; call light in reach; upper side rails up x 2; ID band on<br> <font color=\"gray\"><i>Item=AS safety wdl>>List=AS safety wdl>>Version=F14</i></font>      Skin WDL     Effective: 09/28/15    Row Information: <b>WDL Definition:</b> Warm; dry; intact; elastic; without discoloration; pressure points without redness<br> <font color=\"gray\"><i>Item=AS skin wdl>>List=AS skin wdl>>Version=F14</i></font>      Vitals     Vital Signs Flowsheet     VITAL SIGNS     Best Verbal Response  5-->(V5) oriented 11/12/17 2117    Temp  97.6  F (36.4  C) 11/13/17 0810   Jessica Coma Scale Score  15 11/12/17 2117    Temp src  Oral 11/13/17 0810   Assessment Qualifiers  patient not sedated/intubated 11/11/17 1018    Resp  20 11/13/17 0810   HEIGHT AND WEIGHT      Pulse  83 11/13/17 0604   Height  1.651 m (5' 5\") 11/06/17 1416    Heart Rate  91 11/13/17 0810   Height Method  Estimated 11/06/17 1416    Pulse/Heart Rate Source  Monitor 11/13/17 0810   Height Method  Actual 11/05/17 2322    BP  132/85 11/13/17 0810   Weight  82.1 kg (181 lb) 11/12/17 0643    BP Location  Left arm 11/13/17 0810   Weight Method  Standing scale 11/12/17 0643    OXYGEN THERAPY     POSITIONING      SpO2  95 % 11/13/17 0810   Body Position  independently positioning 11/13/17 0945    O2 Device  None (Room air) 11/13/17 0810   Head of Bed (HOB)  HOB at 20-30 degrees 11/13/17 0945    FiO2 (%)  50 % 11/07/17 2145   Chair  Upright in chair 11/13/17 0945    Oxygen Delivery  2 LPM 11/09/17 0829   Positioning/Transfer Devices  pillows 11/13/17 0605    PAIN/COMFORT     DAILY CARE      Patient Currently in Pain  denies 11/13/17 0946   Activity Management  ambulated to bathroom 11/13/17 0945    Preferred Pain Scale  number (Numeric Rating Pain Scale) 11/10/17 2200   Activity " Assistance Provided  assistance, 1 person 11/13/17 0945    Patient's Stated Pain Goal  No pain 11/06/17 0857   Assistive Device Utilized  walker 11/13/17 0945    0-10 Pain Scale  6 11/10/17 2200   ECG      Pain Location  Head 11/10/17 2200   ECG Rhythm  Atrial fibrillation 11/11/17 1006    Pain Descriptors  Aching 11/10/17 2200   Ectopy  None 11/11/17 1006    Pain Management Interventions  analgesia administered 11/07/17 0159   Lead Monitored  Lead II;V 1 11/07/17 2024    Pain Intervention(s)  Declines 11/10/17 2200   RESPIRATORY MONITORING      Response to Interventions  Decrease in pain 11/06/17 1220   Respiratory Monitoring (EtCO2)  0 mmHg 11/05/17 2320    CRITICAL-CARE PAIN OBSERVATION TOOL (CPOT)     EKG MONITORING      Facial Expression  0 11/08/17 1226   Cardiac Regularity  Irregular 11/07/17 0456    Body Movements  0 11/08/17 1226   Cardiac Rhythm  Atrial fibrillation 11/07/17 0159    Compliance w/ventilator (intubated patients)  Extubated 11/08/17 1226   ANALGESIA SIDE EFFECTS MONITORING      Vocalization (extubated patients)  0 11/08/17 1226   Side Effects Monitoring: Respiratory Quality  R 11/13/17 0946    Muscle Tension  0 11/08/17 1226   Side Effects Monitoring: Respiratory Depth  N 11/13/17 0946    Total  0 11/08/17 1226   Side Effects Monitoring: Sedation Level  1 11/13/17 0946    ALEJANDRO COMA SCALE     POINT OF CARE TESTING      Best Eye Response  4-->(E4) spontaneous 11/12/17 2117   Puncture Site  fingertip 11/08/17 0933    Best Motor Response  6-->(M6) obeys commands 11/12/17 2117   Bedside Glucose (mg/dl )   104 mg/dl 11/08/17 0933            Patient Lines/Drains/Airways Status    Active LINES/DRAINS/AIRWAYS     None            Patient Lines/Drains/Airways Status    Active PICC/CVC     None            Intake/Output Detail Report     Date Intake       Output   Net    Shift P.O. I.V. NG/GT IV Piggyback Total Urine Emesis/NG output Total       Noc 11/11/17 2300 - 11/12/17 0659 250 3 -- -- 253  325 -- 325 -72    Day 11/12/17 0700 - 11/12/17 1459 240 -- -- -- 240 400 -- 400 -160    Ashley 11/12/17 1500 - 11/12/17 2259 -- -- -- -- -- 200 -- 200 -200    Noc 11/12/17 2300 - 11/13/17 0659 -- -- -- -- -- 400 -- 400 -400    Day 11/13/17 0700 - 11/13/17 1459 -- -- -- -- -- -- -- -- 0      Last Void/BM       Most Recent Value    Urine Occurrence     Stool Occurrence 1 at 11/10/2017 0300      Case Management/Discharge Planning     Case Management/Discharge Planning Flowsheet     REFERRAL INFORMATION     Do you work full or part-time?  no 11/10/17 1533    Did the Initial Social Work Assessment result in a Social Work Case?  Yes 11/10/17 1533   COPING/STRESS      Admission Type  inpatient 11/10/17 1533   Major Change/Loss/Stressor  none 11/08/17 1300    Arrived From  home or self-care 11/10/17 1533   EXPECTED DISCHARGE      Referral Source  physician 11/10/17 1533   Expected Discharge Date  11/13/17 (TCU) 11/11/17 0955    # of Referrals Placed by CTS  External Care Coordination;Communication hand-offs to next level of Care Providers;Scheduled Follow-up appointments 11/13/17 1134   DISCHARGE PLANNING      Post Acute Facilities  TCU 11/13/17 1105   Transportation Available  car;family or friend will provide (Pt still drives; however, family can provide for now.) 11/10/17 1147    Reason For Consult  discharge planning 11/13/17 1134   FINAL NOTE      Record Reviewed  clinical discipline documentation;history and physical;medical record;patient profile 11/13/17 1134   Final Note  D/C to [facility] 11/13/17 1105    CTS Assigned to Romeo Barrera RN 11/13/17 1134   FINAL RESOURCES      [facility]  [facility] 11/13/17 1134   Equipment Currently Used at Home  grab bar 11/10/17 1252    Primary Care MD Name  Dr Arielle Leger 11/13/17 1134   Resources List  Skilled Nursing Facility 11/13/17 1105    LIVING ENVIRONMENT     Skilled Nursing [facility] [phone], Fax: [phone]  11/13/17 1105    Lives With  spouse 11/10/17 1533   PAS Number  [id number] 11/13/17 1105    Living Arrangements  house 11/10/17 1533   Senior Linkage Line Referral Placed  11/13/17 11/13/17 1105    Provides Primary Care For  no one 11/10/17 1533   F/U Appointment Brochure Provided  10/22/17 10/22/17 1110    Quality Of Family Relationships  supportive;involved 11/10/17 1533   ABUSE RISK SCREEN      ASSESSMENT OF FAMILY/SOCIAL SUPPORT     QUESTION TO PATIENT:  Has a member of your family or a partner(now or in the past) intimidated, hurt, manipulated, or controlled you in any way?  no 11/08/17 1300    Marital Status   11/10/17 1533   QUESTION TO PATIENT: Do you feel safe going back to the place where you are living?  yes 11/08/17 1300    Who is your support system?  Children; 11/10/17 1533   OBSERVATION: Is there reason to believe there has been maltreatment of a vulnerable adult (ie. Physical/Sexual/Emotional abuse, self neglect, lack of adequate food, shelter, medical care, or financial exploitation)?  no 11/08/17 1300    Spouse's Name  Carson 11/10/17 1533   (R) MENTAL HEALTH SUICIDE RISK      Description of Support System  Supportive;Involved 11/10/17 1533   Are you depressed or being treated for depression?  No 11/08/17 1305    Support Assessment  Adequate family and caregiver support 11/10/17 1533   HOMICIDE RISK      Quality of Family Relationships  supportive;involved 11/10/17 1533   Feels Like Hurting Others  no 11/08/17 1300    EMPLOYMENT

## 2017-11-05 NOTE — IP AVS SNAPSHOT
MRN:[id number]                      After Visit Summary   11/5/2017    Em Richmond    MRN: [id number]           Thank you!     Thank you for choosing [facility] for your care. Our goal is always to provide you with excellent care. Hearing back from our patients is one way we can continue to improve our services. Please take a few minutes to complete the written survey that you may receive in the mail after you visit with us. Thank you!        Patient Information     Date Of Birth          1940        Designated Caregiver       Most Recent Value    Caregiver    Will someone help with your care after discharge? yes    Name of designated caregiver Carson    Phone number of caregiver [phone]    Caregiver address [address]      About your hospital stay     You were admitted on:  November 6, 2017 You last received care in the:  [facility]    You were discharged on:  November 13, 2017        Reason for your hospital stay       Acute respiratory failure due to cardiogenic shock/pulmonary edema.                  Who to Call     For medical emergencies, please call 911.  For non-urgent questions about your medical care, please call your primary care provider or clinic, [phone]          Attending Provider     Provider Specialty    Cami Bucio MD Emergency Medicine    Cho, Santi Kelly MD Internal Medicine       Primary Care Provider Office Phone # Fax #    Arielle Nicole Leger [phone]      After Care Instructions     Activity - Up ad fernando           Advance Diet as Tolerated       Follow this diet upon discharge: Orders Placed This Encounter      2 Gram Sodium Diet            Fall precautions           General info for SNF       Length of Stay Estimate: Short Term Care: Estimated # of Days <30  Condition at Discharge: Improving  Level of care:skilled   Rehabilitation Potential: Good  Admission H&P remains valid and  up-to-date: Yes  Recent Chemotherapy: N/A  Use Nursing Home Standing Orders: Yes                  Follow-up Appointments     Follow Up and recommended labs and tests       Follow up with shelter physician.  The following labs/tests are recommended: INR in 2-3 days. LFTs in 1-2 weeks.  F/u with cardiology (please check with cardiology service)..  Resume atorvastatin once liver function test normalize.                  Your next 10 appointments already scheduled     Nov 20, 2017  1:30 PM CST   LAB with RU LAB   [facility] ([facility])    [address] [place] [address] [address] [place] [id number]   [phone]           Please do not eat 10-12 hours before your appointment if you are coming in fasting for labs on lipids, cholesterol, or glucose (sugar). This does not apply to pregnant women. Water, hot tea and black coffee (with nothing added) are okay. Do not drink other fluids, diet soda or chew gum.            Nov 20, 2017  2:30 PM CST   Return Discharge with DOMINIK Javier CNP   [facility] ([facility])    [address] [place] [address] [address] [place] [id number]   [phone]            Dec 04, 2017  9:30 AM CST   Ech Complete with [address] ([facility])    [address] [place] [address]   [phone]           1. Please bring or wear a comfortable two-piece outfit. 2. You may eat, drink and take your normal medicines. 3. For any questions that cannot be answered, please contact the ordering physician ***Please check-in at the [place] Registration Office located in [address] in the [facility] building. When you are finished registering, please go to [address] and have a seat. The technician will call your name for the test.            Dec 04, 2017 10:30 AM CST   LAB with RU LAB   [facility] [place] ([facility])    [address]  [facility] [address]   [phone]           Please do not eat 10-12 hours before your appointment if you are coming in fasting for labs on lipids, cholesterol, or glucose (sugar). This does not apply to pregnant women. Water, hot tea and black coffee (with nothing added) are okay. Do not drink other fluids, diet soda or chew gum.            Dec 19, 2017  2:45 PM CST   Return Visit with Den Pickard MD   [facility] ([facility])    [address]  [facility] [address]   [phone]              Additional Services     Occupational Therapy Adult Consult       Evaluate and treat as clinically indicated.    Reason:  deconditioning            Physical Therapy Adult Consult       Evaluate and treat as clinically indicated.    Reason:  deconditioning                  Further instructions from your care team       Patient will discharge to [facility] today via family around 13:00.  [facility]'s phone number is [phone].    Warfarin Instruction     You have started taking a medicine called warfarin. This is a blood-thinning medicine (anticoagulant). It helps prevent and treat blood clots.      Before leaving the hospital, make sure you know how much to take and how long to take it.      You will need regular blood tests to make sure your blood is clotting safely. It is very important to see your doctor for regular blood tests.    Talk to your doctor before taking any new medicine (this includes over-the-counter drugs and herbal products). Many medicines can interact with warfarin. This may cause more bleeding or too much clotting.     Eating a lot of vitamin K--found in green, leafy vegetables--can change the way warfarin works in your body. Do NOT avoid these foods. Instead, try to eat the same amount each day.     Bleeding is the most common side-effect of warfarin.  "You may notice bleeding gums, a bloody nose, bruises and bleeding longer when you cut yourself. See a doctor at once if:   o You cough up blood  o You find blood in your stool (poop)  o You have a deep cut, or a cut that bleeds longer than 10 minutes   o You have a bad cut, hard fall, accident or hit your head (go to urgent care or the emergency room).    For women who can get pregnant: This medicine can harm an unborn baby. Be very careful not to get pregnant while taking this medicine. If you think you might be pregnant, call your doctor right away.    For more information, read \"Guide to Warfarin Therapy,  the booklet you received in the hospital.        Pending Results     No orders found from 11/3/2017 to 11/6/2017.            Statement of Approval     Ordered          11/13/17 1031  I have reviewed and agree with all the recommendations and orders detailed in this document.  EFFECTIVE NOW     Approved and electronically signed by:  Heather Ferrera MD             Admission Information     Date & Time Provider Department Dept. Phone    11/5/2017 Santi Terrazas MD [facility] [phone]      Your Vitals Were     Blood Pressure Pulse Temperature Respirations Height Weight    132/85 (BP Location: Left arm) 83 97.6  F (36.4  C) (Oral) 20 1.651 m (5' 5\") 82.1 kg (181 lb)    Pulse Oximetry BMI (Body Mass Index)                95% 30.12 kg/m2          MyChart Information     [employer] lets you send messages to your doctor, view your test results, renew your prescriptions, schedule appointments and more. To sign up, go to [email or web address] . Click on \"Log in\" on the left side of the screen, which will take you to the Welcome page. Then click on \"Sign up Now\" on the right side of the page.     You will be asked to enter the access code listed below, as well as some personal information. Please follow the directions to create your username and password.     Your access code is: " [email or web address]  Expires: 2018  1:00 PM     Your access code will  in 90 days. If you need help or a new code, please call your [facility] clinic or [phone].        Care EveryWhere ID     This is your Care EveryWhere ID. This could be used by other organizations to access your [facility] medical records  [id number]        Equal Access to Services     LUZ MARTINEZ : Hadii aad ku hadasho Soomaali, waaxda luqadaha, qaybta kaalmada adeegyada, waxay idiin haygrahamn adesenait madera lakayodeadithya . So [facility] [phone].    ATENCIÓN: Si habla español, tiene a delgadillo disposición servicios gratuitos de asistencia lingüística. Llame al [phone].    We comply with applicable federal civil rights laws and [place] laws. We do not discriminate on the basis of race, color, national origin, age, disability, sex, sexual orientation, or gender identity.               Review of your medicines      START taking        Dose / Directions    aspirin 81 MG chewable tablet   Used for:  Acute respiratory failure with hypoxia (H)        Dose:  81 mg   Take 1 tablet (81 mg) by mouth daily   Quantity:  36 tablet   Refills:  0       lisinopril 5 MG tablet   Commonly known as:  PRINIVIL/ZESTRIL   Used for:  Acute respiratory failure with hypoxia (H)        Dose:  5 mg   Take 1 tablet (5 mg) by mouth daily   Quantity:  30 tablet   Refills:  0         CONTINUE these medicines which may have CHANGED, or have new prescriptions. If we are uncertain of the size of tablets/capsules you have at home, strength may be listed as something that might have changed.        Dose / Directions    * furosemide 20 MG tablet   Commonly known as:  LASIX   This may have changed:    - medication strength  - when to take this   Used for:  Acute respiratory failure with hypoxia (H)        Dose:  20 mg   Take 1 tablet (20 mg) by mouth every 24 hours   Quantity:  30 tablet   Refills:  0       * furosemide 40 MG tablet   Commonly known as:  LASIX   This may have changed:   You were already taking a medication with the same name, and this prescription was added. Make sure you understand how and when to take each.   Used for:  Acute respiratory failure with hypoxia (H)        Dose:  40 mg   Take 1 tablet (40 mg) by mouth daily   Quantity:  30 tablet   Refills:  0       metoprolol 25 MG 24 hr tablet   Commonly known as:  TOPROL-XL   This may have changed:    - medication strength  - how much to take  - when to take this   Used for:  Acute respiratory failure with hypoxia (H)        Dose:  75 mg   Take 3 tablets (75 mg) by mouth 2 times daily   Quantity:  30 tablet   Refills:  0       * Notice:  This list has 2 medication(s) that are the same as other medications prescribed for you. Read the directions carefully, and ask your doctor or other care provider to review them with you.      CONTINUE these medicines which have NOT CHANGED        Dose / Directions    meclizine 12.5 MG tablet   Commonly known as:  ANTIVERT   Used for:  Labyrinthitis, bilateral        Dose:  12.5 mg   Take 1 tablet (12.5 mg) by mouth 3 times daily as needed for dizziness   Quantity:  60 tablet   Refills:  1       MULTIPLE VITAMINS PO        Dose:  1 tablet   Take 1 tablet by mouth daily   Refills:  0       OMEPRAZOLE PO        Dose:  20 mg   Take 20 mg by mouth daily as needed   Refills:  0       PAROXETINE HCL PO        Dose:  30 mg   Take 30 mg by mouth daily   Refills:  0       VITAMIN D3 PO        Dose:  2000 Units   Take 2,000 Units by mouth daily   Refills:  0       WARFARIN SODIUM PO        Take by mouth daily 5 mg Tu/Th/Su; 2.5 mg AOD   Refills:  0         STOP taking     atorvastatin 40 MG tablet   Commonly known as:  LIPITOR                Where to get your medicines      Some of these will need a paper prescription and others can be bought over the counter. Ask your nurse if you have questions.     You don't need a prescription for these medications     aspirin 81 MG chewable tablet    furosemide 20 MG  tablet    furosemide 40 MG tablet    lisinopril 5 MG tablet    metoprolol 25 MG 24 hr tablet                Protect others around you: Learn how to safely use, store and throw away your medicines at www.disposemymeds.org.             Medication List: This is a list of all your medications and when to take them. Check marks below indicate your daily home schedule. Keep this list as a reference.      Medications           Morning Afternoon Evening Bedtime As Needed    aspirin 81 MG chewable tablet   Take 1 tablet (81 mg) by mouth daily   Last time this was given:  81 mg on 11/13/2017 10:07 AM                                * furosemide 20 MG tablet   Commonly known as:  LASIX   Take 1 tablet (20 mg) by mouth every 24 hours   Last time this was given:  40 mg on 11/13/2017 10:07 AM                                * furosemide 40 MG tablet   Commonly known as:  LASIX   Take 1 tablet (40 mg) by mouth daily   Last time this was given:  40 mg on 11/13/2017 10:07 AM                                lisinopril 5 MG tablet   Commonly known as:  PRINIVIL/ZESTRIL   Take 1 tablet (5 mg) by mouth daily   Last time this was given:  5 mg on 11/13/2017 10:07 AM                                meclizine 12.5 MG tablet   Commonly known as:  ANTIVERT   Take 1 tablet (12.5 mg) by mouth 3 times daily as needed for dizziness                                metoprolol 25 MG 24 hr tablet   Commonly known as:  TOPROL-XL   Take 3 tablets (75 mg) by mouth 2 times daily   Last time this was given:  75 mg on 11/13/2017 10:07 AM                                MULTIPLE VITAMINS PO   Take 1 tablet by mouth daily                                OMEPRAZOLE PO   Take 20 mg by mouth daily as needed   Last time this was given:  20 mg on 11/13/2017 10:07 AM                                PAROXETINE HCL PO   Take 30 mg by mouth daily   Last time this was given:  30 mg on 11/13/2017 10:07 AM                                VITAMIN D3 PO   Take 2,000 Units by mouth  daily                                WARFARIN SODIUM PO   Take by mouth daily 5 mg Tu/Th/Su; 2.5 mg AOD   Last time this was given:  2.5 mg on 11/12/2017  6:27 PM                                * Notice:  This list has 2 medication(s) that are the same as other medications prescribed for you. Read the directions carefully, and ask your doctor or other care provider to review them with you.

## 2017-11-06 ENCOUNTER — APPOINTMENT (OUTPATIENT)
Dept: MRI IMAGING | Facility: CLINIC | Age: 77
DRG: 208 | End: 2017-11-06
Attending: INTERNAL MEDICINE
Payer: MEDICARE

## 2017-11-06 ENCOUNTER — APPOINTMENT (OUTPATIENT)
Dept: CARDIOLOGY | Facility: CLINIC | Age: 77
DRG: 208 | End: 2017-11-06
Attending: INTERNAL MEDICINE
Payer: MEDICARE

## 2017-11-06 ENCOUNTER — APPOINTMENT (OUTPATIENT)
Dept: ULTRASOUND IMAGING | Facility: CLINIC | Age: 77
DRG: 208 | End: 2017-11-06
Attending: EMERGENCY MEDICINE
Payer: MEDICARE

## 2017-11-06 PROBLEM — J96.01 ACUTE RESPIRATORY FAILURE WITH HYPOXEMIA (H): Status: ACTIVE | Noted: 2017-11-06

## 2017-11-06 LAB
ALBUMIN SERPL-MCNC: 2.1 G/DL (ref 3.4–5)
ALBUMIN SERPL-MCNC: 2.1 G/DL (ref 3.4–5)
ALBUMIN SERPL-MCNC: 2.2 G/DL (ref 3.4–5)
ALBUMIN SERPL-MCNC: 2.9 G/DL (ref 3.4–5)
ALBUMIN UR-MCNC: 100 MG/DL
ALP SERPL-CCNC: 259 U/L (ref 40–150)
ALP SERPL-CCNC: 292 U/L (ref 40–150)
ALP SERPL-CCNC: 314 U/L (ref 40–150)
ALP SERPL-CCNC: 405 U/L (ref 40–150)
ALT SERPL W P-5'-P-CCNC: 1890 U/L (ref 0–50)
ALT SERPL W P-5'-P-CCNC: 2858 U/L (ref 0–50)
ALT SERPL W P-5'-P-CCNC: 3231 U/L (ref 0–50)
ALT SERPL W P-5'-P-CCNC: 769 U/L (ref 0–50)
AMMONIA PLAS-SCNC: 72 UMOL/L (ref 10–50)
ANION GAP SERPL CALCULATED.3IONS-SCNC: 13 MMOL/L (ref 3–14)
ANION GAP SERPL CALCULATED.3IONS-SCNC: 16 MMOL/L (ref 3–14)
ANION GAP SERPL CALCULATED.3IONS-SCNC: 18 MMOL/L (ref 3–14)
APAP SERPL-MCNC: 4 MG/L (ref 10–20)
APPEARANCE UR: ABNORMAL
AST SERPL W P-5'-P-CCNC: 1480 U/L (ref 0–45)
AST SERPL W P-5'-P-CCNC: 3720 U/L (ref 0–45)
AST SERPL W P-5'-P-CCNC: 6218 U/L (ref 0–45)
AST SERPL W P-5'-P-CCNC: 6542 U/L (ref 0–45)
BASE DEFICIT BLDA-SCNC: 11.3 MMOL/L
BASE DEFICIT BLDA-SCNC: 12.8 MMOL/L
BASE DEFICIT BLDA-SCNC: 6.8 MMOL/L
BASE DEFICIT BLDA-SCNC: NORMAL MMOL/L
BASE EXCESS BLDA CALC-SCNC: NORMAL MMOL/L
BASOPHILS # BLD AUTO: 0 10E9/L (ref 0–0.2)
BASOPHILS # BLD AUTO: 0.1 10E9/L (ref 0–0.2)
BASOPHILS NFR BLD AUTO: 0.3 %
BASOPHILS NFR BLD AUTO: 0.5 %
BILIRUB DIRECT SERPL-MCNC: 0.7 MG/DL (ref 0–0.2)
BILIRUB DIRECT SERPL-MCNC: 1.1 MG/DL (ref 0–0.2)
BILIRUB DIRECT SERPL-MCNC: 1.4 MG/DL (ref 0–0.2)
BILIRUB SERPL-MCNC: 1.6 MG/DL (ref 0.2–1.3)
BILIRUB SERPL-MCNC: 1.8 MG/DL (ref 0.2–1.3)
BILIRUB SERPL-MCNC: 2.1 MG/DL (ref 0.2–1.3)
BILIRUB SERPL-MCNC: 2.3 MG/DL (ref 0.2–1.3)
BILIRUB UR QL STRIP: NEGATIVE
BLASTS # BLD: 0.1 10E9/L
BUN SERPL-MCNC: 23 MG/DL (ref 7–30)
BUN SERPL-MCNC: 23 MG/DL (ref 7–30)
BUN SERPL-MCNC: 24 MG/DL (ref 7–30)
CA-I BLD-MCNC: 3.8 MG/DL (ref 4.4–5.2)
CA-I BLD-MCNC: 4.2 MG/DL (ref 4.4–5.2)
CA-I BLD-MCNC: 4.3 MG/DL (ref 4.4–5.2)
CA-I BLD-MCNC: 4.3 MG/DL (ref 4.4–5.2)
CALCIUM SERPL-MCNC: 7.6 MG/DL (ref 8.5–10.1)
CALCIUM SERPL-MCNC: 7.8 MG/DL (ref 8.5–10.1)
CALCIUM SERPL-MCNC: 8.3 MG/DL (ref 8.5–10.1)
CHLORIDE SERPL-SCNC: 101 MMOL/L (ref 94–109)
CHLORIDE SERPL-SCNC: 101 MMOL/L (ref 94–109)
CHLORIDE SERPL-SCNC: 99 MMOL/L (ref 94–109)
CK SERPL-CCNC: 66 U/L (ref 30–225)
CO2 SERPL-SCNC: 14 MMOL/L (ref 20–32)
CO2 SERPL-SCNC: 15 MMOL/L (ref 20–32)
CO2 SERPL-SCNC: 17 MMOL/L (ref 20–32)
COLOR UR AUTO: YELLOW
CREAT SERPL-MCNC: 0.83 MG/DL (ref 0.52–1.04)
CREAT SERPL-MCNC: 0.84 MG/DL (ref 0.52–1.04)
CREAT SERPL-MCNC: 1.2 MG/DL (ref 0.52–1.04)
DIFFERENTIAL METHOD BLD: ABNORMAL
DIFFERENTIAL METHOD BLD: ABNORMAL
EOSINOPHIL # BLD AUTO: 0 10E9/L (ref 0–0.7)
EOSINOPHIL # BLD AUTO: 0.2 10E9/L (ref 0–0.7)
EOSINOPHIL NFR BLD AUTO: 0.2 %
EOSINOPHIL NFR BLD AUTO: 2 %
ERYTHROCYTE [DISTWIDTH] IN BLOOD BY AUTOMATED COUNT: 13 % (ref 10–15)
ERYTHROCYTE [DISTWIDTH] IN BLOOD BY AUTOMATED COUNT: 13.1 % (ref 10–15)
ERYTHROCYTE [DISTWIDTH] IN BLOOD BY AUTOMATED COUNT: 13.2 % (ref 10–15)
GFR SERPL CREATININE-BSD FRML MDRD: 44 ML/MIN/1.7M2
GFR SERPL CREATININE-BSD FRML MDRD: 66 ML/MIN/1.7M2
GFR SERPL CREATININE-BSD FRML MDRD: 67 ML/MIN/1.7M2
GLUCOSE BLDC GLUCOMTR-MCNC: 109 MG/DL (ref 70–99)
GLUCOSE BLDC GLUCOMTR-MCNC: 112 MG/DL (ref 70–99)
GLUCOSE BLDC GLUCOMTR-MCNC: 117 MG/DL (ref 70–99)
GLUCOSE BLDC GLUCOMTR-MCNC: 139 MG/DL (ref 70–99)
GLUCOSE BLDC GLUCOMTR-MCNC: 82 MG/DL (ref 70–99)
GLUCOSE SERPL-MCNC: 114 MG/DL (ref 70–99)
GLUCOSE SERPL-MCNC: 122 MG/DL (ref 70–99)
GLUCOSE SERPL-MCNC: 138 MG/DL (ref 70–99)
GLUCOSE UR STRIP-MCNC: NEGATIVE MG/DL
GRAN CASTS #/AREA URNS LPF: 17 /LPF
HAV IGM SERPL QL IA: NONREACTIVE
HBV CORE IGM SERPL QL IA: NONREACTIVE
HBV SURFACE AB SERPL IA-ACNC: 119.99 M[IU]/ML
HBV SURFACE AG SERPL QL IA: NONREACTIVE
HCO3 BLD-SCNC: 16 MMOL/L (ref 21–28)
HCO3 BLD-SCNC: 17 MMOL/L (ref 21–28)
HCO3 BLD-SCNC: 17 MMOL/L (ref 21–28)
HCO3 BLD-SCNC: NORMAL MMOL/L (ref 21–28)
HCT VFR BLD AUTO: 34.4 % (ref 35–47)
HCT VFR BLD AUTO: 37.8 % (ref 35–47)
HCT VFR BLD AUTO: 41.5 % (ref 35–47)
HCV AB SERPL QL IA: REACTIVE
HGB BLD-MCNC: 11.7 G/DL (ref 11.7–15.7)
HGB BLD-MCNC: 12.6 G/DL (ref 11.7–15.7)
HGB BLD-MCNC: 13.5 G/DL (ref 11.7–15.7)
HGB UR QL STRIP: ABNORMAL
IMM GRANULOCYTES # BLD: 0.1 10E9/L (ref 0–0.4)
IMM GRANULOCYTES NFR BLD: 0.6 %
IMM PLASMA CELLS NFR BLD: 3 %
INR PPP: 2.9 (ref 0.86–1.14)
INR PPP: 3.72 (ref 0.86–1.14)
INR PPP: 4.83 (ref 0.86–1.14)
INR PPP: 5.26 (ref 0.86–1.14)
KETONES UR STRIP-MCNC: 5 MG/DL
LACTATE BLD-SCNC: 1.3 MMOL/L (ref 0.7–2)
LACTATE BLD-SCNC: 2 MMOL/L (ref 0.7–2)
LACTATE BLD-SCNC: 4.4 MMOL/L (ref 0.7–2)
LACTATE BLD-SCNC: 5.6 MMOL/L (ref 0.7–2)
LACTATE BLD-SCNC: 6.7 MMOL/L (ref 0.7–2)
LEUKOCYTE ESTERASE UR QL STRIP: NEGATIVE
LIPASE SERPL-CCNC: 189 U/L (ref 73–393)
LIPASE SERPL-CCNC: 455 U/L (ref 73–393)
LYMPHOCYTES # BLD AUTO: 1.4 10E9/L (ref 0.8–5.3)
LYMPHOCYTES # BLD AUTO: 2.2 10E9/L (ref 0.8–5.3)
LYMPHOCYTES NFR BLD AUTO: 11 %
LYMPHOCYTES NFR BLD AUTO: 18.5 %
MAGNESIUM SERPL-MCNC: 1.7 MG/DL (ref 1.6–2.3)
MAGNESIUM SERPL-MCNC: 1.7 MG/DL (ref 1.6–2.3)
MCH RBC QN AUTO: 32.3 PG (ref 26.5–33)
MCH RBC QN AUTO: 32.4 PG (ref 26.5–33)
MCH RBC QN AUTO: 32.4 PG (ref 26.5–33)
MCHC RBC AUTO-ENTMCNC: 32.5 G/DL (ref 31.5–36.5)
MCHC RBC AUTO-ENTMCNC: 33.3 G/DL (ref 31.5–36.5)
MCHC RBC AUTO-ENTMCNC: 34 G/DL (ref 31.5–36.5)
MCV RBC AUTO: 95 FL (ref 78–100)
MCV RBC AUTO: 97 FL (ref 78–100)
MCV RBC AUTO: 99 FL (ref 78–100)
MONOCYTES # BLD AUTO: 0.4 10E9/L (ref 0–1.3)
MONOCYTES # BLD AUTO: 0.7 10E9/L (ref 0–1.3)
MONOCYTES NFR BLD AUTO: 2.8 %
MONOCYTES NFR BLD AUTO: 6 %
MUCOUS THREADS #/AREA URNS LPF: PRESENT /LPF
MYELOCYTES # BLD: 0.1 10E9/L
MYELOCYTES NFR BLD MANUAL: 0.5 %
NEUTROPHILS # BLD AUTO: 10.6 10E9/L (ref 1.6–8.3)
NEUTROPHILS # BLD AUTO: 8.2 10E9/L (ref 1.6–8.3)
NEUTROPHILS NFR BLD AUTO: 69.5 %
NEUTROPHILS NFR BLD AUTO: 85.1 %
NITRATE UR QL: NEGATIVE
NRBC # BLD AUTO: 0 10*3/UL
NRBC BLD AUTO-RTO: 0 /100
NT-PROBNP SERPL-MCNC: ABNORMAL PG/ML (ref 0–1800)
O2/TOTAL GAS SETTING VFR VENT: 100 %
OVALOCYTES BLD QL SMEAR: SLIGHT
OXYHGB MFR BLD: 96 % (ref 92–100)
OXYHGB MFR BLD: 97 % (ref 92–100)
OXYHGB MFR BLD: NORMAL % (ref 92–100)
PCO2 BLD: 28 MM HG (ref 35–45)
PCO2 BLD: 44 MM HG (ref 35–45)
PCO2 BLD: 44 MM HG (ref 35–45)
PCO2 BLD: NORMAL MM HG (ref 35–45)
PH BLD: 7.16 PH (ref 7.35–7.45)
PH BLD: 7.19 PH (ref 7.35–7.45)
PH BLD: 7.39 PH (ref 7.35–7.45)
PH BLD: NORMAL PH (ref 7.35–7.45)
PH UR STRIP: 6 PH (ref 5–7)
PHOSPHATE SERPL-MCNC: 4 MG/DL (ref 2.5–4.5)
PHOSPHATE SERPL-MCNC: 5 MG/DL (ref 2.5–4.5)
PLASMA CELLS # BLD MANUAL: 0.4 10E9/L
PLATELET # BLD AUTO: 217 10E9/L (ref 150–450)
PLATELET # BLD AUTO: 230 10E9/L (ref 150–450)
PLATELET # BLD AUTO: 322 10E9/L (ref 150–450)
PLATELET # BLD EST: ABNORMAL 10*3/UL
PLATELET # BLD EST: ABNORMAL 10*3/UL
PO2 BLD: 105 MM HG (ref 80–105)
PO2 BLD: 115 MM HG (ref 80–105)
PO2 BLD: 213 MM HG (ref 80–105)
PO2 BLD: NORMAL MM HG (ref 80–105)
POTASSIUM SERPL-SCNC: 4.6 MMOL/L (ref 3.4–5.3)
POTASSIUM SERPL-SCNC: 4.8 MMOL/L (ref 3.4–5.3)
POTASSIUM SERPL-SCNC: 5.1 MMOL/L (ref 3.4–5.3)
PROCALCITONIN SERPL-MCNC: 0.38 NG/ML
PROT SERPL-MCNC: 6 G/DL (ref 6.8–8.8)
PROT SERPL-MCNC: 6.1 G/DL (ref 6.8–8.8)
PROT SERPL-MCNC: 6.3 G/DL (ref 6.8–8.8)
PROT SERPL-MCNC: 8.2 G/DL (ref 6.8–8.8)
RBC # BLD AUTO: 3.61 10E12/L (ref 3.8–5.2)
RBC # BLD AUTO: 3.89 10E12/L (ref 3.8–5.2)
RBC # BLD AUTO: 4.18 10E12/L (ref 3.8–5.2)
RBC #/AREA URNS AUTO: 2 /HPF (ref 0–2)
RBC MORPH BLD: NORMAL
SODIUM SERPL-SCNC: 131 MMOL/L (ref 133–144)
SODIUM SERPL-SCNC: 131 MMOL/L (ref 133–144)
SODIUM SERPL-SCNC: 132 MMOL/L (ref 133–144)
SOURCE: ABNORMAL
SP GR UR STRIP: 1.04 (ref 1–1.03)
TROPONIN I SERPL-MCNC: 0.02 UG/L (ref 0–0.04)
TSH SERPL DL<=0.005 MIU/L-ACNC: 1.75 MU/L (ref 0.4–4)
TSH SERPL DL<=0.005 MIU/L-ACNC: 4.78 MU/L (ref 0.4–4)
UROBILINOGEN UR STRIP-MCNC: NORMAL MG/DL (ref 0–2)
WBC # BLD AUTO: 11.8 10E9/L (ref 4–11)
WBC # BLD AUTO: 12.4 10E9/L (ref 4–11)
WBC # BLD AUTO: 15.6 10E9/L (ref 4–11)
WBC #/AREA URNS AUTO: 0 /HPF (ref 0–2)

## 2017-11-06 PROCEDURE — 82330 ASSAY OF CALCIUM: CPT | Performed by: INTERNAL MEDICINE

## 2017-11-06 PROCEDURE — A9270 NON-COVERED ITEM OR SERVICE: HCPCS | Mod: GY | Performed by: INTERNAL MEDICINE

## 2017-11-06 PROCEDURE — 82805 BLOOD GASES W/O2 SATURATION: CPT | Performed by: INTERNAL MEDICINE

## 2017-11-06 PROCEDURE — 74181 MRI ABDOMEN W/O CONTRAST: CPT

## 2017-11-06 PROCEDURE — 84145 PROCALCITONIN (PCT): CPT | Performed by: INTERNAL MEDICINE

## 2017-11-06 PROCEDURE — 80329 ANALGESICS NON-OPIOID 1 OR 2: CPT | Performed by: INTERNAL MEDICINE

## 2017-11-06 PROCEDURE — 25500064 ZZH RX 255 OP 636: Performed by: INTERNAL MEDICINE

## 2017-11-06 PROCEDURE — 40000275 ZZH STATISTIC RCP TIME EA 10 MIN

## 2017-11-06 PROCEDURE — 83690 ASSAY OF LIPASE: CPT | Performed by: INTERNAL MEDICINE

## 2017-11-06 PROCEDURE — 82550 ASSAY OF CK (CPK): CPT | Performed by: INTERNAL MEDICINE

## 2017-11-06 PROCEDURE — 25000125 ZZHC RX 250: Performed by: INTERNAL MEDICINE

## 2017-11-06 PROCEDURE — 96366 THER/PROPH/DIAG IV INF ADDON: CPT

## 2017-11-06 PROCEDURE — 82247 BILIRUBIN TOTAL: CPT | Performed by: INTERNAL MEDICINE

## 2017-11-06 PROCEDURE — 25000128 H RX IP 250 OP 636: Performed by: INTERNAL MEDICINE

## 2017-11-06 PROCEDURE — 81001 URINALYSIS AUTO W/SCOPE: CPT | Performed by: INTERNAL MEDICINE

## 2017-11-06 PROCEDURE — 85610 PROTHROMBIN TIME: CPT | Performed by: INTERNAL MEDICINE

## 2017-11-06 PROCEDURE — 82803 BLOOD GASES ANY COMBINATION: CPT | Performed by: EMERGENCY MEDICINE

## 2017-11-06 PROCEDURE — 25000128 H RX IP 250 OP 636: Performed by: PHYSICIAN ASSISTANT

## 2017-11-06 PROCEDURE — 85027 COMPLETE CBC AUTOMATED: CPT | Performed by: INTERNAL MEDICINE

## 2017-11-06 PROCEDURE — 87040 BLOOD CULTURE FOR BACTERIA: CPT | Performed by: EMERGENCY MEDICINE

## 2017-11-06 PROCEDURE — 25000128 H RX IP 250 OP 636

## 2017-11-06 PROCEDURE — 99291 CRITICAL CARE FIRST HOUR: CPT | Mod: 25 | Performed by: INTERNAL MEDICINE

## 2017-11-06 PROCEDURE — 84100 ASSAY OF PHOSPHORUS: CPT | Performed by: INTERNAL MEDICINE

## 2017-11-06 PROCEDURE — 3E043XZ INTRODUCTION OF VASOPRESSOR INTO CENTRAL VEIN, PERCUTANEOUS APPROACH: ICD-10-PCS | Performed by: HOSPITALIST

## 2017-11-06 PROCEDURE — 25000132 ZZH RX MED GY IP 250 OP 250 PS 637: Mod: GY | Performed by: INTERNAL MEDICINE

## 2017-11-06 PROCEDURE — 80076 HEPATIC FUNCTION PANEL: CPT | Performed by: INTERNAL MEDICINE

## 2017-11-06 PROCEDURE — 82248 BILIRUBIN DIRECT: CPT | Performed by: INTERNAL MEDICINE

## 2017-11-06 PROCEDURE — 99221 1ST HOSP IP/OBS SF/LOW 40: CPT | Performed by: SURGERY

## 2017-11-06 PROCEDURE — 94003 VENT MGMT INPAT SUBQ DAY: CPT

## 2017-11-06 PROCEDURE — 86803 HEPATITIS C AB TEST: CPT | Performed by: INTERNAL MEDICINE

## 2017-11-06 PROCEDURE — 80053 COMPREHEN METABOLIC PANEL: CPT | Performed by: INTERNAL MEDICINE

## 2017-11-06 PROCEDURE — 86709 HEPATITIS A IGM ANTIBODY: CPT | Performed by: INTERNAL MEDICINE

## 2017-11-06 PROCEDURE — 36600 WITHDRAWAL OF ARTERIAL BLOOD: CPT

## 2017-11-06 PROCEDURE — 84484 ASSAY OF TROPONIN QUANT: CPT | Performed by: INTERNAL MEDICINE

## 2017-11-06 PROCEDURE — 40000264 ECHO LIMITED WITH LUMASON

## 2017-11-06 PROCEDURE — 76705 ECHO EXAM OF ABDOMEN: CPT

## 2017-11-06 PROCEDURE — 36556 INSERT NON-TUNNEL CV CATH: CPT

## 2017-11-06 PROCEDURE — 87641 MR-STAPH DNA AMP PROBE: CPT | Performed by: INTERNAL MEDICINE

## 2017-11-06 PROCEDURE — 84443 ASSAY THYROID STIM HORMONE: CPT | Performed by: INTERNAL MEDICINE

## 2017-11-06 PROCEDURE — 93321 DOPPLER ECHO F-UP/LMTD STD: CPT | Mod: 26 | Performed by: INTERNAL MEDICINE

## 2017-11-06 PROCEDURE — 25000128 H RX IP 250 OP 636: Performed by: EMERGENCY MEDICINE

## 2017-11-06 PROCEDURE — 36620 INSERTION CATHETER ARTERY: CPT | Performed by: INTERNAL MEDICINE

## 2017-11-06 PROCEDURE — S0028 INJECTION, FAMOTIDINE, 20 MG: HCPCS | Performed by: INTERNAL MEDICINE

## 2017-11-06 PROCEDURE — 87340 HEPATITIS B SURFACE AG IA: CPT | Performed by: INTERNAL MEDICINE

## 2017-11-06 PROCEDURE — 87640 STAPH A DNA AMP PROBE: CPT | Performed by: INTERNAL MEDICINE

## 2017-11-06 PROCEDURE — 83605 ASSAY OF LACTIC ACID: CPT | Performed by: EMERGENCY MEDICINE

## 2017-11-06 PROCEDURE — 25000128 H RX IP 250 OP 636: Performed by: ANESTHESIOLOGY

## 2017-11-06 PROCEDURE — 96367 TX/PROPH/DG ADDL SEQ IV INF: CPT

## 2017-11-06 PROCEDURE — 40000008 ZZH STATISTIC AIRWAY CARE

## 2017-11-06 PROCEDURE — 86705 HEP B CORE ANTIBODY IGM: CPT | Performed by: INTERNAL MEDICINE

## 2017-11-06 PROCEDURE — 83605 ASSAY OF LACTIC ACID: CPT | Performed by: INTERNAL MEDICINE

## 2017-11-06 PROCEDURE — 85025 COMPLETE CBC W/AUTO DIFF WBC: CPT | Performed by: INTERNAL MEDICINE

## 2017-11-06 PROCEDURE — 93308 TTE F-UP OR LMTD: CPT

## 2017-11-06 PROCEDURE — 20000003 ZZH R&B ICU

## 2017-11-06 PROCEDURE — 93308 TTE F-UP OR LMTD: CPT | Mod: 26 | Performed by: INTERNAL MEDICINE

## 2017-11-06 PROCEDURE — 25000125 ZZHC RX 250: Performed by: ANESTHESIOLOGY

## 2017-11-06 PROCEDURE — 99292 CRITICAL CARE ADDL 30 MIN: CPT | Performed by: INTERNAL MEDICINE

## 2017-11-06 PROCEDURE — 86706 HEP B SURFACE ANTIBODY: CPT | Performed by: INTERNAL MEDICINE

## 2017-11-06 PROCEDURE — 82140 ASSAY OF AMMONIA: CPT | Performed by: INTERNAL MEDICINE

## 2017-11-06 PROCEDURE — 83735 ASSAY OF MAGNESIUM: CPT | Performed by: INTERNAL MEDICINE

## 2017-11-06 PROCEDURE — 96365 THER/PROPH/DIAG IV INF INIT: CPT

## 2017-11-06 PROCEDURE — 96375 TX/PRO/DX INJ NEW DRUG ADDON: CPT

## 2017-11-06 PROCEDURE — 93325 DOPPLER ECHO COLOR FLOW MAPG: CPT | Mod: 26 | Performed by: INTERNAL MEDICINE

## 2017-11-06 PROCEDURE — 00000146 ZZHCL STATISTIC GLUCOSE BY METER IP

## 2017-11-06 RX ORDER — MAGNESIUM SULFATE HEPTAHYDRATE 40 MG/ML
4 INJECTION, SOLUTION INTRAVENOUS EVERY 4 HOURS PRN
Status: DISCONTINUED | OUTPATIENT
Start: 2017-11-06 | End: 2017-11-13 | Stop reason: HOSPADM

## 2017-11-06 RX ORDER — BISACODYL 5 MG
10 TABLET, DELAYED RELEASE (ENTERIC COATED) ORAL DAILY PRN
Status: DISCONTINUED | OUTPATIENT
Start: 2017-11-06 | End: 2017-11-13 | Stop reason: HOSPADM

## 2017-11-06 RX ORDER — FENTANYL CITRATE 50 UG/ML
50 INJECTION, SOLUTION INTRAMUSCULAR; INTRAVENOUS ONCE
Status: COMPLETED | OUTPATIENT
Start: 2017-11-06 | End: 2017-11-06

## 2017-11-06 RX ORDER — PHYTONADIONE 1 MG/.5ML
1 INJECTION, EMULSION INTRAMUSCULAR; INTRAVENOUS; SUBCUTANEOUS ONCE
Status: DISCONTINUED | OUTPATIENT
Start: 2017-11-06 | End: 2017-11-06

## 2017-11-06 RX ORDER — POTASSIUM CHLORIDE 1.5 G/1.58G
20-40 POWDER, FOR SOLUTION ORAL
Status: DISCONTINUED | OUTPATIENT
Start: 2017-11-06 | End: 2017-11-13 | Stop reason: HOSPADM

## 2017-11-06 RX ORDER — SODIUM CHLORIDE, SODIUM LACTATE, POTASSIUM CHLORIDE, CALCIUM CHLORIDE 600; 310; 30; 20 MG/100ML; MG/100ML; MG/100ML; MG/100ML
1000 INJECTION, SOLUTION INTRAVENOUS CONTINUOUS
Status: DISCONTINUED | OUTPATIENT
Start: 2017-11-06 | End: 2017-11-06

## 2017-11-06 RX ORDER — FUROSEMIDE 10 MG/ML
40 INJECTION INTRAMUSCULAR; INTRAVENOUS
Status: DISCONTINUED | OUTPATIENT
Start: 2017-11-06 | End: 2017-11-10

## 2017-11-06 RX ORDER — FENTANYL CITRATE 50 UG/ML
INJECTION, SOLUTION INTRAMUSCULAR; INTRAVENOUS
Status: COMPLETED
Start: 2017-11-06 | End: 2017-11-06

## 2017-11-06 RX ORDER — POTASSIUM CHLORIDE 7.45 MG/ML
10 INJECTION INTRAVENOUS
Status: DISCONTINUED | OUTPATIENT
Start: 2017-11-06 | End: 2017-11-13 | Stop reason: HOSPADM

## 2017-11-06 RX ORDER — FENTANYL CITRATE 50 UG/ML
25 INJECTION, SOLUTION INTRAMUSCULAR; INTRAVENOUS EVERY 5 MIN PRN
Status: DISPENSED | OUTPATIENT
Start: 2017-11-06 | End: 2017-11-07

## 2017-11-06 RX ORDER — HYDROMORPHONE HYDROCHLORIDE 1 MG/ML
.3-.5 INJECTION, SOLUTION INTRAMUSCULAR; INTRAVENOUS; SUBCUTANEOUS
Status: DISCONTINUED | OUTPATIENT
Start: 2017-11-06 | End: 2017-11-13 | Stop reason: HOSPADM

## 2017-11-06 RX ORDER — PROPOFOL 10 MG/ML
5-75 INJECTION, EMULSION INTRAVENOUS CONTINUOUS
Status: DISCONTINUED | OUTPATIENT
Start: 2017-11-06 | End: 2017-11-06

## 2017-11-06 RX ORDER — DOBUTAMINE HCL IN DEXTROSE 5 % 500MG/250
2.5-2 INTRAVENOUS SOLUTION INTRAVENOUS CONTINUOUS
Status: DISCONTINUED | OUTPATIENT
Start: 2017-11-06 | End: 2017-11-08

## 2017-11-06 RX ORDER — BISACODYL 5 MG
5 TABLET, DELAYED RELEASE (ENTERIC COATED) ORAL DAILY PRN
Status: DISCONTINUED | OUTPATIENT
Start: 2017-11-06 | End: 2017-11-13 | Stop reason: HOSPADM

## 2017-11-06 RX ORDER — BISACODYL 5 MG
15 TABLET, DELAYED RELEASE (ENTERIC COATED) ORAL DAILY PRN
Status: DISCONTINUED | OUTPATIENT
Start: 2017-11-06 | End: 2017-11-13 | Stop reason: HOSPADM

## 2017-11-06 RX ORDER — POTASSIUM CHLORIDE 1500 MG/1
20-40 TABLET, EXTENDED RELEASE ORAL
Status: DISCONTINUED | OUTPATIENT
Start: 2017-11-06 | End: 2017-11-13 | Stop reason: HOSPADM

## 2017-11-06 RX ORDER — POTASSIUM CHLORIDE 29.8 MG/ML
20 INJECTION INTRAVENOUS
Status: DISCONTINUED | OUTPATIENT
Start: 2017-11-06 | End: 2017-11-13 | Stop reason: HOSPADM

## 2017-11-06 RX ORDER — NALOXONE HYDROCHLORIDE 0.4 MG/ML
.1-.4 INJECTION, SOLUTION INTRAMUSCULAR; INTRAVENOUS; SUBCUTANEOUS
Status: DISCONTINUED | OUTPATIENT
Start: 2017-11-06 | End: 2017-11-13 | Stop reason: HOSPADM

## 2017-11-06 RX ORDER — FENTANYL CITRATE 50 UG/ML
50 INJECTION, SOLUTION INTRAMUSCULAR; INTRAVENOUS
Status: DISCONTINUED | OUTPATIENT
Start: 2017-11-06 | End: 2017-11-06

## 2017-11-06 RX ORDER — MIDAZOLAM (PF) 1 MG/ML IN 0.9 % SODIUM CHLORIDE INTRAVENOUS SOLUTION
1-8 CONTINUOUS
Status: DISCONTINUED | OUTPATIENT
Start: 2017-11-06 | End: 2017-11-06

## 2017-11-06 RX ORDER — POTASSIUM CL/LIDO/0.9 % NACL 10MEQ/0.1L
10 INTRAVENOUS SOLUTION, PIGGYBACK (ML) INTRAVENOUS
Status: DISCONTINUED | OUTPATIENT
Start: 2017-11-06 | End: 2017-11-13 | Stop reason: HOSPADM

## 2017-11-06 RX ORDER — PROPOFOL 10 MG/ML
5-75 INJECTION, EMULSION INTRAVENOUS CONTINUOUS
Status: DISCONTINUED | OUTPATIENT
Start: 2017-11-06 | End: 2017-11-07

## 2017-11-06 RX ORDER — FENTANYL CITRATE 50 UG/ML
25-50 INJECTION, SOLUTION INTRAMUSCULAR; INTRAVENOUS
Status: COMPLETED | OUTPATIENT
Start: 2017-11-06 | End: 2017-11-06

## 2017-11-06 RX ORDER — NALOXONE HYDROCHLORIDE 0.4 MG/ML
.1-.4 INJECTION, SOLUTION INTRAMUSCULAR; INTRAVENOUS; SUBCUTANEOUS
Status: DISCONTINUED | OUTPATIENT
Start: 2017-11-06 | End: 2017-11-07

## 2017-11-06 RX ORDER — WARFARIN SODIUM 2.5 MG/1
TABLET ORAL
COMMUNITY
End: 2020-09-01

## 2017-11-06 RX ORDER — CHLORHEXIDINE GLUCONATE ORAL RINSE 1.2 MG/ML
15 SOLUTION DENTAL EVERY 12 HOURS
Status: DISCONTINUED | OUTPATIENT
Start: 2017-11-06 | End: 2017-11-08

## 2017-11-06 RX ORDER — DOBUTAMINE HYDROCHLORIDE 200 MG/100ML
2.5-2 INJECTION INTRAVENOUS CONTINUOUS
Status: DISCONTINUED | OUTPATIENT
Start: 2017-11-06 | End: 2017-11-06

## 2017-11-06 RX ADMIN — AMIODARONE HYDROCHLORIDE 1 MG/MIN: 50 INJECTION, SOLUTION INTRAVENOUS at 05:42

## 2017-11-06 RX ADMIN — FENTANYL CITRATE 50 MCG: 50 INJECTION, SOLUTION INTRAMUSCULAR; INTRAVENOUS at 17:35

## 2017-11-06 RX ADMIN — FAMOTIDINE 20 MG: 10 INJECTION, SOLUTION INTRAVENOUS at 16:01

## 2017-11-06 RX ADMIN — CHLORHEXIDINE GLUCONATE 15 ML: 1.2 RINSE ORAL at 08:04

## 2017-11-06 RX ADMIN — AMIODARONE HYDROCHLORIDE 1 MG/MIN: 50 INJECTION, SOLUTION INTRAVENOUS at 09:51

## 2017-11-06 RX ADMIN — SODIUM CHLORIDE, POTASSIUM CHLORIDE, SODIUM LACTATE AND CALCIUM CHLORIDE 1000 ML: 600; 310; 30; 20 INJECTION, SOLUTION INTRAVENOUS at 03:17

## 2017-11-06 RX ADMIN — HYDROMORPHONE HYDROCHLORIDE 0.5 MG: 1 INJECTION, SOLUTION INTRAMUSCULAR; INTRAVENOUS; SUBCUTANEOUS at 11:31

## 2017-11-06 RX ADMIN — FENTANYL CITRATE 50 MCG: 50 INJECTION, SOLUTION INTRAMUSCULAR; INTRAVENOUS at 02:26

## 2017-11-06 RX ADMIN — ACETYLCYSTEINE 9.08 G: 200 INJECTION, SOLUTION INTRAVENOUS at 19:54

## 2017-11-06 RX ADMIN — VANCOMYCIN HYDROCHLORIDE 1750 MG: 5 INJECTION, POWDER, LYOPHILIZED, FOR SOLUTION INTRAVENOUS at 03:08

## 2017-11-06 RX ADMIN — CHLORHEXIDINE GLUCONATE 15 ML: 1.2 RINSE ORAL at 20:39

## 2017-11-06 RX ADMIN — AMIODARONE HYDROCHLORIDE 0.5 MG/MIN: 50 INJECTION, SOLUTION INTRAVENOUS at 19:44

## 2017-11-06 RX ADMIN — HYDROMORPHONE HYDROCHLORIDE 0.5 MG: 1 INJECTION, SOLUTION INTRAMUSCULAR; INTRAVENOUS; SUBCUTANEOUS at 22:11

## 2017-11-06 RX ADMIN — MIDAZOLAM HYDROCHLORIDE 4 MG: 1 INJECTION, SOLUTION INTRAMUSCULAR; INTRAVENOUS at 18:08

## 2017-11-06 RX ADMIN — TAZOBACTAM SODIUM AND PIPERACILLIN SODIUM 4.5 G: 500; 4 INJECTION, SOLUTION INTRAVENOUS at 22:11

## 2017-11-06 RX ADMIN — SODIUM CHLORIDE, POTASSIUM CHLORIDE, SODIUM LACTATE AND CALCIUM CHLORIDE: 600; 310; 30; 20 INJECTION, SOLUTION INTRAVENOUS at 02:37

## 2017-11-06 RX ADMIN — ACETYLCYSTEINE 13.62 G: 200 INJECTION, SOLUTION INTRAVENOUS at 10:02

## 2017-11-06 RX ADMIN — FUROSEMIDE 40 MG: 10 INJECTION, SOLUTION INTRAVENOUS at 16:01

## 2017-11-06 RX ADMIN — TAZOBACTAM SODIUM AND PIPERACILLIN SODIUM 4.5 G: 500; 4 INJECTION, SOLUTION INTRAVENOUS at 11:31

## 2017-11-06 RX ADMIN — PROPOFOL 5 MCG/KG/MIN: 10 INJECTION, EMULSION INTRAVENOUS at 01:20

## 2017-11-06 RX ADMIN — FAMOTIDINE 20 MG: 10 INJECTION, SOLUTION INTRAVENOUS at 05:38

## 2017-11-06 RX ADMIN — NOREPINEPHRINE BITARTRATE 0.1 MCG/KG/MIN: 1 INJECTION INTRAVENOUS at 05:30

## 2017-11-06 RX ADMIN — TAZOBACTAM SODIUM AND PIPERACILLIN SODIUM 4.5 G: 500; 4 INJECTION, SOLUTION INTRAVENOUS at 16:16

## 2017-11-06 RX ADMIN — VANCOMYCIN HYDROCHLORIDE 1750 MG: 5 INJECTION, POWDER, LYOPHILIZED, FOR SOLUTION INTRAVENOUS at 15:02

## 2017-11-06 RX ADMIN — DOBUTAMINE IN DEXTROSE 2.5 MCG/KG/MIN: 200 INJECTION, SOLUTION INTRAVENOUS at 06:13

## 2017-11-06 RX ADMIN — DEXMEDETOMIDINE 0.7 MCG/KG/HR: 100 INJECTION, SOLUTION, CONCENTRATE INTRAVENOUS at 16:45

## 2017-11-06 RX ADMIN — MIDAZOLAM HYDROCHLORIDE 2 MG: 1 INJECTION, SOLUTION INTRAMUSCULAR; INTRAVENOUS at 12:00

## 2017-11-06 RX ADMIN — FENTANYL CITRATE 25 MCG/HR: 50 INJECTION, SOLUTION INTRAMUSCULAR; INTRAVENOUS at 13:58

## 2017-11-06 RX ADMIN — LEVOFLOXACIN 750 MG: 5 INJECTION, SOLUTION INTRAVENOUS at 01:29

## 2017-11-06 RX ADMIN — DEXMEDETOMIDINE 0.2 MCG/KG/HR: 100 INJECTION, SOLUTION, CONCENTRATE INTRAVENOUS at 10:10

## 2017-11-06 RX ADMIN — AMIODARONE HYDROCHLORIDE 150 MG: 1.5 INJECTION, SOLUTION INTRAVENOUS at 04:58

## 2017-11-06 RX ADMIN — CALCIUM GLUCONATE 1 G: 94 INJECTION, SOLUTION INTRAVENOUS at 06:26

## 2017-11-06 RX ADMIN — CALCIUM GLUCONATE 1 G: 94 INJECTION, SOLUTION INTRAVENOUS at 12:48

## 2017-11-06 RX ADMIN — ACETYLCYSTEINE 4.54 G: 200 INJECTION, SOLUTION INTRAVENOUS at 11:10

## 2017-11-06 RX ADMIN — TAZOBACTAM SODIUM AND PIPERACILLIN SODIUM 4.5 G: 500; 4 INJECTION, SOLUTION INTRAVENOUS at 00:39

## 2017-11-06 RX ADMIN — DEXMEDETOMIDINE 0.7 MCG/KG/HR: 100 INJECTION, SOLUTION, CONCENTRATE INTRAVENOUS at 22:00

## 2017-11-06 RX ADMIN — PHYTONADIONE 10 MG: 10 INJECTION, EMULSION INTRAMUSCULAR; INTRAVENOUS; SUBCUTANEOUS at 02:59

## 2017-11-06 RX ADMIN — SULFUR HEXAFLUORIDE 2 ML: KIT at 09:35

## 2017-11-06 RX ADMIN — Medication 1 MG/HR: at 03:15

## 2017-11-06 RX ADMIN — MIDAZOLAM HYDROCHLORIDE 2 MG: 1 INJECTION, SOLUTION INTRAMUSCULAR; INTRAVENOUS at 16:57

## 2017-11-06 NOTE — ED NOTES
0034 Dr Notified only 1 IV access at this time. Unsuccessful obtaining another line after US attempts.  Bedside. Preparing for CL placement

## 2017-11-06 NOTE — ED PROVIDER NOTES
History     Chief Complaint:  Shortness of Breath    HPI   The patient is unable to provide history secondary to being intubated, so history is provided by EMS personnel.     Em Richmond is a 77 year old female with a history of Afib on Warfarin who presents via EMS for shortness of breath. EMS reports that the patient was found alert and able to speak and walk; the patient reported having difficulty breathing for the past few days but that it had worsened significantly tonight. EMS states that they found no lung sounds and that her initial vitals were stable except for tachycardia around 160's and oxygen saturation levels in the 70's. EMS reports that the patient's pressures began dropping 5 minutes prior to arrival and she then consented to intubation. The patient was admitted from 10/20-10/25 for Afib with RVR and CHF. At this time, an echocardiogram was performed which revealed an EF of 30-35% and pulmonary hypertension. An angiogram obtained on the same visit demonstrated moderate disease, no stentable lesions.    The patient's family states that she has not been in great health since her hospital discharge on 10/25. She tracks her heart rate via a Fitbit and the family states that her heart rate has been variable and that she has experienced fatigue on ambulation. Since discharge, she had followed up with her primary physician as well. They also deny any chest pain, fever, or vomiting recently.    Allergies:  Sulfa Drugs     Medications:    Lipitor  Prinivil/Zestril  Toprol  Lasix  Warfarin  Antivert  Paroxetine  Omeprazole     Past Medical History:    Afib  Hypertension    Past Surgical History:    History reviewed. No pertinent surgical history.    Family History:    History review. No contributing family history.    Social History:   PCP: Arielle Leger   Patient presents with family  Marital Status:       Review of Systems   Unable to perform ROS: Intubated       Physical Exam      Patient Vitals for the past 24 hrs:   BP Temp Temp src Pulse Heart Rate Resp SpO2 Weight   11/06/17 0413 - - - - - - 98 % -   11/06/17 0400 91/65 96.8  F (36  C) Bladder - 138 15 98 % 90.8 kg (200 lb 2.8 oz)   11/06/17 0329 109/68 - - 132 - 23 97 % -   11/06/17 0319 (!) 116/93 96.8  F (36  C) - - 140 22 99 % -   11/06/17 0315 - 96.8  F (36  C) - - 126 21 100 % -   11/06/17 0312 (!) 122/99 96.8  F (36  C) - - 125 22 99 % -   11/06/17 0310 96/75 96.8  F (36  C) - - 126 22 99 % -   11/06/17 0300 108/81 97  F (36.1  C) - - 120 26 100 % -   11/06/17 0230 98/77 97.3  F (36.3  C) - - 138 14 - -   11/06/17 0226 - 97.3  F (36.3  C) - - 137 13 98 % -   11/06/17 0219 (!) 108/94 97.3  F (36.3  C) - - 128 16 99 % -   11/06/17 0215 - 97.3  F (36.3  C) - - 130 14 100 % -   11/06/17 0211 97/77 97.3  F (36.3  C) - - 121 12 100 % -   11/06/17 0210 90/47 97.3  F (36.3  C) - - 134 14 99 % -   11/06/17 0200 111/59 97.3  F (36.3  C) - - 115 14 - -   11/06/17 0140 112/60 96.6  F (35.9  C) - - - 13 98 % -   11/06/17 0130 124/77 96.1  F (35.6  C) - - 151 14 98 % -   11/06/17 0120 (!) 121/99 95.9  F (35.5  C) - - 140 13 98 % -   11/06/17 0110 (!) 135/92 97  F (36.1  C) - - 134 14 99 % -   11/06/17 0100 135/82 97.9  F (36.6  C) - - 114 14 - -   11/06/17 0050 (!) 126/95 97.7  F (36.5  C) - - 114 13 - -   11/06/17 0046 - 97.5  F (36.4  C) - - 105 13 98 % -   11/06/17 0042 110/90 97.5  F (36.4  C) - - 120 18 98 % -   11/06/17 0035 - 97.3  F (36.3  C) - - 108 14 99 % -   11/06/17 0030 - 97  F (36.1  C) - - 110 14 98 % -   11/06/17 0029 105/90 97  F (36.1  C) - - 105 14 98 % -   11/06/17 0020 96/72 95.5  F (35.3  C) - - 112 13 97 % -   11/06/17 0010 (!) 124/114 - - - 115 14 97 % -   11/06/17 0008 104/53 - - - 128 12 98 % -   11/06/17 0003 (!) 130/121 - - - 151 (!) 70 (!) 78 % -   11/05/17 2322 - - - - - - - 88.2 kg (194 lb 7.1 oz)   11/05/17 2318 97/81 - - - 120 14 98 % -   11/05/17 2315 - - - - 128 12 98 % -   11/05/17 2314 - - - 137 - - 97 %  -       Physical Exam    Physical Exam   Constitutional:  Patient is ill appearing, presenting intubated and sedated  HENT:   Mouth/Throat:   Endotracheal tube is a 6.5 seen through vocal cords through a glidoscope.  Eyes:    Pupils are 4 mm, the right cornea is slightly cloudy.  Neck:    Normal range of motion.   Cardiovascular: Tachycardic, irregular rhythm and normal heart sounds.  Exam reveals no gallop and no friction rub.  No murmur heard.  Pulmonary/Chest:  Patient is currently being bagged with her endotracheal tube. She has diminished breath sounds bilaterally. Not difficult to bag.  Abdominal:   Soft.   Musculoskeletal:  No pedal edema  Neurological:   Patient is intubated. GCS 3.  Skin:   Skin is warm and dry. No rash noted. No erythema.   Psychiatric:   Unable to assess        Emergency Department Course   ECG (23:12):  Rate 131 bpm. SD interval *. QT/QTc 310/457.   Atrial flutter with variable AV block. Abnormal QRST angle, consider primary T wave abnormality. Abnormal ECG.  Interpreted at 2313 by Cami Bucio MD.     Imaging:  Radiographic findings were communicated with the family who voiced understanding of the findings.    X-ray Chest, 1 view portable:  1. An endotracheal tube is present with distal tube tip projecting  over the mid trachea.  2. An enteric drainage tube is present with distal tip at least to the  gastric body.  3. Mild hazy and interstitial opacities in the central aspect of both  lungs. These are nonspecific, but most likely relate to pulmonary  edema. An infectious or inflammatory process could have a similar  appearance.   As read by Radiology.    Abdomen US, limited  1. 3 cm gallstone within a nondistended, moderately thick-walled and  edematous gallbladder. Additionally, the ultrasound technologist  reports that the patient has focal tenderness over the gallbladder.  These findings are not convincing for acute cholecystitis, but it  cannot be excluded. If there is a  clinical concern for acute  cholecystitis, a HIDA scan could be considered for further evaluation.  2. Mild dilatation of the common duct, which measures 0.9 cm in  diameter. No intrahepatic biliary dilatation.  3. A trace amount of perihepatic free fluid.  As read per Radiology    Chest CT, IV contrast only  1. Mild groundglass opacities and interstitial thickening in the upper  and mid lungs bilaterally. This is nonspecific, but could relate to  interstitial pulmonary edema.  2. Very small right pleural effusion.  3. Several nonspecific mildly enlarged mediastinal lymph nodes.  4. Moderate cardiomegaly.  5. No visualized pulmonary embolus.  As read per radiology.    Laboratory:  2315: Lactic acid: 10.5 (HH)  0214: Lactic acid: 6.7 (HH)  CBC: WBC 11.8 (H), o/w WNL (HGB 13.5,  )  INR: 3.72 (H)  Partial thromboplastin: 47 (H)  CMP:  (L), Carbon Dioxide 15 (L), Anion Gap 18 (H), Glucose 122 (H), Creatinine 1.20 (H), GFR 44 (L), Calcium 8.3 (L), Bilirubin 1.8 (H), Albumin 2.9 (L), Alkphos 405 (H),  (HH), AST 1480 (HH), o/w WNL  Troponin: <0.015  Nt pro BNP inpatient: 09303 (H)  Blood Culture x2: Pending  Influenza A/B antigen: Negative  Lipase: 455 (H)  Blood Gas Arterial:     Recent Labs  Lab 11/06/17  0049   PH 7.16*   PCO2 44   PO2 213*   HCO3 16*   O2PER 100         Procedures:    Central Line Placement     Procedure:  Central Line Placement with Ultrasound Guidance.    Indications: Vascular access    Consent:  Risks (including but not limited to: pneumothorax,bleeding, infection or artery puncture), benefits and alternatives were discussed with  unable to obtain due to emergency conditions and consent for procedure was obtained.    Timeout:  Universal protocol was followed. TIME OUT conducted just prior to starting procedure confirmed patient identity, site/side, procedure, patient position, and availability of correct equipment and implants.?  Yes    Procedure note:  Right Femoral and the  right femoral area was prepped, cleansed and draped in a sterile fashion.  Mask, gown and gloves were used per sterile protocol.  Patient was then placed into Trendelenburg position and lidocaine was used for local anesthesia.  Vascular probe with ultrasound was used in a sterile fashion for guidence.  Introducer needle was then used to gain access to the central venous circulation.  Using Seldinger technique the  Triple lumen catheter was placed.  Catheter port(s) were aspirated and flushed.  Central line was sutured in place and sterile dressing applied.      Patient Status:  Patient tolerated the procedure well.  There were no complications.      Interventions:  2348: Lactated ringers Bolus 2646 mL IV  0039: Zosyn 4.5 g IV  0120 Propofol 5 mcg IV gtt  0129: Levaquin 750 mg IV  0226: Fentanyl 50 mcg IV  0054: Vancomycin 1750 mg IV: infusing on transfer  Vitamin K 10 mg IV infusion    Emergency Department Course:  Past medical records, nursing notes, and vitals reviewed.  2308: I performed an exam of the patient and obtained history, as documented above. Intubated.  IV inserted and blood drawn.  2308: The patient arrived via EMS already intubated with bags 1 & 2 of saline already hung  2311 I verified the placement of the tube using glidescope ultrasound. Imaging was called and a portable bedside xray was ordered  2313 EKG was read and interpreted.  2313 Nurses initiated bedside US to find locate veins for IV access.  2316: Blood was drawn and taken to lab  2320: Automatic ventilation machine engaged.  2324: Bedside portable xray was obtained  2326: Xrays were reviewed, again tube placement was verified  2329 attempting IV access due to prior access in the wrist from EMS being lost  2331 Another bedside ultrasound was initiated to locate veins for access  2336 CT called and notified of incoming patient  2349 I rechecked the patient  0056 Lab results were called in and sepsis protocol was activated due to elevated  lactic and recent hospitalization, concern for empiric treatment of HCAP. POrtable CXR is somewhat suggestive of infiltrate  0105 Central line procedure initiated due to repeatedly lost peripheral lines  0120 US post procedure ordered  0227 Radiology was called as there was no report for CT scan of chest. Spoke with Dr. Doyle to obtain verbal results.  0237: I discussed the patient with Dr. Terrazas, intensivist   0300: I rechecked the patient and updated the family  Findings and plan explained to the spouse and daughter who consents to admission.     0255: Discussed the patient with Dr. Terrazas, who will admit the patient to a medical bed for further monitoring, evaluation, and treatment.     Impression & Plan      CMS Diagnoses:  The patient has signs of Septic Shock as evidenced by:    1. Presence of Sepsis, AND  2. Lactic Acid level >4    Time sepsis diagnosis confirmed = 2359 as this was the time whenLactate was resulted and the level was >4      3 Hour Septic Shock Bundle Completion:  1. Initial Lactic Acid Result:   Recent Labs   Lab Test  11/06/17   0214  11/05/17   2315  10/23/17   1723   LACT  6.7*  10.5*  0.5*     2. Blood Cultures before Antibiotics: Yes  3. Broad Spectrum Antibiotics Administered: Yes     Anti-infectives     None        4. 2646 ml of IV fluids.      6 Hour Severe Sepsis Bundle Completion:  1. Repeat Lactic Acid Level: 6.7  2. MAP>65 after initial IVF bolus, will continue to monitor fluid status and vital signs  I attest to having performed a repeat sepsis exam and assessment of perfusion at 0214 and the results demonstrate improved perfusion.    Medical Decision Making:  Em Richmond is a 77 year old female being brought in for respiratory distress with respiratory failure. She was intubated in the field by EMS and so presents intubated with Afib with RVR. History is somewhat limited by the medics as well as limited by the family. They state that she has been doing ok since discharge from  hospital but has not returned to her baseline activity. They don't think she has had any fever or cough, although they do not have a thermometer at home. She has not complained of any chest pain, any emesis, or diarrhea.     Upon arrival, the patient's endotracheal tube was verified in place via glideoscope. EKG was performed which shows atrial flutter atrial fibrillation with RVR. She had an IO in place because she had difficult IV access by medics. Multiple nurses in the ED tried multiple IV's; however, they infiltrated and were found to be nonfunctional so ultimately a central line was placed for vascular access.     Sepsis protocol was initiated when the lactic acid came back extremely elevated. Because she has been in the hospital over the last 90 days for multiple days, I treated her for hospital acquired pneumonia. Additionally, the chest xray which was portable was obtained to verify tube placement also showed pulmonary edema versus infiltrates, also supporting the empiric treatment for hospital acquired pneumonia/severe sepsis. As the blood work came back, it was found that she had a mild leukocytosis, her cardiac enzyme was normal; however, her BNP was significantly elevated. ABG's showed that she is metabolic acidosis. Her RR was increased to help compensate. She has acute renal failure. Her liver function, lipase, ALKPHOS, bilirubin were all elevated. She therefore in addition to her CT scan of her chest, had an ultrasound of her abdomen. The CT did not show any convincing evidence of infection although it did support CHF as well as cardiomegaly, no pneumothorax, dissection, or PE. Ultrasound did show a large gall stone with mild surrounding edema but the gall bladder was read as normal width without sludge. THese derangements may be due to Acute CHF exacerbation vs hepatobiliary issues.    At this time, the patient's status has been stabilized, although she is receiving significant fluid for sepsis  protocol. We also know that she has poor EF, CHF; however, due to her significantly elevated lactic and acute kidney injury, I believe IV fluids at this time in bolus form is necessary. She did start waking up and for tube tolerance was initially place on Propofol but her blood pressure was very sensitive to this. As soon as the propofol was started at very low dose, her blood pressure would drop to 90 systolic. As soon as the Propofol was stopped, her pressure would rebound to 105-110 systolic. I did try small boluses of Fentanyl for tube tolerance.    At this time, the ultrasound shows findings consistent with cholelithiasis but not convincing for cholecystitis. She is not currently stable enough for ERCP or any surgical intervention. Zosyn should cool things down. Her second lactic acid was performed, which did show improvement but still severely elevated. I am going to reverse her INR as if she requires any further intervention for the gall bladder she will need this to be closer to normal. At this time I will admit to Dr. Terrazas of the intensivist service in ICU. Again, in proceeding to treat for septic shock but suspect her symptoms are multifactorial with CHF being a large component as well.  Critical Care time:  was 100 minutes for this patient excluding procedures.    Diagnosis:    ICD-10-CM    1. Acute respiratory failure with hypoxia (H) J96.01 Blood culture     Blood culture     Glucose by meter     Glucose by meter   2. Calculus of gallbladder without cholecystitis without obstruction K80.20    3. Acidosis E87.2    4. Congestive heart failure, unspecified congestive heart failure chronicity, unspecified congestive heart failure type (H) I50.9    5.     Septic shock the the setting of possible HCAP    Disposition:  Admitted to [facility]  11/5/2017    EMERGENCY DEPARTMENT  Humera KAMINSKI am serving as a scribe at 11:12 PM on 11/5/2017 to document services personally performed by Fortunato  Cami Collins MD based on my observations and the provider's statements to me.         Cami Bucio MD  11/09/17 0900

## 2017-11-06 NOTE — ED NOTES
Bed: [facility]  Expected date:   Expected time:   Means of arrival:   Comments:  HE 78F Intubated SOB

## 2017-11-06 NOTE — PROCEDURES
"Procedure/Surgery Information   [facility]    Bedside Procedure Note  Date of Service (when I performed the procedure): 11/06/2017    Em Richmond is a 77 year old female patient.  1. Acute respiratory failure with hypoxia (H)    2. Calculus of gallbladder without cholecystitis without obstruction    3. Acidosis    4. Congestive heart failure, unspecified congestive heart failure chronicity, unspecified congestive heart failure type (H)      Past Medical History:   Diagnosis Date     Atrial fibrillation (H)      Temp: 96.8  F (36  C) Temp src: Bladder BP: 91/65 Pulse: 132 Heart Rate: 138 Resp: 15 SpO2: 98 % O2 Device: Mechanical Ventilator      Insert arterial line  Date/Time: 11/6/2017 5:08 AM  Performed by: GRACE ARRIOLA  Authorized by: GRACE ARRIOLA   Consent: Verbal consent obtained. Written consent obtained.  Consent given by: guardian  Procedure consent: procedure consent matches procedure scheduled  Relevant documents: relevant documents present and verified  Test results: test results available and properly labeled  Site marked: the operative site was marked  Imaging studies: imaging studies available  Required items: required blood products, implants, devices, and special equipment available  Patient identity confirmed: arm band, provided demographic data and hospital-assigned identification number  Time out: Immediately prior to procedure a \"time out\" was called to verify the correct patient, procedure, equipment, support staff and site/side marked as required.  Preparation: Patient was prepped and draped in the usual sterile fashion.  Indications: hemodynamic monitoring  Location: left radial  Anesthesia: local infiltration  Osmany's test normal: yes  Needle gauge: 20  Seldinger technique: Seldinger technique used  Number of attempts: 1  Post-procedure: line sutured and dressing applied  Post-procedure CMS: normal  Patient tolerance: Patient tolerated the procedure well with no " immediate complications           Santi Terrazas

## 2017-11-06 NOTE — PROGRESS NOTES
Critical Care Progress Note      11/06/2017    Name: Em Richmond MRN#: [id number]   Age: 77 year old YOB: 1940     [facility]    [facility] DAY #1             Problem List:   Active Problems:    Acute respiratory failure with hypoxemia (H)  Cardiogenic shock  Acute transaminitis  Elevated INR (on coumadin)  Lactic acidosis         Summary/Hospital Course:     77F with known systolic heart failure, apparently had episode of abd pain, nausea, vomiting yesterday along with progressively worsened SOB.  Found to be hypoxemic by EMS and intubated in field.  Noted to have very elevated lactate here, transaminitis, extremities cold on arrival.  Initiated on dobutamine for cardiogenic shock.  Abd ultrasound in ED showed equivocally dilated cbd, and edematous gallbladder.       Assessment and plan :     Em Richmond IS a 77 year old female admitted on 11/5/2017 for respiratory failure and shock.   I have personally reviewed the daily labs, imaging studies, cultures and discussed the case with referring physician and consulting physicians.     My assessment and plan by system for this patient is as follows:    Neurology/Psychiatry:   1. Sedation:  Weaning midazolam drip over to precedex now that hemodynamics have somewhat improved.  Prn dilaudid.    Cardiovascular:   1.  Shock:  Suspect cardiogenic given cold extremities on arrival and known heart failure.   Extremities now more warm.  Blood pressure and UOP much improved on dobutamine.  Weaning levophed. Repeating echo today.  Appreciate cardiology input.  Lactate improving.  Weaning dobutamine down to 2.5.  2.  A fib:  Rate 110s to 120s on my visit.  Continue amiodarone drip.  Also started precedex for sedation, which may also have rate effect.    Pulmonary/Ventilator Management:   1. Vent-dependent acute hypoxemia:  Improving this AM with positive pressure ventilation.  Suspect pulmonary edema may be culprit.  Not ready for weaning  today.    GI and Nutrition :   1. Transaminitis:  Suspect shock liver and congestive hepatopathy.  Transaminitidies acutely up this AM, but bilirubin, INR, lactate all stable to slightly improved.  Performing MRCP today as below.  NAC started by GI--appreciate their input.  2.  GB edema and borderline CBD dilation:  MRCP today--perc laina drain if cholangitis confirmed.  3. Protein malnutrition:  Serum albumin 2.1.  Initiate TF if cleared for cholangitis/cholecytitis.    Renal/Fluids/Electrolytes:   1. Lactic acidosis:  Improving.  Most recent abg with compensated acute metabolic acidosis.  Lactate improving with perfusion support.  2.  Creatinine and UOP acceptable.  Monitor.  3.  HypoNa to 131.  Monitor for now.  Avoid hypotonic fluids.    Infectious Disease:   1.  Possible sepsis:  On vanc/zosyn for now.  Continue until cultures return.  Evaluating possible GI source as above.  WBC elevated but improving.  Procal marginal.    Endocrine:   1. Sugars adequately controlled.  CTM.  Insulin vs dextrose prn.    Hematology/Oncology:   1. Elevated INR:  Likely due to coumadin combined with hepatopathy.  Got vitamin K overnight now with some improvement in INR.  No active bleeding.  Reverse if active bleeding or for procedure.     IV/Access:   1. Venous access - fem TLC from ED on 11/6/17  2. Arterial access -  A line  Plan  - central access required and necessary      ICU Prophylaxis:   1. DVT: [facility].  Start chemoprophy if no procedures required.  2. VAP: HOB 30 degrees, chlorhexidine rinse  3. Stress Ulcer: H2 blocker  4. Restraints: Nonviolent soft two point restraints required and necessary for patient safety and continued cares and good effect as patient continues to pull at necessary lines, tubes despite education and distraction. Will readdress daily.   5. Wound care - per unit routine   6. Feeding -  TF once cleared of procedures  7. Family Update:  D/w daughter by phone this AM.  All questions asked/answered.  8.  Disposition - critically ill.         Key goals for next 24 hours:   1.  MRCP  2.  Echo  3.  Continue dobutamine  4.  Cardiology evaluation               Interim History:     Looking better this AM.  Most lab indices improving at this point, only transiminitis worsening.  Limbs warmer--seems to be perfusing better. Awake and alert when sedation weaned--interactive.         Key Medications:       chlorhexidine  15 mL Mouth/Throat Q12H     famotidine  20 mg Intravenous Q12H     acetylcysteine (ACEDOTE) infusion *loading dose*  0.15 g/kg Intravenous Once       norepinephrine Stopped (11/06/17 0732)     midazolam 3 mg/hr (11/06/17 0840)     amiodarone 1 mg/min (11/06/17 0542)     amiodarone       DOBUTamine 2.5 mcg/kg/min (11/06/17 0840)     dexmedetomidine       acetylcysteine (ACETADOTE) infusion *second dose*       acetylcysteine (ACEDOTE) infusion *third dose*                 Physical Examination:   Temp:  [95.5  F (35.3  C)-98.2  F (36.8  C)] 98.2  F (36.8  C)  Pulse:  [132-137] 132  Heart Rate:  [] 108  Resp:  [12-70] 24  BP: ()/() 90/47  MAP:  [57 mmHg-105 mmHg] 78 mmHg  Arterial Line BP: ()/(48-84) 99/60  FiO2 (%):  [50 %-100 %] 50 %  SpO2:  [78 %-100 %] 90 %    Intake/Output Summary (Last 24 hours) at 11/06/17 0907  Last data filed at 11/06/17 0900   Gross per 24 hour   Intake          3099.51 ml   Output              375 ml   Net          2724.51 ml     Wt Readings from Last 4 Encounters:   11/06/17 90.8 kg (200 lb 2.8 oz)   10/25/17 83.9 kg (184 lb 14.4 oz)   06/20/14 83.9 kg (185 lb)     Arterial Line BP: ()/(48-84) 99/60  MAP:  [57 mmHg-105 mmHg] 78 mmHg  BP - Mean:  [] 62  Ventilation Mode: CMV/AC  FiO2 (%): 50 %  Rate Set (breaths/minute): 18 breaths/min  Tidal Volume Set (mL): 400 mL  PEEP (cm H2O): 5 cmH2O  Oxygen Concentration (%): 40 %  Resp: 24    Recent Labs  Lab 11/06/17  0745 11/06/17  0620 11/06/17  0448 11/06/17  0049   PH 7.39 Canceled, Test credited 7.19*  7.16*   PCO2 28* Canceled, Test credited 44 44   PO2 105 Canceled, Test credited 115* 213*   HCO3 17* Canceled, Test credited 17* 16*   O2PER  --   --   --  100       GEN: no acute distress, sedated but awakens/tracks/interacts when sedation weaned.   HEENT: head ncat, sclera anicteric, OP patent, trachea midline   PULM: unlabored synchronous with vent, clear anteriorly    CV/COR: RRR S1S2 no gallop,  No rub, no murmur  ABD: soft mild b/l upper quadrant tenderness, hypoactive bowel sounds, no mass  EXT:  Minimal edema x4,  Now warmer x4  NEURO: awakens/alert/interacts.  Moves all 4.  No gross deficit.  SKIN: no obvious rash  LINES: clean, dry intact         Data:   All data and imaging reviewed     ROUTINE ICU LABS (Last four results)  CMP  Recent Labs  Lab 11/06/17  0700 11/06/17  0440 11/05/17  2315   * 131* 132*   POTASSIUM 4.6 5.1 4.8   CHLORIDE 101 101 99   CO2 14* 17* 15*   ANIONGAP 16* 13 18*   * 114* 122*   BUN 24 23 23   CR 0.83 0.84 1.20*   GFRESTIMATED 67 66 44*   GFRESTBLACK 81 79 53*   ALFIE 7.8* 7.6* 8.3*   MAG 1.7 1.7  --    PHOS 4.0 5.0*  --    PROTTOTAL 6.1* 6.3* 8.2   ALBUMIN 2.1* 2.2* 2.9*   BILITOTAL 2.1* 2.3* 1.8*   ALKPHOS 292* 314* 405*   AST 6218* 3720* 1480*   ALT 2858* 1890* 769*     CBC  Recent Labs  Lab 11/06/17  0700 11/06/17  0440 11/05/17  2315   WBC 12.4* 15.6* 11.8*   RBC 3.61* 3.89 4.18   HGB 11.7 12.6 13.5   HCT 34.4* 37.8 41.5   MCV 95 97 99   MCH 32.4 32.4 32.3   MCHC 34.0 33.3 32.5   RDW 13.0 13.2 13.1    230 322     INR  Recent Labs  Lab 11/06/17  0700 11/06/17  0440 11/05/17  2315   INR 4.83* 5.26* 3.72*     Arterial Blood Gas  Recent Labs  Lab 11/06/17  0745 11/06/17  0620 11/06/17  0448 11/06/17  0049   PH 7.39 Canceled, Test credited 7.19* 7.16*   PCO2 28* Canceled, Test credited 44 44   PO2 105 Canceled, Test credited 115* 213*   HCO3 17* Canceled, Test credited 17* 16*   O2PER  --   --   --  100       All cultures:    Recent Labs  Lab 11/06/17  0139  11/05/17  2315   CULT No growth after 6 hours No growth after 6 hours     Recent Results (from the past 24 hour(s))   Chest  XR, 1 view PORTABLE    Narrative    CHEST 2 VIEWS   11/5/2017 11:29 PM     HISTORY: Endotracheal tube placement.    COMPARISON: 10/20/2017.    FINDINGS: An endotracheal tube is present with distal tube tip  projecting over the mid trachea. An enteric drainage tube is present  with distal tube tip not visualized, but at least to the gastric body.  Hypoinflated lungs. Mild hazy and interstitial opacities within  central aspects of both lungs. Probable mild cardiomegaly.  Atherosclerotic  calcification in the thoracic aorta.      Impression    IMPRESSION:   1. An endotracheal tube is present with distal tube tip projecting  over the mid trachea.  2. An enteric drainage tube is present with distal tip at least to the  gastric body.  3. Mild hazy and interstitial opacities in the central aspect of both  lungs. These are nonspecific, but most likely relate to pulmonary  edema. An infectious or inflammatory process could have a similar  appearance.    GERSON BHAKTA MD   Abdomen US, limited (RUQ only)    Narrative    ULTRASOUND ABDOMEN LIMITED RIGHT UPPER QUADRANT   11/6/2017 2:06 AM     HISTORY: Elevated liver function tests and alkaline phosphatase.    COMPARISON: 12/22/2009 - CT chest, abdomen and pelvis.    FINDINGS: Normal hepatic echogenicity. 1.8 cm hypoechoic lesion in the  left lobe of the liver, most likely a cyst. 2.8 cm gallstone within a  nondistended gallbladder. Moderate diffuse gallbladder wall thickening  and edema. The ultrasound technologist reports that the patient has  focal tenderness over the gallbladder. No intrahepatic biliary  dilatation. The common duct is mildly dilated, measuring 0.9 cm in  diameter. The right kidney has normal size and echogenicity, measuring  11.4 cm in length. No right intrarenal collecting system dilatation,  calculi or masses. A trace amount of  perihepatic free fluid.      Impression    IMPRESSION:   1. 3 cm gallstone within a nondistended, moderately thick-walled and  edematous gallbladder. Additionally, the ultrasound technologist  reports that the patient has focal tenderness over the gallbladder.  These findings are not convincing for acute cholecystitis, but it  cannot be excluded. If there is a clinical concern for acute  cholecystitis, a HIDA scan could be considered for further evaluation.  2. Mild dilatation of the common duct, which measures 0.9 cm in  diameter. No intrahepatic biliary dilatation.  3. A trace amount of perihepatic free fluid.    GERSON BHAKTA MD   Chest CT, IV contrast only - PE protocol    Narrative    CT CHEST WITH CONTRAST   11/6/2017 12:38 AM     HISTORY: Respiratory failure.    COMPARISON: 12/20/2005.    TECHNIQUE: Following the uneventful administration of 71 mL Isovue-370  intravenous contrast, helical sections were acquired through the lungs  according to the pulmonary embolism protocol. Coronal reconstructions  were generated. Radiation dose for this scan was reduced using  automated exposure control, adjustment of the mA and/or kV according  to the patient's size, or iterative reconstruction technique.    FINDINGS: The central pulmonary arteries are mildly large in caliber.  No visualized pulmonary emboli. The thoracic aorta is normal in  caliber. Atherosclerotic calcification in the thoracic aorta and  coronary arteries. Moderate cardiomegaly.    An endotracheal tube is present with distal tip in the mid trachea. An  enteric drainage tube is present with distal tip not visualized, but  at least to the gastric body. Mild groundglass opacities and  interstitial thickening in the upper and mid lungs bilaterally. Very  small right pleural effusion with adjacent atelectasis. Atelectasis is  present in the dependent aspect of the left lower lobe. No left  pleural or pericardial effusion. Several mildly enlarged  mediastinal  lymph nodes. For example, there is a 1.6 x 1.5 cm lower right  paratracheal lymph node (series 7, image 55).    Scan through the upper abdomen is unremarkable.      Impression    IMPRESSION:   1. Mild groundglass opacities and interstitial thickening in the upper  and mid lungs bilaterally. This is nonspecific, but could relate to  interstitial pulmonary edema.  2. Very small right pleural effusion.  3. Several nonspecific mildly enlarged mediastinal lymph nodes.  4. Moderate cardiomegaly.  5. No visualized pulmonary embolus.    GERSON BHAKTA MD     Labs (personally reviewed/interpreted):  Mild hypona 131.  Creatinine normal.  Albumin low 2.1.  Transaminitis alt/ast 2858/6218, alk phos high but improved 292, ammonia high 72, Ical slightly low 4.2.  Bili high but improving 2.1.  Trop ok.  TSH ok. Procal intermediate:  0.38.  ABG with compensated acute metabolic acidosis:  7.39/28/105/17.  Wbc elevated 12.4.  Flu neg.  ua appears uninfected.  Tylenol level 4.    CT PE (personally reviewed/interpreted):  No PE.  B/l effusions.  Some fluid in fissures.  Mild vascular congestion.      EKG (personally reviewed/interpreted):  131, a fib, no acute ischemia to my eye.  Nl axis, nl intervals.      D/w GI attending and PA.    Billing: This patient is critically ill: Yes. Total critical care time today 50 min (in addition to 30 min by Dr. Terrazas earlier in the day--total time now 80 min).

## 2017-11-06 NOTE — PROGRESS NOTES
Echo unchanged from before. EF 31%. No change in valvular function. Though the most likely diagnosis is cardiac pulmonary edema , it is an unusual presentation in a patient who has been stable on discharge 1 week ago on good medical Rx and mild CAD. I think it is not unreasonable to start diuretics but I would keep an open mind for non cardiac causes of pulmonary edema.

## 2017-11-06 NOTE — H&P
[facility]   CRITICAL CARE ADMISSION/CONSULTATION    Em Richmond MRN: [id number]  1940  Date of Admission:11/5/2017          HPI   Em Richmond is a 77F with significant history for HFrEF (EF 35%), Afib, HTN who is brought in by EMS for progressive dyspnea.   - Family called EMS due to progressive dyspnea since recent discharge from [facility]. Additionally has been having abdominal pain, nausea and had emesis prior to transport. Denies chest pain, fever, chills, or hematemesis/hematochezia. Found to be hypoxic (SpO2 70%) on EMS arrival and subsequently intubated in the field.  - Recently at [facility] for CHF and afib w/ RVR from 10/20-25. She had an extensive cardiac w/u including TTE and RHC/LHC. Her CDM was presumed 2/2 tachycardia-induced. She was discharged with diuretics and cipro for UTI.   - In the ED, she had lactic acidosis of ~10, SHUN, elevated nt-pro BNP, elevated transaminases,bili and lipase, and supratherapeutic INR. CT chest demonstrated areas of interstitial edema w/o focal consolidation. US abdomen showed 3cm gallstone with thickened GB wall but not distended. The CBD was mildly dilated 0.9cm. She was treated for presumed sepsis and given vanco, zosyn and levo. She has femoral TLC placed by ED.   - Awaiting transfer to ICU for further evaluation and management.          Past Medical History:      Past Medical History:   Diagnosis Date     Atrial fibrillation (H)     Non-ischemic CDM          Past Surgical History:    No past surgical history on file.         Social History:     Social History   Substance Use Topics     Smoking status: Not on file     Smokeless tobacco: Not on file     Alcohol use Not on file            Family History:   No family history on file.          Allergies:   Please see allergy list which was reviewed this admission.         Medications:       phytonadione  10 mg Intravenous Once     sodium chloride 0.9%  1,000 mL Intravenous Once      levofloxacin  750 mg Intravenous Once     vancomycin (VANCOCIN) IV  1,750 mg Intravenous Once          Review of Systems:   Unable to obtain due to critical illness.          Physical Examination:   Temp:  [95.5  F (35.3  C)-97.9  F (36.6  C)] 97.3  F (36.3  C)  Pulse:  [137] 137  Heart Rate:  [105-151] 138  Resp:  [12-70] 14  BP: ()/() 98/77  FiO2 (%):  [100 %] 100 %  SpO2:  [78 %-100 %] 98 %    Intake/Output Summary (Last 24 hours) at 11/06/17 0249  Last data filed at 11/06/17 0237   Gross per 24 hour   Intake             2646 ml   Output                0 ml   Net             2646 ml     Wt Readings from Last 4 Encounters:   11/05/17 88.2 kg (194 lb 7.1 oz)   10/25/17 83.9 kg (184 lb 14.4 oz)   06/20/14 83.9 kg (185 lb)     BP - Mean:  [] 88  Ventilation Mode: CMV/AC  FiO2 (%): 100 %  Rate Set (breaths/minute): 14 breaths/min  Tidal Volume Set (mL): 550 mL  PEEP (cm H2O): 5 cmH2O  Oxygen Concentration (%): 100 %  Resp: 14    Recent Labs  Lab 11/06/17  0049   PH 7.16*   PCO2 44   PO2 213*   HCO3 16*   O2PER 100     GEN: intubated, sedated  HEENT: head ncat, sclera anicteric, OP patent, trachea midline   PULM: unlabored synchronous with vent, clear anteriorly    CV/COR: RRR S1S2 no gallop,  No rub, no murmur  ABD: soft nontender, hypoactive bowel sounds, no mass  EXT:  Edema   warm  NEURO: grossly intact  SKIN: no obvious rash  BEDSIDE US: No pericardial effusion. IVC 2.1cm w/o respiratory variation. LV function depressed (Est. <20%). No hydronephrosis.       Assessment and plan :   1. Acute hypoxemic respiratory failure  2. Decompensated CHF   3. SHUN   4. Acute liver injury 2/2 #2 vs biliary occlusion  5. Lactic acidosis 2/2 #4 vs sepsis     Neurology/Psychiatry/Pain/Sedation:   1. Sedation for vent synchrony. Given labile BP, will pursue versed and fentanyl for sedation to keep RASS -2 to -3.     Cardiovascular/Hemodynamics/Ventilator Management/Renal/GI/ID  1. Acute hypoxemic respiratory failure  2/2 sepsis and/or decompensated CHF in setting of afib and non-ischemic CDM. She is easy to ventilate and oxygenate. Hemodynamically stable, however bedside US concerning for worsening systolic function.  Elevated pro-BNP >30,000. Will get formal TTE and cycle biomarkers. It is uncertain if her decompensation is also 2/2 sepsis likely from biliary source. There seems to be a recent history of increasing nausea and vomiting per family and given US report; cholecystitis and/or choledocholithiasis is certainly of concern. She is currently on broad spectrum abx to cover biliary source and will get surgery and GI to consult in am. Could narrow abx coverage to zosyn if GI source is more apparent, will leave this up to am team. If hypotensive, will start levophed given that I suspect she has been well resuscitated per bedside US. She might also benefit from inotrope and will consider dobutamine as well. Could consider RHC in am to help assess volume status and guide pressor therapy. Check TSH for completeness.   2. Afib w/ RVR. Would hold off on rate controlling agents now given soft BP. Start amio bolus and gtt.   3. SHUN. Cont to watch. She did receive IV contrast in ED.     Other  1. Nutrition: NPO for now.   2. Endocrine: ICU glucose/insulin protocol   3. Hematology/oncology: INR reversed with vitamin K in ED. Recheck INR. When <2, will place on heparin gtt.     Disposition/Code Status/Other  1. Critically ill with respiratory failure 2/2 cardiac vs sepsis. Family updated at bedside.   2. Code: Full    ICU Prophylaxis:   1. DVT: mechanical  2. VAP: HOB 30 degrees, chlorhexidine rinse  3. Stress Ulcer: H2 Blocker   4. Restraints: Nonviolent soft two point restraints required and necessary for patient safety and continued cares and good effect as patient continues to pull at necessary lines, tubes despite education and distraction. Will readdress daily.   5. IV Access - central access required and necessary for continued  patient cares  6. Feeding - NPO    I have personally reviewed the daily labs, imaging studies, cultures and discussed the case with referring physician and consulting physicians.     This patient is critically ill and I have provided 30 minutes of critical care time (excluding procedures) on November 6, 2017.     Santi Terrazas MD   Critical Care Staff  [phone]         Data:   All data and imaging reviewed     ROUTINE ICU LABS (Last four results)  CMP  Recent Labs  Lab 11/05/17  2315   *   POTASSIUM 4.8   CHLORIDE 99   CO2 15*   ANIONGAP 18*   *   BUN 23   CR 1.20*   GFRESTIMATED 44*   GFRESTBLACK 53*   ALFIE 8.3*   PROTTOTAL 8.2   ALBUMIN 2.9*   BILITOTAL 1.8*   ALKPHOS 405*   AST 1480*   *     CBC  Recent Labs  Lab 11/05/17  2315   WBC 11.8*   RBC 4.18   HGB 13.5   HCT 41.5   MCV 99   MCH 32.3   MCHC 32.5   RDW 13.1        INR  Recent Labs  Lab 11/05/17  2315   INR 3.72*     Arterial Blood Gas  Recent Labs  Lab 11/06/17  0049   PH 7.16*   PCO2 44   PO2 213*   HCO3 16*   O2PER 100       All cultures:  No results for input(s): CULT in the last 168 hours.  Recent Results (from the past 24 hour(s))   Chest  XR, 1 view PORTABLE    Narrative    CHEST 2 VIEWS   11/5/2017 11:29 PM     HISTORY: Endotracheal tube placement.    COMPARISON: 10/20/2017.    FINDINGS: An endotracheal tube is present with distal tube tip  projecting over the mid trachea. An enteric drainage tube is present  with distal tube tip not visualized, but at least to the gastric body.  Hypoinflated lungs. Mild hazy and interstitial opacities within  central aspects of both lungs. Probable mild cardiomegaly.  Atherosclerotic  calcification in the thoracic aorta.      Impression    IMPRESSION:   1. An endotracheal tube is present with distal tube tip projecting  over the mid trachea.  2. An enteric drainage tube is present with distal tip at least to the  gastric body.  3. Mild hazy and interstitial opacities in the central aspect of  both  lungs. These are nonspecific, but most likely relate to pulmonary  edema. An infectious or inflammatory process could have a similar  appearance.   Abdomen US, limited (RUQ only)    Narrative    ULTRASOUND ABDOMEN LIMITED RIGHT UPPER QUADRANT   11/6/2017 2:06 AM     HISTORY: Elevated liver function tests and alkaline phosphatase.    COMPARISON: 12/22/2009-CT chest, abdomen and pelvis.    FINDINGS: Normal hepatic echogenicity. 1.8 cm hypoechoic lesion in the  left lobe of the liver, most likely a cyst. 2.8 cm gallstone within a  nondistended gallbladder. Moderate diffuse gallbladder wall thickening  and edema. The ultrasound technologist reports that the patient has  focal tenderness over the gallbladder. No intrahepatic biliary  dilatation. The common duct is mildly dilated, measuring 0.9 cm in  diameter. The right kidney has normal size and echogenicity, measuring  11.4 cm in length. No right intrarenal collecting system dilatation,  calculi or masses. A trace amount of perihepatic free fluid.      Impression    IMPRESSION:   1. 3 cm gallstone within a nondistended and moderately thick-walled  and edematous gallbladder. Additionally, the ultrasound technologist  reports that the patient has focal tenderness over the gallbladder.  These findings are not convincing for acute cholecystitis, but it  cannot be excluded. If there is clinical concern for acute  cholecystitis, a HIDA scan could be considered for further evaluation.  2. Mild dilatation of the common duct, which measures 0.9 cm in  diameter. No intrahepatic biliary dilatation.  3. A trace amount of perihepatic free fluid.     Interpretation Summary TTE 10/21/17      Left ventricular systolic function is moderately reduced.  The visual ejection fraction is estimated at 30-35%.  Mildly decreased right ventricular systolic function  Right ventricular systolic pressure is elevated, consistent with mild  pulmonary hypertension.  EF lower than in 2014. The  study was technically difficult.  _____________________________________________________________________________     10/20/17 Heart Catheterization results  1-mild coronary disease, no hemodynamically significant or culprit  lesions.  -Left main coronary artery. Short, normal in caliber and appearance  -Left anterior descending. Type III LAD. Average caliber. Moderate  proximal calcification. There is 25% focal mid irregularity. There is  diffuse apical 30% irregularity. 2 major diagonals and septals are  normal in appearance  -Left circumflex coronary. Nondominant dominant. Small first, modest  second third and large fourth marginals. Average caliber. Focal 20%  irregularity at the origin of the second marginal. Trivial distal  irregularity. Marginals are all essentially normal in appearance  -Right coronary. Morphologically dominant. Arises and courses  normally. Normal PDA, small posterolateral system. Focal 20-30%  proximal and minimal mid irregularity. Normal distal system    2-Right heart catheterization.  -Right atrial pressure mean 12  -Right ventricular pressure 35/13  -Pulmonary artery pressure 32/19 (24)  -Pulmonary catheter wedge pressure  =  -/17 (15)  -thermal dilution cardiac output/index 2.93/1.53.  -Pulmonary artery oxygen saturation 61% with an aortic oxygen  saturation of 90%-estimated Scott cardiac output 3.5/index 1.8    3. Left heart catheterization. Left ventricular pressure 134/13. No  gradient across aortic valve.    Comment-  This is a nonischemic cardiomyopathy. Further management will be  further cardiology consult service

## 2017-11-06 NOTE — PLAN OF CARE
Problem: Patient Care Overview  Goal: Plan of Care/Patient Progress Review  OT and PT: Orders rec'd, patient just admitted. Intubated and sedated with tenuous respiratory and cardiac status. Holding evals today.

## 2017-11-06 NOTE — PROGRESS NOTES
60 minutes if ICU service of which >50% was involved in reviewing lab data, imaging data, reviewing past echos and coronary angiograms and cardiac consultations and progress notes as well as reviewing the ICU notes and plans

## 2017-11-06 NOTE — PROGRESS NOTES
Patient attempted ABG stick X 2 and not accomplished will try later.    Cece Yoder RT  11/6/2017

## 2017-11-06 NOTE — CONSULTS
General Surgery Consultation    Em Richmond MRN#: [id number]   Age: 77 year old YOB: 1940     Date of Admission:          11/5/2017  Reason for consult: Choleycstitis   Surgeon:      Sabas Herrera MD   Requesting provider:     Santi Terrazas MD            History of Present Illness:   This patient is a 77 year old  female with a PMH significant for heart failure and Afib on coumadin who was brought in by EMS to [facility] last night for progressive dyspnea following a recent hospitalization for CHF and Afib with RVR. Patient was found to have elevated LFTs, so an abdominal ultrasound was ordered which revealed gallbladder wall thickening, a 3cm gallstone within the edematous gallbladder, and sonographic Nicholas sign. We were consulted, as well as gastroenterology, for evaluation of these findings.    Labs are significant for an ALT of 2,858; AST of 6,218; Total bilirubin of 2.1, direct bilirubin of 1.1, Alk Phos of 292, lactic acid of 4.4, lipase of 189, WBC of 12.4, INR of 4.83. ALT, AST, Alk Phos have all increased from last night. Total bilirubin, direct bilirubin and lactic acid have decreased slightly since initial labs last night.    History is obtained from the chart. Patient is intubated and sedated; no family is present.           Past Medical History:   Atrial fibrillation  Obstructive sleep apnea  Congestive heart failure  Hypertension  Anxiety   Pulmonary hypertension          Past Surgical History:   No past surgical history on file.         Medications:     Prior to Admission medications    Medication Sig Start Date End Date Taking? Authorizing Provider   WARFARIN SODIUM PO Take by mouth daily 5 mg Tu/Th/Su; 2.5 mg AOD   Yes Unknown, Entered By History   METOPROLOL SUCCINATE ER PO Take 100 mg by mouth daily   Yes Unknown, Entered By History   atorvastatin (LIPITOR) 40 MG tablet Take 1 tablet (40 mg) by mouth daily 10/25/17  Yes Moses Win MD   furosemide (LASIX) 40 MG  tablet Take 0.5 tablets (20 mg) by mouth daily 10/25/17  Yes Moses Win MD   meclizine (ANTIVERT) 12.5 MG tablet Take 1 tablet (12.5 mg) by mouth 3 times daily as needed for dizziness 6/21/14  Yes Anurag Arguello MD   PAROXETINE HCL PO Take 30 mg by mouth daily    Yes Reported, Patient   OMEPRAZOLE PO Take 20 mg by mouth daily as needed    Yes Unknown, Entered By History   MULTIPLE VITAMINS PO Take 1 tablet by mouth daily   Yes Unknown, Entered By History   Cholecalciferol (VITAMIN D3 PO) Take 2,000 Units by mouth daily    Yes Unknown, Entered By History            Allergies:     Allergies   Allergen Reactions     Sulfa Drugs             Social History:     Social History   Substance Use Topics     Smoking status: Previous smoker, currently nonsmoker     Smokeless tobacco: Not on file     Alcohol use Occasional             Family History:   POSITIVE for colon cancer. No other pertinent family history.          Review of Systems:   Brief ROS is negative other than noted in the HPI.         Physical Exam:   Blood pressure 90/47, pulse 132, temperature 98.2  F (36.8  C), temperature source Bladder, resp. rate 24, weight 90.8 kg (200 lb 2.8 oz), SpO2 90 %.  I/O last 3 completed shifts:  In: 2871.92 [I.V.:2871.92]  Out: 250 [Urine:250]    General - intubated and sedated  Head - normocephalic, atrumatic  Eyes - pupils equally round and reactive to light, scant scleral icterus  Respiratory: on ventilator  Abdomen - Obese, soft, does not endorse any tenderness but is quite sedated and not responding to noxious stimuli right now; abdomen is nondistended, +bowel sounds, no masses or organomegaly palpated  Extremities - warm with edema  Neurologic - unable to assess due to sedation            Data:   Labs:  Recent Labs   Lab Test  11/06/17   0700  11/06/17   0440  11/05/17   2315   WBC  12.4*  15.6*  11.8*   HGB  11.7  12.6  13.5   HCT  34.4*  37.8  41.5   PLT  217  230  322     Recent Labs   Lab Test  11/06/17   0700   11/06/17 0440 11/05/17   2315   POTASSIUM  4.6  5.1  4.8   CHLORIDE  101  101  99   CO2  14*  17*  15*   BUN  24  23  23   CR  0.83  0.84  1.20*     Recent Labs   Lab Test  11/06/17 0700 11/06/17 0440 11/05/17   2315   BILITOTAL  2.1*  2.3*  1.8*   ALT  2858*  1890*  769*   AST  6218*  3720*  1480*   ALKPHOS  292*  314*  405*   LIPASE   --   189  455*     Recent Labs   Lab Test  11/06/17 0700 11/06/17 0440 11/05/17   2315 06/20/14   1605   INR  4.83*  5.26*  3.72*   < >  2.43*   PTT   --    --   47*   --   39*    < > = values in this interval not displayed.     Recent Labs   Lab Test  11/06/17   0700  11/06/17   0440  11/05/17   2315  10/25/17   0735   ALFIE  7.8*  7.6*  8.3*  8.3*   PHOS  4.0  5.0*   --    --    MAG  1.7  1.7   --   2.2     Recent Labs   Lab Test  11/06/17 0700 11/06/17 0440  11/05/17   2315  10/25/17   1039   06/20/14   1720   ANIONGAP  16*  13  18*   --    < >   --    PROTEIN   --    --    --   30*   --   Negative   ALBUMIN  2.1*  2.2*  2.9*   --    --    --     < > = values in this interval not displayed.       Ultrasound of the abdomen:   IMPRESSION:   1. 3 cm gallstone within a nondistended, moderately thick-walled and  edematous gallbladder. Additionally, the ultrasound technologist  reports that the patient has focal tenderness over the gallbladder.  These findings are not convincing for acute cholecystitis, but it  cannot be excluded. If there is a clinical concern for acute  cholecystitis, a HIDA scan could be considered for further evaluation.  2. Mild dilatation of the common duct, which measures 0.9 cm in  diameter. No intrahepatic biliary dilatation.  3. A trace amount of perihepatic free fluid.      EKG: Complete; See Chart         Assessment:   Em Richmond is a 77 year old female with multiple acute medical issues with lactic acidosis vs sepsis with possible acute cholecystitis         Plan:   Given ultrasound findings, cholecystitis is possible source for  patient's lactic acidosis and leukocytosis. Patient is clearly too ill for any surgical intervention. Her elevated LFTs may be due to her CHF resulting in acute liver failure, although patient's total bilirubin is relatively low and stable.  Agree with MRCP, if patient is stable enough for transfer. We will await these results. HIDA scan could be considered to further assess for cholecystitis and need for cholecystostomy tube placement, pending MRCP. Continue Zosyn. We will follow along.       I will discuss the patient's history, physical exam, and plan with Dr. Herrera who will independently interview and examine the patient and make any additional recommendations as appropriate. Thank you very much for this consultation.        Gertrudis Vazquez PA-C  [facility]  [phone]    Pager: [phone] (7am - 4pm)

## 2017-11-06 NOTE — PLAN OF CARE
Problem: Patient Care Overview  Goal: Plan of Care/Patient Progress Review  Outcome: No Change  Pt arrived from ED this am. Started on amio gtt, levophed, and dobutamine. BP improved with pressors, HR improved with amio. Pt on full vent support, tolerating well. Versed for sedation. A-line placed by intensitivist upon arrival on unit. Family updated on plan of care. Plan is for echo and GI consult today. Will continue to monitor.

## 2017-11-06 NOTE — PHARMACY-VANCOMYCIN DOSING SERVICE
Pharmacy Vancomycin Initial Note  Date of Service 2017  Patient's  1940  77 year old, female    Indication: [facility]-Associated Pneumonia    Current estimated CrCl = CrCl cannot be calculated (Unknown ideal weight.). - calculated as 65.2 mL/min per [facility]    Creatinine for last 3 days  2017: 11:15 PM Creatinine 1.20 mg/dL  2017:  4:40 AM Creatinine 0.84 mg/dL;  7:00 AM Creatinine 0.83 mg/dL    Recent Vancomycin Level(s) for last 3 days  No results found for requested labs within last 72 hours.      Vancomycin IV Administrations (past 72 hours)                   vancomycin (VANCOCIN) 1,750 mg in NaCl 0.9 % 500 mL intermittent infusion (mg) 1,750 mg New Bag 17 0308                Nephrotoxins and other renal medications (Future)    Start     Dose/Rate Route Frequency Ordered Stop    17 1500  vancomycin (VANCOCIN) 1,750 mg in NaCl 0.9 % 250 mL intermittent infusion      1,750 mg (central catheter)  over 60 Minutes Intravenous EVERY 12 HOURS 17 1129      17 1045  piperacillin-tazobactam (ZOSYN) intermittent infusion 4.5 g     Comments:  Pharmacy can adjust dose based on renal function.    4.5 g  200 mL/hr over 30 Minutes Intravenous EVERY 6 HOURS 17 1037      17 1015  DOBUTamine (DOBUTREX) 500 mg in NaCl 0.9 % 250 mL infusion      2.5-20 mcg/kg/min × 90.8 kg  6.8-54.5 mL/hr  Intravenous CONTINUOUS 17 1014      17 0313  norepinephrine (LEVOPHED) 16 mg in D5W 250 mL infusion      0.03-0.4 mcg/kg/min × 88.2 kg  2.5-33.1 mL/hr  Intravenous CONTINUOUS 17 0312            Contrast Orders - past 72 hours (72h ago through future)    Start     Dose/Rate Route Frequency Ordered Stop    17 0945  sulfur hexafluoride microsphere (LUMASON) 60.7-25 MG injection 2 mL      2 mL Intravenous ONCE 17 0935 17 0935    17 2327  iopamidol (ISOVUE-370) solution 71 mL      71 mL Intravenous ONCE 17 2295 17 9161                 Plan:  1.  Start vancomycin  1,750 mg IV q12h (continued from ER dose.)  2.  Goal Trough Level: 15-20 mg/L   3.  Pharmacy will check trough levels as appropriate in 1-3 Days.    4. Serum creatinine levels will be ordered daily for the first week of therapy and at least twice weekly for subsequent weeks.    5. Inyokern method utilized to dose vancomycin therapy: Both methods used - rounding down due to SHUN and concurrent use with Zosyn    Alonso Braxton, PharmD IV Student    Beti Wolf, PharmD  Proceed cautiously with this dose as pt getting Zosyn also and came in with SHUN. Will check level sooner than later.

## 2017-11-06 NOTE — PHARMACY-ADMISSION MEDICATION HISTORY
Admission medication history interview status for the 11/5/2017  admission is complete. See EPIC admission navigator for prior to admission medications     Medication history source reliability:Moderate    Actions taken by pharmacist (provider contacted, etc):  Reviewed recent hospital discharge summary and recent office visit through Care Everywhere.      Additional medication history information not noted on PTA med list :    Per recent office visit on 10/30/17, patient was instructed to stop taking Lisinopril and increase her Metoprolol Succinate dose to 100 mg daily.    Medication reconciliation/reorder completed by provider prior to medication history? No    Time spent in this activity: 20 minutes    Prior to Admission medications    Medication Sig Last Dose Taking? Auth Provider   WARFARIN SODIUM PO Take by mouth daily 5 mg Tu/Th/Su; 2.5 mg AOD  Yes Unknown, Entered By History   METOPROLOL SUCCINATE ER PO Take 100 mg by mouth daily  Yes Unknown, Entered By History   atorvastatin (LIPITOR) 40 MG tablet Take 1 tablet (40 mg) by mouth daily  Yes Moses Win MD   furosemide (LASIX) 40 MG tablet Take 0.5 tablets (20 mg) by mouth daily  Yes Moses Win MD   meclizine (ANTIVERT) 12.5 MG tablet Take 1 tablet (12.5 mg) by mouth 3 times daily as needed for dizziness  Yes Anurag Arguello MD   PAROXETINE HCL PO Take 30 mg by mouth daily   Yes Reported, Patient   OMEPRAZOLE PO Take 20 mg by mouth daily as needed   Yes Unknown, Entered By History   MULTIPLE VITAMINS PO Take 1 tablet by mouth daily  Yes Unknown, Entered By History   Cholecalciferol (VITAMIN D3 PO) Take 2,000 Units by mouth daily   Yes Unknown, Entered By History     Neelam Jiang, PharmD

## 2017-11-06 NOTE — ED NOTES
DATE:  11/5/2017   TIME OF RECEIPT FROM LAB:  8861  LAB TEST:  Lactic Acid  LAB VALUE:  10.5  RESULTS GIVEN WITH READ-BACK TO (PROVIDER):  Dr Bucio notified awaiting further orders  TIME LAB VALUE REPORTED TO PROVIDER:   5619

## 2017-11-06 NOTE — ED NOTES
6.5 ET tube at 23cm at lip placed by EMS using Etomadate Succs, and vec PTA. Placement verified by Dr felton auscultation, visualized by Glydascope, and portable chest X ray

## 2017-11-06 NOTE — CONSULTS
CARDIOLOGY CONSULTATION        REQUESTING PHYSICIAN:  Hospitalist Service       PRIMARY CARE PHYSICIAN:  Arielle Leger      INDICATION FOR CARDIAC CONSULTATION:  Congestive heart failure.      Dear Doctors:        It is my pleasure to see your patient, Em Richmond who is a very pleasant 77-year-old female patient.  This patient was only discharged just over a week ago from [facility].  At that time the patient had a full cardiac workup.  She was noted to have a drop in her ejection fraction from previously.  Her echocardiogram in 2014 showed an ejection fraction of 60% to 65%.  Echocardiography at [facility] on 10/21 showed that the EF had dropped to 30% to 35%.  There was mild to moderate tricuspid regurgitation, mild to moderate mitral regurgitation.  This patient has a history of chronic atrial fibrillation.  The patient had coronary angiography performed on 10/23 because the ejection fraction had dropped.  The coronary angiogram showed only mild coronary artery disease with no stenosis greater than 30%.  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  The patient was reported on history to have stopped her metoprolol succinate medication so it was felt that possibly the faster ventricular rate caused the cardiomyopathy.  With that background in mind the patient was admitted this morning with progressively increasing shortness of breath, nausea and vomiting and was found to be markedly hypoxic with O2 saturations of 70%.  The patient was intubated in the field.  There have been no reports of chest pain or fevers or chills.  The patient was found to have significant lactic acidosis.  Her proBNP was raised.  She had a supratherapeutic INR.  Her liver function tests are extremely abnormal with the ALT at 1890, the AST at 3720.  This morning they have gone even  higher with an ALT of 2858 and an AST of 6218.  The troponin was in the normal range at 0.020.  ProBNP was significantly raised at 36,488.  The 12-lead electrocardiogram revealed that the patient was in atrial flutter with a ventricular rate of 131 beats per minute.  Her last EKG on 10/23 prior to this admission showed atrial fibrillation with a controlled ventricular rate at 75 beats per minute.  Imaging studies on admission showed that there were hazy interstitial opacities in the central aspects of both lungs and while they were nonspecific, felt most likely to be related to pulmonary edema.  CT of the chest which was performed in the early hours this morning showed mild ground glass opacities and interstitial thickening in the upper and mid lungs bilaterally.  Again it was felt to be nonspecific but could be related to interstitial pulmonary edema.  Several nonspecific mildly enlarged mediastinal lymph nodes were noted.  On admission, the patient has been noted to be afebrile, temperature at 10:15 was 99 degrees Fahrenheit.  The patient was significantly hypotensive and because of that she was started on Levophed.  Intravenous amiodarone was used to control the ventricular rate.  At present, she is in atrial fibrillation and the ventricular rate is around 104 beats per minute oleg.  The patient is ventilated.  Of note, the patient's white cell count was raised on admission at 15.6 with a hemoglobin of 12.6.  So far none of the cultures have come back.  Her INR was raised as I mentioned at 5.26.      PAST MEDICAL HISTORY:     1.  Cardiomyopathy.    2.  Atrial fibrillation with rapid ventricular response.  The atrial fibrillation is chronic.   3.  Pulmonary hypertension.    4.  Obstructive sleep apnea.   5.  Anxiety.   6.  Essential hypertension.      PAST SURGICAL HISTORY:  None.      FAMILY HISTORY:  Brother has coronary artery disease.  Father with coronary artery disease, mother coronary artery disease.  Her  father had colonic cancer and sister had colonic cancer.      SOCIAL HISTORY:  She is a former smoker.  Occasionally uses alcohol.      CURRENT MEDICATIONS:     1.  Amiodarone bolus plus infusion.     2.  Calcium gluconate intravenously.     3.  Famotidine 20 mg IV q.12 hours.     4.  Levofloxacin 750 mg intravenously once.   5.  Vitamin K10 mg.     6.  Zosyn 4.5 grams intravenously q.6 h.   7.  Vancomycin 1750 mg intravenously once.    8.  Acetylcysteine intravenously q.4 h.     9.  Dobutamine infusion.    10.  Norepinephrine infusion which has been held at present.      ALLERGIES:  Sulfa drugs.      REVIEW OF SYSTEMS:  Review of systems is unavailable from the patient as the patient is sedated and orally intubated.      PHYSICAL EXAMINATION:   GENERAL:  She is a moderately obese patient.  She is unresponsive and sedated on the ventilator.   VITAL SIGNS:  Her blood pressure at present is 98/59, her pulse is 97 beats per minute, atrial fibrillation.  Her temperature is 99 degrees.  Her oxygen saturations are 93%.  She is on the ventilator.   HEENT:  Revealed that her jugular venous pulse is not raised.  Her carotids are normal with no bruits.  She has normal facial symmetry.  She has an endotracheal tube in her mouth.   HEART:  Sounds 1 variable, heart sound 2 normal, no murmurs heard.   CHEST:  Clear to percussion and auscultation with transmitted sounds from the ventilator.   ABDOMEN:  Reveals moderate truncal obesity.   EXTREMITIES:  Femoral pulses are 1+.  No peripheral edema noted.  She moves all 4.  Her peripheries, her feet feel warm, her hands feel cold.   SKIN:  No obvious skin lesions noted.  She is unconscious and not orientated to time, place and person.      LABORATORY INVESTIGATIONS:  Sodium is low at 131, potassium is 4.6, BUN is 24, creatinine is 0.83, calcium 7.8, magnesium was 1.7.  Albumin is significantly low at 2.1, total protein low at 6.1, alkaline phosphatase is raised at 293, bilirubin  raised at 2.1, ALT 2858, AST 6218, direct bilirubin raised at 1.1.  Ionized calcium low at 4.2.  Lactic acid raised at 4.4, glucose is raised at 138.  White cell count raised at 12.4, hemoglobin 11.3, platelet count is 217,000.  PCO2 is low at 28, pO2 is 105, pH is 7.38.      IMPRESSION:     1.  The physical findings at present are consistent with congestive heart failure with interstitial pulmonary edema.  However, it is unusual that this patient who was doing well when she was discharged on 10/27 which is 1 week ago with good rate control, good medical therapy for cardiomyopathy and no significant coronary artery disease would suddenly deteriorate rapidly like this, requiring intubation and showing evidence of shock liver and hypotension.  This is certainly an unusual situation in a patient who, as I said,  was otherwise quite stable.  Her ventricular rate was well controlled when she was discharged and she is on good medical therapy for this so it does appear that the atrial fibrillation rate has picked up, possibly due to congestive heart failure or some other intercurrent illness.  It is odd that she would develop cardiogenic shock given that her myocardial perfusion is good and she is on good medical therapy for her atrial fibrillation.   2.  Atrial fibrillation, chronic.  Ventricular rate is being appropriately and well controlled with amiodarone.   3.  Hypotension.  At present she is not on pressers.  The Levophed has been held.   4.  Raised white cell count.   5.  Evidence of hepatic congestion and/or shock.        PLAN:  At this time she hemodynamically is being maintained with pressers.  Her respiratory status is being maintained with the ventilator.  Her ventricular rate is reasonably well controlled with intravenous amiodarone.  What we do not know at this stage is what her ejection fraction is and we are waiting the results of the echocardiogram.  It appears likely that the patient will require  diuresis but I would initially await the results of the echocardiogram.  We are also awaiting the results of cultures at this stage.  We will follow this patient closely and as soon as I see the results of the echocardiogram, I will make further adjustments if needed.       It has been pleasure to be involved in the care of this very nice patient.         LIZ CENTENO MD, [facility]             D: 2017 11:34   T: 2017 12:39   MT: LE      Name:     RICARDO DORMAN   MRN:      [id number]        Account:       [id number]   :      1940           Consult Date:  2017      Document: [id number]       cc: Arielle Leger MD

## 2017-11-06 NOTE — CONSULTS
"[facility]  Gastroenterology Consultation    Em Richmond  [address]  [place] [address]  77 year old female    Admission Date/Time: 11/5/2017  Primary Care Provider: Arielle Leger    We were asked to see the patient in consultation by Dr. Terrazas for evaluation of elevated liver function tests.        HPI:  Em Richmond is a 77 year old female who has a PMH significant for atrial fibrillation on warfarin, JERSON not on CPAP, pulmonary HTN, anxiety, and HTN who was recently admitted to [facility] from 10/20-10/25 for shortness of breath.  She was diagnosed with CHF at that time, and noted to have an EF of 35%.   During that stay she underwent a CXR, VQ scan, right heart cath and ECHO.  She was discharged on Lipitor, lisinopril, metoprolol, furosemide, and cipro.  Warfarin was continued as well.      The patient then represented to [facility] on 11/5.  It was noted that the patient had not been in \"great health\" per the family upon reviewing the chart.  Her heart rate had been variable and she had noted fatigue with ambulation.  She significantly worsened yesterday and EMS was called.  The patient was found alert but unable to speak or walk.  She was SOB.  She was tachycardic in the 160s.  O2 sat's were around 70%.  She was intubated.  Family also reported abdominal pain, nausea and vomiting.      On evaluation it was noted that sodium was low at 132, creatinine at 1.20, tbili at 1.8, alp at 405, ALT at 769, and AST at 1480.  Lactic acid was at 10.5, BNP at 77182.  Lactic acid has subsequently improved to 4.4, however liver enzymes continued to climb.  Currently Tbili is 2.1, ALP at 292, ALT at 2858, AST 6218.  WBC was 11.8 on admission, increased to 15.6 and is now at 12.4.  Plts are normal at 217k.  INR on admission was at 3.72, increased to 5.26 and is now at 4.83.  The patient was given Vitamin K.      Patient is currently intubated and sedated so any history is obtained from the chart. "  Family is not present.        ROS: A comprehensive ten point review of systems was negative aside from those in mentioned in the HPI.      MEDICATIONS:   No current facility-administered medications on file prior to encounter.   Current Outpatient Prescriptions on File Prior to Encounter:  atorvastatin (LIPITOR) 40 MG tablet Take 1 tablet (40 mg) by mouth daily   furosemide (LASIX) 40 MG tablet Take 0.5 tablets (20 mg) by mouth daily   meclizine (ANTIVERT) 12.5 MG tablet Take 1 tablet (12.5 mg) by mouth 3 times daily as needed for dizziness   PAROXETINE HCL PO Take 30 mg by mouth daily    OMEPRAZOLE PO Take 20 mg by mouth daily as needed    MULTIPLE VITAMINS PO Take 1 tablet by mouth daily   Cholecalciferol (VITAMIN D3 PO) Take 2,000 Units by mouth daily    [DISCONTINUED] warfarin (COUMADIN) 5 MG tablet Take 0.5 tablets (2.5 mg) by mouth daily       ALLERGIES:   Allergies   Allergen Reactions     Sulfa Drugs        PMH:    afib  JERSON  Pulmonary HTN  Anxiety  HTN  CHF    PSH:  NONE      SOCIAL HISTORY: Former smoker but has quit.  Only occasional ETOH consumption.        FAMILY HISTORY:  Strong family history of colon CA      PHYSICAL EXAM:   BP 90/47  Pulse 132  Temp 98.2  F (36.8  C) (Bladder)  Resp 24  Wt 90.8 kg (200 lb 2.8 oz)  SpO2 90%  BMI 33.31 kg/m2    Constitutional: intubated  Cardiovascular: RRR, normal S1 and S2, no r/c/g/m  Respiratory: CTAB  Head: Normocephalic. Atraumatic.    Neck: Neck supple. No adenopathy. Thyroid symmetric, normal size, trachea midline  Eyes:  PERRL, mild icterus  ENT: Hearing adequate, pharynx normal without erythema or exudate  Abdomen:   Auscultation: +BS  Appearance: normal  Palpation: soft, nontender  NEURO: cannot assess  SKIN: no suspicious lesions or rashes  LYMPH:   anterior cervical: no adenopathy  posterior cervical: no adenopathy  supraclavicular: no adenopathy          ADDITIONAL COMMENTS:   I reviewed the patient's new clinical lab test results.   Recent Labs    Lab Test  11/06/17   0700  11/06/17   0440  11/05/17   2315   WBC  12.4*  15.6*  11.8*   HGB  11.7  12.6  13.5   MCV  95  97  99   PLT  217  230  322   INR  4.83*  5.26*  3.72*     Recent Labs   Lab Test  11/06/17   0700  11/06/17   0440  11/05/17   2315   NA  131*  131*  132*   POTASSIUM  4.6  5.1  4.8   CHLORIDE  101  101  99   CO2  14*  17*  15*   BUN  24  23  23   CR  0.83  0.84  1.20*   ANIONGAP  16*  13  18*   ALFIE  7.8*  7.6*  8.3*   GLC  138*  114*  122*     Recent Labs   Lab Test  11/06/17   0700  11/06/17   0440  11/05/17   2315  10/25/17   1039  06/20/14   1720   ALBUMIN  2.1*  2.2*  2.9*   --    --    BILITOTAL  2.1*  2.3*  1.8*   --    --    ALT  2858*  1890*  769*   --    --    AST  6218*  3720*  1480*   --    --    ALKPHOS  292*  314*  405*   --    --    PROTEIN   --    --    --   30*  Negative   LIPASE   --   189  455*   --    --              .    CONSULTATION ASSESSMENT AND PLAN:    Active Problems:  Acute respiratory failure with hypoxemia (H)  Possible acute liver failure    Assessment: 77 year old female with PMH as per HPI, admitted with shortness of breath and general decline since discharge from [facility] on 10/25.  Intubated on arrival and labs revealed multiple abnormalities.  INR was 3.72 on admission and increased to above 5 without any additional doses of coumadin.  INR partially improved with Vitamin K down to 4.8.  Labs are concerning for acute liver failure, which may be on the basis of ischemic hepatopathy from hypotension, in conjunction with passive congestion and possibly sepsis (seems less likely given lack of fever or significant WBC).  It is reassuring however that the total bilirubin is stable and not increasing significantly.    Plan:   -INR and LFTs [id number] for now  -Start acetylcysteine protocol--continue even if tylenol level is negative given concern for acute liver failure.  Acetylcysteine has been shown to add benefit in the setting of acute liver failure even if not from  Tylenol  -If concern for biliary obstruction (ALT/AST not consistent with obstruction, far too high), patient will need cholecystotomy tube as she is far too ill for ERCP  -MRCP today to assess for biliary obstruction--this seems unlikely given only mild elevation in ALP and tbili, but if present patient will need emergent C tube        I will discuss the patient's findings and plan with Dr. Alicea who will independently examine the patient and add further recommendations as necessary.          Ekaterina Hyatt, PAC  [facility]  Office:  [phone] call if needed after 5PM  Cell:  [phone], not available after 5PM at this number    Staff addendum: 77 yof with hx of CHF admitted for respiratory failure, recent hospitalization last month. Had some complaints of abd pain. Found to have elevated liver tests, abd pain. U/s showed CBD dilation, gallstone, thickened GB wall. INR elevated despite holding warfarin, partially reversed with Vit K today. Pt intubated and unable to give hx. Found to be in decompensated CHR. Currently on dobutamine and amiodarone gtts.     BP 90/47  Pulse 132  Temp 99  F (37.2  C)  Resp (!) 32  Wt 90.8 kg (200 lb 2.8 oz)  SpO2 93%  BMI 33.31 kg/m2  Gen: sedated intubated.   Cor: Tachy IRIR  Abd: S NT ND+bs    A/P: 77 yof with markedly elevated liver tests consistent with ischemia, passive congestion. Elevated INR concerning for acute liver failure in setting of held warfarin. . Some concern for possible biliary sepsis given elevated WBC, biliary dilation, gallstones. Pt not stable enough for EUS or ERCP at this time. Discussed with Dr. Moya utility of percutaneous cholecystectomy tube vs MRCP. Because of lack of high suspicion for biliary source will proceed with MRCP. Unable to do percutaneous tube at this time due to high INR anyway  -Continue ABX  -Await MRCP  -Follow liver tests  -Recommend further dosing of Vit K to reverse INR further in case intervention  needed  -Acetylcysteine ok    Ilene Alicea MD  [facility]  Pager [phone]  Office [phone]

## 2017-11-07 PROBLEM — I42.9 SECONDARY CARDIOMYOPATHY (H): Status: ACTIVE | Noted: 2017-11-07

## 2017-11-07 PROBLEM — J81.0 ACUTE PULMONARY EDEMA (H): Status: ACTIVE | Noted: 2017-11-07

## 2017-11-07 LAB
ALBUMIN SERPL-MCNC: 1.9 G/DL (ref 3.4–5)
ALP SERPL-CCNC: 226 U/L (ref 40–150)
ALT SERPL W P-5'-P-CCNC: 2474 U/L (ref 0–50)
ANION GAP SERPL CALCULATED.3IONS-SCNC: 10 MMOL/L (ref 3–14)
ANION GAP SERPL CALCULATED.3IONS-SCNC: 9 MMOL/L (ref 3–14)
AST SERPL W P-5'-P-CCNC: 3199 U/L (ref 0–45)
BASE DEFICIT BLDA-SCNC: 1.5 MMOL/L
BASOPHILS # BLD AUTO: 0.1 10E9/L (ref 0–0.2)
BASOPHILS NFR BLD AUTO: 1 %
BILIRUB DIRECT SERPL-MCNC: 0.6 MG/DL (ref 0–0.2)
BILIRUB SERPL-MCNC: 1.4 MG/DL (ref 0.2–1.3)
BUN SERPL-MCNC: 14 MG/DL (ref 7–30)
BUN SERPL-MCNC: 17 MG/DL (ref 7–30)
CA-I BLD-MCNC: 4.3 MG/DL (ref 4.4–5.2)
CALCIUM SERPL-MCNC: 7.3 MG/DL (ref 8.5–10.1)
CALCIUM SERPL-MCNC: 7.4 MG/DL (ref 8.5–10.1)
CHLORIDE SERPL-SCNC: 104 MMOL/L (ref 94–109)
CHLORIDE SERPL-SCNC: 105 MMOL/L (ref 94–109)
CO2 SERPL-SCNC: 23 MMOL/L (ref 20–32)
CO2 SERPL-SCNC: 24 MMOL/L (ref 20–32)
CREAT SERPL-MCNC: 0.8 MG/DL (ref 0.52–1.04)
CREAT SERPL-MCNC: 0.9 MG/DL (ref 0.52–1.04)
DIFFERENTIAL METHOD BLD: ABNORMAL
EOSINOPHIL # BLD AUTO: 0.5 10E9/L (ref 0–0.7)
EOSINOPHIL NFR BLD AUTO: 5.1 %
ERYTHROCYTE [DISTWIDTH] IN BLOOD BY AUTOMATED COUNT: 13 % (ref 10–15)
GFR SERPL CREATININE-BSD FRML MDRD: 61 ML/MIN/1.7M2
GFR SERPL CREATININE-BSD FRML MDRD: 70 ML/MIN/1.7M2
GLUCOSE BLDC GLUCOMTR-MCNC: 105 MG/DL (ref 70–99)
GLUCOSE BLDC GLUCOMTR-MCNC: 115 MG/DL (ref 70–99)
GLUCOSE BLDC GLUCOMTR-MCNC: 120 MG/DL (ref 70–99)
GLUCOSE BLDC GLUCOMTR-MCNC: 121 MG/DL (ref 70–99)
GLUCOSE BLDC GLUCOMTR-MCNC: 95 MG/DL (ref 70–99)
GLUCOSE BLDC GLUCOMTR-MCNC: 99 MG/DL (ref 70–99)
GLUCOSE SERPL-MCNC: 133 MG/DL (ref 70–99)
GLUCOSE SERPL-MCNC: 97 MG/DL (ref 70–99)
HCO3 BLD-SCNC: 23 MMOL/L (ref 21–28)
HCT VFR BLD AUTO: 33.3 % (ref 35–47)
HGB BLD-MCNC: 11.4 G/DL (ref 11.7–15.7)
IMM GRANULOCYTES # BLD: 0 10E9/L (ref 0–0.4)
IMM GRANULOCYTES NFR BLD: 0.3 %
INR PPP: 1.92 (ref 0.86–1.14)
LACTATE BLD-SCNC: 1 MMOL/L (ref 0.7–2)
LYMPHOCYTES # BLD AUTO: 1.5 10E9/L (ref 0.8–5.3)
LYMPHOCYTES NFR BLD AUTO: 16 %
MAGNESIUM SERPL-MCNC: 1.4 MG/DL (ref 1.6–2.3)
MAGNESIUM SERPL-MCNC: 2 MG/DL (ref 1.6–2.3)
MAGNESIUM SERPL-MCNC: 2.3 MG/DL (ref 1.6–2.3)
MCH RBC QN AUTO: 32 PG (ref 26.5–33)
MCHC RBC AUTO-ENTMCNC: 34.2 G/DL (ref 31.5–36.5)
MCV RBC AUTO: 94 FL (ref 78–100)
MONOCYTES # BLD AUTO: 0.4 10E9/L (ref 0–1.3)
MONOCYTES NFR BLD AUTO: 4.2 %
MRSA DNA SPEC QL NAA+PROBE: NEGATIVE
NEUTROPHILS # BLD AUTO: 6.7 10E9/L (ref 1.6–8.3)
NEUTROPHILS NFR BLD AUTO: 73.4 %
NRBC # BLD AUTO: 0 10*3/UL
NRBC BLD AUTO-RTO: 0 /100
OXYHGB MFR BLD: 98 % (ref 92–100)
PCO2 BLD: 37 MM HG (ref 35–45)
PH BLD: 7.4 PH (ref 7.35–7.45)
PHOSPHATE SERPL-MCNC: 2.5 MG/DL (ref 2.5–4.5)
PLATELET # BLD AUTO: 225 10E9/L (ref 150–450)
PO2 BLD: 114 MM HG (ref 80–105)
POTASSIUM SERPL-SCNC: 2.8 MMOL/L (ref 3.4–5.3)
POTASSIUM SERPL-SCNC: 3.6 MMOL/L (ref 3.4–5.3)
POTASSIUM SERPL-SCNC: 3.6 MMOL/L (ref 3.4–5.3)
PROT SERPL-MCNC: 5.9 G/DL (ref 6.8–8.8)
RBC # BLD AUTO: 3.56 10E12/L (ref 3.8–5.2)
SODIUM SERPL-SCNC: 137 MMOL/L (ref 133–144)
SODIUM SERPL-SCNC: 138 MMOL/L (ref 133–144)
SPECIMEN SOURCE: NORMAL
WBC # BLD AUTO: 9.2 10E9/L (ref 4–11)

## 2017-11-07 PROCEDURE — 83735 ASSAY OF MAGNESIUM: CPT | Performed by: SURGERY

## 2017-11-07 PROCEDURE — 94003 VENT MGMT INPAT SUBQ DAY: CPT

## 2017-11-07 PROCEDURE — 85610 PROTHROMBIN TIME: CPT | Performed by: SURGERY

## 2017-11-07 PROCEDURE — 84132 ASSAY OF SERUM POTASSIUM: CPT | Performed by: INTERNAL MEDICINE

## 2017-11-07 PROCEDURE — 82330 ASSAY OF CALCIUM: CPT | Performed by: SURGERY

## 2017-11-07 PROCEDURE — 82805 BLOOD GASES W/O2 SATURATION: CPT | Performed by: SURGERY

## 2017-11-07 PROCEDURE — 83735 ASSAY OF MAGNESIUM: CPT | Performed by: INTERNAL MEDICINE

## 2017-11-07 PROCEDURE — 25000128 H RX IP 250 OP 636: Performed by: INTERNAL MEDICINE

## 2017-11-07 PROCEDURE — 99231 SBSQ HOSP IP/OBS SF/LOW 25: CPT | Performed by: SURGERY

## 2017-11-07 PROCEDURE — 40000008 ZZH STATISTIC AIRWAY CARE

## 2017-11-07 PROCEDURE — 40000275 ZZH STATISTIC RCP TIME EA 10 MIN

## 2017-11-07 PROCEDURE — 25000128 H RX IP 250 OP 636: Performed by: PHYSICIAN ASSISTANT

## 2017-11-07 PROCEDURE — 20000003 ZZH R&B ICU

## 2017-11-07 PROCEDURE — S0028 INJECTION, FAMOTIDINE, 20 MG: HCPCS | Performed by: INTERNAL MEDICINE

## 2017-11-07 PROCEDURE — 99291 CRITICAL CARE FIRST HOUR: CPT | Performed by: INTERNAL MEDICINE

## 2017-11-07 PROCEDURE — 80048 BASIC METABOLIC PNL TOTAL CA: CPT | Performed by: INTERNAL MEDICINE

## 2017-11-07 PROCEDURE — 80076 HEPATIC FUNCTION PANEL: CPT | Performed by: SURGERY

## 2017-11-07 PROCEDURE — 84100 ASSAY OF PHOSPHORUS: CPT | Performed by: SURGERY

## 2017-11-07 PROCEDURE — 80048 BASIC METABOLIC PNL TOTAL CA: CPT | Performed by: SURGERY

## 2017-11-07 PROCEDURE — 83605 ASSAY OF LACTIC ACID: CPT | Performed by: SURGERY

## 2017-11-07 PROCEDURE — 00000146 ZZHCL STATISTIC GLUCOSE BY METER IP

## 2017-11-07 PROCEDURE — 25000128 H RX IP 250 OP 636: Performed by: ANESTHESIOLOGY

## 2017-11-07 PROCEDURE — 25000125 ZZHC RX 250: Performed by: ANESTHESIOLOGY

## 2017-11-07 PROCEDURE — 25000125 ZZHC RX 250: Performed by: INTERNAL MEDICINE

## 2017-11-07 PROCEDURE — 85025 COMPLETE CBC W/AUTO DIFF WBC: CPT | Performed by: SURGERY

## 2017-11-07 RX ORDER — DIGOXIN 0.25 MG/ML
250 INJECTION INTRAMUSCULAR; INTRAVENOUS ONCE
Status: COMPLETED | OUTPATIENT
Start: 2017-11-07 | End: 2017-11-07

## 2017-11-07 RX ORDER — HEPARIN SODIUM 5000 [USP'U]/.5ML
5000 INJECTION, SOLUTION INTRAVENOUS; SUBCUTANEOUS EVERY 8 HOURS
Status: DISCONTINUED | OUTPATIENT
Start: 2017-11-07 | End: 2017-11-12

## 2017-11-07 RX ADMIN — POTASSIUM CHLORIDE 20 MEQ: 400 INJECTION, SOLUTION INTRAVENOUS at 07:56

## 2017-11-07 RX ADMIN — FUROSEMIDE 40 MG: 10 INJECTION, SOLUTION INTRAVENOUS at 08:22

## 2017-11-07 RX ADMIN — FENTANYL CITRATE 25 MCG: 50 INJECTION, SOLUTION INTRAMUSCULAR; INTRAVENOUS at 03:59

## 2017-11-07 RX ADMIN — TAZOBACTAM SODIUM AND PIPERACILLIN SODIUM 4.5 G: 500; 4 INJECTION, SOLUTION INTRAVENOUS at 22:37

## 2017-11-07 RX ADMIN — POTASSIUM CHLORIDE 20 MEQ: 400 INJECTION, SOLUTION INTRAVENOUS at 18:44

## 2017-11-07 RX ADMIN — HEPARIN SODIUM 5000 UNITS: 5000 INJECTION, SOLUTION INTRAVENOUS; SUBCUTANEOUS at 13:21

## 2017-11-07 RX ADMIN — HYDROMORPHONE HYDROCHLORIDE 0.5 MG: 1 INJECTION, SOLUTION INTRAMUSCULAR; INTRAVENOUS; SUBCUTANEOUS at 00:32

## 2017-11-07 RX ADMIN — VANCOMYCIN HYDROCHLORIDE 1750 MG: 5 INJECTION, POWDER, LYOPHILIZED, FOR SOLUTION INTRAVENOUS at 03:59

## 2017-11-07 RX ADMIN — TAZOBACTAM SODIUM AND PIPERACILLIN SODIUM 4.5 G: 500; 4 INJECTION, SOLUTION INTRAVENOUS at 15:45

## 2017-11-07 RX ADMIN — HYDROMORPHONE HYDROCHLORIDE 0.5 MG: 1 INJECTION, SOLUTION INTRAMUSCULAR; INTRAVENOUS; SUBCUTANEOUS at 02:15

## 2017-11-07 RX ADMIN — CHLORHEXIDINE GLUCONATE 15 ML: 1.2 RINSE ORAL at 19:44

## 2017-11-07 RX ADMIN — FUROSEMIDE 40 MG: 10 INJECTION, SOLUTION INTRAVENOUS at 15:45

## 2017-11-07 RX ADMIN — POTASSIUM CHLORIDE 20 MEQ: 400 INJECTION, SOLUTION INTRAVENOUS at 13:06

## 2017-11-07 RX ADMIN — DEXMEDETOMIDINE 0.7 MCG/KG/HR: 100 INJECTION, SOLUTION, CONCENTRATE INTRAVENOUS at 05:32

## 2017-11-07 RX ADMIN — DOBUTAMINE 2.5 MCG/KG/MIN: 12.5 INJECTION, SOLUTION, CONCENTRATE INTRAVENOUS at 11:37

## 2017-11-07 RX ADMIN — FAMOTIDINE 20 MG: 10 INJECTION, SOLUTION INTRAVENOUS at 05:26

## 2017-11-07 RX ADMIN — HYDROMORPHONE HYDROCHLORIDE 0.5 MG: 1 INJECTION, SOLUTION INTRAMUSCULAR; INTRAVENOUS; SUBCUTANEOUS at 05:45

## 2017-11-07 RX ADMIN — MAGNESIUM SULFATE IN WATER 4 G: 40 INJECTION, SOLUTION INTRAVENOUS at 05:26

## 2017-11-07 RX ADMIN — AMIODARONE HYDROCHLORIDE 0.5 MG/MIN: 50 INJECTION, SOLUTION INTRAVENOUS at 04:00

## 2017-11-07 RX ADMIN — HEPARIN SODIUM 5000 UNITS: 5000 INJECTION, SOLUTION INTRAVENOUS; SUBCUTANEOUS at 22:37

## 2017-11-07 RX ADMIN — DIGOXIN 250 MCG: 0.25 INJECTION INTRAMUSCULAR; INTRAVENOUS at 17:27

## 2017-11-07 RX ADMIN — POTASSIUM CHLORIDE 20 MEQ: 400 INJECTION, SOLUTION INTRAVENOUS at 09:15

## 2017-11-07 RX ADMIN — POTASSIUM CHLORIDE 20 MEQ: 400 INJECTION, SOLUTION INTRAVENOUS at 06:44

## 2017-11-07 RX ADMIN — AMIODARONE HYDROCHLORIDE 0.5 MG/MIN: 50 INJECTION, SOLUTION INTRAVENOUS at 22:59

## 2017-11-07 RX ADMIN — TAZOBACTAM SODIUM AND PIPERACILLIN SODIUM 4.5 G: 500; 4 INJECTION, SOLUTION INTRAVENOUS at 04:00

## 2017-11-07 RX ADMIN — AMIODARONE HYDROCHLORIDE 0.5 MG/MIN: 50 INJECTION, SOLUTION INTRAVENOUS at 14:38

## 2017-11-07 RX ADMIN — TAZOBACTAM SODIUM AND PIPERACILLIN SODIUM 4.5 G: 500; 4 INJECTION, SOLUTION INTRAVENOUS at 10:20

## 2017-11-07 RX ADMIN — POTASSIUM CHLORIDE 20 MEQ: 400 INJECTION, SOLUTION INTRAVENOUS at 05:26

## 2017-11-07 RX ADMIN — CHLORHEXIDINE GLUCONATE 15 ML: 1.2 RINSE ORAL at 08:13

## 2017-11-07 NOTE — PLAN OF CARE
Problem: Patient Care Overview  Goal: Plan of Care/Patient Progress Review  Outcome: No Change  Sedated, withdraws from pain. R pupil irregular, sluggish reaction, hx cataract surgery. Pale, dusky in BLE, doppled pulses. LS coarse/ronchi in BUL, diminished in BLL. BS hypoactive. Farias with adequate output. L radial art line intact. Pain controlled with fentanyl infusion. OG to low-intermittent suction. VSS on amiodarone 0.5 mg/hr, dex 0.7 mcg/kg/hr, dobutamine 2.5 mcg/kg/min Tele is afib, in and out of RVR. MRCP completed today. Family at bedside today, supportive, updated and included in planning.

## 2017-11-07 NOTE — PROGRESS NOTES
Chart reviewed  MRCP reviewed  Exam noncontributory due to intubation and sedation  LFTs slightly improved  Nothing to suggest cholecystitis at this time, suspect elevated liver function tests due to hepatic congestion and heart failure  No indication for surgical intervention  Surgery will sign off, call if needed

## 2017-11-07 NOTE — PROGRESS NOTES
"[facility] Cardiology Progress Note            Interval History:   Acute pulmonary edema in the background of non ischemic cardiomyopathy and chronic afib. Coronary angiogram 1 1/2 weeks ago showed no lesion greater than 30%. Cause of acute decompensation is unclear. Responded to diuresis and now has been extubated and placed on bipap. Weaning of levophed and continuing on dobutamine for time being.              Medications:       chlorhexidine  15 mL Mouth/Throat Q12H     famotidine  20 mg Intravenous Q12H     piperacillin-tazobactam  4.5 g Intravenous Q6H     furosemide  40 mg Intravenous BID                 Physical Exam:   Blood pressure 94/73, pulse 132, temperature 100  F (37.8  C), temperature source Bladder, resp. rate (!) 32, height 1.651 m (5' 5\"), weight 89.5 kg (197 lb 5 oz), SpO2 99 %.  Rhythm:  afib   Constitutional:   fatigued and somnolent     Neck:   no jugular venous distension and no carotid bruits     Lungs:   Moderate respiratory distress and crackles right base, left base and using bipap post extubation     Cardiovascular:   irregularly irregular rhythm, S3 present, no murmur noted, no edema and variable S1 and normal S2                    Data:     Lab Results   Component Value Date    PH 7.40 11/07/2017    PH 7.39 11/06/2017    PH Canceled, Test credited 11/06/2017    PO2 114 (H) 11/07/2017    PO2 105 11/06/2017    PO2 Canceled, Test credited 11/06/2017    PCO2 37 11/07/2017    PCO2 28 (L) 11/06/2017    PCO2 Canceled, Test credited 11/06/2017    HCO3 23 11/07/2017    HCO3 17 (L) 11/06/2017    HCO3 Canceled, Test credited 11/06/2017    ADAM Canceled, Test credited 11/06/2017          Lab Results   Component Value Date     11/07/2017     11/06/2017     11/06/2017    Lab Results   Component Value Date    CHLORIDE 104 11/07/2017    CHLORIDE 101 11/06/2017    CHLORIDE 101 11/06/2017    Lab Results   Component Value Date    BUN 17 11/07/2017    BUN 24 11/06/2017    BUN 23 " 11/06/2017      Lab Results   Component Value Date    POTASSIUM 2.8 11/07/2017    POTASSIUM 4.6 11/06/2017    POTASSIUM 5.1 11/06/2017    Lab Results   Component Value Date    CO2 24 11/07/2017    CO2 14 11/06/2017    CO2 17 11/06/2017    Lab Results   Component Value Date    CR 0.80 11/07/2017    CR 0.83 11/06/2017    CR 0.84 11/06/2017        Lab Results   Component Value Date    HGB 11.4 (L) 11/07/2017    HGB 11.7 11/06/2017    HGB 12.6 11/06/2017     Lab Results   Component Value Date    AST 3199 (HH) 11/07/2017    AST 6542 (HH) 11/06/2017    AST 6218 (HH) 11/06/2017    ALT 2474 (HH) 11/07/2017    ALT 3231 (HH) 11/06/2017    ALT 2858 (HH) 11/06/2017    ALKPHOS 226 (H) 11/07/2017    ALKPHOS 259 (H) 11/06/2017    ALKPHOS 292 (H) 11/06/2017    BILITOTAL 1.4 (H) 11/07/2017    BILITOTAL 1.6 (H) 11/06/2017    BILITOTAL 2.1 (H) 11/06/2017    BETH 72 (H) 11/06/2017     Lab Results   Component Value Date     11/07/2017     11/06/2017     11/06/2017     Lab Results   Component Value Date    TROPONIN <0.07 12/20/2005    TROPI 0.020 11/06/2017    TROPI <0.015 11/05/2017    TROPI <0.015 10/20/2017            EKG and Imaging results:    I have reviewed recent EKGs and imaging studies.         Assessment and Plan:   Assessment:   Problem List    Acute respiratory failure with hypoxemia (H)    Acute pulmonary edema (H)    Secondary cardiomyopathy (H)    * No resolved hospital problems. *       Plan:   Continue to diurese.  Agree with weaning off levophed which will allow HR to slow and hopefully wean off dobutamine tomorrow.  Addition of betablockers once BP allows.       Attestation:  Amount of time spent providing critical care service today: 40 minutes.  Care coordination / counseling time: 25 minutes  Total time: 40 minutes   [place] MD, MD 11/7/2017  11:29 AM

## 2017-11-07 NOTE — PROGRESS NOTES
"GASTROENTEROLOGY PROGRESS NOTE     CC:    SUBJECTIVE:  Hypertensive overnight, hypotensive this am.     OBJECTIVE:  General Appearance:  Sedated and intubated  /54  Pulse 132  Temp 99.1  F (37.3  C)  Resp 21  Ht 1.651 m (5' 5\")  Wt 89.5 kg (197 lb 5 oz)  SpO2 95%  BMI 32.83 kg/m2  Temp (24hrs), Av  F (37.2  C), Min:98.4  F (36.9  C), Max:99.5  F (37.5  C)    Patient Vitals for the past 72 hrs:   Weight   17 0000 89.5 kg (197 lb 5 oz)   17 0400 90.8 kg (200 lb 2.8 oz)   17 2322 88.2 kg (194 lb 7.1 oz)       Intake/Output Summary (Last 24 hours) at 17 0849  Last data filed at 17 0800   Gross per 24 hour   Intake          3477.43 ml   Output             4790 ml   Net         -1312.57 ml       PHYSICAL EXAM    Cor: IRIR, less tachy this am  ABD: S NT ND         Additional Comments:  ROS, FH, SH: See initial GI consult for details.    I have reviewed the patient's new clinical lab results:    Recent Labs   Lab Test  17   0717   0440   WBC  9.2   --   12.4*  15.6*   HGB  11.4*   --   11.7  12.6   MCV  94   --   95  97   PLT  225   --   217  230   INR  1.92*  2.90*  4.83*  5.26*     Recent Labs   Lab Test  17   0700  17   0440   POTASSIUM  2.8*  4.6  5.1   CHLORIDE  104  101  101   CO2  24  14*  17*   BUN  17  24  23   ANIONGAP  9  16*  13     Recent Labs   Lab Test  17   14117   0920  17   0700  17   0440  17   2315   10/25/17   1039  14   1720   ALBUMIN  1.9*  2.1*   --   2.1*  2.2*  2.9*   < >   --    --    BILITOTAL  1.4*  1.6*   --   2.1*  2.3*  1.8*   < >   --    --    ALT  2474*  3231*   --   2858*  1890*  769*   < >   --    --    AST  3199*  6542*   --   6218*  3720*  1480*   < >   --    --    PROTEIN   --    --   100*   --    --    --    --   30*  Negative   LIPASE   --    --    --    --   189  455*   --    --    --     < > = values in " this interval not displayed.         Active Problems:    Elevated liver tests, improving. No CBD obstruction on MRCP, suspect liver test abnormalities from heart failure and ischemia    Plan:   -Continue supportive care per ICU team        Ilene Alicea MD  [facility]  Pager: [phone]  Office: [phone]

## 2017-11-07 NOTE — PLAN OF CARE
Problem: Patient Care Overview  Goal: Plan of Care/Patient Progress Review  Outcome: Improving  Alert, confused to place and time. R pupil irregular, sluggish reaction, hx cataract surgery. Pale in BLE, doppled pulses. Extubated at 1125 LS clear BUL, diminished in BLL. BS hypoactive. Farias with adequate output. L radial art line intact. Denies pain. VSS on amiodarone 0.5 mg/hr, Levo 0.03, Tele is afib, in and out of RVR, digoxin 250mcg given, dobutamin stopped. Family at bedside today, supportive, updated and included in planning.

## 2017-11-07 NOTE — PLAN OF CARE
Problem: Patient Care Overview  Goal: Plan of Care/Patient Progress Review  Right wrist and Left wrist restraints discontinued at 1205 PM on 11/7/2017.     Restraint discontinue criteria met, patient is calm, cooperative and safe. Restraints removed.      Patient's Response: Demonstrates understanding  Family Notification:  (Carson), Daughter (Teresa)  Attending Physician Notified: Yes, Attending Physician's Name: Dr. Nita Hendricks

## 2017-11-07 NOTE — PLAN OF CARE
Problem: Patient Care Overview  Goal: Plan of Care/Patient Progress Review  Outcome: Improving  Patient electrolytes replacing at this time, afib continues hr 115-130, sbp high in 130's, levo off. extremeties remain cool, hepatitis c virology positive, Dr Oviedo updated on this. No vent changes, no other gtt changes. Patient remain restrained for pulling at tubes and lines. Liver enzymes trending down, no biliary/hepatic obstruction noted on MRCP. VSS. Daughter and  updated on patient last noc.

## 2017-11-07 NOTE — PROGRESS NOTES
[facility] ICU RESPIRATORY NOTE  Date of Admission: 11/5/17  Date of Intubation (most recent): 11/5/17  Reason for Mechanical Ventilation: Resp. Failure  Number of Days on Mechanical Ventilation: 2  Met Criteria for Pressure Support Trial: No  Length of Pressure Support Trial:  Reason for Stopping Pressure Support Trial:  Reason for No Pressure Support Trial: Per MD    Significant Events Today: None this shift.     ABG Results:     Recent Labs  Lab 11/07/17  0415 11/06/17  0745 11/06/17  0620 11/06/17  0448 11/06/17  0049   PH 7.40 7.39 Canceled, Test credited 7.19* 7.16*   PCO2 37 28* Canceled, Test credited 44 44   PO2 114* 105 Canceled, Test credited 115* 213*   HCO3 23 17* Canceled, Test credited 17* 16*   O2PER  --   --   --   --  100     Ventilation Mode: CMV/AC  FiO2 (%): 40 %  Rate Set (breaths/minute): 18 breaths/min  Tidal Volume Set (mL): 400 mL  PEEP (cm H2O): 5 cmH2O  Oxygen Concentration (%): 40 %  Peak Inspiratory Pressure (cm H2O) (Drager Isi): 55  Resp: 16      ETT appearance on chest x-ray: In good position     Plan:  Continue full support for now.     Troy Tuttle RT

## 2017-11-07 NOTE — PROGRESS NOTES
Notified of rvr now as fast as 140s by nursing staff.  Starting digoxin bolus 250 mcg.  Trialling off of dobutamine, but if uop decreases will need to restart this.  Hesitant to use more amio given liver insult.

## 2017-11-07 NOTE — PROVIDER NOTIFICATION
2100-Dr Oviedo updated on patients blood pressure and MRCP results-continue dobutamine unless sbp maintains above 150. Daughter Teresa and  updated that no plans with MRI results tonight for changes or drain placement.   2300-updated Dr Oviedo on patients lab results.

## 2017-11-07 NOTE — PROGRESS NOTES
Pt extubated at 1125 to Bipap 14/7 12 40%. Weak cough, VSS, and BS equal and bilateral. RT will continue to follow.

## 2017-11-08 ENCOUNTER — APPOINTMENT (OUTPATIENT)
Dept: PHYSICAL THERAPY | Facility: CLINIC | Age: 77
DRG: 208 | End: 2017-11-08
Attending: INTERNAL MEDICINE
Payer: MEDICARE

## 2017-11-08 LAB
ALBUMIN SERPL-MCNC: 2.2 G/DL (ref 3.4–5)
ALP SERPL-CCNC: 209 U/L (ref 40–150)
ALT SERPL W P-5'-P-CCNC: 1650 U/L (ref 0–50)
ANION GAP SERPL CALCULATED.3IONS-SCNC: 7 MMOL/L (ref 3–14)
AST SERPL W P-5'-P-CCNC: 1509 U/L (ref 0–45)
BASOPHILS # BLD AUTO: 0.1 10E9/L (ref 0–0.2)
BASOPHILS NFR BLD AUTO: 1.1 %
BILIRUB DIRECT SERPL-MCNC: 0.6 MG/DL (ref 0–0.2)
BILIRUB SERPL-MCNC: 1.3 MG/DL (ref 0.2–1.3)
BUN SERPL-MCNC: 12 MG/DL (ref 7–30)
CALCIUM SERPL-MCNC: 7.7 MG/DL (ref 8.5–10.1)
CHLORIDE SERPL-SCNC: 107 MMOL/L (ref 94–109)
CO2 SERPL-SCNC: 27 MMOL/L (ref 20–32)
CREAT SERPL-MCNC: 0.86 MG/DL (ref 0.52–1.04)
DIFFERENTIAL METHOD BLD: ABNORMAL
EOSINOPHIL # BLD AUTO: 0.5 10E9/L (ref 0–0.7)
EOSINOPHIL NFR BLD AUTO: 5.6 %
ERYTHROCYTE [DISTWIDTH] IN BLOOD BY AUTOMATED COUNT: 13.3 % (ref 10–15)
GFR SERPL CREATININE-BSD FRML MDRD: 64 ML/MIN/1.7M2
GLUCOSE BLDC GLUCOMTR-MCNC: 104 MG/DL (ref 70–99)
GLUCOSE BLDC GLUCOMTR-MCNC: 106 MG/DL (ref 70–99)
GLUCOSE BLDC GLUCOMTR-MCNC: 111 MG/DL (ref 70–99)
GLUCOSE SERPL-MCNC: 111 MG/DL (ref 70–99)
HCT VFR BLD AUTO: 31.7 % (ref 35–47)
HGB BLD-MCNC: 10.7 G/DL (ref 11.7–15.7)
IMM GRANULOCYTES # BLD: 0 10E9/L (ref 0–0.4)
IMM GRANULOCYTES NFR BLD: 0.1 %
INR PPP: 1.62 (ref 0.86–1.14)
LACTATE BLD-SCNC: 1.2 MMOL/L (ref 0.7–2)
LYMPHOCYTES # BLD AUTO: 1.8 10E9/L (ref 0.8–5.3)
LYMPHOCYTES NFR BLD AUTO: 20.7 %
MAGNESIUM SERPL-MCNC: 1.8 MG/DL (ref 1.6–2.3)
MCH RBC QN AUTO: 32.3 PG (ref 26.5–33)
MCHC RBC AUTO-ENTMCNC: 33.8 G/DL (ref 31.5–36.5)
MCV RBC AUTO: 96 FL (ref 78–100)
MONOCYTES # BLD AUTO: 0.5 10E9/L (ref 0–1.3)
MONOCYTES NFR BLD AUTO: 5.3 %
NEUTROPHILS # BLD AUTO: 5.8 10E9/L (ref 1.6–8.3)
NEUTROPHILS NFR BLD AUTO: 67.2 %
NRBC # BLD AUTO: 0 10*3/UL
NRBC BLD AUTO-RTO: 0 /100
PHOSPHATE SERPL-MCNC: 2.8 MG/DL (ref 2.5–4.5)
PLATELET # BLD AUTO: 260 10E9/L (ref 150–450)
POTASSIUM SERPL-SCNC: 3.6 MMOL/L (ref 3.4–5.3)
PROT SERPL-MCNC: 6.1 G/DL (ref 6.8–8.8)
RBC # BLD AUTO: 3.31 10E12/L (ref 3.8–5.2)
SODIUM SERPL-SCNC: 141 MMOL/L (ref 133–144)
WBC # BLD AUTO: 8.6 10E9/L (ref 4–11)

## 2017-11-08 PROCEDURE — 40000885 ZZH STATISTIC STEP DOWN HRS EVENING

## 2017-11-08 PROCEDURE — 25000128 H RX IP 250 OP 636: Performed by: INTERNAL MEDICINE

## 2017-11-08 PROCEDURE — 87522 HEPATITIS C REVRS TRNSCRPJ: CPT | Performed by: SURGERY

## 2017-11-08 PROCEDURE — 99233 SBSQ HOSP IP/OBS HIGH 50: CPT | Performed by: INTERNAL MEDICINE

## 2017-11-08 PROCEDURE — 40000895 ZZH STATISTIC SLP IP EVAL DEFER

## 2017-11-08 PROCEDURE — 25000132 ZZH RX MED GY IP 250 OP 250 PS 637: Mod: GY | Performed by: INTERNAL MEDICINE

## 2017-11-08 PROCEDURE — 97110 THERAPEUTIC EXERCISES: CPT | Mod: GP | Performed by: PHYSICAL THERAPIST

## 2017-11-08 PROCEDURE — 21000000 ZZH R&B IMCU HEART CARE

## 2017-11-08 PROCEDURE — 99207 ZZC CONSULT E&M CHANGED TO INITIAL LEVEL: CPT | Performed by: PHYSICIAN ASSISTANT

## 2017-11-08 PROCEDURE — A9270 NON-COVERED ITEM OR SERVICE: HCPCS | Mod: GY | Performed by: INTERNAL MEDICINE

## 2017-11-08 PROCEDURE — 40000193 ZZH STATISTIC PT WARD VISIT: Performed by: PHYSICAL THERAPIST

## 2017-11-08 PROCEDURE — 25000132 ZZH RX MED GY IP 250 OP 250 PS 637: Mod: GY | Performed by: PHYSICIAN ASSISTANT

## 2017-11-08 PROCEDURE — 85610 PROTHROMBIN TIME: CPT | Performed by: INTERNAL MEDICINE

## 2017-11-08 PROCEDURE — 94660 CPAP INITIATION&MGMT: CPT

## 2017-11-08 PROCEDURE — 87522 HEPATITIS C REVRS TRNSCRPJ: CPT | Performed by: INTERNAL MEDICINE

## 2017-11-08 PROCEDURE — 80076 HEPATIC FUNCTION PANEL: CPT | Performed by: SURGERY

## 2017-11-08 PROCEDURE — 40000275 ZZH STATISTIC RCP TIME EA 10 MIN

## 2017-11-08 PROCEDURE — A9270 NON-COVERED ITEM OR SERVICE: HCPCS | Mod: GY | Performed by: PHYSICIAN ASSISTANT

## 2017-11-08 PROCEDURE — 85025 COMPLETE CBC W/AUTO DIFF WBC: CPT | Performed by: SURGERY

## 2017-11-08 PROCEDURE — 80048 BASIC METABOLIC PNL TOTAL CA: CPT | Performed by: SURGERY

## 2017-11-08 PROCEDURE — 97161 PT EVAL LOW COMPLEX 20 MIN: CPT | Mod: GP | Performed by: PHYSICAL THERAPIST

## 2017-11-08 PROCEDURE — 97530 THERAPEUTIC ACTIVITIES: CPT | Mod: GP | Performed by: PHYSICAL THERAPIST

## 2017-11-08 PROCEDURE — 25000128 H RX IP 250 OP 636: Performed by: PHYSICIAN ASSISTANT

## 2017-11-08 PROCEDURE — 40000884 ZZH STATISTIC STEP DOWN HRS NIGHT

## 2017-11-08 PROCEDURE — 99222 1ST HOSP IP/OBS MODERATE 55: CPT | Performed by: PHYSICIAN ASSISTANT

## 2017-11-08 PROCEDURE — 99291 CRITICAL CARE FIRST HOUR: CPT | Performed by: INTERNAL MEDICINE

## 2017-11-08 PROCEDURE — 00000146 ZZHCL STATISTIC GLUCOSE BY METER IP

## 2017-11-08 PROCEDURE — 25000125 ZZHC RX 250: Performed by: INTERNAL MEDICINE

## 2017-11-08 PROCEDURE — 83735 ASSAY OF MAGNESIUM: CPT | Performed by: SURGERY

## 2017-11-08 PROCEDURE — 36415 COLL VENOUS BLD VENIPUNCTURE: CPT | Performed by: PHYSICIAN ASSISTANT

## 2017-11-08 PROCEDURE — 84100 ASSAY OF PHOSPHORUS: CPT | Performed by: SURGERY

## 2017-11-08 PROCEDURE — 83605 ASSAY OF LACTIC ACID: CPT | Performed by: PHYSICIAN ASSISTANT

## 2017-11-08 RX ORDER — NITROGLYCERIN 0.4 MG/1
0.4 TABLET SUBLINGUAL EVERY 5 MIN PRN
Status: DISCONTINUED | OUTPATIENT
Start: 2017-11-08 | End: 2017-11-09

## 2017-11-08 RX ORDER — PAROXETINE 30 MG/1
30 TABLET, FILM COATED ORAL DAILY
Status: DISCONTINUED | OUTPATIENT
Start: 2017-11-08 | End: 2017-11-13 | Stop reason: HOSPADM

## 2017-11-08 RX ORDER — METOPROLOL SUCCINATE 25 MG/1
25 TABLET, EXTENDED RELEASE ORAL 2 TIMES DAILY
Status: DISCONTINUED | OUTPATIENT
Start: 2017-11-08 | End: 2017-11-09

## 2017-11-08 RX ORDER — METOPROLOL TARTRATE 1 MG/ML
2.5 INJECTION, SOLUTION INTRAVENOUS EVERY 4 HOURS PRN
Status: DISCONTINUED | OUTPATIENT
Start: 2017-11-08 | End: 2017-11-13 | Stop reason: HOSPADM

## 2017-11-08 RX ORDER — WARFARIN SODIUM 2.5 MG/1
2.5 TABLET ORAL
Status: COMPLETED | OUTPATIENT
Start: 2017-11-08 | End: 2017-11-08

## 2017-11-08 RX ORDER — ATORVASTATIN CALCIUM 40 MG/1
40 TABLET, FILM COATED ORAL AT BEDTIME
Status: DISCONTINUED | OUTPATIENT
Start: 2017-11-08 | End: 2017-11-10

## 2017-11-08 RX ORDER — LIDOCAINE 40 MG/G
CREAM TOPICAL
Status: DISCONTINUED | OUTPATIENT
Start: 2017-11-08 | End: 2017-11-09

## 2017-11-08 RX ADMIN — HEPARIN SODIUM 5000 UNITS: 5000 INJECTION, SOLUTION INTRAVENOUS; SUBCUTANEOUS at 22:17

## 2017-11-08 RX ADMIN — PAROXETINE HYDROCHLORIDE 30 MG: 30 TABLET, FILM COATED ORAL at 22:17

## 2017-11-08 RX ADMIN — HEPARIN SODIUM 5000 UNITS: 5000 INJECTION, SOLUTION INTRAVENOUS; SUBCUTANEOUS at 13:45

## 2017-11-08 RX ADMIN — HEPARIN SODIUM 5000 UNITS: 5000 INJECTION, SOLUTION INTRAVENOUS; SUBCUTANEOUS at 05:09

## 2017-11-08 RX ADMIN — METOPROLOL SUCCINATE 25 MG: 25 TABLET, EXTENDED RELEASE ORAL at 22:16

## 2017-11-08 RX ADMIN — WARFARIN SODIUM 2.5 MG: 2.5 TABLET ORAL at 17:49

## 2017-11-08 RX ADMIN — FUROSEMIDE 40 MG: 10 INJECTION, SOLUTION INTRAVENOUS at 07:51

## 2017-11-08 RX ADMIN — Medication 2 G: at 07:51

## 2017-11-08 RX ADMIN — POTASSIUM CHLORIDE 20 MEQ: 400 INJECTION, SOLUTION INTRAVENOUS at 09:06

## 2017-11-08 RX ADMIN — FUROSEMIDE 40 MG: 10 INJECTION, SOLUTION INTRAVENOUS at 16:06

## 2017-11-08 RX ADMIN — TAZOBACTAM SODIUM AND PIPERACILLIN SODIUM 4.5 G: 500; 4 INJECTION, SOLUTION INTRAVENOUS at 05:09

## 2017-11-08 RX ADMIN — METOPROLOL SUCCINATE 25 MG: 25 TABLET, EXTENDED RELEASE ORAL at 10:07

## 2017-11-08 RX ADMIN — ATORVASTATIN CALCIUM 40 MG: 40 TABLET, FILM COATED ORAL at 22:17

## 2017-11-08 NOTE — PROGRESS NOTES
GI chart check    Liver tests continue to improve. No evidence of biliary obstruction on MRCP. Suspect liver injury related to passive congestion and ischemia    Recommend continued supportive care per ICU team    Will sign off. Call with questions.    Ilene Alicea MD  [facility]  Pager [phone]  Office [phone]

## 2017-11-08 NOTE — PLAN OF CARE
Problem: Patient Care Overview  Goal: Plan of Care/Patient Progress Review  SLP: Bedside swallow eval orders received. Per discussion with RN, pt was extubated yesterday morning and passed nursing swallow screen this AM without difficulty. Pt with no documented hx of dysphagia and per RN pt is tolerating current diet w/o difficulty. Will defer swallow eval and complete ST orders.

## 2017-11-08 NOTE — PHARMACY-ANTICOAGULATION SERVICE
Clinical Pharmacy - Warfarin Dosing Consult     Pharmacy has been consulted to manage this patient s warfarin therapy.  Indication: Atrial Fibrillation  Therapy Goal: INR 2-3  Warfarin Prior to Admission: Yes  Warfarin PTA Regimen: 5 mg Tu/Th/Su; 2.5 mg AOD   Significant drug interactions: Heparin SQ    INR   Date Value Ref Range Status   11/07/2017 1.92 (H) 0.86 - 1.14 Final   11/06/2017 2.90 (H) 0.86 - 1.14 Final     Comment:     DELTA CALLED TO LAUREN IN ICU. PCU TO TAKE FURTHER ACTION IF THEY DEEM IT   NECESSARY         Recommend warfarin 2.5 mg today.  Pharmacy will monitor Em PB Basilio daily and order warfarin doses to achieve specified goal.      Please contact pharmacy as soon as possible if the warfarin needs to be held for a procedure or if the warfarin goals change.

## 2017-11-08 NOTE — PROGRESS NOTES
Pt was extubated @0320 to BIPAP 12/5 40%. Equal bilateral breath sounds, no stridor. Will continue to follow.  11/8/2017  Magi Hassan

## 2017-11-08 NOTE — PROGRESS NOTES
"[facility] Cardiology Progress Note            Interval History:   Acute pulmonary edema. Chronic afib. Non ischemic cardiomyopathy. Cause of acute pulmonary edema is unclear. No flow limiting CAD on coronary angiogram from 2 weeks ago. HR appeared to be well controlled when discharged from [facility]. Extubated now. HR better off pressors and with improved respiratory status.              Medications:       metoprolol  25 mg Oral BID     warfarin  2.5 mg Oral ONCE at 18:00     sodium chloride (PF)  3 mL Intracatheter Q8H     heparin  5,000 Units Subcutaneous Q8H     chlorhexidine  15 mL Mouth/Throat Q12H     furosemide  40 mg Intravenous BID                 Physical Exam:   Blood pressure 107/80, pulse 132, temperature 96.8  F (36  C), resp. rate 17, height 1.651 m (5' 5\"), weight 89.5 kg (197 lb 5 oz), SpO2 100 %.  Rhythm:  afib   Constitutional:   awake, alert, cooperative, no apparent distress, and appears stated age     Neck:   no jugular venous distension and no carotid bruits     Lungs:   Mild respiratory distress and crackles right base and left base     Cardiovascular:   irregularly irregular rhythm, S3 present, no murmur noted, murmurs include systolic murmur I/VI located at apex without radiation and no edema                    Data:          Lab Results   Component Value Date     11/08/2017     11/07/2017     11/07/2017    Lab Results   Component Value Date    CHLORIDE 107 11/08/2017    CHLORIDE 105 11/07/2017    CHLORIDE 104 11/07/2017    Lab Results   Component Value Date    BUN 12 11/08/2017    BUN 14 11/07/2017    BUN 17 11/07/2017      Lab Results   Component Value Date    POTASSIUM 3.6 11/08/2017    POTASSIUM 3.6 11/07/2017    POTASSIUM 3.6 11/07/2017    Lab Results   Component Value Date    CO2 27 11/08/2017    CO2 23 11/07/2017    CO2 24 11/07/2017    Lab Results   Component Value Date    CR 0.86 11/08/2017    CR 0.90 11/07/2017    CR 0.80 11/07/2017        Lab Results "   Component Value Date    HGB 10.7 (L) 11/08/2017    HGB 11.4 (L) 11/07/2017    HGB 11.7 11/06/2017     Lab Results   Component Value Date    INR 1.62 (H) 11/08/2017    INR 1.92 (H) 11/07/2017    INR 2.90 (H) 11/06/2017     Lab Results   Component Value Date     11/08/2017     11/07/2017     11/06/2017     Lab Results   Component Value Date    WBC 8.6 11/08/2017    WBC 9.2 11/07/2017    WBC 12.4 (H) 11/06/2017            EKG and Imaging results:    I have reviewed recent EKGs and imaging studies.         Assessment and Plan:   Assessment:   Problem List    Acute respiratory failure with hypoxemia (H)    Acute pulmonary edema (H)    Secondary cardiomyopathy (H)   Chronic atrial fibrillation    * No resolved hospital problems. *       Plan:   Metoprolol succinate 25 mg BID as discussed over phone and titrate up to control HR.  Continue with IV lasix.  Add ACEI when BP allows.       Attestation:  I have reviewed today's vital signs, notes, medications, labs and imaging.  Amount of time spent providing critical care service today: 25 minutes.  Total time: 40 minutes   Moses Green MD, MD 11/8/2017  1:07 PM

## 2017-11-08 NOTE — PLAN OF CARE
Problem: Patient Care Overview  Goal: Plan of Care/Patient Progress Review  OT: Attempted to see patient however just getting back into bed, too tired after PT.

## 2017-11-08 NOTE — PROGRESS NOTES
Pt has been on the BIPAP 14/7 50% throughout the night, mepilex and gel pad under the mask. Will continue to follow.  11/8/2017  Magi Hassan

## 2017-11-08 NOTE — PLAN OF CARE
Problem: Patient Care Overview  Goal: Plan of Care/Patient Progress Review  Discharge Planner PT    Evaluation completed, treatment initiated. 78 yo female adm with significant SOB, intubated x 2 days. PMH includes a-fib with RVR, recent stay at [facility] with a-fib and CHF. PMH includes HTN. Notes indicate EF~ 30%.   Pt resides in a house with her . States she was independent with mobility and ADLs prior to admit. Reports she has a walker. Notes from previous admit at [facility] (10/25/2017) state the pt was ambulating independently without the use of an AD.     Patient plan for discharge: None Stated.  Current status: Pt is confused, poor historian. Initially unable to recall that she is  or her 's name. Pt is oriented to place and month.  Today the pt presents confused, pleasant, follows 100% of commands. Sequencing and processing are slow, but follows 100%. Difficulty taking steps, appears to have pressoreceptive and coordination deficits with stepping. HR remains in a-fib, HR up to 140; however, RN at bedside and okays up to chair. HR at rest and with activity remains the same 110's to 140. Sit>supine Min A. Sit<>stand Min A. Stand pivot Min A for balance and max verbal cues for stepping sequence.  Barriers to return to prior living situation: Level of assist, cognition, impaired activity tolerance, strength,coordination, fall risk  Recommendations for discharge: Acute Rehab  Rationale for recommendations: Pt will benefit from continued skilled therapy sessions to progress independence and safety with mobility.       Entered by: Kamini Styles 11/08/2017 9:41 AM

## 2017-11-08 NOTE — PROGRESS NOTES
Patient was on BiPAP 14/7 50% most of the evening. Was placed on 6L oxymizer for a break off of the BiPAP at 1945 with SpO2 in the mid to upper 90's. Was placed back on BiPAP at 2130 due to increased work of breathing. Gel pad and mepilex in place under BiPAP mask.  Will cont to follow.  11/7/2017  Ciera Mccray RRT

## 2017-11-08 NOTE — PLAN OF CARE
Problem: Patient Care Overview  Goal: Plan of Care/Patient Progress Review  Outcome: Improving  Pt A/O. Off levo since 2000 last evening. Slept well on BiPAP. LS diminished in bases. Amino gtt, a fib controled. Good urine output. Therapies to see today.

## 2017-11-08 NOTE — PROGRESS NOTES
Critical Care Progress Note      11/08/2017    Name: Em Richmond MRN#: [id number]   Age: 77 year old YOB: 1940     [facility]               Problem List:   Active Problems:    Acute respiratory failure with hypoxemia (H)    Acute pulmonary edema (H)    Secondary cardiomyopathy (H)  Cardiogenic shock--resolved  Acute transaminitis (improving)  Elevated INR (on coumadin)           Summary/Hospital Course:     77F with known systolic heart failure, apparently had episode of abd pain, nausea, vomiting yesterday along with progressively worsened SOB.  Found to be hypoxemic by EMS and intubated in field.  Noted to have very elevated lactate here, transaminitis, extremities cold on arrival.  Initiated on dobutamine for cardiogenic shock.  Abd ultrasound in ED showed equivocally dilated cbd, and edematous gallbladder, but MRCP less concerning for hepatobiliary disease.  Her perfusion significantly improved with dobutamine and she has now weaned off of this.  She also was briefly on levophed, but this has weaned off as well.  She was extubated on 11/7, and is breathing comfortably on 6L oximizer on 11/8.  She has diuresed well during her ICU stay.  On 11/8 we are restarting toprol.      Assessment and plan :     Em Richmond IS a 77 year old female admitted on 11/5/2017 for respiratory failure and shock.   I have personally reviewed the daily labs, imaging studies, cultures and discussed the case with referring physician and consulting physicians.   My assessment and plan by system for this patient is as follows:    Neurology/Psychiatry:   1. Awake, alert and pleasant.    Cardiovascular:   1.  Shock:  Suspect cardiogenic but now resolved.  Perfusing well this AM.  2.  A fib:  Rate 100s to 110s on my visit.  Stopped amio drip.  Restarting toprol XL 25 bid per cardiology direction.    Pulmonary/Ventilator Management:   1. Hypoxemia:  Suspect pulmonary edema.  Much improved. Now  extubated.  o2 needs weaning well.  Continue to diurese.    GI and Nutrition :   1. Transaminitis:  Suspect shock liver and congestive hepatopathy.  Transaminitidies and synthetic markers all now improving.  MRCP negative for obstruction.  NAC started by GI--appreciate their input, NAC now finished.  2.  GB edema and borderline CBD dilation:  MRCP reassuring.  3. Protein malnutrition:  Serum albumin 1.9. Resuming PO feeding (swallowing well).  4.  Hep C antibody positive:  Following up with RNA quant.  If positive will need outpatient eval for treatment.  I have discussed this with the patient.  D/w Dr. Alicea of GI service to ensure followup is established.    Renal/Fluids/Electrolytes:   1. Lactic acidosis: Resolved.  2.  Creatinine acceptable and UOP excellent with diuresis. Continue.    Infectious Disease:   1.  Possible sepsis:  Cultures negative.  Stopped both vanc and zosyn.   WBC resolved to normal.    Endocrine:   1. Sugars adequately controlled.  CTM.  Insulin vs dextrose prn.    Hematology/Oncology:   1. AC:  OK to restart coumadin from critical care perspective.    IV/Access:   1. Venous access - fem TLC from ED on 11/6/17  2. Arterial access -  A line  Plan  - central access required and necessary      ICU Prophylaxis:   1. DVT: mech/sc hep.  Starting coumadin today.  7. Family Update:  Pt herself at bedside  8. Disposition - may leave ICU today        Key goals for next 24 hours:   1. Restart toprol  2.  Restart coumadin  3. Diet PO               Interim History:     Looking much better today.  Denies complaints of shortness of breath, chest pain, nausea/vomiting or dizzy/lightheaded on my visit.         Key Medications:       metoprolol  25 mg Oral BID     heparin  5,000 Units Subcutaneous Q8H     chlorhexidine  15 mL Mouth/Throat Q12H     furosemide  40 mg Intravenous BID       norepinephrine Stopped (11/07/17 2004)     amiodarone 0.5 mg/min (11/07/17 0942)     acetylcysteine (ACETADOTE) infusion  *second dose* Stopped (11/06/17 1646)     acetylcysteine (ACEDOTE) infusion *third dose* Stopped (11/07/17 1259)     DOBUTamine Stopped (11/07/17 1715)     dexmedetomidine Stopped (11/07/17 1141)               Physical Examination:   Temp:  [96.8  F (36  C)-100.9  F (38.3  C)] 96.8  F (36  C)  Heart Rate:  [] 93  Resp:  [11-32] 17  BP: ()/(72-80) 107/80  MAP:  [58 mmHg-108 mmHg] 84 mmHg  Arterial Line BP: ()/(52-90) 104/65  FiO2 (%):  [40 %-50 %] 50 %  SpO2:  [91 %-100 %] 100 %      Intake/Output Summary (Last 24 hours) at 11/08/17 1009  Last data filed at 11/08/17 1000   Gross per 24 hour   Intake           745.13 ml   Output             3850 ml   Net         -3104.87 ml         Wt Readings from Last 4 Encounters:   11/08/17 89.5 kg (197 lb 5 oz)   10/25/17 83.9 kg (184 lb 14.4 oz)   06/20/14 83.9 kg (185 lb)     Arterial Line BP: ()/(52-90) 104/65  MAP:  [58 mmHg-108 mmHg] 84 mmHg  BP - Mean:  [77-92] 92  Ventilation Mode: CPAP/PS  FiO2 (%): 50 %  Rate Set (breaths/minute): 18 breaths/min  Tidal Volume Set (mL): 400 mL  PEEP (cm H2O): 5 cmH2O  Pressure Support (cm H2O): 5 cmH2O  Oxygen Concentration (%): 40 %  Resp: 17    Recent Labs  Lab 11/07/17  0415 11/06/17  0745 11/06/17  0620 11/06/17  0448 11/06/17  0049   PH 7.40 7.39 Canceled, Test credited 7.19* 7.16*   PCO2 37 28* Canceled, Test credited 44 44   PO2 114* 105 Canceled, Test credited 115* 213*   HCO3 23 17* Canceled, Test credited 17* 16*   O2PER  --   --   --   --  100       GEN: no acute distress, awake, alert, pleasant  HEENT: head ncat, sclera anicteric, OP patent, trachea midline   PULM: unlabored, clear anteriorly    NECK: supple  CV/COR: RRR S1S2 no gallop,  No rub, no murmur  ABD: soft, now non-tender, hypoactive bowel sounds, no mass  EXT:  Minimal edema x4,  remains warm x4   NEURO: awake, alert, pleasant, fluent/rapid/appropriate speech.  Good str/sens x4.  SKIN: no obvious rash  LINES: clean, dry intact         Data:    All data and imaging reviewed     ROUTINE ICU LABS (Last four results)  CMP    Recent Labs  Lab 11/08/17  0500 11/07/17  1720 11/07/17  1225 11/07/17  0415 11/06/17  1417 11/06/17  0700 11/06/17  0440    138  --  137  --  131* 131*   POTASSIUM 3.6 3.6 3.6 2.8*  --  4.6 5.1   CHLORIDE 107 105  --  104  --  101 101   CO2 27 23  --  24  --  14* 17*   ANIONGAP 7 10  --  9  --  16* 13   * 133*  --  97  --  138* 114*   BUN 12 14  --  17  --  24 23   CR 0.86 0.90  --  0.80  --  0.83 0.84   GFRESTIMATED 64 61  --  70  --  67 66   GFRESTBLACK 77 74  --  84  --  81 79   ALFIE 7.7* 7.4*  --  7.3*  --  7.8* 7.6*   MAG 1.8 2.0 2.3 1.4*  --  1.7 1.7   PHOS 2.8  --   --  2.5  --  4.0 5.0*   PROTTOTAL  --   --   --  5.9* 6.0* 6.1* 6.3*   ALBUMIN  --   --   --  1.9* 2.1* 2.1* 2.2*   BILITOTAL  --   --   --  1.4* 1.6* 2.1* 2.3*   ALKPHOS  --   --   --  226* 259* 292* 314*   AST  --   --   --  3199* 6542* 6218* 3720*   ALT  --   --   --  2474* 3231* 2858* 1890*     CBC    Recent Labs  Lab 11/08/17  0500 11/07/17  0415 11/06/17  0700 11/06/17  0440   WBC 8.6 9.2 12.4* 15.6*   RBC 3.31* 3.56* 3.61* 3.89   HGB 10.7* 11.4* 11.7 12.6   HCT 31.7* 33.3* 34.4* 37.8   MCV 96 94 95 97   MCH 32.3 32.0 32.4 32.4   MCHC 33.8 34.2 34.0 33.3   RDW 13.3 13.0 13.0 13.2    225 217 230     INR    Recent Labs  Lab 11/07/17  0415 11/06/17  1417 11/06/17  0700 11/06/17  0440   INR 1.92* 2.90* 4.83* 5.26*     Arterial Blood Gas    Recent Labs  Lab 11/07/17  0415 11/06/17  0745 11/06/17  0620 11/06/17  0448 11/06/17  0049   PH 7.40 7.39 Canceled, Test credited 7.19* 7.16*   PCO2 37 28* Canceled, Test credited 44 44   PO2 114* 105 Canceled, Test credited 115* 213*   HCO3 23 17* Canceled, Test credited 17* 16*   O2PER  --   --   --   --  100       All cultures:    Recent Labs  Lab 11/06/17  0139 11/05/17  2315   CULT No growth after 2 days No growth after 2 days     No results found for this or any previous visit (from the past 24  hour(s)).  Labs (personally reviewed/interpreted): lytes stable/acceptable. Creatinine normal at 0.86.    Wbc normalized 8.6.  Hgb/pltlts ok.    Echo with EF 31%, moderate RV dysfunction as well.     D/w cardiology attdg Dr. Green, GI attdg Dr. Alicea, and hospitalist attending Dr. Foote.     Billing: Care time: 40 min

## 2017-11-08 NOTE — PLAN OF CARE
Problem: Patient Care Overview  Goal: Plan of Care/Patient Progress Review  Outcome: Improving  Alert, forgetful but reorients easily. R pupil irregular, sluggish reaction, hx cataract surgery. Oxymizer cannula, LS clear. BS hypoactive. Farias with adequate output. L radial art line site CDI, R groin site CDI. Tele is afib, in and out of RVR. Family at bedside today, supportive, updated and included in planning.

## 2017-11-08 NOTE — PROGRESS NOTES
11/08/17 0949   Quick Adds   Type of Visit Initial PT Evaluation   Living Environment   Lives With spouse   Living Arrangements house   Home Accessibility stairs to enter home;stairs within home   Transportation Available car;family or friend will provide   Living Environment Comment Pt with difficulty recalling home set up, states she lives with her parents, then states her Daughter lives with her, then her Son. Per chart pt is , with cues and re-orientation to situation, pt able to come up wiht her 's name and that she is .   Self-Care   Dominant Hand right   Usual Activity Tolerance moderate   Current Activity Tolerance poor   Regular Exercise no   Equipment Currently Used at Home none   Functional Level Prior   Ambulation 0-->independent   Transferring 0-->independent   Toileting 0-->independent   Bathing 0-->independent   Dressing 0-->independent   Eating 0-->independent   Communication 0-->understands/communicates without difficulty   Swallowing 0-->swallows foods/liquids without difficulty   Cognition 0 - no cognition issues reported   Prior Functional Level Comment Per chart from admit at [facility] 10/20-10/25 the pt was able to ambulate 125' without difficulty, indepenent, no AD, no LOB.    General Information   Onset of Illness/Injury or Date of Surgery - Date 11/05/17   Referring Physician Santi Terrazas MD   Patient/Family Goals Statement None Stated   Pertinent History of Current Problem (include personal factors and/or comorbidities that impact the POC) 78 yo female adm with significant SOB, intubated x 2 days. PMH includes a-fib with RVR, recent stay at [facility] with a-fib and CHF. PMH includes HTN. Notes indicate EF~ 30%   Precautions/Limitations fall precautions;oxygen therapy device and L/min  (6L oxymizer)   Cognitive Status Examination   Orientation person;place  (oriented to Month)   Level of Consciousness alert;confused   Follows Commands and Answers Questions 100% of the time;able  "to follow single-step instructions  (Needs repeate cues and time to process)   Cognitive Comment Confused, defer to OT for formal assessment   Integumentary/Edema   Integumentary/Edema Comments Multiple lines   Posture    Posture Forward head position;Protracted shoulders   Range of Motion (ROM)   ROM Comment Demonstrates B LE and UE WFL as obs via functional mobiltiy   Strength   Strength Comments Demonstrates antigravity strength with mobility, but fatigues quickly.    Bed Mobility   Bed Mobility Comments Sup>sit Min A   Transfer Skills   Transfer Comments sit>stand Min A for anterior weight shift   Gait   Gait Comments Difficulty with stepping sequence and balance.   Balance   Balance Comments Impaired standing balance, needs B UEs upport   Sensory Examination   Sensory Perception Comments Reports a \"funny feeling\" in the L LE. RN states the pt had a \"chalie horse\" earlier.  Pt having difficulty differentiating L and R with sensation assessment.   Coordination   Coordination Comments Coordination and sequencing are off , proprioreception with stepping off.   General Therapy Interventions   Planned Therapy Interventions balance training;bed mobility training;gait training;neuromuscular re-education;ROM;strengthening;stretching;transfer training;progressive activity/exercise   Clinical Impression   Criteria for Skilled Therapeutic Intervention yes, treatment indicated   PT Diagnosis Impaired Gait   Influenced by the following impairments Decreased strength,balance, impaired activity tolerance, coordination   Functional limitations due to impairments Decreased functoinal independence   Clinical Presentation Evolving/Changing   Clinical Presentation Rationale PMH, current level of assist   Clinical Decision Making (Complexity) Moderate complexity   Therapy Frequency` daily   Predicted Duration of Therapy Intervention (days/wks) 1 week   Anticipated Discharge Disposition Acute Rehabilitation Facility   Risk & Benefits " "of therapy have been explained Yes   Patient, Family & other staff in agreement with plan of care Yes   [facility] AM-PAC TM \"6 Clicks\"   2016, Trustees of [facility], under license to [employer].  All rights reserved.   6 Clicks Short Forms Basic Mobility Inpatient Short Form   [facility] AM-PAC  \"6 Clicks\" V.2 Basic Mobility Inpatient Short Form   1. Turning from your back to your side while in a flat bed without using bedrails? 3 - A Little   2. Moving from lying on your back to sitting on the side of a flat bed without using bedrails? 3 - A Little   3. Moving to and from a bed to a chair (including a wheelchair)? 3 - A Little   4. Standing up from a chair using your arms (e.g., wheelchair, or bedside chair)? 3 - A Little   5. To walk in hospital room? 2 - A Lot   6. Climbing 3-5 steps with a railing? 1 - Total   Basic Mobility Raw Score (Score out of 24.Lower scores equate to lower levels of function) 15   Total Evaluation Time   Total Evaluation Time (Minutes) 10     "

## 2017-11-09 ENCOUNTER — APPOINTMENT (OUTPATIENT)
Dept: PHYSICAL THERAPY | Facility: CLINIC | Age: 77
DRG: 208 | End: 2017-11-09
Payer: MEDICARE

## 2017-11-09 LAB
ALBUMIN SERPL-MCNC: 2.3 G/DL (ref 3.4–5)
ALP SERPL-CCNC: 199 U/L (ref 40–150)
ALT SERPL W P-5'-P-CCNC: 1120 U/L (ref 0–50)
AST SERPL W P-5'-P-CCNC: 749 U/L (ref 0–45)
BILIRUB DIRECT SERPL-MCNC: 0.6 MG/DL (ref 0–0.2)
BILIRUB SERPL-MCNC: 1.5 MG/DL (ref 0.2–1.3)
INR PPP: 1.68 (ref 0.86–1.14)
POTASSIUM SERPL-SCNC: 3.1 MMOL/L (ref 3.4–5.3)
POTASSIUM SERPL-SCNC: 3.4 MMOL/L (ref 3.4–5.3)
PROT SERPL-MCNC: 6.7 G/DL (ref 6.8–8.8)

## 2017-11-09 PROCEDURE — 40000886 ZZH STATISTIC STEP DOWN HRS DAY

## 2017-11-09 PROCEDURE — 25000132 ZZH RX MED GY IP 250 OP 250 PS 637: Mod: GY

## 2017-11-09 PROCEDURE — 25000132 ZZH RX MED GY IP 250 OP 250 PS 637: Mod: GY | Performed by: INTERNAL MEDICINE

## 2017-11-09 PROCEDURE — 97110 THERAPEUTIC EXERCISES: CPT | Mod: GP

## 2017-11-09 PROCEDURE — 97116 GAIT TRAINING THERAPY: CPT | Mod: GP

## 2017-11-09 PROCEDURE — A9270 NON-COVERED ITEM OR SERVICE: HCPCS | Mod: GY | Performed by: INTERNAL MEDICINE

## 2017-11-09 PROCEDURE — 80076 HEPATIC FUNCTION PANEL: CPT | Performed by: PHYSICIAN ASSISTANT

## 2017-11-09 PROCEDURE — 25000128 H RX IP 250 OP 636: Performed by: INTERNAL MEDICINE

## 2017-11-09 PROCEDURE — 85610 PROTHROMBIN TIME: CPT | Performed by: PHYSICIAN ASSISTANT

## 2017-11-09 PROCEDURE — 40000193 ZZH STATISTIC PT WARD VISIT

## 2017-11-09 PROCEDURE — 36415 COLL VENOUS BLD VENIPUNCTURE: CPT | Performed by: PHYSICIAN ASSISTANT

## 2017-11-09 PROCEDURE — A9270 NON-COVERED ITEM OR SERVICE: HCPCS | Mod: GY | Performed by: PHYSICIAN ASSISTANT

## 2017-11-09 PROCEDURE — 36415 COLL VENOUS BLD VENIPUNCTURE: CPT | Performed by: INTERNAL MEDICINE

## 2017-11-09 PROCEDURE — A9270 NON-COVERED ITEM OR SERVICE: HCPCS | Mod: GY

## 2017-11-09 PROCEDURE — 84132 ASSAY OF SERUM POTASSIUM: CPT | Performed by: PHYSICIAN ASSISTANT

## 2017-11-09 PROCEDURE — 99232 SBSQ HOSP IP/OBS MODERATE 35: CPT | Performed by: HOSPITALIST

## 2017-11-09 PROCEDURE — 99232 SBSQ HOSP IP/OBS MODERATE 35: CPT | Performed by: INTERNAL MEDICINE

## 2017-11-09 PROCEDURE — 25000132 ZZH RX MED GY IP 250 OP 250 PS 637: Mod: GY | Performed by: PHYSICIAN ASSISTANT

## 2017-11-09 PROCEDURE — 21000001 ZZH R&B HEART CARE

## 2017-11-09 PROCEDURE — 84132 ASSAY OF SERUM POTASSIUM: CPT | Performed by: INTERNAL MEDICINE

## 2017-11-09 RX ORDER — POTASSIUM CL/LIDO/0.9 % NACL 10MEQ/0.1L
10 INTRAVENOUS SOLUTION, PIGGYBACK (ML) INTRAVENOUS
Status: DISCONTINUED | OUTPATIENT
Start: 2017-11-09 | End: 2017-11-09

## 2017-11-09 RX ORDER — POTASSIUM CHLORIDE 1500 MG/1
20-40 TABLET, EXTENDED RELEASE ORAL
Status: DISCONTINUED | OUTPATIENT
Start: 2017-11-09 | End: 2017-11-09

## 2017-11-09 RX ORDER — WARFARIN SODIUM 5 MG/1
5 TABLET ORAL
Status: COMPLETED | OUTPATIENT
Start: 2017-11-09 | End: 2017-11-09

## 2017-11-09 RX ORDER — POTASSIUM CHLORIDE 29.8 MG/ML
20 INJECTION INTRAVENOUS
Status: DISCONTINUED | OUTPATIENT
Start: 2017-11-09 | End: 2017-11-09

## 2017-11-09 RX ORDER — POTASSIUM CHLORIDE 7.45 MG/ML
10 INJECTION INTRAVENOUS
Status: DISCONTINUED | OUTPATIENT
Start: 2017-11-09 | End: 2017-11-09

## 2017-11-09 RX ORDER — POTASSIUM CHLORIDE 1.5 G/1.58G
20-40 POWDER, FOR SOLUTION ORAL
Status: DISCONTINUED | OUTPATIENT
Start: 2017-11-09 | End: 2017-11-09

## 2017-11-09 RX ADMIN — OMEPRAZOLE 20 MG: 20 CAPSULE, DELAYED RELEASE ORAL at 08:30

## 2017-11-09 RX ADMIN — PAROXETINE HYDROCHLORIDE 30 MG: 30 TABLET, FILM COATED ORAL at 08:29

## 2017-11-09 RX ADMIN — METOPROLOL SUCCINATE 25 MG: 25 TABLET, EXTENDED RELEASE ORAL at 08:30

## 2017-11-09 RX ADMIN — POTASSIUM CHLORIDE 20 MEQ: 1500 TABLET, EXTENDED RELEASE ORAL at 18:45

## 2017-11-09 RX ADMIN — FUROSEMIDE 40 MG: 10 INJECTION, SOLUTION INTRAVENOUS at 08:32

## 2017-11-09 RX ADMIN — WARFARIN SODIUM 5 MG: 5 TABLET ORAL at 17:57

## 2017-11-09 RX ADMIN — Medication 37.5 MG: at 20:33

## 2017-11-09 RX ADMIN — HEPARIN SODIUM 5000 UNITS: 5000 INJECTION, SOLUTION INTRAVENOUS; SUBCUTANEOUS at 05:46

## 2017-11-09 RX ADMIN — FUROSEMIDE 40 MG: 10 INJECTION, SOLUTION INTRAVENOUS at 16:45

## 2017-11-09 RX ADMIN — POTASSIUM CHLORIDE 20 MEQ: 1500 TABLET, EXTENDED RELEASE ORAL at 13:54

## 2017-11-09 RX ADMIN — HEPARIN SODIUM 5000 UNITS: 5000 INJECTION, SOLUTION INTRAVENOUS; SUBCUTANEOUS at 22:34

## 2017-11-09 RX ADMIN — ATORVASTATIN CALCIUM 40 MG: 40 TABLET, FILM COATED ORAL at 22:34

## 2017-11-09 RX ADMIN — POTASSIUM CHLORIDE 40 MEQ: 1500 TABLET, EXTENDED RELEASE ORAL at 12:00

## 2017-11-09 RX ADMIN — HEPARIN SODIUM 5000 UNITS: 5000 INJECTION, SOLUTION INTRAVENOUS; SUBCUTANEOUS at 13:54

## 2017-11-09 NOTE — PROGRESS NOTES
[facility]    Hospitalist Progress Note    Assessment & Plan   Em Richmond is a 77-year-old female with a past medical history significant for nonischemic cardiomyopathy, chronic atrial fibrillation, gastroesophageal reflux disease, anxiety, pulmonary hypertension, obesity and obstructive sleep apnea who was admitted on 11/06/2017 due to acute hypoxic respiratory failure requiring intubation in the setting of cardiogenic shock secondary to atrial fibrillation with rapid ventricular response and decompensated nonischemic cardiomyopathy with pulmonary edema and shock liver.   The patient has successfully been extubated,  no longer requiring pressors, including Levophed and dobutamine.  The patient had been placed on an amiodarone drip per Cardiology and has since been discontinued and the patient has been started on metoprolol XL 25 mg 2 times daily and had previously received digoxin 250 mcg on 11/07/2017.  The patient was transferred to [facility] with close monitoring, including continuous telemetry on 11/7.  Cardiology Service will continue to follow    Acute hypoxic respiratory failure, requiring intubation secondary to cardiogenic shock in the setting of decompensated nonischemic cardiomyopathy with pulmonary edema/decompensated systolic congestive heart failure and atrial fibrillation with rapid ventricular response. Sob with activity.   - Cont diuretic/bb, oxygen support. Change diuretic to PO when ok with cards (today)  - Low salt diet.  - daily weights. I/Os.  - Further plan per cards.    Chronic atrial fibrillation with rapid ventricular response: rate controlled.   - Plan per cards (on coumadin, BB)    Acute kidney injury:  resolved.   -  Monitor BMP     Shock liver in the setting of cardiogenic shock:  The patient was managed with a NAC drip.  LFT's are improving.  - Monitor LFTs.     Gastroesophageal reflux disease:  Will continue prior to admit omeprazole, but schedule this instead of as  needed at 20 mg daily.     Generalized anxiety disorder:    - Cont PTA paroxetine 30 mg daily.     Obesity with associated obstructive sleep apnea:  Patient is compliant with home CPAP.    - Cont CPAP.    Gallbladder edema and borderline common bile duct dilation:  Patient underwent an MRCP and has been followed by Gastroenterology.  There is a high suspicion that shock liver with hepatic congestion and ischemia likely resulted in these ultrasound findings.  Will continue with supportive care.     Deep venous thrombosis prophylaxis:  on coumadin.    DISPOSITION: per clinical course. Can come out of IMN. Can transfer to [facility].    Interval History   Pt seen and examined. Sob with activity.    Physical Exam   Temp: 98  F (36.7  C) Temp src: Oral BP: 130/62   Heart Rate: 91 Resp: 19 SpO2: 94 % O2 Device: None (Room air) Oxygen Delivery: 2 LPM  Vitals:    11/06/17 0400 11/07/17 0000 11/08/17 0000   Weight: 90.8 kg (200 lb 2.8 oz) 89.5 kg (197 lb 5 oz) 89.5 kg (197 lb 5 oz)     Vital Signs with Ranges  Temp:  [97.5  F (36.4  C)-98.1  F (36.7  C)] 98  F (36.7  C)  Heart Rate:  [] 91  Resp:  [14-24] 19  BP: (111-146)/() 130/62  MAP:  [94 mmHg-105 mmHg] 94 mmHg  Arterial Line BP: (137-139)/(74-79) 137/74  SpO2:  [93 %-100 %] 94 %  I/O last 3 completed shifts:  In: 1700 [P.O.:1535; I.V.:165]  Out: 3135 [Urine:3135]    Constitutional:Awake, alert, cooperative, no apparent distress  Respiratory: Clear to auscultation bilaterally, no crackles or wheezing  Cardiovascular: Regular rate and rhythm, normal S1 and S2, and no murmur noted  GI: Normal bowel sounds, soft, non-distended, non-tender  Skin/Integumen: No rashes, no cyanosis, no edema  Other:     Medications     Warfarin Therapy Reminder         warfarin  5 mg Oral ONCE at 18:00     metoprolol  25 mg Oral BID     sodium chloride (PF)  3 mL Intracatheter Q8H     atorvastatin  40 mg Oral At Bedtime     omeprazole (priLOSEC) CR capsule 20 mg  20 mg Oral QAM      PARoxetine (PAXIL) tablet 30 mg  30 mg Oral Daily     heparin  5,000 Units Subcutaneous Q8H     furosemide  40 mg Intravenous BID       Data     Recent Labs  Lab 11/09/17  0641 11/08/17  1138 11/08/17  0500 11/07/17  1720  11/07/17  0415  11/06/17  0700 11/06/17  0440 11/05/17  2315   WBC  --   --  8.6  --   --  9.2  --  12.4* 15.6* 11.8*   HGB  --   --  10.7*  --   --  11.4*  --  11.7 12.6 13.5   MCV  --   --  96  --   --  94  --  95 97 99   PLT  --   --  260  --   --  225  --  217 230 322   INR 1.68* 1.62*  --   --   --  1.92*  < > 4.83* 5.26* 3.72*   NA  --   --  141 138  --  137  --  131* 131* 132*   POTASSIUM 3.1*  --  3.6 3.6  < > 2.8*  --  4.6 5.1 4.8   CHLORIDE  --   --  107 105  --  104  --  101 101 99   CO2  --   --  27 23  --  24  --  14* 17* 15*   BUN  --   --  12 14  --  17  --  24 23 23   CR  --   --  0.86 0.90  --  0.80  --  0.83 0.84 1.20*   ANIONGAP  --   --  7 10  --  9  --  16* 13 18*   ALFIE  --   --  7.7* 7.4*  --  7.3*  --  7.8* 7.6* 8.3*   GLC  --   --  111* 133*  --  97  --  138* 114* 122*   ALBUMIN 2.3*  --  2.2*  --   --  1.9*  < > 2.1* 2.2* 2.9*   PROTTOTAL 6.7*  --  6.1*  --   --  5.9*  < > 6.1* 6.3* 8.2   BILITOTAL 1.5*  --  1.3  --   --  1.4*  < > 2.1* 2.3* 1.8*   ALKPHOS 199*  --  209*  --   --  226*  < > 292* 314* 405*   ALT 1120*  --  1650*  --   --  2474*  < > 2858* 1890* 769*   *  --  1509*  --   --  3199*  < > 6218* 3720* 1480*   LIPASE  --   --   --   --   --   --   --   --  189 455*   TROPI  --   --   --   --   --   --   --   --  0.020 <0.015   < > = values in this interval not displayed.    No results found for this or any previous visit (from the past 24 hour(s)).

## 2017-11-09 NOTE — PROGRESS NOTES
"[facility] Cardiology Progress Note            Interval History:   congestive heart failure. Acute pulmonary edema. Chronic atrial fibrillation. Patient has been extubated and is continuing to diurese. Atrial fibrillation rate is controlled with metoprolol succinate 25 mg twice a day. Anticoagulation is with warfarin.              Medications:       warfarin  5 mg Oral ONCE at 18:00     metoprolol  25 mg Oral BID     atorvastatin  40 mg Oral At Bedtime     omeprazole (priLOSEC) CR capsule 20 mg  20 mg Oral QAM     PARoxetine (PAXIL) tablet 30 mg  30 mg Oral Daily     heparin  5,000 Units Subcutaneous Q8H     furosemide  40 mg Intravenous BID                 Physical Exam:   Blood pressure 130/62, pulse 132, temperature 98  F (36.7  C), temperature source Oral, resp. rate 19, height 1.651 m (5' 5\"), weight 89.5 kg (197 lb 5 oz), SpO2 94 %.  Rhythm:  atrial fibrillation   Constitutional:   awake, alert and distracted     Neck:   no jugular venous distension and no carotid bruits     Lungs:   crackles right base and left base, diminished breath sounds right base     Cardiovascular:   irregularly irregular rhythm, S3 present, murmurs include systolic murmur I/VI located at right upper sternal border without radiation and 1 plus pre tibial edema bilaterally.                     Data:          Lab Results   Component Value Date     11/08/2017     11/07/2017     11/07/2017    Lab Results   Component Value Date    CHLORIDE 107 11/08/2017    CHLORIDE 105 11/07/2017    CHLORIDE 104 11/07/2017    Lab Results   Component Value Date    BUN 12 11/08/2017    BUN 14 11/07/2017    BUN 17 11/07/2017      Lab Results   Component Value Date    POTASSIUM 3.1 11/09/2017    POTASSIUM 3.6 11/08/2017    POTASSIUM 3.6 11/07/2017    Lab Results   Component Value Date    CO2 27 11/08/2017    CO2 23 11/07/2017    CO2 24 11/07/2017    Lab Results   Component Value Date    CR 0.86 11/08/2017    CR 0.90 11/07/2017    CR " 0.80 11/07/2017        Lab Results   Component Value Date     (H) 11/08/2017     (H) 11/07/2017    GLC 97 11/07/2017     Lab Results   Component Value Date    HGB 10.7 (L) 11/08/2017    HGB 11.4 (L) 11/07/2017    HGB 11.7 11/06/2017     Lab Results   Component Value Date     11/08/2017     11/07/2017     11/06/2017     Lab Results   Component Value Date    TROPONIN <0.07 12/20/2005    TROPI 0.020 11/06/2017    TROPI <0.015 11/05/2017    TROPI <0.015 10/20/2017     Lab Results   Component Value Date    WBC 8.6 11/08/2017    WBC 9.2 11/07/2017    WBC 12.4 (H) 11/06/2017            EKG and Imaging results:    I have reviewed recent EKGs and imaging studies.         Assessment and Plan:   Assessment:   Problem List    Acute respiratory failure with hypoxemia (H)    Acute pulmonary edema (H)    Secondary cardiomyopathy (H)    * No resolved hospital problems. *       Plan:   Try to increase metoprolol to 37.5 mg twice a day  Continue with intravenous Lasix is the patient still has some pedal edema.  Replace potassium.        Attestation:  I have reviewed today's vital signs, notes, medications, labs and imaging.  Care coordination / counseling time: 20 minutes  Total time: 30 minutes   Moses Green MD, MD 11/9/2017  12:38 PM

## 2017-11-09 NOTE — PLAN OF CARE
Problem: Patient Care Overview  Goal: Plan of Care/Patient Progress Review  The patient has been instructed about heart failure (HF) management after discharge and to review the written instructions provided on admission, during the hospital stay or on discharge.         Instructed on Diet: A heart healthy diet ordered by MD    Activity: At the level ordered by MD    Weight Monitoring: Monitor the weight at least every other day or as ordered by MD    Worsening symptoms of HF: Monitor symptoms of worsening HF such as    weight gain of 2# or more per day or  5# in a week    shortness of breath    leg/ankle swelling    chest pain/ discomfort    decreased energy    fatigue or other symptoms as specified by MD    Call the MD when worsening symptom(s) occur    Medications:  All medications were reviewed upon discharge    Follow-up: Reviewed appointment(s) as directed by the MD    Smoking Cessation: Re-enforced cigarette smoking cessation and avoidance of cigarette smoke

## 2017-11-09 NOTE — PLAN OF CARE
Problem: Patient Care Overview  Goal: Plan of Care/Patient Progress Review  Outcome: No Change  Transferred out from CCU today. A & O times four. Vitals stable. Denied any pain/discomfort. Room air sat WDL. Occasional infrequent cough. Tele a-fib with CVR.  Potassium replaced and plan to recheck 1800. On lasix and diuresing good amount per catheter. Assist of one with cares. Continue to monitor.

## 2017-11-09 NOTE — PROVIDER NOTIFICATION
MD Notification    Notified Persons Name: Dr. Matos      Notification Date/Time: 11/09/17 0010    Notification Interaction:  Talked with Physician    Purpose of Notification: DBP 's    Comments: No new orders at this time d/t previous hypotension. Do not want to drop SBP. Will continue to monitor.

## 2017-11-09 NOTE — PLAN OF CARE
Problem: Cardiac: Heart Failure (Adult)  Goal: Signs and Symptoms of Listed Potential Problems Will be Absent, Minimized or Managed (Cardiac: Heart Failure)  Signs and symptoms of listed potential problems will be absent, minimized or managed by discharge/transition of care (reference Cardiac: Heart Failure (Adult) CPG).   Outcome: Improving  Heart Failure Care Pathway  GOALS TO BE MET BEFORE DISCHARGE:     1. Decrease congestion and/or edema with diuretic therapy to achieve near      optimal volume status.            Dyspnea improved:  Yes            Edema improved:     Yes         Net I/O and Weights since admission:          11/04 0700 - 11/09 0659  In: 9149.85 [P.O.:1535; I.V.:7614.85]  Out: 97396 [Urine:03301]  Net: -2610.15            Vitals:     11/05/17 2322 11/06/17 0400 11/07/17 0000 11/08/17 0000   Weight: 88.2 kg (194 lb 7.1 oz) 90.8 kg (200 lb 2.8 oz) 89.5 kg (197 lb 5 oz) 89.5 kg (197 lb 5 oz)        2.  O2 sats > 92% on RA or at prior home O2 therapy level.           Current oxygenation status:       SpO2: 92 %         O2 Device: None (Room air),  Oxygen Delivery: 2 LPM          Able to wean O2 this shift to keep sats > 92%:  Yes -- now on RA from 4 L nc       Does patient use Home O2? No     3.  Tolerates ambulation and mobility near baseline: No, please explain: Ax1 c gaitbelt, walker; improving but still unsteady        How many times did the patient ambulate with nursing staff this shift? 1 in room     Please review the Heart Failure Care Pathway for additional HF goal outcomes.     Weaned off O2. Tele afib CVR. K replaced at 12:00, second dose due at 14:00. Report given to NATHALY Givens. Transferred to [facility].      Ibeth Van RN  11/9/2017

## 2017-11-09 NOTE — PLAN OF CARE
"Problem: Patient Care Overview  Goal: Plan of Care/Patient Progress Review  OT: Attempted for the second day to evaluate patient. She declines, citing fatigue. Unable to encourage patient to participate in ADL, she states she's \"already done all that\". Note some confusion during session tho.      "

## 2017-11-09 NOTE — PLAN OF CARE
Problem: Cardiac: Heart Failure (Adult)  Goal: Signs and Symptoms of Listed Potential Problems Will be Absent, Minimized or Managed (Cardiac: Heart Failure)  Signs and symptoms of listed potential problems will be absent, minimized or managed by discharge/transition of care (reference Cardiac: Heart Failure (Adult) CPG).   Outcome: Improving  Heart Failure Care Pathway  GOALS TO BE MET BEFORE DISCHARGE:     1. Decrease congestion and/or edema with diuretic therapy to achieve near      optimal volume status.            Dyspnea improved:  Yes            Edema improved:     Yes         Net I/O and Weights since admission:          11/03 2300 - 11/08 2259  In: 9094.85 [P.O.:1485; I.V.:7609.85]  Out: 86096 [Urine:80124]  Net: -2460.15            Vitals:     11/05/17 2322 11/06/17 0400 11/07/17 0000 11/08/17 0000   Weight: 88.2 kg (194 lb 7.1 oz) 90.8 kg (200 lb 2.8 oz) 89.5 kg (197 lb 5 oz) 89.5 kg (197 lb 5 oz)        2.  O2 sats > 92% on RA or at prior home O2 therapy level.           Current oxygenation status:       SpO2: 99 %         O2 Device: Nasal cannula,  Oxygen Delivery: 4 LPM          Able to wean O2 this shift to keep sats > 92%:  No, please explain: pt wearing 4L while awake. Placed pt on BiPAP while asleep.        Does patient use Home O2? No     3.  Tolerates ambulation and mobility near baseline: No, please explain: up to bedside commode. Baseline independent at home, but activity tolerance improving.         How many times did the patient ambulate with nursing staff this shift? 1     Please review the Heart Failure Care Pathway for additional HF goal outcomes.     Dorothy Carrasco RN  11/9/2017

## 2017-11-09 NOTE — PLAN OF CARE
Problem: Patient Care Overview  Goal: Plan of Care/Patient Progress Review  PT: Discharge Planner PT   Patient plan for discharge: Not stated.  Current status: Pt transfers sit<>stand with SBA. Amb 30 ft in room with FWW, CGA. Fatigued quickly. Tolerated seated exercises well. O2 sats maintained high 80s to mid 90s throughout.   Barriers to return to prior living situation: Dec strength, stability, activity tolerance. Requires CGA.  Recommendations for discharge: TCU.  Rationale for recommendations: Pt would benefit from continued skilled therapy to improve activity tolerance and decrease risk of fall prior to returning home. Requires some assistance with mobility.       Entered by: Gertrudis Benton 11/09/2017 9:51 AM

## 2017-11-09 NOTE — PLAN OF CARE
Problem: Patient Care Overview  Goal: Plan of Care/Patient Progress Review  Heart Failure Care Pathway  GOALS TO BE MET BEFORE DISCHARGE:     1. Decrease congestion and/or edema with diuretic therapy to achieve near      optimal volume status.            Dyspnea improved:  Yes            Edema improved:     Yes         Net I/O and Weights since admission:          11/03 1500 - 11/08 1459  In: 8569.85 [P.O.:960; I.V.:7609.85]  Out: 33465 [Urine:9705]  Net: -1585.15            Vitals:     11/05/17 2322 11/06/17 0400 11/07/17 0000 11/08/17 0000   Weight: 88.2 kg (194 lb 7.1 oz) 90.8 kg (200 lb 2.8 oz) 89.5 kg (197 lb 5 oz) 89.5 kg (197 lb 5 oz)        2.  O2 sats > 92% on RA or at prior home O2 therapy level.           Current oxygenation status:       SpO2: 100 %         O2 Device: Oxymizer cannula,  Oxygen Delivery: 4 LPM          Able to wean O2 this shift to keep sats > 92%: Patient weaned from 6L oximyzer to 4L NC this shift, sats mid 90's     Does patient use Home O2? No     3.  Tolerates ambulation and mobility near baseline: No, please explain: 2 person assist from chair to bed, heavy transfer, deconditioned.        How many times did the patient ambulate with nursing staff this shift?0     Please review the Heart Failure Care Pathway for additional HF goal outcomes.     mAena Rojas RN  11/8/2017

## 2017-11-10 ENCOUNTER — APPOINTMENT (OUTPATIENT)
Dept: OCCUPATIONAL THERAPY | Facility: CLINIC | Age: 77
DRG: 208 | End: 2017-11-10
Attending: PHYSICIAN ASSISTANT
Payer: MEDICARE

## 2017-11-10 ENCOUNTER — APPOINTMENT (OUTPATIENT)
Dept: PHYSICAL THERAPY | Facility: CLINIC | Age: 77
DRG: 208 | End: 2017-11-10
Payer: MEDICARE

## 2017-11-10 LAB
ALBUMIN SERPL-MCNC: 2.1 G/DL (ref 3.4–5)
ALP SERPL-CCNC: 215 U/L (ref 40–150)
ALT SERPL W P-5'-P-CCNC: 895 U/L (ref 0–50)
ANION GAP SERPL CALCULATED.3IONS-SCNC: 8 MMOL/L (ref 3–14)
AST SERPL W P-5'-P-CCNC: 712 U/L (ref 0–45)
BILIRUB SERPL-MCNC: 1.7 MG/DL (ref 0.2–1.3)
BUN SERPL-MCNC: 7 MG/DL (ref 7–30)
CALCIUM SERPL-MCNC: 8.1 MG/DL (ref 8.5–10.1)
CHLORIDE SERPL-SCNC: 99 MMOL/L (ref 94–109)
CO2 SERPL-SCNC: 30 MMOL/L (ref 20–32)
CREAT SERPL-MCNC: 0.66 MG/DL (ref 0.52–1.04)
GFR SERPL CREATININE-BSD FRML MDRD: 87 ML/MIN/1.7M2
GLUCOSE SERPL-MCNC: 112 MG/DL (ref 70–99)
INR PPP: 1.78 (ref 0.86–1.14)
MAGNESIUM SERPL-MCNC: 1.5 MG/DL (ref 1.6–2.3)
PHOSPHATE SERPL-MCNC: 2.6 MG/DL (ref 2.5–4.5)
POTASSIUM SERPL-SCNC: 3.4 MMOL/L (ref 3.4–5.3)
PROT SERPL-MCNC: 6.7 G/DL (ref 6.8–8.8)
SODIUM SERPL-SCNC: 137 MMOL/L (ref 133–144)

## 2017-11-10 PROCEDURE — 25000128 H RX IP 250 OP 636: Performed by: INTERNAL MEDICINE

## 2017-11-10 PROCEDURE — 36415 COLL VENOUS BLD VENIPUNCTURE: CPT | Performed by: HOSPITALIST

## 2017-11-10 PROCEDURE — 83735 ASSAY OF MAGNESIUM: CPT | Performed by: HOSPITALIST

## 2017-11-10 PROCEDURE — 25000132 ZZH RX MED GY IP 250 OP 250 PS 637: Mod: GY | Performed by: INTERNAL MEDICINE

## 2017-11-10 PROCEDURE — 25000132 ZZH RX MED GY IP 250 OP 250 PS 637: Mod: GY | Performed by: PHYSICIAN ASSISTANT

## 2017-11-10 PROCEDURE — 99232 SBSQ HOSP IP/OBS MODERATE 35: CPT | Performed by: INTERNAL MEDICINE

## 2017-11-10 PROCEDURE — A9270 NON-COVERED ITEM OR SERVICE: HCPCS | Mod: GY | Performed by: PHYSICIAN ASSISTANT

## 2017-11-10 PROCEDURE — 40000133 ZZH STATISTIC OT WARD VISIT

## 2017-11-10 PROCEDURE — 40000193 ZZH STATISTIC PT WARD VISIT

## 2017-11-10 PROCEDURE — 97110 THERAPEUTIC EXERCISES: CPT | Mod: GP

## 2017-11-10 PROCEDURE — 97535 SELF CARE MNGMENT TRAINING: CPT | Mod: GO

## 2017-11-10 PROCEDURE — 85610 PROTHROMBIN TIME: CPT | Performed by: HOSPITALIST

## 2017-11-10 PROCEDURE — 99232 SBSQ HOSP IP/OBS MODERATE 35: CPT | Performed by: HOSPITALIST

## 2017-11-10 PROCEDURE — 84100 ASSAY OF PHOSPHORUS: CPT | Performed by: HOSPITALIST

## 2017-11-10 PROCEDURE — A9270 NON-COVERED ITEM OR SERVICE: HCPCS | Mod: GY | Performed by: HOSPITALIST

## 2017-11-10 PROCEDURE — 21000001 ZZH R&B HEART CARE

## 2017-11-10 PROCEDURE — 97166 OT EVAL MOD COMPLEX 45 MIN: CPT | Mod: GO

## 2017-11-10 PROCEDURE — 80053 COMPREHEN METABOLIC PANEL: CPT | Performed by: HOSPITALIST

## 2017-11-10 PROCEDURE — 25000132 ZZH RX MED GY IP 250 OP 250 PS 637: Mod: GY | Performed by: HOSPITALIST

## 2017-11-10 PROCEDURE — 97116 GAIT TRAINING THERAPY: CPT | Mod: GP

## 2017-11-10 PROCEDURE — A9270 NON-COVERED ITEM OR SERVICE: HCPCS | Mod: GY | Performed by: INTERNAL MEDICINE

## 2017-11-10 PROCEDURE — 97530 THERAPEUTIC ACTIVITIES: CPT | Mod: GO

## 2017-11-10 PROCEDURE — 40000275 ZZH STATISTIC RCP TIME EA 10 MIN

## 2017-11-10 RX ORDER — METOPROLOL SUCCINATE 25 MG/1
25 TABLET, EXTENDED RELEASE ORAL ONCE
Status: COMPLETED | OUTPATIENT
Start: 2017-11-10 | End: 2017-11-10

## 2017-11-10 RX ORDER — FUROSEMIDE 20 MG
20 TABLET ORAL 2 TIMES DAILY
Status: DISCONTINUED | OUTPATIENT
Start: 2017-11-10 | End: 2017-11-11

## 2017-11-10 RX ORDER — WARFARIN SODIUM 2.5 MG/1
2.5 TABLET ORAL
Status: COMPLETED | OUTPATIENT
Start: 2017-11-10 | End: 2017-11-10

## 2017-11-10 RX ADMIN — FUROSEMIDE 40 MG: 10 INJECTION, SOLUTION INTRAVENOUS at 06:48

## 2017-11-10 RX ADMIN — WARFARIN SODIUM 2.5 MG: 2.5 TABLET ORAL at 17:50

## 2017-11-10 RX ADMIN — POTASSIUM CHLORIDE 20 MEQ: 1500 TABLET, EXTENDED RELEASE ORAL at 11:26

## 2017-11-10 RX ADMIN — METOPROLOL SUCCINATE 75 MG: 50 TABLET, EXTENDED RELEASE ORAL at 21:05

## 2017-11-10 RX ADMIN — FUROSEMIDE 20 MG: 20 TABLET ORAL at 16:09

## 2017-11-10 RX ADMIN — METOPROLOL SUCCINATE 25 MG: 25 TABLET, EXTENDED RELEASE ORAL at 14:16

## 2017-11-10 RX ADMIN — OMEPRAZOLE 20 MG: 20 CAPSULE, DELAYED RELEASE ORAL at 09:03

## 2017-11-10 RX ADMIN — HEPARIN SODIUM 5000 UNITS: 5000 INJECTION, SOLUTION INTRAVENOUS; SUBCUTANEOUS at 21:05

## 2017-11-10 RX ADMIN — Medication 37.5 MG: at 09:03

## 2017-11-10 RX ADMIN — HEPARIN SODIUM 5000 UNITS: 5000 INJECTION, SOLUTION INTRAVENOUS; SUBCUTANEOUS at 06:41

## 2017-11-10 RX ADMIN — PAROXETINE HYDROCHLORIDE 30 MG: 30 TABLET, FILM COATED ORAL at 09:03

## 2017-11-10 RX ADMIN — HEPARIN SODIUM 5000 UNITS: 5000 INJECTION, SOLUTION INTRAVENOUS; SUBCUTANEOUS at 14:16

## 2017-11-10 RX ADMIN — MAGNESIUM SULFATE IN WATER 4 G: 40 INJECTION, SOLUTION INTRAVENOUS at 13:12

## 2017-11-10 ASSESSMENT — ACTIVITIES OF DAILY LIVING (ADL): PREVIOUS_RESPONSIBILITIES: MEAL PREP;HOUSEKEEPING;LAUNDRY;MEDICATION MANAGEMENT;FINANCES;DRIVING

## 2017-11-10 ASSESSMENT — PAIN DESCRIPTION - DESCRIPTORS: DESCRIPTORS: ACHING

## 2017-11-10 NOTE — PROGRESS NOTES
11/10/17 1144   Quick Adds   Type of Visit Initial Occupational Therapy Evaluation   Living Environment   Lives With spouse   Living Arrangements house   Home Accessibility stairs to enter home;stairs within home;tub/shower is not walk in;grab bars present (bathtub)   Number of Stairs to Enter Home 8   Number of Stairs Within Home 8   Transportation Available car;family or friend will provide  (Pt still drives; however, family can provide for now.)   Self-Care   Usual Activity Tolerance good   Current Activity Tolerance moderate   Equipment Currently Used at Home grab bar   Functional Level Prior   Ambulation 0-->independent   Transferring 0-->independent   Toileting 0-->independent   Bathing 1-->assistive equipment   Dressing 0-->independent   Eating 0-->independent   Communication 0-->understands/communicates without difficulty   Swallowing 0-->swallows foods/liquids without difficulty   Cognition 0 - no cognition issues reported   Fall history within last six months no   General Information   Onset of Illness/Injury or Date of Surgery - Date 11/05/17   Referring Physician OBDULIA Pierre PA-C   Patient/Family Goals Statement Pt plans to discharge to rehab   Additional Occupational Profile Info/Pertinent History of Current Problem Admitted for respiratory failure due to CHF exacerbation resulting in intubation. Extubated on 11/7/17   Precautions/Limitations fall precautions   Heart Disease Risk Factors High blood pressure;Lack of physical activity;Dislipidemia;Overweight;Family history;Medical history;Age   Cognitive Status Examination   Orientation orientation to person, place and time   Level of Consciousness alert;confused   Able to Follow Commands WNL/WFL   Personal Safety (Cognitive) at risk behaviors demonstrated;decreased insight to deficits;impulsive   Memory (will continue to monitor and assess as needed)   Pain Assessment   Patient Currently in Pain No   Range of Motion (ROM)   ROM Quick Adds No deficits  were identified   ROM Comment B UE WNL   Strength   Manual Muscle Testing Quick Adds No deficits were identified   Strength Comments B UE 5/5 on MMT   Mobility   Bed Mobility Bed mobility skill: Rolling/Turning;Bed mobility skill: Scooting/Bridging;Bed mobility skill: Sit to supine;Bed mobility analysis;Bed mobility skill: Supine to sit   Bed Mobility Skill: Rolling/Turning   Level of Burnet - Bed Mobility Skill Rolling Turning stand-by assist   Bed Mobility Skill: Scooting/Bridging   Level of [place]: Scooting/Bridging stand-by assist   Bed Mobility Skill: Sit to Supine   Level of [place]: Sit/Supine stand-by assist   Bed Mobility Skill: Supine to Sit   Level of [place]: Supine/Sit stand-by assist   Bed Mobility Analysis   Bed Mobility Limitations cognitive deficits   Impairments Contributing to Impaired Bed Mobility impaired balance   Transfer Skills   Transfer Transfer Safety Analysis Bed/Chair;Transfer Skill: Stand to Sit;Transfer Safety Analysis Sit/Stand   Transfer Skill: Bed to Chair/Chair to Bed   Level of Burn[place]: Bed to Chair minimum assist (75% patients effort)   Transfer Safety Analysis Bed/Chair   Transfer Safety Concerns Noted decreased balance during turns   Impairments Contributing to Impaired Transfers impaired balance   Transfer Skill: Sit to Stand   Level of [place]: Sit/Stand stand-by assist   Transfer Safety Analysis Sit/Stand   Impaired Transfers: Sit/Stand impaired balance   Transfer Skill: Toilet Transfer   Level of [place]: Toilet minimum assist (75% patients effort)   Upper Body Dressing   Level of [place]: Dress Upper Body stand-by assist   Lower Body Dressing   Level of [place]: Dress Lower Body minimum assist (75% patients effort)   Toileting   Level of [place]: Toilet stand-by assist   Grooming   Level of [place]: Grooming minimum assist (75% patients effort)   Eating/Self Feeding   Level of [place]: Eating independent  "  Instrumental Activities of Daily Living (IADL)   Previous Responsibilities meal prep;housekeeping;laundry;medication management;finances;driving   Activities of Daily Living Analysis   Impairments Contributing to Impaired Activities of Daily Living cognition impaired;balance impaired   General Therapy Interventions   Planned Therapy Interventions ADL retraining;cognition;transfer training   Clinical Impression   Criteria for Skilled Therapeutic Interventions Met yes, treatment indicated   OT Diagnosis Decreased I and safety with ADLs   Influenced by the following impairments Impaired safety; Decreased balance and activity tolerance   Assessment of Occupational Performance 3-5 Performance Deficits   Identified Performance Deficits Dressing; Bathing; Toileting; Med mgmt   Clinical Decision Making (Complexity) Moderate complexity   Therapy Frequency 3 times/wk   Predicted Duration of Therapy Intervention (days/wks) 4 days   Anticipated Discharge Disposition Transitional Care Facility   Risks and Benefits of Treatment have been explained. Yes   Patient, Family & other staff in agreement with plan of care Yes   [facility] TM \"6 Clicks\"   2016, Trustees of [facility], under license to [employer].  All rights reserved.   6 Clicks Short Forms Daily Activity Inpatient Short Form   [facility]  \"6 Clicks\" Daily Activity Inpatient Short Form   1. Putting on and taking off regular lower body clothing? 3 - A Little   2. Bathing (including washing, rinsing, drying)? 3 - A Little   3. Toileting, which includes using toilet, bedpan or urinal? 3 - A Little   4. Putting on and taking off regular upper body clothing? 3 - A Little   5. Taking care of personal grooming such as brushing teeth? 3 - A Little   6. Eating meals? 4 - None   Daily Activity Raw Score (Score out of 24.Lower scores equate to lower levels of function) 19   Total Evaluation Time   Total Evaluation Time (Minutes) 10     "

## 2017-11-10 NOTE — PROGRESS NOTES
Care Transition Initial Assessment -   Reason For Consult: discharge planning  Met with: Patient, daughter Teresa, and granddaughter  Active Problems:    Acute respiratory failure with hypoxemia (H)    Acute pulmonary edema (H)    Secondary cardiomyopathy (H)         DATA  Lives With: spouse  Living Arrangements: house  Description of Support System: Supportive, Involved  Who is your support system?: Children,   Support Assessment: Adequate family and caregiver support.  Identified issues/concerns regarding health management:      Quality Of Family Relationships: supportive, involved  Transportation Available: car, family or friend will provide (Pt still drives; however, family can provide for now.)    Per social service protocol for discharge planning.  Patient was admitted on 11-5-17 with acute respiratory failure with hypoxemia.  The tentative date of discharge is 11-13-17.  Reviewed chart and spoke with patient, daughter, and grand-daughter regarding discharge plans.  Per patient and family report, patient lives with her  in a house with 8 steps to enter and 8 steps inside the house.  Patient has grab bars in the bathroom.  Patient still drives.  Patient is independent with meal prep. Housekeeping, laundry, medication management, and finances.  Reviewed the therapy discharge recommendations of tcu placement and patient and family are in agreement.  Patient states she would like to go to [facility] or [facility] on discharge.  Patient states she would prefer a double room.  Referral sent, via discharge on the double, to check bed availability.  Received confirmation, via discharge on the double, that [facility] can accept patient.  Call placed to [facility] to follow up on the referral.  Per Cyndie, they have not assessed patient yet.  We also received some [facility] paperwork from patient's daughter that needs to be completed.  Paged Dr. Ferrera and placed the paperwork on the front of the chart.  Patient's family  will transport when discharge is known.    ASSESSMENT  Cognitive Status:  awake, alert and oriented  Concerns to be addressed: discharge planning.     PLAN  Financial costs for the patient includes N/A.  Patient given options and choices for discharge TCU choices.  Patient/family is agreeable to the plan?  Yes  Patient Goals and Preferences: TCU on discharge.  Patient anticipates discharging to:  TCU.    Will confirm a bed, continue to follow, and assist with a safe discharge plan.    RHONDA Winn, [facility]  [phone]

## 2017-11-10 NOTE — PLAN OF CARE
Problem: Patient Care Overview  Goal: Individualization & Mutuality  Outcome: No Change  A&O x 4, vss, a-febrile, denies pain or shortness of breath, pt on room air tolerating well, up to the bathroom with assist of one, diuresing well with lasix. Still in rapid a-fib, metoprolol increased to 75 mg bid, plan to DC to TCU in 1-2 days  If bed available.

## 2017-11-10 NOTE — PLAN OF CARE
Problem: Patient Care Overview  Goal: Plan of Care/Patient Progress Review  OT/CR: Eval complete and Tx initiated. Pt admitted for respiratory failure due to CHF requiring intubation. Recent discharge from [facility]. Prior to admit, pt lives in house with  and reports I with ADL/IADLs, including driving, med mgmt, and household mgmt.     Discharge Planner OT   Patient plan for discharge: TCU     Current status: Pt required min A for toilet transfer and LE dressing task.      Barriers to return to prior living situation: Stairs to enter home and within home; Bathtub     Recommendations for discharge: TCU     Rationale for recommendations: Pt limited by impaired safety, balance, and activity tolerance; thus, OT/CR will continue with intervention to progress I with ADLs and monitor exercise.        Entered by: Jessica August 11/10/2017 1:10 PM

## 2017-11-10 NOTE — PROGRESS NOTES
[facility]    Hospitalist Progress Note    Assessment & Plan   Em Richmond is a 77-year-old female with a past medical history significant for nonischemic cardiomyopathy, chronic atrial fibrillation, gastroesophageal reflux disease, anxiety, pulmonary hypertension, obesity and obstructive sleep apnea who was admitted on 11/06/2017 due to acute hypoxic respiratory failure requiring intubation in the setting of cardiogenic shock secondary to atrial fibrillation with rapid ventricular response and decompensated nonischemic cardiomyopathy with pulmonary edema and shock liver.   The patient has successfully been extubated,  no longer requiring pressors, including Levophed and dobutamine.  The patient had been placed on an amiodarone drip per Cardiology and has since been discontinued and the patient has been started on metoprolol XL 25 mg 2 times daily and had previously received digoxin 250 mcg on 11/07/2017.  The patient was transferred to [facility] with close monitoring, including continuous telemetry on 11/7.  Cardiology Service will continue to follow    Acute hypoxic respiratory failure, requiring intubation secondary to cardiogenic shock in the setting of decompensated systolic congestive heart failure and atrial fibrillation with rapid ventricular response. Sob with activity.   - Cont diuretic/bb, oxygen support.   - Cont diuretic.  - Supportive care.      Acute on chronic nonischemic cardiomyopathy with pulmonary edema: weight is down. Net neg in the past 24 hrs  Is about 1.2 L. Breathing much better. Sating well on RA. No peripheral edema is evident.  - Cont diuresis per cards (chamge to PO today).  - Cont BB.  -  Low salt diet.  - daily weights. I/Os.  - Monitor BMP.  - Replace electrolytes as indicated.    Chronic atrial fibrillation with rapid ventricular response: rate controlled.   - Plan per cards (on coumadin, BB)    Acute kidney injury:  resolved.   -  Monitor BMP     Shock liver in the  setting of cardiogenic shock:  The patient was managed with a NAC drip.  LFT's are improving.  - Monitor LFTs.     Gastroesophageal reflux disease:    - Will continue prior to admit omeprazole, but schedule this instead of as needed at 20 mg daily.     Generalized anxiety disorder:    - Cont PTA paroxetine 30 mg daily.     Obesity with associated obstructive sleep apnea:  Patient is compliant with home CPAP.    - Cont CPAP.    Gallbladder edema and borderline common bile duct dilation:  Patient underwent an MRCP and has been followed by Gastroenterology.  There is a high suspicion that shock liver with hepatic congestion and ischemia likely resulted in these ultrasound findings.    - Will continue with supportive care.     Deep venous thrombosis prophylaxis:  on coumadin.    DISPOSITION: per clinical course. Can come out of IMN. Can transfer to [facility].    Interval History   Pt seen and examined. Sob with activity is improving. Sating well on RA.    Physical Exam   Temp: 97.9  F (36.6  C) Temp src: Oral BP: 120/70 Pulse: 101 Heart Rate: 91 Resp: 18 SpO2: 92 % O2 Device: None (Room air)    Vitals:    11/07/17 0000 11/08/17 0000 11/10/17 0500   Weight: 89.5 kg (197 lb 5 oz) 89.5 kg (197 lb 5 oz) 83.4 kg (183 lb 13.8 oz)     Vital Signs with Ranges  Temp:  [97.9  F (36.6  C)-98.4  F (36.9  C)] 97.9  F (36.6  C)  Pulse:  [101-107] 101  Heart Rate:  [] 91  Resp:  [18-20] 18  BP: (120-140)/(70-81) 120/70  SpO2:  [90 %-94 %] 92 %  I/O last 3 completed shifts:  In: 750 [P.O.:750]  Out: 2075 [Urine:2075]    Constitutional:Awake, alert, cooperative, no apparent distress  Respiratory: Clear to auscultation bilaterally, no crackles or wheezing  Cardiovascular: Regular rate and rhythm, normal S1 and S2, and no murmur noted  GI: Normal bowel sounds, soft, non-distended, non-tender  Skin/Integumen: No rashes, no cyanosis, no edema  Other:     Medications     Warfarin Therapy Reminder         warfarin  2.5 mg Oral ONCE at 18:00      metoprolol  37.5 mg Oral BID     atorvastatin  40 mg Oral At Bedtime     omeprazole (priLOSEC) CR capsule 20 mg  20 mg Oral QAM     PARoxetine (PAXIL) tablet 30 mg  30 mg Oral Daily     heparin  5,000 Units Subcutaneous Q8H     furosemide  40 mg Intravenous BID       Data     Recent Labs  Lab 11/10/17  0554 11/09/17  1802 11/09/17  0641 11/08/17  1138 11/08/17  0500 11/07/17  1720  11/07/17  0415  11/06/17  0700 11/06/17  0440 11/05/17  2315   WBC  --   --   --   --  8.6  --   --  9.2  --  12.4* 15.6* 11.8*   HGB  --   --   --   --  10.7*  --   --  11.4*  --  11.7 12.6 13.5   MCV  --   --   --   --  96  --   --  94  --  95 97 99   PLT  --   --   --   --  260  --   --  225  --  217 230 322   INR 1.78*  --  1.68* 1.62*  --   --   --  1.92*  < > 4.83* 5.26* 3.72*     --   --   --  141 138  --  137  --  131* 131* 132*   POTASSIUM 3.4 3.4 3.1*  --  3.6 3.6  < > 2.8*  --  4.6 5.1 4.8   CHLORIDE 99  --   --   --  107 105  --  104  --  101 101 99   CO2 30  --   --   --  27 23  --  24  --  14* 17* 15*   BUN 7  --   --   --  12 14  --  17  --  24 23 23   CR 0.66  --   --   --  0.86 0.90  --  0.80  --  0.83 0.84 1.20*   ANIONGAP 8  --   --   --  7 10  --  9  --  16* 13 18*   ALFIE 8.1*  --   --   --  7.7* 7.4*  --  7.3*  --  7.8* 7.6* 8.3*   *  --   --   --  111* 133*  --  97  --  138* 114* 122*   ALBUMIN 2.1*  --  2.3*  --  2.2*  --   --  1.9*  < > 2.1* 2.2* 2.9*   PROTTOTAL 6.7*  --  6.7*  --  6.1*  --   --  5.9*  < > 6.1* 6.3* 8.2   BILITOTAL 1.7*  --  1.5*  --  1.3  --   --  1.4*  < > 2.1* 2.3* 1.8*   ALKPHOS 215*  --  199*  --  209*  --   --  226*  < > 292* 314* 405*   *  --  1120*  --  1650*  --   --  2474*  < > 2858* 1890* 769*   *  --  749*  --  1509*  --   --  3199*  < > 6218* 3720* 1480*   LIPASE  --   --   --   --   --   --   --   --   --   --  189 455*   TROPI  --   --   --   --   --   --   --   --   --   --  0.020 <0.015   < > = values in this interval not displayed.    No results  found for this or any previous visit (from the past 24 hour(s)).

## 2017-11-10 NOTE — PROGRESS NOTES
[facility]  Cardiology Progress Note    Date of Service (when I saw the patient): 11/10/2017  Primary Cardiologist: Dr. Pickard     Assessment & Plan   Em Richmond is a 77 year old female who was admitted on 11/5/2017 with SOB and weight gain. She was recently admitted at [facility] with CHF exacerbation.      Acute on Chronic Systolic CHF   -- During admission in [facility] EF was found to be reduced to 30-35%  -- Admission BNP 36,500  -- I/O: -5.3 L since admission  -- Wt: -11 Ibs since admission    -- Switched to PO Furosemide 20 mg BID today    Chronic Atrial Fibrillation   -- HR's suboptimal  -- Rate control strategy with Metoprolol succinate 37.5 mg BID   -- Coumadin for CVA prophylaxis   -- Give additional Toprol 25 mg now and increase dose to 75 mg BID    Acute Respiratory Failure (resolved)  -- Went into cardiogenic shock secondary to decompensated HF and required intubation   -- Now extubated and stable    Mild CAD   -- coronary angiogram 10/23 showed lesions no greater than 30%    Mild to Moderate Tricuspid Regurgitation     [facility] Follow Up:   Ying Denilson Pickard       Tele: A Fib with  bpm     Interval History   Still gets SOB with activity. No palpitations. Does report left sided intermittent chest discomfort which has been going on for a few years. No other concerns.     Physical Exam   Temp: 97.9  F (36.6  C) Temp src: Oral BP: 120/70 Pulse: 101 Heart Rate: 91 Resp: 18 SpO2: 92 % O2 Device: None (Room air)    Vitals:    11/07/17 0000 11/08/17 0000 11/10/17 0500   Weight: 89.5 kg (197 lb 5 oz) 89.5 kg (197 lb 5 oz) 83.4 kg (183 lb 13.8 oz)     Gen- NAD  Neck- Normal JVD at 30 degree angle   Resp- Fine crackles at the bases bilaterally  Card- Irregular rhythm, tachycardic. No m,r,g  Abd- Soft, nontender  Ext- No edema      Medications     Warfarin Therapy Reminder         warfarin  2.5 mg Oral ONCE at 18:00     furosemide  20 mg Oral BID     metoprolol  37.5  mg Oral BID     omeprazole (priLOSEC) CR capsule 20 mg  20 mg Oral QAM     PARoxetine (PAXIL) tablet 30 mg  30 mg Oral Daily     heparin  5,000 Units Subcutaneous Q8H       Data   Reviewed.       Caitlin Vasquez PA-C   11/10/2017  Pager: [phone]

## 2017-11-10 NOTE — PLAN OF CARE
Problem: Cardiac: Heart Failure (Adult)  Goal: Signs and Symptoms of Listed Potential Problems Will be Absent, Minimized or Managed (Cardiac: Heart Failure)  Signs and symptoms of listed potential problems will be absent, minimized or managed by discharge/transition of care (reference Cardiac: Heart Failure (Adult) CPG).   Outcome: No Change  Heart Failure Care Pathway  GOALS TO BE MET BEFORE DISCHARGE:     1. Decrease congestion and/or edema with diuretic therapy to achieve near      optimal volume status.            Dyspnea improved: yes            Edema improved:   yes      Net I/O and Weights since admission:          11/04 2300 - 11/09 2259  In: 9899.85 [P.O.:2285; I.V.:7614.85]  Out: 34551 [Urine:92659]  Net: -3935.15            Vitals:     11/05/17 2322 11/06/17 0400 11/07/17 0000 11/08/17 0000   Weight: 88.2 kg (194 lb 7.1 oz) 90.8 kg (200 lb 2.8 oz) 89.5 kg (197 lb 5 oz) 89.5 kg (197 lb 5 oz)        2.  O2 sats > 92% on RA or at prior home O2 therapy level.           Current oxygenation status:       SpO2: 94 %         O2 Device: None (Room air),  Oxygen Delivery: 2 LPM          Able to wean O2 this shift to keep sats > 92%:  yes     Does patient use Home O2?  NO  3.  Tolerates ambulation and mobility near baseline:  yes        How many times did the patient ambulate with nursing staff this shift? 1     Patient is A & O times four. Vitals stable. Denied any pain/discomfort. On IV lasix and diuresing good amount per catheter. Potassium 3.4 after replacement, another dose was given and plan to recheck in AM. Assist of one with care. Tele a-fib. She is on both BB and coumadin. Continue to monitor.         Please review the Heart Failure Care Pathway for additional HF goal outcomes.     Ofelia Boss RN  11/10/2017

## 2017-11-10 NOTE — PLAN OF CARE
Problem: Patient Care Overview  Goal: Plan of Care/Patient Progress Review  PT: Discharge Planner PT   Patient plan for discharge: Not stated.  Current status: Pt amb 100ft with FWW, CGA. Walks with slowed manuela and dec step length. Reports she cannot correct when cued. Tolerated exercises well.   Barriers to return to prior living situation: Dec activity tolerance, strength, balance. Requires some assist with mobility.  Recommendations for discharge: TCU.  Rationale for recommendations: Pt lives in home with stairs.  She would benefit from continued skilled therapy to return to PLOF and improve safety prior to returning home.       Entered by: Gertrudis Benton 11/10/2017 2:09 PM

## 2017-11-10 NOTE — PLAN OF CARE
Problem: Patient Care Overview  Goal: Plan of Care/Patient Progress Review  Outcome: No Change  Patient is A & O times four. Vitals stable. Denied any pain/discomfort. On IV lasix and diuresing good amount per catheter. Potassium 3.4 after replacement, another dose was given and plan to recheck in AM. Assist of one with care. Tele a-fib. She is on both BB and coumadin. Continue to monitor.

## 2017-11-11 ENCOUNTER — APPOINTMENT (OUTPATIENT)
Dept: PHYSICAL THERAPY | Facility: CLINIC | Age: 77
DRG: 208 | End: 2017-11-11
Payer: MEDICARE

## 2017-11-11 LAB
ANION GAP SERPL CALCULATED.3IONS-SCNC: 4 MMOL/L (ref 3–14)
BUN SERPL-MCNC: 10 MG/DL (ref 7–30)
CALCIUM SERPL-MCNC: 8.6 MG/DL (ref 8.5–10.1)
CHLORIDE SERPL-SCNC: 99 MMOL/L (ref 94–109)
CO2 SERPL-SCNC: 34 MMOL/L (ref 20–32)
CREAT SERPL-MCNC: 0.69 MG/DL (ref 0.52–1.04)
GFR SERPL CREATININE-BSD FRML MDRD: 82 ML/MIN/1.7M2
GLUCOSE SERPL-MCNC: 111 MG/DL (ref 70–99)
HCV RNA SERPL NAA+PROBE-ACNC: NORMAL [IU]/ML
HCV RNA SERPL NAA+PROBE-LOG IU: NORMAL LOG IU/ML
INR PPP: 2.29 (ref 0.86–1.14)
MAGNESIUM SERPL-MCNC: 2 MG/DL (ref 1.6–2.3)
PHOSPHATE SERPL-MCNC: 3.7 MG/DL (ref 2.5–4.5)
PLATELET # BLD AUTO: 202 10E9/L (ref 150–450)
POTASSIUM SERPL-SCNC: 3.4 MMOL/L (ref 3.4–5.3)
SODIUM SERPL-SCNC: 137 MMOL/L (ref 133–144)

## 2017-11-11 PROCEDURE — 25000132 ZZH RX MED GY IP 250 OP 250 PS 637: Mod: GY | Performed by: INTERNAL MEDICINE

## 2017-11-11 PROCEDURE — 25000128 H RX IP 250 OP 636: Performed by: INTERNAL MEDICINE

## 2017-11-11 PROCEDURE — 83735 ASSAY OF MAGNESIUM: CPT | Performed by: HOSPITALIST

## 2017-11-11 PROCEDURE — A9270 NON-COVERED ITEM OR SERVICE: HCPCS | Mod: GY | Performed by: INTERNAL MEDICINE

## 2017-11-11 PROCEDURE — A9270 NON-COVERED ITEM OR SERVICE: HCPCS | Mod: GY | Performed by: PHYSICIAN ASSISTANT

## 2017-11-11 PROCEDURE — 97116 GAIT TRAINING THERAPY: CPT | Mod: GP

## 2017-11-11 PROCEDURE — 99232 SBSQ HOSP IP/OBS MODERATE 35: CPT | Performed by: HOSPITALIST

## 2017-11-11 PROCEDURE — 84100 ASSAY OF PHOSPHORUS: CPT | Performed by: HOSPITALIST

## 2017-11-11 PROCEDURE — 21000001 ZZH R&B HEART CARE

## 2017-11-11 PROCEDURE — 99232 SBSQ HOSP IP/OBS MODERATE 35: CPT | Performed by: INTERNAL MEDICINE

## 2017-11-11 PROCEDURE — 85610 PROTHROMBIN TIME: CPT | Performed by: HOSPITALIST

## 2017-11-11 PROCEDURE — 99207 ZZC CDG-CHARGE REQUIRED MANUAL ENTRY: CPT | Performed by: HOSPITALIST

## 2017-11-11 PROCEDURE — 85049 AUTOMATED PLATELET COUNT: CPT | Performed by: INTERNAL MEDICINE

## 2017-11-11 PROCEDURE — A9270 NON-COVERED ITEM OR SERVICE: HCPCS | Mod: GY | Performed by: HOSPITALIST

## 2017-11-11 PROCEDURE — 97110 THERAPEUTIC EXERCISES: CPT | Mod: GP

## 2017-11-11 PROCEDURE — 80048 BASIC METABOLIC PNL TOTAL CA: CPT | Performed by: HOSPITALIST

## 2017-11-11 PROCEDURE — 40000275 ZZH STATISTIC RCP TIME EA 10 MIN

## 2017-11-11 PROCEDURE — 25000128 H RX IP 250 OP 636: Performed by: HOSPITALIST

## 2017-11-11 PROCEDURE — 25000132 ZZH RX MED GY IP 250 OP 250 PS 637: Mod: GY | Performed by: PHYSICIAN ASSISTANT

## 2017-11-11 PROCEDURE — 36415 COLL VENOUS BLD VENIPUNCTURE: CPT | Performed by: HOSPITALIST

## 2017-11-11 PROCEDURE — 25000132 ZZH RX MED GY IP 250 OP 250 PS 637: Mod: GY | Performed by: HOSPITALIST

## 2017-11-11 PROCEDURE — 40000193 ZZH STATISTIC PT WARD VISIT

## 2017-11-11 RX ORDER — ASPIRIN 81 MG/1
81 TABLET, CHEWABLE ORAL DAILY
Status: DISCONTINUED | OUTPATIENT
Start: 2017-11-11 | End: 2017-11-13 | Stop reason: HOSPADM

## 2017-11-11 RX ORDER — FUROSEMIDE 40 MG
40 TABLET ORAL 2 TIMES DAILY
Status: DISCONTINUED | OUTPATIENT
Start: 2017-11-11 | End: 2017-11-12

## 2017-11-11 RX ORDER — FUROSEMIDE 10 MG/ML
20 INJECTION INTRAMUSCULAR; INTRAVENOUS ONCE
Status: COMPLETED | OUTPATIENT
Start: 2017-11-11 | End: 2017-11-11

## 2017-11-11 RX ORDER — WARFARIN SODIUM 2.5 MG/1
2.5 TABLET ORAL
Status: COMPLETED | OUTPATIENT
Start: 2017-11-11 | End: 2017-11-11

## 2017-11-11 RX ORDER — LIDOCAINE 40 MG/G
CREAM TOPICAL
Status: DISCONTINUED | OUTPATIENT
Start: 2017-11-11 | End: 2017-11-13 | Stop reason: HOSPADM

## 2017-11-11 RX ADMIN — WARFARIN SODIUM 2.5 MG: 2.5 TABLET ORAL at 17:13

## 2017-11-11 RX ADMIN — FUROSEMIDE 20 MG: 10 INJECTION, SOLUTION INTRAVENOUS at 12:09

## 2017-11-11 RX ADMIN — METOPROLOL SUCCINATE 75 MG: 50 TABLET, EXTENDED RELEASE ORAL at 08:13

## 2017-11-11 RX ADMIN — ASPIRIN 81 MG 81 MG: 81 TABLET ORAL at 08:13

## 2017-11-11 RX ADMIN — OMEPRAZOLE 20 MG: 20 CAPSULE, DELAYED RELEASE ORAL at 08:13

## 2017-11-11 RX ADMIN — PAROXETINE HYDROCHLORIDE 30 MG: 30 TABLET, FILM COATED ORAL at 08:14

## 2017-11-11 RX ADMIN — HEPARIN SODIUM 5000 UNITS: 5000 INJECTION, SOLUTION INTRAVENOUS; SUBCUTANEOUS at 06:41

## 2017-11-11 RX ADMIN — Medication 1 LOZENGE: at 03:22

## 2017-11-11 RX ADMIN — METOPROLOL SUCCINATE 75 MG: 50 TABLET, EXTENDED RELEASE ORAL at 20:16

## 2017-11-11 RX ADMIN — FUROSEMIDE 20 MG: 20 TABLET ORAL at 08:14

## 2017-11-11 RX ADMIN — FUROSEMIDE 40 MG: 40 TABLET ORAL at 17:13

## 2017-11-11 RX ADMIN — HEPARIN SODIUM 5000 UNITS: 5000 INJECTION, SOLUTION INTRAVENOUS; SUBCUTANEOUS at 13:36

## 2017-11-11 NOTE — PROGRESS NOTES
[facility]    Hospitalist Progress Note    Assessment & Plan   Em Richmond is a 77-year-old female with a past medical history significant for nonischemic cardiomyopathy, chronic atrial fibrillation, gastroesophageal reflux disease, anxiety, pulmonary hypertension, obesity and obstructive sleep apnea who was admitted on 11/06/2017 due to acute hypoxic respiratory failure requiring intubation in the setting of cardiogenic shock secondary to atrial fibrillation with rapid ventricular response and decompensated nonischemic cardiomyopathy with pulmonary edema and shock liver.   The patient has successfully been extubated,  no longer requiring pressors, including Levophed and dobutamine.  The patient had been placed on an amiodarone drip per Cardiology and has since been discontinued and the patient has been started on metoprolol XL 25 mg 2 times daily and had previously received digoxin 250 mcg on 11/07/2017.  The patient was transferred to [facility] [place] with close monitoring, including continuous telemetry on 11/7.  Cardiology Service will continue to follow    Acute hypoxic respiratory failure, requiring intubation secondary to cardiogenic shock in the setting of decompensated systolic congestive heart failure and atrial fibrillation with rapid ventricular response. Sob with activity.   - Cont diuretic/bb, oxygen support.   - Cont diuretic.  - Supportive care.      Acute on chronic nonischemic cardiomyopathy with pulmonary edema: weight is down. Net neg in the past 24 hrs  Is about 1.2 L. Breathing much better. Sating well on RA. Mild peripheral edema this am.  - Cont diuresis per cards (increase dose to 40 mg BID)  - Cont BB.  -  Low salt diet.  - daily weights. I/Os.  - Monitor BMP.  - Replace electrolytes as indicated.    Chronic atrial fibrillation with rapid ventricular response: rate controlled.   - Plan per cards (on coumadin, BB)    Acute kidney injury:  resolved.   -  Monitor BMP     Shock liver in  the setting of cardiogenic shock:  The patient was managed with a NAC drip.  LFT's are improving.  - Monitor LFTs.     Gastroesophageal reflux disease:    - Will continue prior to admit omeprazole, but schedule this instead of as needed at 20 mg daily.     Generalized anxiety disorder:    - Cont PTA paroxetine 30 mg daily.     Obesity with associated obstructive sleep apnea:  Patient is compliant with home CPAP.    - Cont CPAP.    Gallbladder edema and borderline common bile duct dilation:  Patient underwent an MRCP and has been followed by Gastroenterology.  There is a high suspicion that shock liver with hepatic congestion and ischemia likely resulted in these ultrasound findings.    - Will continue with supportive care.     Deep venous thrombosis prophylaxis:  on coumadin.    DISPOSITION: to TCU 1-2 days.    Interval History   Pt seen and examined. Sating well on RA. Exertional sob.    Physical Exam   Temp: 98  F (36.7  C) Temp src: Oral BP: 142/71   Heart Rate: 84 Resp: 18 SpO2: 93 % O2 Device: None (Room air)    Vitals:    11/08/17 0000 11/10/17 0500 11/11/17 0500   Weight: 89.5 kg (197 lb 5 oz) 83.4 kg (183 lb 13.8 oz) 82.5 kg (181 lb 14.1 oz)     Vital Signs with Ranges  Temp:  [97.8  F (36.6  C)-98.4  F (36.9  C)] 98  F (36.7  C)  Heart Rate:  [84-99] 84  Resp:  [18] 18  BP: (122-142)/(65-79) 142/71  SpO2:  [93 %-95 %] 93 %  I/O last 3 completed shifts:  In: 730 [P.O.:730]  Out: 2000 [Urine:2000]    Constitutional:Awake, alert, cooperative, no apparent distress  Respiratory: Clear to auscultation bilaterally, no crackles or wheezing  Cardiovascular: Regular rate and rhythm, normal S1 and S2, and no murmur noted  GI: Normal bowel sounds, soft, non-distended, non-tender  Skin/Integumen: mild BLE edema.  Other:     Medications     Warfarin Therapy Reminder         aspirin  81 mg Oral Daily     warfarin  2.5 mg Oral ONCE at 18:00     furosemide  20 mg Oral BID     metoprolol  75 mg Oral BID     omeprazole  (priLOSEC) CR capsule 20 mg  20 mg Oral QAM     PARoxetine (PAXIL) tablet 30 mg  30 mg Oral Daily     heparin  5,000 Units Subcutaneous Q8H       Data     Recent Labs  Lab 11/11/17  0525 11/10/17  0554 11/09/17  1802 11/09/17  0641  11/08/17  0500  11/07/17  0415  11/06/17  0700 11/06/17  0440 11/05/17  2315   WBC  --   --   --   --   --  8.6  --  9.2  --  12.4* 15.6* 11.8*   HGB  --   --   --   --   --  10.7*  --  11.4*  --  11.7 12.6 13.5   MCV  --   --   --   --   --  96  --  94  --  95 97 99   PLT  --   --   --   --   --  260  --  225  --  217 230 322   INR 2.29* 1.78*  --  1.68*  < >  --   --  1.92*  < > 4.83* 5.26* 3.72*    137  --   --   --  141  < > 137  --  131* 131* 132*   POTASSIUM 3.4 3.4 3.4 3.1*  --  3.6  < > 2.8*  --  4.6 5.1 4.8   CHLORIDE 99 99  --   --   --  107  < > 104  --  101 101 99   CO2 34* 30  --   --   --  27  < > 24  --  14* 17* 15*   BUN 10 7  --   --   --  12  < > 17  --  24 23 23   CR 0.69 0.66  --   --   --  0.86  < > 0.80  --  0.83 0.84 1.20*   ANIONGAP 4 8  --   --   --  7  < > 9  --  16* 13 18*   ALFIE 8.6 8.1*  --   --   --  7.7*  < > 7.3*  --  7.8* 7.6* 8.3*   * 112*  --   --   --  111*  < > 97  --  138* 114* 122*   ALBUMIN  --  2.1*  --  2.3*  --  2.2*  --  1.9*  < > 2.1* 2.2* 2.9*   PROTTOTAL  --  6.7*  --  6.7*  --  6.1*  --  5.9*  < > 6.1* 6.3* 8.2   BILITOTAL  --  1.7*  --  1.5*  --  1.3  --  1.4*  < > 2.1* 2.3* 1.8*   ALKPHOS  --  215*  --  199*  --  209*  --  226*  < > 292* 314* 405*   ALT  --  895*  --  1120*  --  1650*  --  2474*  < > 2858* 1890* 769*   AST  --  712*  --  749*  --  1509*  --  3199*  < > 6218* 3720* 1480*   LIPASE  --   --   --   --   --   --   --   --   --   --  189 455*   TROPI  --   --   --   --   --   --   --   --   --   --  0.020 <0.015   < > = values in this interval not displayed.    No results found for this or any previous visit (from the past 24 hour(s)).

## 2017-11-11 NOTE — PROGRESS NOTES
[facility]  Cardiology Progress Note    Date of Service (when I saw the patient): 11/11/2017  Primary Cardiologist: Dr. Pickard     Assessment & Plan   Em Richmond is a 77 year old female who was admitted on 11/5/2017 with SOB and weight gain. She was recently admitted at [facility] with CHF exacerbation.      Acute on Chronic Systolic CHF   Chronic Atrial Fibrillation   -- increasing Lasix to PO Furosemide 40 mg BID   -- continue rate control strategy with Metoprolol succinate 75 mg BID   -- continue Coumadin for CVA prophylaxis     Acute Respiratory Failure (resolved)    Mild CAD, non-obstructive  - start daily aspirin    Mild to Moderate Tricuspid Regurgitation     [facility] Follow Up:   Ying Pickard         Interval History   No acute overnight events.  Patient makes not complaints aside from feeling weak with ambulation.    Physical Exam   Temp: 97.9  F (36.6  C) Temp src: Oral BP: 137/69   Heart Rate: 89 Resp: 18 SpO2: 93 % O2 Device: None (Room air)    Vitals:    11/08/17 0000 11/10/17 0500 11/11/17 0500   Weight: 89.5 kg (197 lb 5 oz) 83.4 kg (183 lb 13.8 oz) 82.5 kg (181 lb 14.1 oz)     Gen- NAD  Neck- JVP elevated at 12 cm of water  Resp- Fine crackles at the bases bilaterally  Card- Irregular rhythm, normal. No murmurs  Abd- Soft, nontender  Ext- No edema    Medications     Warfarin Therapy Reminder         furosemide  20 mg Oral BID     metoprolol  75 mg Oral BID     omeprazole (priLOSEC) CR capsule 20 mg  20 mg Oral QAM     PARoxetine (PAXIL) tablet 30 mg  30 mg Oral Daily     heparin  5,000 Units Subcutaneous Q8H       Data   Reviewed.

## 2017-11-11 NOTE — PROVIDER NOTIFICATION
MD Notification    Notified Person:  MD    Notified Persons Name: Dr. Matos     Notification Date/Time: 11/11 0330    Notification Interaction:  Talked with Physician    Purpose of Notification: urine output 125 cc since gaston removal, see flowsheet for detail    Orders Received: no new orders at this time    Comments:continue monitor.

## 2017-11-11 NOTE — PLAN OF CARE
Problem: Patient Care Overview  Goal: Plan of Care/Patient Progress Review  OT: Pleasantly declined therapy after just having a shower. Cancel this AM.

## 2017-11-11 NOTE — PLAN OF CARE
Problem: Patient Care Overview  Goal: Plan of Care/Patient Progress Review  Discharge Planner PT   Patient plan for discharge:  TCU  Current status:  SBA with bed mobility, CGA/SBA and FWW with functional transfers, ambulated 340' with FWW and CGA progressing to SBA.     Barriers to return to prior living situation:  Decreased activity tolerance, generalized weakness.    Recommendations for discharge:  TCU  Rationale for recommendations:  To progress strength, activity tolerance, and independence/safety with mobility.  During hospitalization, pt will benefit from ambulating with nursing 3x/day.          Entered by: Yamilet Cordova 11/11/2017 1:18 PM

## 2017-11-11 NOTE — PLAN OF CARE
Problem: Patient Care Overview  Goal: Plan of Care/Patient Progress Review  Outcome: Improving  No c/o pain. Seems sob with exertion.  Appetite good. Stronger

## 2017-11-11 NOTE — PLAN OF CARE
Problem: Patient Care Overview  Goal: Plan of Care/Patient Progress Review  Outcome: No Change  Heart Failure Care Pathway  GOALS TO BE MET BEFORE DISCHARGE: SOB improvement     1. Decrease congestion and/or edema with diuretic therapy to achieve near      optimal volume status.            Dyspnea improved:  Yes            Edema improved:     Yes        Net I/O and Weights since admission:          11/05 2300 - 11/10 2259  In: 35594.85 [P.O.:2765; I.V.:7614.85]  Out: 17678 [Urine:12930]  Net: -5355.15            Vitals:    11/05/17 2322 11/06/17 0400 11/07/17 0000 11/08/17 0000   Weight: 88.2 kg (194 lb 7.1 oz) 90.8 kg (200 lb 2.8 oz) 89.5 kg (197 lb 5 oz) 89.5 kg (197 lb 5 oz)    11/10/17 0500 11/11/17 0500   Weight: 83.4 kg (183 lb 13.8 oz) 82.5 kg (181 lb 14.1 oz)       2.  O2 sats > 92% on RA or at prior home O2 therapy level.          Current oxygenation status:       SpO2: 93 %         O2 Device: None (Room air),            Able to wean O2 this shift to keep sats > 92%:  Yes       Does patient use Home O2? No    3.  Tolerates ambulation and mobility near baseline: Yes        How many times did the patient ambulate with nursing staff this shift? 2    Please review the Heart Failure Care Pathway for additional HF goal outcomes.    Ryan Levy RN  11/11/2017         Pt A&O, forgetful at times. Reports having frequent non productive cough, denies SOB. CPAP on until 0300. Ambulated to the bathroom w/ walker and SBA. On PO lasix. Afib w/ CVR w/ Hr in 80s-90s on tele. VSS. Possible d/c 1-2 days. Will continue to monitor pt.

## 2017-11-12 ENCOUNTER — APPOINTMENT (OUTPATIENT)
Dept: PHYSICAL THERAPY | Facility: CLINIC | Age: 77
DRG: 208 | End: 2017-11-12
Payer: MEDICARE

## 2017-11-12 LAB
ALBUMIN SERPL-MCNC: 2.3 G/DL (ref 3.4–5)
ALP SERPL-CCNC: 251 U/L (ref 40–150)
ALT SERPL W P-5'-P-CCNC: 491 U/L (ref 0–50)
ANION GAP SERPL CALCULATED.3IONS-SCNC: 6 MMOL/L (ref 3–14)
AST SERPL W P-5'-P-CCNC: 309 U/L (ref 0–45)
BACTERIA SPEC CULT: NO GROWTH
BACTERIA SPEC CULT: NO GROWTH
BILIRUB DIRECT SERPL-MCNC: 0.5 MG/DL (ref 0–0.2)
BILIRUB SERPL-MCNC: 1.1 MG/DL (ref 0.2–1.3)
BUN SERPL-MCNC: 12 MG/DL (ref 7–30)
CALCIUM SERPL-MCNC: 8.3 MG/DL (ref 8.5–10.1)
CHLORIDE SERPL-SCNC: 97 MMOL/L (ref 94–109)
CO2 SERPL-SCNC: 35 MMOL/L (ref 20–32)
CREAT SERPL-MCNC: 0.82 MG/DL (ref 0.52–1.04)
GFR SERPL CREATININE-BSD FRML MDRD: 68 ML/MIN/1.7M2
GLUCOSE SERPL-MCNC: 94 MG/DL (ref 70–99)
INR PPP: 2.62 (ref 0.86–1.14)
Lab: NORMAL
Lab: NORMAL
MAGNESIUM SERPL-MCNC: 1.8 MG/DL (ref 1.6–2.3)
PHOSPHATE SERPL-MCNC: 4.2 MG/DL (ref 2.5–4.5)
POTASSIUM SERPL-SCNC: 3.2 MMOL/L (ref 3.4–5.3)
POTASSIUM SERPL-SCNC: 3.6 MMOL/L (ref 3.4–5.3)
POTASSIUM SERPL-SCNC: 3.7 MMOL/L (ref 3.4–5.3)
PROT SERPL-MCNC: 6.7 G/DL (ref 6.8–8.8)
SODIUM SERPL-SCNC: 138 MMOL/L (ref 133–144)
SPECIMEN SOURCE: NORMAL
SPECIMEN SOURCE: NORMAL

## 2017-11-12 PROCEDURE — A9270 NON-COVERED ITEM OR SERVICE: HCPCS | Mod: GY | Performed by: INTERNAL MEDICINE

## 2017-11-12 PROCEDURE — A9270 NON-COVERED ITEM OR SERVICE: HCPCS | Mod: GY | Performed by: PHYSICIAN ASSISTANT

## 2017-11-12 PROCEDURE — 80048 BASIC METABOLIC PNL TOTAL CA: CPT | Performed by: HOSPITALIST

## 2017-11-12 PROCEDURE — 21000001 ZZH R&B HEART CARE

## 2017-11-12 PROCEDURE — 25000132 ZZH RX MED GY IP 250 OP 250 PS 637: Mod: GY | Performed by: INTERNAL MEDICINE

## 2017-11-12 PROCEDURE — 80076 HEPATIC FUNCTION PANEL: CPT | Performed by: HOSPITALIST

## 2017-11-12 PROCEDURE — 99232 SBSQ HOSP IP/OBS MODERATE 35: CPT | Performed by: INTERNAL MEDICINE

## 2017-11-12 PROCEDURE — 25000128 H RX IP 250 OP 636: Performed by: INTERNAL MEDICINE

## 2017-11-12 PROCEDURE — 84132 ASSAY OF SERUM POTASSIUM: CPT | Performed by: HOSPITALIST

## 2017-11-12 PROCEDURE — 25000132 ZZH RX MED GY IP 250 OP 250 PS 637: Mod: GY | Performed by: PHYSICIAN ASSISTANT

## 2017-11-12 PROCEDURE — 99207 ZZC CDG-CHARGE REQUIRED MANUAL ENTRY: CPT | Performed by: HOSPITALIST

## 2017-11-12 PROCEDURE — 84100 ASSAY OF PHOSPHORUS: CPT | Performed by: HOSPITALIST

## 2017-11-12 PROCEDURE — 97116 GAIT TRAINING THERAPY: CPT | Mod: GP

## 2017-11-12 PROCEDURE — 40000193 ZZH STATISTIC PT WARD VISIT

## 2017-11-12 PROCEDURE — 85610 PROTHROMBIN TIME: CPT | Performed by: HOSPITALIST

## 2017-11-12 PROCEDURE — 97110 THERAPEUTIC EXERCISES: CPT | Mod: GP

## 2017-11-12 PROCEDURE — 25000125 ZZHC RX 250: Performed by: INTERNAL MEDICINE

## 2017-11-12 PROCEDURE — 99232 SBSQ HOSP IP/OBS MODERATE 35: CPT | Performed by: HOSPITALIST

## 2017-11-12 PROCEDURE — 36415 COLL VENOUS BLD VENIPUNCTURE: CPT | Performed by: HOSPITALIST

## 2017-11-12 PROCEDURE — 40000275 ZZH STATISTIC RCP TIME EA 10 MIN

## 2017-11-12 PROCEDURE — 83735 ASSAY OF MAGNESIUM: CPT | Performed by: HOSPITALIST

## 2017-11-12 RX ORDER — METOPROLOL SUCCINATE 25 MG/1
75 TABLET, EXTENDED RELEASE ORAL 2 TIMES DAILY
Qty: 30 TABLET | DISCHARGE
Start: 2017-11-12 | End: 2017-12-11 | Stop reason: DRUGHIGH

## 2017-11-12 RX ORDER — WARFARIN SODIUM 2.5 MG/1
2.5 TABLET ORAL
Status: COMPLETED | OUTPATIENT
Start: 2017-11-12 | End: 2017-11-12

## 2017-11-12 RX ORDER — LISINOPRIL 5 MG/1
5 TABLET ORAL DAILY
Status: DISCONTINUED | OUTPATIENT
Start: 2017-11-12 | End: 2017-11-13 | Stop reason: HOSPADM

## 2017-11-12 RX ORDER — ASPIRIN 81 MG/1
81 TABLET, CHEWABLE ORAL DAILY
Qty: 36 TABLET | Refills: 0 | Status: ON HOLD | DISCHARGE
Start: 2017-11-12 | End: 2019-05-03

## 2017-11-12 RX ORDER — FUROSEMIDE 40 MG
40 TABLET ORAL DAILY
Status: DISCONTINUED | OUTPATIENT
Start: 2017-11-12 | End: 2017-11-13 | Stop reason: HOSPADM

## 2017-11-12 RX ORDER — LISINOPRIL 5 MG/1
5 TABLET ORAL DAILY
Qty: 30 TABLET | Refills: 0 | DISCHARGE
Start: 2017-11-12 | End: 2017-12-11

## 2017-11-12 RX ORDER — FUROSEMIDE 20 MG
20 TABLET ORAL EVERY EVENING
Status: DISCONTINUED | OUTPATIENT
Start: 2017-11-12 | End: 2017-11-12

## 2017-11-12 RX ORDER — FUROSEMIDE 40 MG
40 TABLET ORAL DAILY
Qty: 30 TABLET | DISCHARGE
Start: 2017-11-12 | End: 2017-11-20 | Stop reason: DRUGHIGH

## 2017-11-12 RX ORDER — FUROSEMIDE 20 MG
20 TABLET ORAL EVERY 24 HOURS
Qty: 30 TABLET | DISCHARGE
Start: 2017-11-12 | End: 2017-11-20 | Stop reason: DRUGHIGH

## 2017-11-12 RX ORDER — FUROSEMIDE 20 MG
20 TABLET ORAL DAILY
Status: DISCONTINUED | OUTPATIENT
Start: 2017-11-12 | End: 2017-11-12

## 2017-11-12 RX ORDER — FUROSEMIDE 20 MG
20 TABLET ORAL 2 TIMES DAILY
Status: DISCONTINUED | OUTPATIENT
Start: 2017-11-12 | End: 2017-11-12

## 2017-11-12 RX ORDER — FUROSEMIDE 20 MG
20 TABLET ORAL EVERY 24 HOURS
Status: DISCONTINUED | OUTPATIENT
Start: 2017-11-12 | End: 2017-11-13 | Stop reason: HOSPADM

## 2017-11-12 RX ADMIN — WARFARIN SODIUM 2.5 MG: 2.5 TABLET ORAL at 18:27

## 2017-11-12 RX ADMIN — HEPARIN SODIUM 5000 UNITS: 5000 INJECTION, SOLUTION INTRAVENOUS; SUBCUTANEOUS at 00:28

## 2017-11-12 RX ADMIN — POTASSIUM CHLORIDE 40 MEQ: 1500 TABLET, EXTENDED RELEASE ORAL at 06:45

## 2017-11-12 RX ADMIN — PAROXETINE HYDROCHLORIDE 30 MG: 30 TABLET, FILM COATED ORAL at 08:24

## 2017-11-12 RX ADMIN — OMEPRAZOLE 20 MG: 20 CAPSULE, DELAYED RELEASE ORAL at 08:26

## 2017-11-12 RX ADMIN — Medication 2 G: at 07:43

## 2017-11-12 RX ADMIN — POTASSIUM CHLORIDE 20 MEQ: 1500 TABLET, EXTENDED RELEASE ORAL at 08:44

## 2017-11-12 RX ADMIN — METOPROLOL SUCCINATE 75 MG: 50 TABLET, EXTENDED RELEASE ORAL at 20:52

## 2017-11-12 RX ADMIN — LISINOPRIL 5 MG: 5 TABLET ORAL at 08:25

## 2017-11-12 RX ADMIN — HEPARIN SODIUM 5000 UNITS: 5000 INJECTION, SOLUTION INTRAVENOUS; SUBCUTANEOUS at 08:24

## 2017-11-12 RX ADMIN — FUROSEMIDE 40 MG: 40 TABLET ORAL at 08:27

## 2017-11-12 RX ADMIN — FUROSEMIDE 20 MG: 20 TABLET ORAL at 16:31

## 2017-11-12 RX ADMIN — ASPIRIN 81 MG 81 MG: 81 TABLET ORAL at 08:26

## 2017-11-12 RX ADMIN — METOPROLOL SUCCINATE 75 MG: 50 TABLET, EXTENDED RELEASE ORAL at 08:25

## 2017-11-12 RX ADMIN — POTASSIUM CHLORIDE 20 MEQ: 1500 TABLET, EXTENDED RELEASE ORAL at 18:38

## 2017-11-12 NOTE — PROGRESS NOTES
"BRIEF NUTRITION ASSESSMENT      REASON FOR ASSESSMENT:  LOS    NUTRITION HISTORY:  Patient states that her appetite and intake are good at baseline.  \"I like food\".    CURRENT DIET AND INTAKE:  Diet:  2 gram Na               Patient reports that her appetite and intake are much improved from earlier this admit.  She is not eating % of meals.  Breakfast today was an omelet, yogurt, blueberry muffin, OJ, and coffee.  Lunch consisted of a tuna fish sandwich, pineapple, and coffee.     ANTHROPOMETRICS:  Height: 5'5\"  Weight: 82.1 kg (181#)(11/12)  BMI: 30.12 kg/m2  IBW:  56.8 kg   Weight Status: Obesity Grade I BMI 30-34.9  %IBW: 145%  Weight History:   Wt Readings from Last 10 Encounters:   11/12/17 82.1 kg (181 lb)   10/25/17 83.9 kg (184 lb 14.4 oz)   06/20/14 83.9 kg (185 lb)   Patient states that she has diuresed down to her UBW  Noted she was 194# on admit (up due to fluids)      LABS:  Labs noted    MALNUTRITION:  Patient does not meet two of the following criteria necessary for diagnosing malnutrition: significant weight loss, reduced intake, subcutaneous fat loss, muscle loss or fluid retention    NUTRITION INTERVENTION:  Nutrition Diagnosis:  No nutrition diagnosis at this time.    Implementation:  Nutrition Education:  Per Provider order if indicated.    FOLLOW UP/MONITORING:   Will re-evaluate in 7 - 10 days, or sooner, if re-consulted.    Angela Villela RD, LD, CNSC   Clinical Dietitian - [facility]             "

## 2017-11-12 NOTE — PROGRESS NOTES
[facility]  Cardiology Progress Note    Date of Service (when I saw the patient): 11/12/2017  Primary Cardiologist: Dr. Pickard     Assessment & Plan   Em Richmond is a 77 year old female who was admitted on 11/5/2017 with SOB and weight gain. She was recently admitted at [facility] with CHF exacerbation and hypoxemic respiratory failure.  Patient is making a good recovery.    Acute Respiratory Failure (resolved)  Acute on Chronic Systolic CHF, LVEF 31%  Mild pulmonary hypertension  Mild to Moderate Tricuspid Regurgitation    Atrial Fibrillation   -- decrease Lasix to Furosemide 40 mg in the am and 20 mg in the pm   -- continue Metoprolol succinate 75 mg BID   -- continue Coumadin for CVA prophylaxis   -- adding low dose ACEI with Lisinopril 5 mg PO daily    Mild CAD, non-obstructive  - start daily aspirin    [facility] Follow [facility]:   Ying Pickard         Interval History   No acute overnight events.  Patient makes no complaints.    Physical Exam   Temp: 97.9  F (36.6  C) Temp src: Oral BP: 139/78 Pulse: 79 Heart Rate: 89 Resp: 16 SpO2: 93 % O2 Device: None (Room air)    Vitals:    11/08/17 0000 11/10/17 0500 11/11/17 0500   Weight: 89.5 kg (197 lb 5 oz) 83.4 kg (183 lb 13.8 oz) 82.5 kg (181 lb 14.1 oz)     Gen- NAD, lying supine in bed  Neck- JVP non-elevated today  Resp- Fine crackles at the bases bilaterally  Card- Irregular rhythm, normal. No murmurs  Abd- Soft, nontender  Ext- No edema    Medications     Warfarin Therapy Reminder         aspirin  81 mg Oral Daily     furosemide  40 mg Oral BID     sodium chloride (PF)  3 mL Intracatheter Q8H     metoprolol  75 mg Oral BID     omeprazole (priLOSEC) CR capsule 20 mg  20 mg Oral QAM     PARoxetine (PAXIL) tablet 30 mg  30 mg Oral Daily     heparin  5,000 Units Subcutaneous Q8H       Data   Reviewed.

## 2017-11-12 NOTE — PLAN OF CARE
Problem: Cardiac: Heart Failure (Adult)  Goal: Signs and Symptoms of Listed Potential Problems Will be Absent, Minimized or Managed (Cardiac: Heart Failure)  Signs and symptoms of listed potential problems will be absent, minimized or managed by discharge/transition of care (reference Cardiac: Heart Failure (Adult) CPG).   Outcome: No Change  Heart Failure Care Pathway  GOALS TO BE MET BEFORE DISCHARGE:     1. Decrease congestion and/or edema with diuretic therapy to achieve near      optimal volume status.            Dyspnea improved:  Yes            Edema improved:     Yes         Net I/O and Weights since admission:          11/06 2300 - 11/11 2259  In: 5486.65 [P.O.:3315; I.V.:2171.65]  Out: 59401 [Urine:32040]  Net: -8348.35            Vitals:     11/05/17 2322 11/06/17 0400 11/07/17 0000 11/08/17 0000   Weight: 88.2 kg (194 lb 7.1 oz) 90.8 kg (200 lb 2.8 oz) 89.5 kg (197 lb 5 oz) 89.5 kg (197 lb 5 oz)     11/10/17 0500 11/11/17 0500 11/12/17 0500   Weight: 83.4 kg (183 lb 13.8 oz) 82.5 kg (181 lb 14.1 oz) 82.1 kg (181 lb)        2.  O2 sats > 92% on RA or at prior home O2 therapy level.           Current oxygenation status:       SpO2: 93 %         O2 Device: None (Room air),             Able to wean O2 this shift to keep sats > 92%:  NA, on RA       Does patient use Home O2? No     3.  Tolerates ambulation and mobility near baseline: Yes        How many times did the patient ambulate with nursing staff this shift? 3x up to bathroom     Please review the Heart Failure Care Pathway for additional HF goal outcomes.     Mariana Izaguirre RN  11/12/2017      A/O, up SBA and walker to bathroom. VSS on RA. Tele: A.fib CVR. Pt denies pain/SOB. Trace edema in ankles. LS clear. Pt c/o non-productive cough. K+ 3.2, replacement initiated per protocol. Plan for d/c to TCU today or tomorrow.

## 2017-11-12 NOTE — PLAN OF CARE
Problem: Patient Care Overview  Goal: Plan of Care/Patient Progress Review  Discharge Planner PT   Patient plan for discharge:  TCU  Current status:  SBA/IND with functional transfers, ambulated 450' with SBA and FWW.  Participated in standing LE exercises and tolerated well.    Barriers to return to prior living situation:  Decreased activity tolerance, generalized weakness, stairs.   Recommendations for discharge:  TCU  Rationale for recommendations:  To progress strength, activity tolerance, and independence/safety with mobility.         Entered by: Yamilet Cordova 11/12/2017 4:35 PM

## 2017-11-12 NOTE — PLAN OF CARE
Problem: Patient Care Overview  Goal: Individualization & Mutuality  Outcome: No Change  A&O x 4 with forgetfulness, vss, a-febrile, denies shortness of breath, on room air 95%, up to the bathroom with walker and SBA, diuresing with oral lasix, plan is to discharge to TCU tomorrow if bed available.

## 2017-11-12 NOTE — PROGRESS NOTES
[facility]    Hospitalist Progress Note    Assessment & Plan   Em Richmond is a 77-year-old female with a past medical history significant for nonischemic cardiomyopathy, chronic atrial fibrillation, gastroesophageal reflux disease, anxiety, pulmonary hypertension, obesity and obstructive sleep apnea who was admitted on 11/06/2017 due to acute hypoxic respiratory failure requiring intubation in the setting of cardiogenic shock secondary to atrial fibrillation with rapid ventricular response and decompensated nonischemic cardiomyopathy with pulmonary edema and shock liver.   The patient has successfully been extubated,  no longer requiring pressors, including Levophed and dobutamine.  The patient had been placed on an amiodarone drip per Cardiology and has since been discontinued and the patient has been started on metoprolol XL 25 mg 2 times daily and had previously received digoxin 250 mcg on 11/07/2017.  The patient was transferred to [facility] [place] with close monitoring, including continuous telemetry on 11/7.  Cardiology Service will continue to follow    Acute hypoxic respiratory failure, requiring intubation secondary to cardiogenic shock in the setting of decompensated systolic congestive heart failure and atrial fibrillation with rapid ventricular response. Sob with activity.   - Cont diuretic/bb, oxygen support.   - Cont diuretic.  - Supportive care.      Acute on chronic nonischemic cardiomyopathy with pulmonary edema: weight is down.  Sating well on RA. No peripheral edema this am.  - Cont lasix 40 mg in am and 20 mg in the afternoon..  - Cont BB/ACEI.  -  Low salt diet.  - daily weights. I/Os.  - Monitor BMP.  - Replace electrolytes as indicated.  - Appreciate cards assistance.    Chronic atrial fibrillation with rapid ventricular response: rate controlled.   - Plan per cards (on coumadin, BB)    Acute kidney injury:  resolved.   -  Monitor BMP     Shock liver in the setting of cardiogenic  shock:  The patient was managed with a NAC drip.  LFT's are improving.  - Monitor LFTs.     Gastroesophageal reflux disease:    - Will continue prior to admit omeprazole.    Generalized anxiety disorder:    - Cont PTA paroxetine 30 mg daily.     Obesity with associated obstructive sleep apnea:  Patient is compliant with home CPAP.    - Cont CPAP.    Gallbladder edema and borderline common bile duct dilation:  Patient underwent an MRCP and has been followed by Gastroenterology.  There is a high suspicion that shock liver with hepatic congestion and ischemia likely resulted in these ultrasound findings.    - Will continue with supportive care.     Deep venous thrombosis prophylaxis:  on coumadin.    DISPOSITION: to TCU likely in am.    Interval History   Pt seen and examined. Sating well on RA. Less exertional sob.    Physical Exam   Temp: 98.2  F (36.8  C) Temp src: Oral BP: 138/84 Pulse: 79 Heart Rate: 83 Resp: 18 SpO2: 93 % O2 Device: None (Room air)    Vitals:    11/10/17 0500 11/11/17 0500 11/12/17 0500   Weight: 83.4 kg (183 lb 13.8 oz) 82.5 kg (181 lb 14.1 oz) 82.1 kg (181 lb)     Vital Signs with Ranges  Temp:  [97.9  F (36.6  C)-98.2  F (36.8  C)] 98.2  F (36.8  C)  Pulse:  [79-93] 79  Heart Rate:  [] 83  Resp:  [16-18] 18  BP: (109-139)/(73-84) 138/84  SpO2:  [92 %-95 %] 93 %  I/O last 3 completed shifts:  In: 553 [P.O.:550; I.V.:3]  Out: 2625 [Urine:2625]    Constitutional:Awake, alert, cooperative, no apparent distress  Respiratory: Clear to auscultation bilaterally, no crackles or wheezing  Cardiovascular: Regular rate and rhythm, normal S1 and S2, and no murmur noted  GI: Normal bowel sounds, soft, non-distended, non-tender  Skin/Integumen: no edema.  Other:     Medications     Warfarin Therapy Reminder         lisinopril  5 mg Oral Daily     furosemide  40 mg Oral Daily     furosemide  20 mg Oral Q24H     warfarin  2.5 mg Oral ONCE at 18:00     aspirin  81 mg Oral Daily     sodium chloride (PF)  3  mL Intracatheter Q8H     metoprolol  75 mg Oral BID     omeprazole (priLOSEC) CR capsule 20 mg  20 mg Oral QAM     PARoxetine (PAXIL) tablet 30 mg  30 mg Oral Daily     heparin  5,000 Units Subcutaneous Q8H       Data     Recent Labs  Lab 11/12/17  0530 11/11/17  1212 11/11/17  0525 11/10/17  0554  11/08/17  0500  11/07/17  0415  11/06/17  0700 11/06/17  0440 11/05/17  2315   WBC  --   --   --   --   --  8.6  --  9.2  --  12.4* 15.6* 11.8*   HGB  --   --   --   --   --  10.7*  --  11.4*  --  11.7 12.6 13.5   MCV  --   --   --   --   --  96  --  94  --  95 97 99   PLT  --  202  --   --   --  260  --  225  --  217 230 322   INR 2.62*  --  2.29* 1.78*  < >  --   --  1.92*  < > 4.83* 5.26* 3.72*     --  137 137  --  141  < > 137  --  131* 131* 132*   POTASSIUM 3.2*  --  3.4 3.4  < > 3.6  < > 2.8*  --  4.6 5.1 4.8   CHLORIDE 97  --  99 99  --  107  < > 104  --  101 101 99   CO2 35*  --  34* 30  --  27  < > 24  --  14* 17* 15*   BUN 12  --  10 7  --  12  < > 17  --  24 23 23   CR 0.82  --  0.69 0.66  --  0.86  < > 0.80  --  0.83 0.84 1.20*   ANIONGAP 6  --  4 8  --  7  < > 9  --  16* 13 18*   ALFIE 8.3*  --  8.6 8.1*  --  7.7*  < > 7.3*  --  7.8* 7.6* 8.3*   GLC 94  --  111* 112*  --  111*  < > 97  --  138* 114* 122*   ALBUMIN 2.3*  --   --  2.1*  < > 2.2*  --  1.9*  < > 2.1* 2.2* 2.9*   PROTTOTAL 6.7*  --   --  6.7*  < > 6.1*  --  5.9*  < > 6.1* 6.3* 8.2   BILITOTAL 1.1  --   --  1.7*  < > 1.3  --  1.4*  < > 2.1* 2.3* 1.8*   ALKPHOS 251*  --   --  215*  < > 209*  --  226*  < > 292* 314* 405*   *  --   --  895*  < > 1650*  --  2474*  < > 2858* 1890* 769*   *  --   --  712*  < > 1509*  --  3199*  < > 6218* 3720* 1480*   LIPASE  --   --   --   --   --   --   --   --   --   --  189 455*   TROPI  --   --   --   --   --   --   --   --   --   --  0.020 <0.015   < > = values in this interval not displayed.    No results found for this or any previous visit (from the past 24 hour(s)).

## 2017-11-12 NOTE — PLAN OF CARE
Problem: Patient Care Overview  Goal: Plan of Care/Patient Progress Review  Outcome: Improving  VSS, pt denies pain, up with SBA and walker to the bathroom. Po lasix given with good urine output. Plan for d/c to TCU tomorrow.

## 2017-11-13 VITALS
BODY MASS INDEX: 30.16 KG/M2 | RESPIRATION RATE: 20 BRPM | OXYGEN SATURATION: 96 % | DIASTOLIC BLOOD PRESSURE: 65 MMHG | HEART RATE: 83 BPM | HEIGHT: 65 IN | TEMPERATURE: 97.6 F | SYSTOLIC BLOOD PRESSURE: 128 MMHG | WEIGHT: 181 LBS

## 2017-11-13 DIAGNOSIS — I50.41 ACUTE COMBINED SYSTOLIC AND DIASTOLIC CHF, NYHA CLASS 3 (H): Primary | ICD-10-CM

## 2017-11-13 LAB
ALBUMIN SERPL-MCNC: 2.5 G/DL (ref 3.4–5)
ALP SERPL-CCNC: 256 U/L (ref 40–150)
ALT SERPL W P-5'-P-CCNC: 385 U/L (ref 0–50)
AST SERPL W P-5'-P-CCNC: 197 U/L (ref 0–45)
BILIRUB DIRECT SERPL-MCNC: 0.5 MG/DL (ref 0–0.2)
BILIRUB SERPL-MCNC: 1.1 MG/DL (ref 0.2–1.3)
CREAT SERPL-MCNC: 0.71 MG/DL (ref 0.52–1.04)
GFR SERPL CREATININE-BSD FRML MDRD: 80 ML/MIN/1.7M2
INR PPP: 2.57 (ref 0.86–1.14)
MAGNESIUM SERPL-MCNC: 1.9 MG/DL (ref 1.6–2.3)
PHOSPHATE SERPL-MCNC: 3.7 MG/DL (ref 2.5–4.5)
POTASSIUM SERPL-SCNC: 3.8 MMOL/L (ref 3.4–5.3)
PROT SERPL-MCNC: 7.2 G/DL (ref 6.8–8.8)

## 2017-11-13 PROCEDURE — 25000132 ZZH RX MED GY IP 250 OP 250 PS 637: Mod: GY | Performed by: PHYSICIAN ASSISTANT

## 2017-11-13 PROCEDURE — 80076 HEPATIC FUNCTION PANEL: CPT | Performed by: HOSPITALIST

## 2017-11-13 PROCEDURE — 25000132 ZZH RX MED GY IP 250 OP 250 PS 637: Mod: GY | Performed by: INTERNAL MEDICINE

## 2017-11-13 PROCEDURE — A9270 NON-COVERED ITEM OR SERVICE: HCPCS | Mod: GY | Performed by: PHYSICIAN ASSISTANT

## 2017-11-13 PROCEDURE — 99239 HOSP IP/OBS DSCHRG MGMT >30: CPT | Performed by: HOSPITALIST

## 2017-11-13 PROCEDURE — 36415 COLL VENOUS BLD VENIPUNCTURE: CPT | Performed by: HOSPITALIST

## 2017-11-13 PROCEDURE — A9270 NON-COVERED ITEM OR SERVICE: HCPCS | Mod: GY | Performed by: INTERNAL MEDICINE

## 2017-11-13 PROCEDURE — 84100 ASSAY OF PHOSPHORUS: CPT | Performed by: HOSPITALIST

## 2017-11-13 PROCEDURE — 99232 SBSQ HOSP IP/OBS MODERATE 35: CPT | Performed by: INTERNAL MEDICINE

## 2017-11-13 PROCEDURE — 40000275 ZZH STATISTIC RCP TIME EA 10 MIN

## 2017-11-13 PROCEDURE — 84132 ASSAY OF SERUM POTASSIUM: CPT | Performed by: HOSPITALIST

## 2017-11-13 PROCEDURE — 82565 ASSAY OF CREATININE: CPT | Performed by: HOSPITALIST

## 2017-11-13 PROCEDURE — 25000125 ZZHC RX 250: Performed by: INTERNAL MEDICINE

## 2017-11-13 PROCEDURE — 83735 ASSAY OF MAGNESIUM: CPT | Performed by: HOSPITALIST

## 2017-11-13 PROCEDURE — 94660 CPAP INITIATION&MGMT: CPT

## 2017-11-13 PROCEDURE — 85610 PROTHROMBIN TIME: CPT | Performed by: HOSPITALIST

## 2017-11-13 RX ORDER — WARFARIN SODIUM 2.5 MG/1
2.5 TABLET ORAL
Status: DISCONTINUED | OUTPATIENT
Start: 2017-11-13 | End: 2017-11-13 | Stop reason: HOSPADM

## 2017-11-13 RX ADMIN — OMEPRAZOLE 20 MG: 20 CAPSULE, DELAYED RELEASE ORAL at 10:07

## 2017-11-13 RX ADMIN — FUROSEMIDE 40 MG: 40 TABLET ORAL at 10:07

## 2017-11-13 RX ADMIN — PAROXETINE HYDROCHLORIDE 30 MG: 30 TABLET, FILM COATED ORAL at 10:07

## 2017-11-13 RX ADMIN — ASPIRIN 81 MG 81 MG: 81 TABLET ORAL at 10:07

## 2017-11-13 RX ADMIN — LISINOPRIL 5 MG: 5 TABLET ORAL at 10:07

## 2017-11-13 RX ADMIN — POTASSIUM CHLORIDE 20 MEQ: 1500 TABLET, EXTENDED RELEASE ORAL at 10:07

## 2017-11-13 RX ADMIN — METOPROLOL SUCCINATE 75 MG: 50 TABLET, EXTENDED RELEASE ORAL at 10:07

## 2017-11-13 NOTE — PROGRESS NOTES
[facility]  Cardiology Progress Note     Date of Service (when I saw the patient): 11/13/2017    Summary: Em Richmond is a 77 year old female with history of recently diagnosed nonischemic CM, chronic atrial fibrillation, untreated JERSON, HTN, who was admitted on 11/5/2017 with acute respiratory failure requiring intubation. She was recently admitted from 10/20/17 - 10/25/17 with AF with RVR and acute systolic congestive heart failure exacerbation. Echocardiogram showed LVEF of 30 - 35% and mild pulmonary hypertension. Coronary angiogram was done which showed mild coronary disease. R heart cath was consistent with mild pulmonary HTN, PCWP was 15. Weight at discharge was 181. She was readmitted on 11/5 with acute respiratory distress requiring intubation. She was noted to be in atrial fibrillation with RVR and was hypotensive requiring dopamine and epinephrine. Her lactate as 10 on admission and NT-pro BNP was > 36,000. There was concern for sepsis and possible cholecystitis. She was placed on empiric antibiotics for three days. Abdominal US and MRCP were done. Surgery was consulted and she was not felt to have cholecystitis.She was extubated 11/7. Her HRs were initially controlled with amiodarone and digoxin, and now with Toprolol-XL which has been titrated up for better control. She was diuresed with IV lasix with significant improvement. Her admission weight was 194 lbs and was down to 181 on discharge.   Interval History   Em reports she is feeling well this morning. She currently denies any shortness of breath or chest discomfort. No palpitations or orthopnea. Her LE edema has significantly improved. She plans to discharge to a TCU today.    We discussed the events leading up to her hospitalization this time. About 3 - 5 days prior to admission, she noted significant shortness of breath and orthopnea. She is a vague historian but it seems her HRs were not well controlled between her two  recent hospitalizations. At her PCP visit on 10/30, her HR was 128. She could also not tell me whether or not she was taking her lasix at home. Her admission weight on admission was 194.     Assessment & Plan   Acute on chronic systolic CHF exacerbation, nonischemic cardiomyopathy. Admitted with significant pulmonary edema requiring intubation in the setting of AF with RVR and possible not taking her lasix at home. Repeat echo this admission showed unchanged LVEF of 30 - 35%.   -Continue I&Os, daily weights. She is negative 8 L and at least 13 lbs since admission.   -Low Na diet, reinforced this today.  -Would discharge on current medication regiment including Toprolol XL 75 BID, Lisinopril 5 mg, and lasix 40 mg in am, 20 mg in pm. Unclear what her dry weight is but certainly should be no higher than 181 lbs.   -We will have her follow up closely in the [facility] next week. If agreeable, she should enroll in telemanagement for close monitoring of her weights and symptoms. Would have a low threshold to consider Cardiomems in the future in her case. We discussed the importance of medication compliance, sodium restriction, and daily weights.     Chronic atrial fibrillation with RVR. Rates now controlled. I suspect her rates were not well controlled at home these past few weeks. Treated with amiodarone drip while in the ICU and digoxin. Metoprolol has been titrated for better rate control.  -Continue Toprolol-XL 75 mg BID. Rates have been controlled on telemetry.  -Continue coumadin for CVA prophylaxis. INR is therapeutic today.  -Holter monitor as an outpatient to assess HRs.    Mild CAD. Coronary angiogram on 10/23 showed lesions no greater than 30%.  -Continue aspirin, beta blocker. Statin has been on hold due to elevated LFTs with plans to resume in one week.     Acute hypoxic respiratory failure secondary to pulmonary edema. Intubated from 11/5 - 11/7. Resolved.     JERSON. CPAP.    Abnormal LFTs. Likely secondary  to shock liver +/- hepatic congestion. Improving. Abdominal US and MRCP done. GI     Mild to moderate TR.    Luisa Ward PA-C      Patient Active Problem List   Diagnosis     Atrial fibrillation (H)     Syncope     Atrial fibrillation with rapid ventricular response (H)     Acute combined systolic and diastolic CHF, NYHA class 3 (H)     Essential hypertension     Tachycardia induced cardiomyopathy (H)     Acute cystitis without hematuria     Acute respiratory failure with hypoxemia (H)     Acute pulmonary edema (H)     Secondary cardiomyopathy (H)       Physical Exam   Temp: 97.6  F (36.4  C) Temp src: Oral BP: 132/85 Pulse: 83 Heart Rate: 91 Resp: 20 SpO2: 95 % O2 Device: None (Room air)    Vitals:    11/10/17 0500 11/11/17 0500 11/12/17 0500   Weight: 83.4 kg (183 lb 13.8 oz) 82.5 kg (181 lb 14.1 oz) 82.1 kg (181 lb)     Vital Signs with Ranges  Temp:  [97.6  F (36.4  C)-98.1  F (36.7  C)] 97.6  F (36.4  C)  Pulse:  [83] 83  Heart Rate:  [71-92] 91  Resp:  [18-20] 20  BP: (100-132)/(68-85) 132/85  SpO2:  [92 %-96 %] 95 %  I/O last 3 completed shifts:  In: 240 [P.O.:240]  Out: 1000 [Urine:1000]    Constitutional: Very pleasant woman in no acute distress sitting in bed.  Neck: No JVD  Respiratory: No respiratory distress. Breath sounds with bibasilar crackles. No wheeze or rhonchi.   Cardiovascular: RRR, s1s2, No murmur, rub or gallop.  GI: soft, BS+  Skin: warm, no rashes  Musculoskeletal: Moving all extremities. No bilateral LE edema.   Neurologic: Alert, oriented x 3.  Neuropsychiatric: Normal affect       Data   Results for orders placed or performed during the hospital encounter of 11/05/17 (from the past 24 hour(s))   Potassium   Result Value Ref Range    Potassium 3.6 3.4 - 5.3 mmol/L   Potassium   Result Value Ref Range    Potassium 3.7 3.4 - 5.3 mmol/L   Creatinine   Result Value Ref Range    Creatinine 0.71 0.52 - 1.04 mg/dL    GFR Estimate 80 >60 mL/min/1.7m2    GFR Estimate If Black >90 >60  mL/min/1.7m2   Magnesium   Result Value Ref Range    Magnesium 1.9 1.6 - 2.3 mg/dL   Phosphorus   Result Value Ref Range    Phosphorus 3.7 2.5 - 4.5 mg/dL   INR   Result Value Ref Range    INR 2.57 (H) 0.86 - 1.14   Hepatic panel   Result Value Ref Range    Bilirubin Direct 0.5 (H) 0.0 - 0.2 mg/dL    Bilirubin Total 1.1 0.2 - 1.3 mg/dL    Albumin 2.5 (L) 3.4 - 5.0 g/dL    Protein Total 7.2 6.8 - 8.8 g/dL    Alkaline Phosphatase 256 (H) 40 - 150 U/L     (H) 0 - 50 U/L     (H) 0 - 45 U/L   Potassium   Result Value Ref Range    Potassium 3.8 3.4 - 5.3 mmol/L       Medications     Warfarin Therapy Reminder         warfarin  2.5 mg Oral ONCE at 18:00     lisinopril  5 mg Oral Daily     furosemide  40 mg Oral Daily     furosemide  20 mg Oral Q24H     aspirin  81 mg Oral Daily     sodium chloride (PF)  3 mL Intracatheter Q8H     metoprolol  75 mg Oral BID     omeprazole (priLOSEC) CR capsule 20 mg  20 mg Oral QAM     PARoxetine (PAXIL) tablet 30 mg  30 mg Oral Daily     Tele: AF with rates 80 - 90s.    Echocardiogram 11/5  Limited study.     Left ventricular systolic function is moderate to severely reduced.There is mod-severe global hypokinesia of the left ventricle.LVEF by biplane 31% The right ventricle is not well visualized.Moderately decreased right ventricular systolic function There is mild to moderate (1-2+) tricuspid regurgitation. Pulmonary hypertension- RVSP 38 mm hg +RA. The IVC is dilated and fails to change with respiration, suggesting elevated central venous pressure.  Compared to echo dated 10/21/2017 no significant changes.

## 2017-11-13 NOTE — PLAN OF CARE
Problem: Patient Care Overview  Goal: Plan of Care/Patient Progress Review  Outcome: Improving  Vitals stable, patient denies pain and continues to have mild SOB with activity, MD is aware.  Discharge instructions gone over with patient, no IV in place, belongings sent with patient.  Patient transferred off unit via staff and daughter provided transportation to TCU.  Continue to monitor.

## 2017-11-13 NOTE — PROVIDER NOTIFICATION
Heart Failure Care Pathway  GOALS TO BE MET BEFORE DISCHARGE: improve Breathing     1. Decrease congestion and/or edema with diuretic therapy to achieve near      optimal volume status.            Dyspnea improved:  Yes            Edema improved:     Yes        Net I/O and Weights since admission:          11/07 2300 - 11/12 2259  In: 4213 [P.O.:3805; I.V.:408]  Out: 50331 [Urine:50571]  Net: -6772            Vitals:    11/05/17 2322 11/06/17 0400 11/07/17 0000 11/08/17 0000   Weight: 88.2 kg (194 lb 7.1 oz) 90.8 kg (200 lb 2.8 oz) 89.5 kg (197 lb 5 oz) 89.5 kg (197 lb 5 oz)    11/10/17 0500 11/11/17 0500 11/12/17 0500   Weight: 83.4 kg (183 lb 13.8 oz) 82.5 kg (181 lb 14.1 oz) 82.1 kg (181 lb)       2.  O2 sats > 92% on RA or at prior home O2 therapy level.          Current oxygenation status:       SpO2: 96 %         O2 Device: None (Room air),            Able to wean O2 this shift to keep sats > 92%:  Yes       Does patient use Home O2? No    3.  Tolerates ambulation and mobility near baseline: Yes        How many times did the patient ambulate with nursing staff this shift? 2    Please review the Heart Failure Care Pathway for additional HF goal outcomes.    Ryan Levy RN  11/13/2017     Pt A&O. Denies any complaints. Up w/ SBA. Cpap on during the night. Pt on Po lasix. Afib w/ CVR on tele. VSS. Possible d/c to TCU. Will continue to monitor pt.

## 2017-11-13 NOTE — PROGRESS NOTES
SW:  D:  Received discharge orders for patient.  Bed available at [facility] for today.  Patient's daughter will transport around 13:00 today.  Patient and family informed of the plan and in agreement to the plan.  Updated [facility] and faxed the orders and the PAS.  Call placed to [facility] to cancel the bed.      PAS-RR    D: Per DHS regulation, SW completed and submitted PAS-RR to [place] [employer] via the Senior [employer] Line.  PAS-RR confirmation # is : [id number].    I: SW spoke with patient and daughter and they are aware a PAS-RR has been submitted.  SW reviewed with patient and daughter that they may be contacted for a follow up appointment within 10 days of hospital discharge if their SNF stay is < 30 days.  Contact information for Senior [employer] Line was also provided.    A: Patient and daughter verbalized understanding.    P: Further questions may be directed to [facility] LinkAge Line at #[phone], option #4 for PAS-RR staff.

## 2017-11-13 NOTE — PLAN OF CARE
Problem: Patient Care Overview  Goal: Plan of Care/Patient Progress Review  Physical Therapy Discharge Summary     Reason for therapy discharge:    Discharged to transitional care facility.     Progress towards therapy goal(s). See goals on Care Plan in [facility] electronic health record for goal details.  Goals not met.  Barriers to achieving goals:   discharge from facility.     Therapy recommendation(s):    Continued therapy is recommended.  Rationale/Recommendations:  Decreased IND with and tolerance for functional mobility.

## 2017-11-13 NOTE — DISCHARGE SUMMARY
"Discharge Summary    Em Richmond MRN# [id number]   YOB: 1940 Age: 77 year old     Date of Admission:  11/5/2017  Date of Discharge:  11/13/2017  Admitting Physician:  Santi Terrazas MD  Discharge Physician:  Heather Ferrera MD Pager [phone] Office [phone]  Discharging Service:  [facility] Hospitalist Service     Primary Provider: Arielle Leger          Discharge Diagnosis:   See problem-oriented hospital course for diagnoses addressed during this hospital stay.             Discharge Disposition:   Discharged to nursing home          Condition on Discharge:   Discharge condition: Stable   Discharge vitals: Blood pressure 132/85, pulse 83, temperature 97.6  F (36.4  C), temperature source Oral, resp. rate 20, height 1.651 m (5' 5\"), weight 82.1 kg (181 lb), SpO2 95 %.   Code status on discharge: Full Code          Discharge Medications:      Em Richmond   Home Medication Instructions TODD:[id number]    Printed on:11/12/17 1601   Medication Information                      aspirin 81 MG chewable tablet  Take 1 tablet (81 mg) by mouth daily             atorvastatin (LIPITOR) 40 MG tablet  Take 1 tablet (40 mg) by mouth daily             Cholecalciferol (VITAMIN D3 PO)  Take 2,000 Units by mouth daily              furosemide (LASIX) 20 MG tablet  Take 1 tablet (20 mg) by mouth every 24 hours             furosemide (LASIX) 40 MG tablet  Take 1 tablet (40 mg) by mouth daily             lisinopril (PRINIVIL/ZESTRIL) 5 MG tablet  Take 1 tablet (5 mg) by mouth daily             meclizine (ANTIVERT) 12.5 MG tablet  Take 1 tablet (12.5 mg) by mouth 3 times daily as needed for dizziness             metoprolol (TOPROL-XL) 25 MG 24 hr tablet  Take 3 tablets (75 mg) by mouth 2 times daily             MULTIPLE VITAMINS PO  Take 1 tablet by mouth daily             OMEPRAZOLE PO  Take 20 mg by mouth daily as needed              PAROXETINE HCL PO  Take 30 mg by mouth daily              WARFARIN " SODIUM PO  Take by mouth daily 5 mg Tu/Th/Su; 2.5 mg AOD                  Consultations:   Cardiology  GI        Brief Hx and Hospital Course:   Em Richmond is a 77-year-old female with a past medical history significant for nonischemic cardiomyopathy, chronic atrial fibrillation, gastroesophageal reflux disease, anxiety, pulmonary hypertension, obesity and obstructive sleep apnea who was admitted on 11/06/2017 due to acute hypoxic respiratory failure requiring intubation in the setting of cardiogenic shock secondary to atrial fibrillation with rapid ventricular response and decompensated nonischemic cardiomyopathy with pulmonary edema. The patient was transferred out of the ICU on 11/7 as he was extubated and off pressors.     Acute hypoxic respiratory failure, requiring intubation secondary to cardiogenic shock in the setting of decompensated systolic congestive heart failure with pulmonary edema and atrial fibrillation with rapid ventricular response. Improved a lot. Sating well on RA. Only mild sob with activity.  - Cont diuretic/bb/acei..       Acute on chronic nonischemic cardiomyopathy with pulmonary edema: weight is down.  Sating well on RA. No peripheral edema this am.  - Cont lasix 40 mg in am and 20 mg in the afternoon..  - Cont BB/ACEI.  -  Low salt diet.  - daily weights. I/Os.  - F/U with cardiology..    Chronic atrial fibrillation with rapid ventricular response: rate controlled. Was on amiodarone drip in the ICU. Then digoxin.  - Cont metoprolol succinate 75 mg daily (titrate as needed).  - Cont coumadin.    Acute kidney injury: due to shock/chf. resolved.     GI issues: shock liver, gallbladder edema and borderline common bile duct dilation:. US showed CBD CBD dilatation. MRCP did not show.    LFT's are improving. The elevated LFTs are likely due passive congestion and hypoperfusion following cardiogenic shock and pulmonary edema. Was followed by GI in the hospital.  - LFT in 1-2 weeks.  - Hold  statin and resume once LFT normalize.    Pt is being discharged to NH in stable conditions..       DISCHARGE EXAM:   Temp:  [97.6  F (36.4  C)-98.1  F (36.7  C)] 97.6  F (36.4  C)  Pulse:  [83] 83  Heart Rate:  [71-92] 91  Resp:  [18-20] 20  BP: (100-132)/(68-85) 132/85  SpO2:  [92 %-96 %] 95 %  Wt Readings from Last 5 Encounters:   11/12/17 82.1 kg (181 lb)   10/25/17 83.9 kg (184 lb 14.4 oz)   06/20/14 83.9 kg (185 lb)     Constitutional: Awake, alert, cooperative, no apparent distress    Lungs: Clear to auscultation bilaterally, no crackles or wheezing    Cardiovascular: Regular rate and rhythm, normal S1 and S2, and no murmur noted   Abdomen: Normal bowel sounds, soft, non-distended, non-tender   Skin: No rashes, no cyanosis, no edema   Other:           Pertinent Tests and Procedures:   TTE  MRCP  US abdomen             Pending Results:   none          Discharge Instructions and Follow-Up:   Discharge diet:   Active Diet Order      2 Gram Sodium Diet      Advance Diet as Tolerated   Discharge activity: Activity as tolerated   Discharge follow-up: Follow up with NH provider  Follow up with cardiology.   Outpatient therapy: None    Home Care agency: None    Supplies and equipment: None   Lines and drains: None    Wound care: None   Other instructions: None      More than 30 minutes were required for coordination of care for this discharge,.          Procedures / Labs / Imaging:     Results for orders placed or performed during the hospital encounter of 11/05/17   Chest  XR, 1 view PORTABLE    Narrative    CHEST 2 VIEWS   11/5/2017 11:29 PM     HISTORY: Endotracheal tube placement.    COMPARISON: 10/20/2017.    FINDINGS: An endotracheal tube is present with distal tube tip  projecting over the mid trachea. An enteric drainage tube is present  with distal tube tip not visualized, but at least to the gastric body.  Hypoinflated lungs. Mild hazy and interstitial opacities within  central aspects of both lungs.  Probable mild cardiomegaly.  Atherosclerotic  calcification in the thoracic aorta.      Impression    IMPRESSION:   1. An endotracheal tube is present with distal tube tip projecting  over the mid trachea.  2. An enteric drainage tube is present with distal tip at least to the  gastric body.  3. Mild hazy and interstitial opacities in the central aspect of both  lungs. These are nonspecific, but most likely relate to pulmonary  edema. An infectious or inflammatory process could have a similar  appearance.    GERSON BHAKTA MD   Chest CT, IV contrast only - PE protocol    Narrative    CT CHEST WITH CONTRAST   11/6/2017 12:38 AM     HISTORY: Respiratory failure.    COMPARISON: 12/20/2005.    TECHNIQUE: Following the uneventful administration of 71 mL Isovue-370  intravenous contrast, helical sections were acquired through the lungs  according to the pulmonary embolism protocol. Coronal reconstructions  were generated. Radiation dose for this scan was reduced using  automated exposure control, adjustment of the mA and/or kV according  to the patient's size, or iterative reconstruction technique.    FINDINGS: The central pulmonary arteries are mildly large in caliber.  No visualized pulmonary emboli. The thoracic aorta is normal in  caliber. Atherosclerotic calcification in the thoracic aorta and  coronary arteries. Moderate cardiomegaly.    An endotracheal tube is present with distal tip in the mid trachea. An  enteric drainage tube is present with distal tip not visualized, but  at least to the gastric body. Mild groundglass opacities and  interstitial thickening in the upper and mid lungs bilaterally. Very  small right pleural effusion with adjacent atelectasis. Atelectasis is  present in the dependent aspect of the left lower lobe. No left  pleural or pericardial effusion. Several mildly enlarged mediastinal  lymph nodes. For example, there is a 1.6 x 1.5 cm lower right  paratracheal lymph node (series 7, image  55).    Scan through the upper abdomen is unremarkable.      Impression    IMPRESSION:   1. Mild groundglass opacities and interstitial thickening in the upper  and mid lungs bilaterally. This is nonspecific, but could relate to  interstitial pulmonary edema.  2. Very small right pleural effusion.  3. Several nonspecific mildly enlarged mediastinal lymph nodes.  4. Moderate cardiomegaly.  5. No visualized pulmonary embolus.    GERSON BHAKTA MD   Abdomen US, limited (RUQ only)    Narrative    ULTRASOUND ABDOMEN LIMITED RIGHT UPPER QUADRANT   11/6/2017 2:06 AM     HISTORY: Elevated liver function tests and alkaline phosphatase.    COMPARISON: 12/22/2009 - CT chest, abdomen and pelvis.    FINDINGS: Normal hepatic echogenicity. 1.8 cm hypoechoic lesion in the  left lobe of the liver, most likely a cyst. 2.8 cm gallstone within a  nondistended gallbladder. Moderate diffuse gallbladder wall thickening  and edema. The ultrasound technologist reports that the patient has  focal tenderness over the gallbladder. No intrahepatic biliary  dilatation. The common duct is mildly dilated, measuring 0.9 cm in  diameter. The right kidney has normal size and echogenicity, measuring  11.4 cm in length. No right intrarenal collecting system dilatation,  calculi or masses. A trace amount of perihepatic free fluid.      Impression    IMPRESSION:   1. 3 cm gallstone within a nondistended, moderately thick-walled and  edematous gallbladder. Additionally, the ultrasound technologist  reports that the patient has focal tenderness over the gallbladder.  These findings are not convincing for acute cholecystitis, but it  cannot be excluded. If there is a clinical concern for acute  cholecystitis, a HIDA scan could be considered for further evaluation.  2. Mild dilatation of the common duct, which measures 0.9 cm in  diameter. No intrahepatic biliary dilatation.  3. A trace amount of perihepatic free fluid.    GERSON BHAKTA MD   MR Abdomen  MRCP w/o Contast & w Recon    Narrative    MR ABDOMEN MRCP WITHOUT CONTRAST WITH RECON 11/6/2017 6:50 PM     HISTORY: Evaluate for choledocholithiasis.     TECHNIQUE:  Multisequence, multiplanar imaging is performed through  the biliary system. Patient was on respirator therefore pre and  postcontrast imaging through the liver is unable to be obtained.    FINDINGS:   Abdomen: Limited evaluation of the biliary system is performed.  Gallbladder is distended with large gallstone near the gallbladder  neck as described on recent ultrasound report from 11/6/2017. Repeated  attempts at obtaining adequate MRCP images were performed with series  2000 being the best data set. This coronal sequence with heavy T2  weighting shows no evidence of intrahepatic or common bile duct  dilatation. No evidence of an intraductal stone. No pancreatic ductal  dilatation is evident. Abnormal T2 signal is noted likely relating to  trace ascites. Free pelvic fluid is noted on series 701, image 21. No  evidence of hydronephrosis. T2 hyperintense liver lesion on series  601, image 15 is most likely the cyst seen on prior ultrasound.      Impression    IMPRESSION: Limited exam due to respiratory motion artifact in this  intubated patient. No evidence of intrahepatic or common bile duct  dilatation. No evidence of an obstructing common bile duct stone.  Cholelithiasis as seen on prior ultrasound. Trace ascites and free  pelvic fluid is noted. No hydronephrosis.      OUSMANE DUNN MD   CBC with platelets differential   Result Value Ref Range    WBC 11.8 (H) 4.0 - 11.0 10e9/L    RBC Count 4.18 3.8 - 5.2 10e12/L    Hemoglobin 13.5 11.7 - 15.7 g/dL    Hematocrit 41.5 35.0 - 47.0 %    MCV 99 78 - 100 fl    MCH 32.3 26.5 - 33.0 pg    MCHC 32.5 31.5 - 36.5 g/dL    RDW 13.1 10.0 - 15.0 %    Platelet Count 322 150 - 450 10e9/L    Diff Method Manual Differential     % Neutrophils 69.5 %    % Lymphocytes 18.5 %    % Monocytes 6.0 %    % Eosinophils 2.0 %     % Basophils 0.5 %    % Myelocytes 0.5 %    % Plasma Cells 3.0 %    Absolute Neutrophil 8.2 1.6 - 8.3 10e9/L    Absolute Lymphocytes 2.2 0.8 - 5.3 10e9/L    Absolute Monocytes 0.7 0.0 - 1.3 10e9/L    Absolute Eosinophils 0.2 0.0 - 0.7 10e9/L    Absolute Basophils 0.1 0.0 - 0.2 10e9/L    Absolute Myelocytes 0.1 (H) 0 10e9/L    Absolute Blasts 0.1 (H) 0 10e9/L    Absolute Plasma Cells 0.4 (H) 0 10e9/L    RBC Morphology Normal     Platelet Estimate Confirming automated cell count    INR   Result Value Ref Range    INR 3.72 (H) 0.86 - 1.14   Partial thromboplastin time   Result Value Ref Range    PTT 47 (H) 22 - 37 sec   Comprehensive metabolic panel   Result Value Ref Range    Sodium 132 (L) 133 - 144 mmol/L    Potassium 4.8 3.4 - 5.3 mmol/L    Chloride 99 94 - 109 mmol/L    Carbon Dioxide 15 (L) 20 - 32 mmol/L    Anion Gap 18 (H) 3 - 14 mmol/L    Glucose 122 (H) 70 - 99 mg/dL    Urea Nitrogen 23 7 - 30 mg/dL    Creatinine 1.20 (H) 0.52 - 1.04 mg/dL    GFR Estimate 44 (L) >60 mL/min/1.7m2    GFR Estimate If Black 53 (L) >60 mL/min/1.7m2    Calcium 8.3 (L) 8.5 - 10.1 mg/dL    Bilirubin Total 1.8 (H) 0.2 - 1.3 mg/dL    Albumin 2.9 (L) 3.4 - 5.0 g/dL    Protein Total 8.2 6.8 - 8.8 g/dL    Alkaline Phosphatase 405 (H) 40 - 150 U/L     (HH) 0 - 50 U/L    AST 1480 (HH) 0 - 45 U/L   Lactic acid whole blood   Result Value Ref Range    Lactic Acid 10.5 (HH) 0.7 - 2.0 mmol/L   Troponin I   Result Value Ref Range    Troponin I ES <0.015 0.000 - 0.045 ug/L   Nt probnp inpatient (BNP)   Result Value Ref Range    N-Terminal Pro BNP Inpatient 71497 (H) 0 - 1800 pg/mL   Blood gas arterial   Result Value Ref Range    pH Arterial 7.16 (LL) 7.35 - 7.45 pH    pCO2 Arterial 44 35 - 45 mm Hg    pO2 Arterial 213 (H) 80 - 105 mm Hg    Bicarbonate Arterial 16 (L) 21 - 28 mmol/L    Base Deficit Art 12.8 mmol/L    FIO2 100    Lactic acid   Result Value Ref Range    Lactic Acid 6.7 (HH) 0.7 - 2.0 mmol/L   Lipase   Result Value Ref Range     Lipase 455 (H) 73 - 393 U/L   CBC with platelets   Result Value Ref Range    WBC 15.6 (H) 4.0 - 11.0 10e9/L    RBC Count 3.89 3.8 - 5.2 10e12/L    Hemoglobin 12.6 11.7 - 15.7 g/dL    Hematocrit 37.8 35.0 - 47.0 %    MCV 97 78 - 100 fl    MCH 32.4 26.5 - 33.0 pg    MCHC 33.3 31.5 - 36.5 g/dL    RDW 13.2 10.0 - 15.0 %    Platelet Count 230 150 - 450 10e9/L   Comprehensive metabolic panel   Result Value Ref Range    Sodium 131 (L) 133 - 144 mmol/L    Potassium 5.1 3.4 - 5.3 mmol/L    Chloride 101 94 - 109 mmol/L    Carbon Dioxide 17 (L) 20 - 32 mmol/L    Anion Gap 13 3 - 14 mmol/L    Glucose 114 (H) 70 - 99 mg/dL    Urea Nitrogen 23 7 - 30 mg/dL    Creatinine 0.84 0.52 - 1.04 mg/dL    GFR Estimate 66 >60 mL/min/1.7m2    GFR Estimate If Black 79 >60 mL/min/1.7m2    Calcium 7.6 (L) 8.5 - 10.1 mg/dL    Bilirubin Total 2.3 (H) 0.2 - 1.3 mg/dL    Albumin 2.2 (L) 3.4 - 5.0 g/dL    Protein Total 6.3 (L) 6.8 - 8.8 g/dL    Alkaline Phosphatase 314 (H) 40 - 150 U/L    ALT 1890 (HH) 0 - 50 U/L    AST 3720 (HH) 0 - 45 U/L   Magnesium   Result Value Ref Range    Magnesium 1.7 1.6 - 2.3 mg/dL   Phosphorus   Result Value Ref Range    Phosphorus 5.0 (H) 2.5 - 4.5 mg/dL   Calcium ionized whole blood   Result Value Ref Range    Calcium Ionized Whole Blood 4.3 (L) 4.4 - 5.2 mg/dL   Lactic acid whole blood   Result Value Ref Range    Lactic Acid 5.6 (HH) 0.7 - 2.0 mmol/L   Lipase   Result Value Ref Range    Lipase 189 73 - 393 U/L   Ammonia   Result Value Ref Range    Ammonia 72 (H) 10 - 50 umol/L   INR   Result Value Ref Range    INR 5.26 (HH) 0.86 - 1.14   Procalcitonin   Result Value Ref Range    Procalcitonin 0.38 ng/ml   Troponin I   Result Value Ref Range    Troponin I ES 0.020 0.000 - 0.045 ug/L   CK total   Result Value Ref Range    CK Total 66 30 - 225 U/L   Glucose by meter   Result Value Ref Range    Glucose 117 (H) 70 - 99 mg/dL   TSH   Result Value Ref Range    TSH 4.78 (H) 0.40 - 4.00 mU/L   TSH with free T4 reflex (Add on  or recollect)   Result Value Ref Range    TSH 1.75 0.40 - 4.00 mU/L   Blood gas arterial and oxyhgb   Result Value Ref Range    pH Arterial 7.19 (LL) 7.35 - 7.45 pH    pCO2 Arterial 44 35 - 45 mm Hg    pO2 Arterial 115 (H) 80 - 105 mm Hg    Bicarbonate Arterial 17 (L) 21 - 28 mmol/L    Oxyhemoglobin Arterial 96 92 - 100 %    Base Deficit Art 11.3 mmol/L   Bilirubin direct   Result Value Ref Range    Bilirubin Direct 1.4 (H) 0.0 - 0.2 mg/dL   Blood gas arterial with oxyhemoglobin (AM Draw)   Result Value Ref Range    pH Arterial Canceled, Test credited 7.35 - 7.45 pH    pCO2 Arterial Canceled, Test credited 35 - 45 mm Hg    pO2 Arterial Canceled, Test credited 80 - 105 mm Hg    Bicarbonate Arterial Canceled, Test credited 21 - 28 mmol/L    Oxyhemoglobin Arterial Canceled, Test credited 92 - 100 %    Base Excess Art Canceled, Test credited mmol/L    Base Deficit Art Canceled, Test credited mmol/L   Calcium ionized whole blood   Result Value Ref Range    Calcium Ionized Whole Blood 4.2 (L) 4.4 - 5.2 mg/dL   CBC with platelets differential   Result Value Ref Range    WBC 12.4 (H) 4.0 - 11.0 10e9/L    RBC Count 3.61 (L) 3.8 - 5.2 10e12/L    Hemoglobin 11.7 11.7 - 15.7 g/dL    Hematocrit 34.4 (L) 35.0 - 47.0 %    MCV 95 78 - 100 fl    MCH 32.4 26.5 - 33.0 pg    MCHC 34.0 31.5 - 36.5 g/dL    RDW 13.0 10.0 - 15.0 %    Platelet Count 217 150 - 450 10e9/L    Diff Method Automated Method     % Neutrophils 85.1 %    % Lymphocytes 11.0 %    % Monocytes 2.8 %    % Eosinophils 0.2 %    % Basophils 0.3 %    % Immature Granulocytes 0.6 %    Nucleated RBCs 0 0 /100    Absolute Neutrophil 10.6 (H) 1.6 - 8.3 10e9/L    Absolute Lymphocytes 1.4 0.8 - 5.3 10e9/L    Absolute Monocytes 0.4 0.0 - 1.3 10e9/L    Absolute Eosinophils 0.0 0.0 - 0.7 10e9/L    Absolute Basophils 0.0 0.0 - 0.2 10e9/L    Abs Immature Granulocytes 0.1 0 - 0.4 10e9/L    Absolute Nucleated RBC 0.0     Ovalocytes Slight     Platelet Estimate Confirming automated cell  count    Comprehensive metabolic panel   Result Value Ref Range    Sodium 131 (L) 133 - 144 mmol/L    Potassium 4.6 3.4 - 5.3 mmol/L    Chloride 101 94 - 109 mmol/L    Carbon Dioxide 14 (L) 20 - 32 mmol/L    Anion Gap 16 (H) 3 - 14 mmol/L    Glucose 138 (H) 70 - 99 mg/dL    Urea Nitrogen 24 7 - 30 mg/dL    Creatinine 0.83 0.52 - 1.04 mg/dL    GFR Estimate 67 >60 mL/min/1.7m2    GFR Estimate If Black 81 >60 mL/min/1.7m2    Calcium 7.8 (L) 8.5 - 10.1 mg/dL    Bilirubin Total 2.1 (H) 0.2 - 1.3 mg/dL    Albumin 2.1 (L) 3.4 - 5.0 g/dL    Protein Total 6.1 (L) 6.8 - 8.8 g/dL    Alkaline Phosphatase 292 (H) 40 - 150 U/L    ALT 2858 (HH) 0 - 50 U/L    AST 6218 (HH) 0 - 45 U/L   INR   Result Value Ref Range    INR 4.83 (H) 0.86 - 1.14   Lactic acid whole blood   Result Value Ref Range    Lactic Acid 4.4 (HH) 0.7 - 2.0 mmol/L   Magnesium   Result Value Ref Range    Magnesium 1.7 1.6 - 2.3 mg/dL   Phosphorus   Result Value Ref Range    Phosphorus 4.0 2.5 - 4.5 mg/dL   Bilirubin direct   Result Value Ref Range    Bilirubin Direct 1.1 (H) 0.0 - 0.2 mg/dL   Blood gas arterial with oxyhemoglobin (AM Draw)   Result Value Ref Range    pH Arterial 7.39 7.35 - 7.45 pH    pCO2 Arterial 28 (L) 35 - 45 mm Hg    pO2 Arterial 105 80 - 105 mm Hg    Bicarbonate Arterial 17 (L) 21 - 28 mmol/L    Oxyhemoglobin Arterial 97 92 - 100 %    Base Deficit Art 6.8 mmol/L     *Note: Due to a large number of results and/or encounters for the requested time period, some results have not been displayed. A complete set of results can be found in Results Review.

## 2017-11-13 NOTE — PROGRESS NOTES
SW:  D:  Received a call back from [facility].  The have a bed available for patient today.  P:  Will continue to follow.

## 2017-11-13 NOTE — PLAN OF CARE
Problem: Patient Care Overview  Goal: Plan of Care/Patient Progress Review  Occupational Therapy Discharge Summary     Reason for therapy discharge:    Discharged to transitional care facility.     Progress towards therapy goal(s). See goals on Care Plan in [facility] electronic health record for goal details.  Goals not met.  Barriers to achieving goals:   discharge from facility.     Therapy recommendation(s):    Continued therapy is recommended.  Rationale/Recommendations:  To maximize I in ADL/IADL.

## 2017-11-13 NOTE — DISCHARGE INSTRUCTIONS
Patient will discharge to [facility] today via family around 13:00.  [facility]'s phone number is [phone].    CHF Orders and Instructions for Nursing Home/TCU  1.  Have the patient get a scale to put in their room and then use at home.  2.  Post a weight chart or calendar in room next to the scale.  3.  Ask patient to weigh themselves daily first thing when they get up in the morning, before breakfast, with only their night gown on and record on the chart/calendar (if able).  4.  Record NH/TCU admission weight.  5.  Record daily am weight, with same clothes every day. If patient is in a wheelchair, note weight of wheel chair.   6.  Provide patient CHF education booklet and review with patient and family.  7.  Vital signs twice daily and prn. Check oxygen sats prn dyspnea.  8.  Apply Oxygen 2 or 3 liters/min by nasal cannula prn O2 Sat < 90%.   9.  Notify NP/MD:   1. If HR <50 bpm, SBP <90mmHg, or O2 Sat < 90%.   2. If weight is up more than 2 lbs. in one day or 5 lbs. in one week from their admission weight OR if patient has signs or symptoms of CHF, such as worsening dyspnea or leg edema.  10.  ACE-wraps or support stockings (knee high) to bilateral lower extremities prn edema. On in am and off at HS.   11.  Diet: 2 gm sodium cardiac diet, with additional diet modifications if ordered elsewhere.  12.  Fluid restriction:  1500cc/day, if hospital discharge sodium < 134.  13.  Dietician: CHF education  14.  PT/OT to Evaluate and Treat for deconditioning and CHF.  12.  Activity as tolerated   13.  PT to notify RN if wheelchair equipment has been modified (change in weight should also be noted on the patients weight calendar).    Heart Failure Follow-up Appointments and Instructions for TCU Discharge   _____An appointment to enroll the patient into [facility] may already have been made (see section  Your next appointments already scheduled ).  If there is a question about the appointment or you would like  "to speak with a [facility] nurse, please call one of the following locations:     [facility]):                                                    [phone]    [facility] ([place]):                                                   [phone]    [facility] ([place]):                   [phone]    _____If no [facility] appointment was made, please call one of the locations and schedule:    NP/PA appointment 14 days after hospital discharge if still in TCU    NP/PA appointment for 3-5 days after discharge from TCU    _____Have patient bring their med list and daily vitals/weight chart with them to appointment.    _____At discharge from the TCU give the patient the \"General Recommendations to Control Heart Failure When You Get Home  information for them to take home and their scale.  _____Upon discharge from the TCU reinforce the importance of home management of CHF including follow up in [facility].  What is the [facility]?  Cardiomyopathy  Optimization  Rehabilitation  Education  The [facility] is a heart failure specialty clinic within [facility].  It is an outpatient disease management program that is based on a phase-by-phase approach, which is tailored to each patient s individual needs.  The cardiologist, nurse practitioner, physician assistant and nurses provide an ongoing outpatient care and treatment plan that guides heart failure and cardiomyopathy patients from evaluation and education to stabilization. This team works with the current primary care doctor and cardiologist to help patients:    Avoid hospitalizations    Slow the progression of the disease    Improve length and quality of life    Know who and when to call if heart failure symptoms appear    Receive easy access to quality health care and advice    Better understand your condition and treatment    Decrease the tremendous cost burden of heart " failure care    Detect future heart problems before they become life threatening

## 2017-11-14 ENCOUNTER — CARE COORDINATION (OUTPATIENT)
Dept: CARDIOLOGY | Facility: CLINIC | Age: 77
End: 2017-11-14

## 2017-11-14 NOTE — PROGRESS NOTES
[facility] follow up plan for patient with Care Coordinator. RN confirmed with Care Coordinator that patient has a scheduled F/U appt on 11/20/17 with lab and MELISSA Elizabeth. Patient to be weighed daily and call clinic with a weight gain of 2 lbs overnight or 5 lbs in a week. RN confirmed with Care Coordinator that patient will bring list of daily weights and last progress note to appt.

## 2017-11-15 ENCOUNTER — DOCUMENTATION ONLY (OUTPATIENT)
Dept: CARDIOLOGY | Facility: CLINIC | Age: 77
End: 2017-11-15

## 2017-11-15 DIAGNOSIS — I50.21 ACUTE SYSTOLIC HEART FAILURE (H): Primary | ICD-10-CM

## 2017-11-15 PROBLEM — I42.8 NONISCHEMIC CARDIOMYOPATHY (H): Status: ACTIVE | Noted: 2017-11-07

## 2017-11-15 NOTE — PROGRESS NOTES
[facility] consideration chart review:  -Class III heart failure symptoms:  yes   -Hospitalization for heart failure in the past 12 months:  11/5/17  -Type of HF:  Systolic, nonischemic  -Followed by [facility] providers:  scheduled  -Compliant with TELEMANAGEMENT:  n/a  -GFR>25:  yes   -Chest circumference <65 inches:    -Able to take dual antiplatelet (Plavix for 30 days in addition to aspirin 81mg for life (or already on Coumadin or NOAC):  warfarin  -Active infection:  no  -History of >1 pulmonary embolism or DVT:  no   -Congenital heart disease:  no  -Mechanical right heart valves:  no  -CRT in last 3 months:  New CM, narrow QRS   -RVSP noted on Echo:  11/6/17--38 mmHg + RA     Danica Garcia RN  [facility] Care Coordinator  [phone]    [facility].R.E[facility] Clinic: Cardiomyopathy, Optimization, Rehabilitation, Education   The [facility] is a heart failure specialty clinic within the [facility] where you will work with specialized nurse practioners, physician assistants, doctors and registered nurses. They are dedicated to helping patients with heart failure to carefully adjust medications, receive education, and learn who and when to call if symptoms develop. They specialize in helping you better understand your condition, slow the progression of your disease, improve the length and quality of your life, help you detect future heart problems before they become life threatening, and avoid hospitalizations.

## 2017-11-15 NOTE — PROGRESS NOTES
Verbal order per Dusty Rose CNP for TSH, Hgb, ntproBNP, and CMP prior to [facility] hospital follow-up office visit 11/20/17. Orders placed.  Cheryl ANDERSEN

## 2017-11-20 ENCOUNTER — OFFICE VISIT (OUTPATIENT)
Dept: CARDIOLOGY | Facility: CLINIC | Age: 77
End: 2017-11-20
Attending: NURSE PRACTITIONER
Payer: COMMERCIAL

## 2017-11-20 ENCOUNTER — CARE COORDINATION (OUTPATIENT)
Dept: CARDIOLOGY | Facility: CLINIC | Age: 77
End: 2017-11-20

## 2017-11-20 VITALS
BODY MASS INDEX: 29.44 KG/M2 | HEIGHT: 65 IN | WEIGHT: 176.7 LBS | DIASTOLIC BLOOD PRESSURE: 66 MMHG | SYSTOLIC BLOOD PRESSURE: 98 MMHG | HEART RATE: 82 BPM | OXYGEN SATURATION: 96 %

## 2017-11-20 DIAGNOSIS — I50.21 ACUTE SYSTOLIC HEART FAILURE (H): ICD-10-CM

## 2017-11-20 DIAGNOSIS — I50.41 ACUTE COMBINED SYSTOLIC AND DIASTOLIC CHF, NYHA CLASS 3 (H): ICD-10-CM

## 2017-11-20 LAB
ALBUMIN SERPL-MCNC: 3 G/DL (ref 3.4–5)
ALP SERPL-CCNC: 144 U/L (ref 40–150)
ALT SERPL W P-5'-P-CCNC: 99 U/L (ref 0–50)
ANION GAP SERPL CALCULATED.3IONS-SCNC: 4 MMOL/L (ref 3–14)
AST SERPL W P-5'-P-CCNC: 71 U/L (ref 0–45)
BILIRUB SERPL-MCNC: 1 MG/DL (ref 0.2–1.3)
BUN SERPL-MCNC: 14 MG/DL (ref 7–30)
CALCIUM SERPL-MCNC: 8.5 MG/DL (ref 8.5–10.1)
CHLORIDE SERPL-SCNC: 102 MMOL/L (ref 94–109)
CO2 SERPL-SCNC: 31 MMOL/L (ref 20–32)
CREAT SERPL-MCNC: 1.05 MG/DL (ref 0.52–1.04)
GFR SERPL CREATININE-BSD FRML MDRD: 51 ML/MIN/1.7M2
GLUCOSE SERPL-MCNC: 112 MG/DL (ref 70–99)
HGB BLD-MCNC: 13 G/DL (ref 11.7–15.7)
NT-PROBNP SERPL-MCNC: 2523 PG/ML (ref 0–450)
POTASSIUM SERPL-SCNC: 4.2 MMOL/L (ref 3.4–5.3)
PROT SERPL-MCNC: 8.4 G/DL (ref 6.8–8.8)
SODIUM SERPL-SCNC: 137 MMOL/L (ref 133–144)
TSH SERPL DL<=0.005 MIU/L-ACNC: 3.02 MU/L (ref 0.4–4)

## 2017-11-20 PROCEDURE — 85018 HEMOGLOBIN: CPT | Performed by: NURSE PRACTITIONER

## 2017-11-20 PROCEDURE — 80053 COMPREHEN METABOLIC PANEL: CPT | Performed by: NURSE PRACTITIONER

## 2017-11-20 PROCEDURE — 83880 ASSAY OF NATRIURETIC PEPTIDE: CPT | Performed by: NURSE PRACTITIONER

## 2017-11-20 PROCEDURE — 99214 OFFICE O/P EST MOD 30 MIN: CPT | Performed by: NURSE PRACTITIONER

## 2017-11-20 PROCEDURE — 84443 ASSAY THYROID STIM HORMONE: CPT | Performed by: NURSE PRACTITIONER

## 2017-11-20 PROCEDURE — 36415 COLL VENOUS BLD VENIPUNCTURE: CPT | Performed by: NURSE PRACTITIONER

## 2017-11-20 RX ORDER — FUROSEMIDE 20 MG
40 TABLET ORAL DAILY
Qty: 90 TABLET | Refills: 3 | COMMUNITY
Start: 2017-11-20 | End: 2017-12-11 | Stop reason: DRUGHIGH

## 2017-11-20 RX ORDER — FUROSEMIDE 20 MG
TABLET ORAL
COMMUNITY
End: 2017-11-20

## 2017-11-20 NOTE — PROGRESS NOTES
HPI and Plan:   See [id number]    Orders Placed This Encounter   Procedures     Basic metabolic panel     Basic metabolic panel     Basic metabolic panel     SLEEP EVALUATION & MANAGEMENT REFERRAL - ADULT -[facility] [place]  [phone] (Age 18 and up) (jason Freitas)     Follow-Up with [facility]     Follow-Up with [facility]     Follow-Up with [facility]     Orders Placed This Encounter   Medications     DISCONTD: furosemide (LASIX) 20 MG tablet     Sig: Take 2 tablet (40 mg) in AM and 1 tablet at noon daily     furosemide (LASIX) 20 MG tablet     Sig: Take 2 tablets (40 mg) by mouth daily Take 2 tablet (40 mg) in AM or as directed     Dispense:  90 tablet     Refill:  3     Medications Discontinued During This Encounter   Medication Reason     furosemide (LASIX) 40 MG tablet Dose adjustment     furosemide (LASIX) 20 MG tablet Dose adjustment     furosemide (LASIX) 20 MG tablet Reorder         Encounter Diagnosis   Name Primary?     Acute combined systolic and diastolic CHF, NYHA class 3 (H)        CURRENT MEDICATIONS:  Current Outpatient Prescriptions   Medication Sig Dispense Refill     furosemide (LASIX) 20 MG tablet Take 2 tablets (40 mg) by mouth daily Take 2 tablet (40 mg) in AM or as directed 90 tablet 3     aspirin 81 MG chewable tablet Take 1 tablet (81 mg) by mouth daily 36 tablet 0     lisinopril (PRINIVIL/ZESTRIL) 5 MG tablet Take 1 tablet (5 mg) by mouth daily 30 tablet 0     metoprolol (TOPROL-XL) 25 MG 24 hr tablet Take 3 tablets (75 mg) by mouth 2 times daily 30 tablet      WARFARIN SODIUM PO Take by mouth daily As directed       meclizine (ANTIVERT) 12.5 MG tablet Take 1 tablet (12.5 mg) by mouth 3 times daily as needed for dizziness 60 tablet 1     PAROXETINE HCL PO Take 30 mg by mouth daily        OMEPRAZOLE PO Take 20 mg by mouth daily as needed        MULTIPLE VITAMINS PO Take 1 tablet by mouth daily       Cholecalciferol (VITAMIN D3 PO) Take 2,000 Units by mouth daily   "      [DISCONTINUED] furosemide (LASIX) 20 MG tablet Take 2 tablet (40 mg) in AM and 1 tablet at noon daily       [DISCONTINUED] furosemide (LASIX) 20 MG tablet Take 1 tablet (20 mg) by mouth every 24 hours (Patient taking differently: Take 40 mg by mouth every 24 hours ) 30 tablet      [DISCONTINUED] furosemide (LASIX) 40 MG tablet Take 1 tablet (40 mg) by mouth daily 30 tablet        ALLERGIES     Allergies   Allergen Reactions     Sulfa Drugs        PAST MEDICAL HISTORY:  Past Medical History:   Diagnosis Date     Atrial fibrillation (H)      Cardiogenic shock (H) 11/05/2017       PAST SURGICAL HISTORY:  Past Surgical History:   Procedure Laterality Date     HEART CATH RIGHT AND LEFT HEART CATH  10/23/2017    Cath no significant obstructive CAD. RHC: mild PHTN/NL wedge/ low CI.        FAMILY HISTORY:  Family History   Problem Relation Age of Onset     DIABETES Mother      Other Cancer Father        SOCIAL HISTORY:  Social History     Social History     Marital status:      Spouse name: N/A     Number of children: N/A     Years of education: N/A     Social History Main Topics     Smoking status: Former Smoker     Types: Cigarettes     Quit date: 1/1/1965     Smokeless tobacco: Never Used     Alcohol use 0.0 oz/week      Comment: 8 oz wine daily     Drug use: No     Sexual activity: Not Asked     Other Topics Concern     None     Social History Narrative       Review of Systems:  Skin:  Negative     Eyes:  Positive for glasses  ENT:  Negative    Respiratory:  Positive for dyspnea on exertion;sleep apnea  Cardiovascular:    Positive for;lightheadedness;fatigue  Gastroenterology: Positive for nausea  Genitourinary:  Negative    Musculoskeletal:  Negative    Neurologic:  Negative    Psychiatric:  Negative    Heme/Lymph/Imm:  Negative    Endocrine:  Negative      Physical Exam:  Vitals: BP 98/66 (BP Location: Right arm, Patient Position: Sitting, Cuff Size: Adult Regular)  Pulse 82  Ht 1.651 m (5' 5\")  Wt " 80.2 kg (176 lb 11.2 oz)  SpO2 96%  BMI 29.4 kg/m2    Constitutional:  cooperative, alert and oriented, well developed, well nourished, in no acute distress        Skin:  warm and dry to the touch          Head:  not assessed this visit        Eyes:  pupils equal and round, conjunctivae and lids unremarkable, sclera white, no xanthalasma, EOMS intact, no nystagmus        Lymph:      ENT:  no pallor or cyanosis, dentition good        Neck:  carotid pulses are full and equal bilaterally JVP 8-10      Respiratory:  clear to auscultation         Cardiac:   irregularly irregular rhythm                                                       GI:  abdomen soft        Extremities and Muscular Skeletal:  no deformities, clubbing, cyanosis, erythema observed;no edema              Neurological:  no gross motor deficits;affect appropriate        Psych:  Alert and Oriented x 3      Recent Lab Results:  LIPID RESULTS:  No results found for: CHOL, HDL, LDL, TRIG, CHOLHDLRATIO    LIVER ENZYME RESULTS:  Lab Results   Component Value Date    AST 71 (H) 11/20/2017    ALT 99 (H) 11/20/2017       CBC RESULTS:  Lab Results   Component Value Date    WBC 8.6 11/08/2017    RBC 3.31 (L) 11/08/2017    HGB 13.0 11/20/2017    HCT 31.7 (L) 11/08/2017    MCV 96 11/08/2017    MCH 32.3 11/08/2017    MCHC 33.8 11/08/2017    RDW 13.3 11/08/2017     11/11/2017       BMP RESULTS:  Lab Results   Component Value Date     11/20/2017    POTASSIUM 4.2 11/20/2017    CHLORIDE 102 11/20/2017    CO2 31 11/20/2017    ANIONGAP 4 11/20/2017     (H) 11/20/2017    BUN 14 11/20/2017    CR 1.05 (H) 11/20/2017    GFRESTIMATED 51 (L) 11/20/2017    GFRESTBLACK 62 11/20/2017    ALFIE 8.5 11/20/2017        A1C RESULTS:  No results found for: A1C    INR RESULTS:  Lab Results   Component Value Date    INR 2.57 (H) 11/13/2017    INR 2.62 (H) 11/12/2017       Impresstion & Plan:   Chronic systolic heart failure secondary to nonischemic  cardiomoypathy.      Stage: C  NYHA Class: III  ACEi/ARB: yes, lisinopril 5mg daily  ARNI: no  Beta blocker: yes, dose / medication unclear per patient  Aldosterone antagonist: no  SCD prophylaxis: NA  % BiV pacing: NA  Fluid status:  hypovolemic  Sleep Apnea Evaluation: ordered 11/20/17   Follow-up as scheduled in the [facility] and in: 1 week  Diuretic plan until next visit: if weight> 179#, give extra 20mg of lasix at noon for one day. Review diet.    CC  Luisaibrahima Ward PA-C  [address]  [place] [id number]

## 2017-11-20 NOTE — LETTER
11/20/2017    Arielle Leger MD  [employer]   [address] [facility] [id number]    RE: Em Richmond       Dear Colleague,    I had the pleasure of seeing Em Richmond in the [facility].    PRIMARY CARE PROVIDER: Arielle Leger MD       CARDIOLOGIST:   Den Pickard MD      REASON FOR VISIT: [facility] Clinic enrollment after 2 admissions.      Em Richmond is a pleasant 77-year-old female who has had 2 recent admissions.  On her first admission, she had a full cardiac workup.  She was noted to have a drop in her ejection fraction from previously.  Her echocardiogram in 2014 showed an ejection fraction of 60%-65%.  Echocardiography at [facility] on 10/21 showed that her EF had dropped to 30%-35%.  There was mild to moderate tricuspid regurgitation, mild to moderate mitral regurgitation.      The patient has a history of chronic atrial fibrillation.  The patient had coronary angiography performed 10/23 because the ejection fraction had dropped.  Coronary angiography showed only mild coronary artery disease with no stenosis greater than 30%.  Working diagnosis with idiopathic cardiomyopathy, although it was felt that it was most likely rate-related cardiomyopathy from atrial fibrillation.  The patient had reported history of stopping her metoprolol succinate medications, so it was felt that possibly the faster ventricular rate caused a cardiomyopathy.        With that background in mind, the patient was readmitted with progressive increased shortness of breath, nausea and vomiting and found to be markedly hypoxic with O2 saturations at 70%.  The patient was seen by Dr. Green.  The patient was intubated in the field.  There were no reports of chest pain or fever or chills.  She was found to have significant lactic acidosis.  Her proBNP was raised.  She had a subtherapeutic INR.  Her liver function tests were extremely abnormal with an ALT  of 1890, AST 3720.  The day after admission, her ALT went to 2858 and AST 6218.  The troponin was in the normal range at 0.020.  Her NT-proBNP significantly raised at 36,488.  Her 12-lead electrocardiogram revealed that the patient was in atrial flutter with a ventricular rate of 131 beats per minute.  Her EKG prior to that on 10/23 showed atrial fibrillation with controlled ventricular rate at 75 beats per minute.      Imaging studies on admission showed that were hazy interstitial opacities in the central aspect of both lungs, and while they were nonspecific, it was felt they were most likely related to pulmonary edema.  CT of the chest which was performed showed mild ground-glass pacing and interstitial thickening in the upper and mid lungs bilaterally.  Again, it was felt to be nonspecific, but could be related to interstitial pulmonary edema.  Several nonspecific mildly enlarged mediastinal lymph nodes were noted.  On admission, she was afebrile.  She had significant hypotension and was started on Levophed.  Intravenous amiodarone was used to controlled ventricular rate.      PAST MEDICAL HISTORY:   1.  Cardiomyopathy.   2.  Atrial fibrillation with rapid ventricular response.  The atrial fibrillation is chronic   3.  Pulmonary hypertension.   4.  Obstructive sleep apnea.   5.  Anxiety.   6.  Essential hypertension.      PAST SURGICAL HISTORY:   None.      FAMILY HISTORY:   Brother has coronary artery disease.  Father with coronary artery disease and mother with coronary artery disease.  Her father had colonic cancer in sister had colonic cancer.      SOCIAL HISTORY:     She is a former smoker.  Occasionally uses alcohol.  Lives with her .      Her admission was 11/05 to 11/13/2017.  She was discharged on 11/13 to [facility].  Of note, her admission weight was 195 pounds and her discharge weight was 181 pounds.  She had a complicated hospitalization.  She was extubated and titrated on  medications.  She was discharged on Lasix 40 mg in the a.m. and 20 mg in the afternoon.  She was on amiodarone drip in the ICU and then transitioned to digoxin.  She was titrated on metoprolol succinate 75 mg daily.  She was seen by GI for shock liver, gallbladder edema and borderline common bile duct dilatation.  Her ultrasound showed CDD dilatation.  MRCP did not show.  Liver function tests were improving but still elevated, likely due to passive congestion and hypoperfusion following cardiogenic shock and pulmonary edema.  Her statin was held until her LFTs normalized.  She was transitioned to [facility].  She enrolls in the C.O.R.E[facility]  Clinic today bringing her [facility] T[facility] medication list which states that she is on Lopressor and some confusion on Lasix dosing.      Today, she feels very weak.  She denies increased shortness of breath, PND, syncope or near-syncope.  She has been home for 1 day.      PHYSICAL EXAMINATION:     VITAL SIGNS:  Her blood pressure is 98/66, pulse is 82 and irregular and clinic weight is 176 pounds and home weight is 176 pounds.   NECK:  JVP 6-8 cm of water to 45 degree angle.   LUNGS:  Mild diminished breath sounds bibasilar, otherwise clear.   CARDIOVASCULAR:  Irregularly irregular, no murmur noted.   ABDOMEN:  Soft, nontender.   EXTREMITIES:  No peripheral edema bilaterally.      BASIC METABOLIC PANEL:     Sodium 137, potassium 4.2, BUN 14, creatinine 1.05, GFR 41, down from 80.     Outpatient Encounter Prescriptions as of 11/20/2017   Medication Sig Dispense Refill     furosemide (LASIX) 20 MG tablet Take 2 tablets (40 mg) by mouth daily Take 2 tablet (40 mg) in AM or as directed 90 tablet 3     aspirin 81 MG chewable tablet Take 1 tablet (81 mg) by mouth daily 36 tablet 0     lisinopril (PRINIVIL/ZESTRIL) 5 MG tablet Take 1 tablet (5 mg) by mouth daily 30 tablet 0     metoprolol (TOPROL-XL) 25 MG 24 hr tablet Take 3 tablets (75 mg) by mouth 2 times daily 30  tablet      WARFARIN SODIUM PO Take by mouth daily As directed       meclizine (ANTIVERT) 12.5 MG tablet Take 1 tablet (12.5 mg) by mouth 3 times daily as needed for dizziness 60 tablet 1     PAROXETINE HCL PO Take 30 mg by mouth daily        OMEPRAZOLE PO Take 20 mg by mouth daily as needed        MULTIPLE VITAMINS PO Take 1 tablet by mouth daily       Cholecalciferol (VITAMIN D3 PO) Take 2,000 Units by mouth daily        [DISCONTINUED] furosemide (LASIX) 20 MG tablet Take 2 tablet (40 mg) in AM and 1 tablet at noon daily       [DISCONTINUED] furosemide (LASIX) 20 MG tablet Take 1 tablet (20 mg) by mouth every 24 hours (Patient taking differently: Take 40 mg by mouth every 24 hours ) 30 tablet      [DISCONTINUED] furosemide (LASIX) 40 MG tablet Take 1 tablet (40 mg) by mouth daily 30 tablet      No facility-administered encounter medications on file as of 11/20/2017.       IMPRESSION AND PLAN:     Em Richmond is a 77-year-old female with past medical history significant for nonischemic cardiomyopathy, chronic atrial fibrillation, gastroesophageal reflux disease, anxiety, pulmonary hypertension, obesity, untreated sleep apnea who was admitted 11/06/2017 due to acute hypoxic respiratory failure requiring intubation in the setting of cardiogenic shock secondary to atrial fibrillation with rapid ventricular response and decompensated nonischemic cardiomyopathy with pulmonary edema.  She was transferred to the ICU.  Over time she was extubated and off pressors.  She was treated initially with amiodarone and that was discontinued and digoxin was added and metoprolol XL was titrated to 75 mg twice a day.  She transitioned to [facility].  Her medication list is not clear today as she enrolls in the [facility].R[facility] Clinic.   She diuresed about 20 pounds in the hospital.  The patient was not aware that she had gained 20 pounds.  She was not weighing herself.  She was eating a general diet.   1.  Recent acute hypoxic  respiratory failure, requiring intubation secondary to cardiogenic shock in the setting of decompensated systolic congestive heart failure with pulmonary edema and atrial fibrillation with rapid ventricular response.  She is improved.  Her saturation is 98% on room air.  She continues to be short of breath with exertion.   2.  Nonischemic cardiomyopathy with recent pulmonary edema.  Currently on beta blocker and ACE inhibitor.  CHF teaching was given.  Suggested daily weights and to enroll in the tele-management program where upper weight parameter of 179 pounds.   3.  Chronic atrial fibrillation.  She was discharged from the hospital on metoprolol succinate 75 mg daily.  The patient is not clear if she is on Lopressor or metoprolol succinate and she does not know if it is daily or b.i.d.  She will call the C.O.R.E. nurse when she gets home to clarify these medications and we will make changes as needed.  Continue Coumadin therapy.  She has an appointment with Dr. Leger today for titration of her Coumadin.   4.  Recent acute kidney injury, Due to shock, CHF.  Resolved at the time of discharge.   5.  GI issues.  Shock liver, gallbladder edema and borderline common bile duct dilatation.   6.  Sleep apnea.  She cannot remember her sleep doctor.  I have referred her to Dr. Freitas.      I have asked her to follow up with me weekly until she sees Dr. Pickard 12/19.  She will have an echocardiogram, fasting lipid profile and BMP prior to that visit.  At some point we will consider ordering a Holter monitor to look at rate control.     Again, thank you for allowing me to participate in the care of your patient.      Sincerely,    DOMINIK Ritchie [facility]

## 2017-11-20 NOTE — PATIENT INSTRUCTIONS
Call the C.O.R.E. nurse for any questions or concerns:  [phone]    1.  Medication changes from today: Change furosemide to 40 mg in am. Stop noon furosemide     2.  Weigh yourself daily and write it down. Start calling in weight in am.     3.  Call [facility] nurse if your weight is up more than 2 pounds in one day or 5 pounds in one week.    4.  Call [facility] nurse if you feel more short of breath, have more abdominal bloating, or leg swelling.    5.  Continue low sodium diet (less than 2000 mg daily). If you eat less salt, you will retain less fluid.    6.  Alcohol can weaken your heart further. You should avoid alcohol or limit its use to special times, such as a holiday or birthday.     7.  Do NOT take Aleve (naproxen) or Advil (ibuprofen) without talking to your doctor first.     8.  Lab Results:   Component      Latest Ref Rng & Units 11/20/2017   Sodium      133 - 144 mmol/L 137   Potassium      3.4 - 5.3 mmol/L 4.2   Chloride      94 - 109 mmol/L 102   Carbon Dioxide      20 - 32 mmol/L 31   Anion Gap      3 - 14 mmol/L 4   Glucose      70 - 99 mg/dL 112 (H)   Urea Nitrogen      7 - 30 mg/dL 14   Creatinine      0.52 - 1.04 mg/dL 1.05 (H)   GFR Estimate      >60 mL/min/1.7m2 51 (L)   GFR Estimate If Black      >60 mL/min/1.7m2 62   Calcium      8.5 - 10.1 mg/dL 8.5   Bilirubin Total      0.2 - 1.3 mg/dL 1.0   Albumin      3.4 - 5.0 g/dL 3.0 (L)   Protein Total      6.8 - 8.8 g/dL 8.4   Alkaline Phosphatase      40 - 150 U/L 144   ALT      0 - 50 U/L 99 (H)   AST      0 - 45 U/L 71 (H)   TSH      0.40 - 4.00 mU/L 3.02   Hemoglobin      11.7 - 15.7 g/dL 13.0   N-Terminal Pro Bnp      0 - 450 pg/mL 2523 (H)     [facility]: Cardiomyopathy, Optimization, Rehabilitation, Education  The [facility] is a heart failure specialty clinic within the [facility] where you will work with your cardiologist, nurse practitioners, physician assistants and registered nurses who specialize in heart  failure care. They are dedicated to helping patients with heart failure to carefully adjust medications, receive education, and learn who and when to call if symptoms develop. They specialize in helping you better understand your condition, slow the progression of your disease, improve the length and quality of your life, help you detect future heart problems before they become life threatening, and avoid hospitalizations.

## 2017-11-20 NOTE — MR AVS SNAPSHOT
After Visit Summary   11/20/2017    Em Richmond    MRN: [id number]           Patient Information     Date Of Birth          1940        Visit Information        Provider Department      11/20/2017 8:50 AM Mandie Rose APRN CNP [facility]        Today's Diagnoses     Acute combined systolic and diastolic CHF, NYHA class 3 (H)          Care Instructions    Call the C.O.R.E. nurse for any questions or concerns:  [phone]    1.  Medication changes from today: Change furosemide to 40 mg in am. Stop noon furosemide     2.  Weigh yourself daily and write it down. Start calling in weight in am.     3.  Call CORE nurse if your weight is up more than 2 pounds in one day or 5 pounds in one week.    4.  Call CORE nurse if you feel more short of breath, have more abdominal bloating, or leg swelling.    5.  Continue low sodium diet (less than 2000 mg daily). If you eat less salt, you will retain less fluid.    6.  Alcohol can weaken your heart further. You should avoid alcohol or limit its use to special times, such as a holiday or birthday.     7.  Do NOT take Aleve (naproxen) or Advil (ibuprofen) without talking to your doctor first.     8.  Lab Results:   Component      Latest Ref Rng & Units 11/20/2017   Sodium      133 - 144 mmol/L 137   Potassium      3.4 - 5.3 mmol/L 4.2   Chloride      94 - 109 mmol/L 102   Carbon Dioxide      20 - 32 mmol/L 31   Anion Gap      3 - 14 mmol/L 4   Glucose      70 - 99 mg/dL 112 (H)   Urea Nitrogen      7 - 30 mg/dL 14   Creatinine      0.52 - 1.04 mg/dL 1.05 (H)   GFR Estimate      >60 mL/min/1.7m2 51 (L)   GFR Estimate If Black      >60 mL/min/1.7m2 62   Calcium      8.5 - 10.1 mg/dL 8.5   Bilirubin Total      0.2 - 1.3 mg/dL 1.0   Albumin      3.4 - 5.0 g/dL 3.0 (L)   Protein Total      6.8 - 8.8 g/dL 8.4   Alkaline Phosphatase      40 - 150 U/L 144   ALT      0 - 50 U/L 99 (H)   AST      0 - 45 U/L 71 (H)    TSH      0.40 - 4.00 mU/L 3.02   Hemoglobin      11.7 - 15.7 g/dL 13.0   N-Terminal Pro Bnp      0 - 450 pg/mL 2523 (H)     CORE Clinic: Cardiomyopathy, Optimization, Rehabilitation, Education  The CORE Clinic is a heart failure specialty clinic within the [facility] where you will work with your cardiologist, nurse practitioners, physician assistants and registered nurses who specialize in heart failure care. They are dedicated to helping patients with heart failure to carefully adjust medications, receive education, and learn who and when to call if symptoms develop. They specialize in helping you better understand your condition, slow the progression of your disease, improve the length and quality of your life, help you detect future heart problems before they become life threatening, and avoid hospitalizations.                Follow-ups after your visit        Additional Services     Follow-Up with CORE Clinic           SLEEP EVALUATION & MANAGEMENT REFERRAL - ADULT -[facility]  [phone] (Age 18 and up) (jason Freitas)       Please be aware that coverage of these services is subject to the terms and limitations of your health insurance plan.  Call member services at your health plan with any benefit or coverage questions.      Please bring the following to your appointment:    >>   List of current medications   >>   This referral request   >>   Any documents/labs given to you for this referral                Follow-Up with CORE Clinic           Follow-Up with [facility]                 Your next 10 appointments already scheduled     Nov 27, 2017 12:00 PM CST   LAB with RU LAB   [facility] [place] ([facility])    [address] [address] [address] [phone]           Please do not eat 10-12 hours before your appointment if you are coming in fasting for labs on lipids, cholesterol, or  glucose (sugar). This does not apply to pregnant women. Water, hot tea and black coffee (with nothing added) are okay. Do not drink other fluids, diet soda or chew gum.            Nov 27, 2017  1:10 PM CST   Core Return with DOMINIK White CNP   [facility] ([facility])    [address]  [facility] [address]   [phone]            Dec 04, 2017  9:00 AM CST   LAB with RU LAB   [facility] ([facility])    [address] [place] [address] [address] [place] [address]   [phone]           Please do not eat 10-12 hours before your appointment if you are coming in fasting for labs on lipids, cholesterol, or glucose (sugar). This does not apply to pregnant women. Water, hot tea and black coffee (with nothing added) are okay. Do not drink other fluids, diet soda or chew gum.            Dec 04, 2017  9:30 AM CST   Ech Complete with [address] ([facility])    [address] [place] [address] [address] [place] [address]   [phone]           1. Please bring or wear a comfortable two-piece outfit. 2. You may eat, drink and take your normal medicines. 3. For any questions that cannot be answered, please contact the ordering physician ***Please check-in at the [facility] Registration Office located in [address] in the [facility] building. When you are finished registering, please go to [address] and have a seat. The technician will call your name for the test.            Dec 04, 2017 10:50 AM CST   Core Return with DOMINIK White CNP   [facility] ([facility])    [address] [place] [address] [address] [place] [address]   [phone]            Dec 11, 2017 10:15 AM CST   LAB with RU LAB   [facility] ([facility])    [address] [place] [address]  [facility] [address]  [facility] [address]   [phone]           Please do not eat 10-12 hours before your appointment if you are coming in fasting for labs on lipids, cholesterol, or glucose (sugar). This does not apply to pregnant women. Water, hot tea and black coffee (with nothing added) are okay. Do not drink other fluids, diet soda or chew gum.            Dec 11, 2017 10:50 AM CST   Core Return with DOMINIK White CNP   [facility] ([facility])    [address]  [facility] [address] [phone]            Dec 12, 2017  9:30 AM CST   New Sleep Patient with Shawn Freitas MD   [facility] ([facility])    [address]  [facility] [id number] [phone]            Dec 19, 2017  2:45 PM CST   Return Visit with Den Pickard MD   [facility] ([facility])    [address] [place] [address] [address]  [facility] [address]   [phone]              Future tests that were ordered for you today     Open Future Orders        Priority Expected Expires Ordered    Basic metabolic panel Routine 12/14/2017 11/20/2018 11/20/2017    Follow-Up with CORE Clinic Routine 12/14/2017 11/20/2018 11/20/2017    Basic metabolic panel Routine 12/7/2017 11/20/2018 11/20/2017    Follow-Up with [facility] Clinic Routine 12/7/2017 11/20/2018 11/20/2017    Basic metabolic panel Routine 11/27/2017 11/20/2018 11/20/2017    Follow-Up with [facility]ORE Clinic Routine 11/27/2017 11/20/2018 11/20/2017    SLEEP EVALUATION & MANAGEMENT REFERRAL - ADULT -[facility]  [phone] (Age 18 and up) (jason Freitas) Routine 11/27/2017 11/20/2018 11/20/2017            Who to contact     If you have questions or need follow up information about today's clinic visit or your schedule please contact [facility]  "directly at [phone].  Normal or non-critical lab and imaging results will be communicated to you by Adzerkhart, letter or phone within 4 business days after the clinic has received the results. If you do not hear from us within 7 days, please contact the clinic through Adzerkhart or phone. If you have a critical or abnormal lab result, we will notify you by phone as soon as possible.  Submit refill requests through Smart Plate or call your pharmacy and they will forward the refill request to us. Please allow 3 business days for your refill to be completed.          Additional Information About Your Visit        Adzerkhart Information     Smart Plate lets you send messages to your doctor, view your test results, renew your prescriptions, schedule appointments and more. To sign up, go to [email or web address] . Click on \"Log in\" on the left side of the screen, which will take you to the Welcome page. Then click on \"Sign up Now\" on the right side of the page.     You will be asked to enter the access code listed below, as well as some personal information. Please follow the directions to create your username and password.     Your access code is: [email or web address]  Expires: 2018  1:00 PM     Your access code will  in 90 days. If you need help or a new code, please call your [facility] clinic or [phone].        Care EveryWhere ID     This is your Care EveryWhere ID. This could be used by other organizations to access your [facility] medical records  [id number]        Your Vitals Were     Pulse Height Pulse Oximetry BMI (Body Mass Index)          82 1.651 m (5' 5\") 96% 29.4 kg/m2         Blood Pressure from Last 3 Encounters:   17 98/66   17 128/65   10/25/17 110/73    Weight from Last 3 Encounters:   17 80.2 kg (176 lb 11.2 oz)   17 82.1 kg (181 lb)   10/25/17 83.9 kg (184 lb 14.4 oz)              We Performed the Following     Follow-Up with CORE Clinic - MELISSA visit          Today's Medication " Changes          These changes are accurate as of: 11/20/17 11:07 AM.  If you have any questions, ask your nurse or doctor.               These medicines have changed or have updated prescriptions.        Dose/Directions    furosemide 20 MG tablet   Commonly known as:  LASIX   This may have changed:    - how much to take  - how to take this  - when to take this  - additional instructions  - Another medication with the same name was removed. Continue taking this medication, and follow the directions you see here.   Changed by:  Mandie Rose APRN CNP        Dose:  40 mg   Take 2 tablets (40 mg) by mouth daily Take 2 tablet (40 mg) in AM or as directed   Quantity:  90 tablet   Refills:  3                Primary Care Provider Office Phone # Fax #    Arielle Nicole Leger [phone]       [facility] [address] [facility] [address]        Equal Access to Services     [facility] : Hadii aad ku hadasho Sodara, waaxda luqadaha, qaybta kaalmada marcella, scout nixon . So [facility] [phone].    ATENCIÓN: Si habla español, tiene a delgadillo disposición servicios gratuitos de asistencia lingüística. Jovon al [phone].    We comply with applicable federal civil rights laws and [place] laws. We do not discriminate on the basis of race, color, national origin, age, disability, sex, sexual orientation, or gender identity.            Thank you!     Thank you for choosing [facility]  for your care. Our goal is always to provide you with excellent care. Hearing back from our patients is one way we can continue to improve our services. Please take a few minutes to complete the written survey that you may receive in the mail after your visit with us. Thank you!             Your Updated Medication List - Protect others around you: Learn how to safely use, store and throw away your medicines at www.disposemymeds.org.          This  list is accurate as of: 11/20/17 11:07 AM.  Always use your most recent med list.                   Brand Name Dispense Instructions for use Diagnosis    aspirin 81 MG chewable tablet     36 tablet    Take 1 tablet (81 mg) by mouth daily    Acute respiratory failure with hypoxia (H)       furosemide 20 MG tablet    LASIX    90 tablet    Take 2 tablets (40 mg) by mouth daily Take 2 tablet (40 mg) in AM or as directed        lisinopril 5 MG tablet    PRINIVIL/ZESTRIL    30 tablet    Take 1 tablet (5 mg) by mouth daily    Acute respiratory failure with hypoxia (H)       meclizine 12.5 MG tablet    ANTIVERT    60 tablet    Take 1 tablet (12.5 mg) by mouth 3 times daily as needed for dizziness    Labyrinthitis, bilateral       metoprolol 25 MG 24 hr tablet    TOPROL-XL    30 tablet    Take 3 tablets (75 mg) by mouth 2 times daily    Acute respiratory failure with hypoxia (H)       MULTIPLE VITAMINS PO      Take 1 tablet by mouth daily        OMEPRAZOLE PO      Take 20 mg by mouth daily as needed        PAROXETINE HCL PO      Take 30 mg by mouth daily        VITAMIN D3 PO      Take 2,000 Units by mouth daily        WARFARIN SODIUM PO      Take by mouth daily As directed

## 2017-11-20 NOTE — PROGRESS NOTES
PRIMARY CARE PROVIDER: Arielle Leger MD       CARDIOLOGIST:   Den Pickard MD      REASON FOR VISIT: C.O.R.E. Clinic enrollment after 2 admissions.      HISTORY OF PRESENT ILLNESS:     Em Richmond is a pleasant 77-year-old female who has had 2 recent admissions.  On her first admission, she had a full cardiac workup.  She was noted to have a drop in her ejection fraction from previously.  Her echocardiogram in 2014 showed an ejection fraction of 60%-65%.  Echocardiography at [facility] on 10/21 showed that her EF had dropped to 30%-35%.  There was mild to moderate tricuspid regurgitation, mild to moderate mitral regurgitation.      The patient has a history of chronic atrial fibrillation.  The patient had coronary angiography performed 10/23 because the ejection fraction had dropped.  Coronary angiography showed only mild coronary artery disease with no stenosis greater than 30%.  Working diagnosis with idiopathic cardiomyopathy, although it was felt that it was most likely rate-related cardiomyopathy from atrial fibrillation.  The patient had reported history of stopping her metoprolol succinate medications, so it was felt that possibly the faster ventricular rate caused a cardiomyopathy.        With that background in mind, the patient was readmitted with progressive increased shortness of breath, nausea and vomiting and found to be markedly hypoxic with O2 saturations at 70%.  The patient was seen by Dr. Green.  The patient was intubated in the field.  There were no reports of chest pain or fever or chills.  She was found to have significant lactic acidosis.  Her proBNP was raised.  She had a subtherapeutic INR.  Her liver function tests were extremely abnormal with an ALT of 1890, AST 3720.  The day after admission, her ALT went to 2858 and AST 6218.  The troponin was in the normal range at 0.020.  Her NT-proBNP significantly raised at 36,488.  Her 12-lead electrocardiogram  revealed that the patient was in atrial flutter with a ventricular rate of 131 beats per minute.  Her EKG prior to that on 10/23 showed atrial fibrillation with controlled ventricular rate at 75 beats per minute.      Imaging studies on admission showed that were hazy interstitial opacities in the central aspect of both lungs, and while they were nonspecific, it was felt they were most likely related to pulmonary edema.  CT of the chest which was performed showed mild ground-glass pacing and interstitial thickening in the upper and mid lungs bilaterally.  Again, it was felt to be nonspecific, but could be related to interstitial pulmonary edema.  Several nonspecific mildly enlarged mediastinal lymph nodes were noted.  On admission, she was afebrile.  She had significant hypotension and was started on Levophed.  Intravenous amiodarone was used to controlled ventricular rate.      PAST MEDICAL HISTORY:   1.  Cardiomyopathy.   2.  Atrial fibrillation with rapid ventricular response.  The atrial fibrillation is chronic   3.  Pulmonary hypertension.   4.  Obstructive sleep apnea.   5.  Anxiety.   6.  Essential hypertension.      PAST SURGICAL HISTORY:   None.      FAMILY HISTORY:   Brother has coronary artery disease.  Father with coronary artery disease and mother with coronary artery disease.  Her father had colonic cancer in sister had colonic cancer.      SOCIAL HISTORY:     She is a former smoker.  Occasionally uses alcohol.  Lives with her .      Her admission was 11/05 to 11/13/2017.  [facility]  Of note, her admission weight was 195 pounds and her discharge weight was 181 pounds.  She had a complicated hospitalization.  She was extubated and titrated on medications.  She was discharged on Lasix 40 mg in the a.m. and 20 mg in the afternoon.  She was on amiodarone drip in the ICU and then transitioned to digoxin.  She was titrated on metoprolol succinate 75 mg  daily.  She was seen by GI for shock liver, gallbladder edema and borderline common bile duct dilatation.  Her ultrasound showed CDD dilatation.  MRCP did not show.  Liver function tests were improving but still elevated, likely due to passive congestion and hypoperfusion following cardiogenic shock and pulmonary edema.  Her statin was held until her LFTs normalized.  She was transitioned to [facility].  She enrolls in the C.O.R.E.  Clinic today bringing her [facility] medication list which states that she is on Lopressor and some confusion on Lasix dosing.      Today, she feels very weak.  She denies increased shortness of breath, PND, syncope or near-syncope.  She has been home for 1 day.      PHYSICAL EXAMINATION:     VITAL SIGNS:  Her blood pressure is 98/66, pulse is 82 and irregular and clinic weight is 176 pounds and home weight is 176 pounds.   NECK:  JVP 6-8 cm of water to 45 degree angle.   LUNGS:  Mild diminished breath sounds bibasilar, otherwise clear.   CARDIOVASCULAR:  Irregularly irregular, no murmur noted.   ABDOMEN:  Soft, nontender.   EXTREMITIES:  No peripheral edema bilaterally.      BASIC METABOLIC PANEL:     Sodium 137, potassium 4.2, BUN 14, creatinine 1.05, GFR 41, down from 80.      IMPRESSION AND PLAN:     Em Richmond is a 77-year-old female with past medical history significant for nonischemic cardiomyopathy, chronic atrial fibrillation, gastroesophageal reflux disease, anxiety, pulmonary hypertension, obesity, untreated sleep apnea who was admitted 11/06/2017 due to acute hypoxic respiratory failure requiring intubation in the setting of cardiogenic shock secondary to atrial fibrillation with rapid ventricular response and decompensated nonischemic cardiomyopathy with pulmonary edema.  She was transferred to the ICU.  Over time she was extubated and off pressors.  She was treated initially with amiodarone and that was discontinued and digoxin was added and  metoprolol XL was titrated to 75 mg twice a day.  She transitioned to [facility].  Her medication list is not clear today as she enrolls in the C.O.R.E. Clinic.   She diuresed about 20 pounds in the hospital.  The patient was not aware that she had gained 20 pounds.  She was not weighing herself.  She was eating a general diet.   1.  Recent acute hypoxic respiratory failure, requiring intubation secondary to cardiogenic shock in the setting of decompensated systolic congestive heart failure with pulmonary edema and atrial fibrillation with rapid ventricular response.  She is improved.  Her saturation is 98% on room air.  She continues to be short of breath with exertion.   2.  Nonischemic cardiomyopathy with recent pulmonary edema.  Currently on beta blocker and ACE inhibitor.  CHF teaching was given.  Suggested daily weights and to enroll in the tele-management program where upper weight parameter of 179 pounds.   3.  Chronic atrial fibrillation.  She was discharged from the hospital on metoprolol succinate 75 mg daily.  The patient is not clear if she is on Lopressor or metoprolol succinate and she does not know if it is daily or b.i.d.  She will call the C.O.R.E. nurse when she gets home to clarify these medications and we will make changes as needed.  Continue Coumadin therapy.  She has an appointment with Dr. Leger today for titration of her Coumadin.   4.  Recent acute kidney injury, Due to shock, CHF.  Resolved at the time of discharge.   5.  GI issues.  Shock liver, gallbladder edema and borderline common bile duct dilatation.   6.  Sleep apnea.  She cannot remember her sleep doctor.  I have referred her to Dr. Freitas.      I have asked her to follow up with me weekly until she sees Dr. Pickard 12/19.  She will have an echocardiogram, fasting lipid profile and BMP prior to that visit.  At some point we will consider ordering a Holter monitor to look at rate control.         ERASTO MAYA  DOMINIK CARSON, CNP             D: 2017 10:06   T: 2017 11:29   MT: GHASSAN      Name:     RICARDO DORMAN   MRN:      [id number]        Account:      [id number]   :      1940           Service Date: 2017      Document: [id number]

## 2017-11-20 NOTE — PROGRESS NOTES
Per Dusty Rose CNP, enroll patient in HF tele-management monitoring with home goal weight 170-179#. If weight is above 179#, verbal order from Dusty Rose CNP to instruct patient to take an extra 20 mg of furosemide that day. Cheryl ANDERSEN

## 2017-11-21 NOTE — PROGRESS NOTES
Patient is not interested in tele-management at this time because she does not think she will remember to call in. She does not remember her weight from this morning and was off by at least 25# of her last weight.  Asked patient to read off her medication bottles. She repeatedly tried to read off the cardiology After Visit Summary medication list from yesterday instead of her pill bottles as requested. She was not able to tell RN how she was taking the medications and she sets them up herself. For example, she said she was taking Toprol twice a day and then not at all the next sentence.  Per patient, the medication bottle reads:  Furosemide 40 mg tablets--taking 1/2 tablet daily  Lisinopril 5 mg tablets--taking 1/2 tablet daily  Metoprolol succinate 50 mg tablets--taking 1-1/2 tablet daily  Patient would like prescriptions sent to [facility] in [place]  Asked patient to bring her medications to her next CORE f/u office visit 11/27/17. She said her  would be bringing her to the appointment. Left a voicemail for patient's daughter, Teresa Rivera, who attended yesterday's office visit to call back.  Cheryl ANDERSEN

## 2017-11-21 NOTE — PROGRESS NOTES
Left a voicemail for patient to call back. She was asked to call back after her CORE Clinic visit yesterday to clarify her medications. For example, whether she is taking metoprolol tartrate (per [facility] medication list) or succinate per hospital discharge. Patient also did not call into first tele-management today. Cheryl ANDERSEN

## 2017-11-27 NOTE — PROGRESS NOTES
Daughter did not call back and patient no showed to CORE Clinic today to address medication administration concerns. Cheryl ANDERSEN

## 2017-11-29 ENCOUNTER — CARE COORDINATION (OUTPATIENT)
Dept: CARDIOLOGY | Facility: CLINIC | Age: 77
End: 2017-11-29

## 2017-11-29 NOTE — PROGRESS NOTES
Patient called wondering if she was retaining more fluid. Reports runny nose and clear productive cough. Home weight is down 3# over the last 10 days. No shortness of breath or edema reported. Told patient these symptoms are more likely related to allergies or a virus. She has taken loratadine in the past and may try a tablet to see if that is helpful. Reminded patient that she did not come to her weekly CORE clinic visit on Monday. Encouraged follow-up visit on Monday and bringing all her home medications to her appointment. Cheryl ANDERSEN

## 2017-12-04 ENCOUNTER — HOSPITAL ENCOUNTER (OUTPATIENT)
Dept: CARDIOLOGY | Facility: CLINIC | Age: 77
Discharge: HOME | End: 2017-12-04
Attending: NURSE PRACTITIONER | Admitting: NURSE PRACTITIONER
Payer: MEDICARE

## 2017-12-04 DIAGNOSIS — R00.0 TACHYCARDIA INDUCED CARDIOMYOPATHY (H): ICD-10-CM

## 2017-12-04 DIAGNOSIS — I50.41 ACUTE COMBINED SYSTOLIC AND DIASTOLIC CHF, NYHA CLASS 3 (H): ICD-10-CM

## 2017-12-04 DIAGNOSIS — I43 TACHYCARDIA INDUCED CARDIOMYOPATHY (H): ICD-10-CM

## 2017-12-04 LAB
ANION GAP SERPL CALCULATED.3IONS-SCNC: 6 MMOL/L (ref 3–14)
BUN SERPL-MCNC: 18 MG/DL (ref 7–30)
CALCIUM SERPL-MCNC: 8.5 MG/DL (ref 8.5–10.1)
CHLORIDE SERPL-SCNC: 101 MMOL/L (ref 94–109)
CO2 SERPL-SCNC: 29 MMOL/L (ref 20–32)
CREAT SERPL-MCNC: 0.98 MG/DL (ref 0.52–1.04)
GFR SERPL CREATININE-BSD FRML MDRD: 55 ML/MIN/1.7M2
GLUCOSE SERPL-MCNC: 94 MG/DL (ref 70–99)
POTASSIUM SERPL-SCNC: 4.2 MMOL/L (ref 3.4–5.3)
SODIUM SERPL-SCNC: 136 MMOL/L (ref 133–144)

## 2017-12-04 PROCEDURE — 93321 DOPPLER ECHO F-UP/LMTD STD: CPT | Mod: 26 | Performed by: INTERNAL MEDICINE

## 2017-12-04 PROCEDURE — 40000264 ECHO LIMITED WITH OPTISON

## 2017-12-04 PROCEDURE — 93308 TTE F-UP OR LMTD: CPT | Mod: 26 | Performed by: INTERNAL MEDICINE

## 2017-12-04 PROCEDURE — 93325 DOPPLER ECHO COLOR FLOW MAPG: CPT | Mod: 26 | Performed by: INTERNAL MEDICINE

## 2017-12-04 PROCEDURE — 80048 BASIC METABOLIC PNL TOTAL CA: CPT | Performed by: NURSE PRACTITIONER

## 2017-12-04 PROCEDURE — 36415 COLL VENOUS BLD VENIPUNCTURE: CPT | Performed by: NURSE PRACTITIONER

## 2017-12-07 ENCOUNTER — CARE COORDINATION (OUTPATIENT)
Dept: CARDIOLOGY | Facility: CLINIC | Age: 77
End: 2017-12-07

## 2017-12-07 NOTE — PROGRESS NOTES
Patient left a voicemail upset because she had not been called about her echocardiogram results. Called patient back and told her that she didn't get a phone call with her results because she didn't stay for her office visit following her echo. She has missed the last 2 weekly office visits. She does not remember me reiterating her office visit times last week over the phone. She remembers bringing her pills to her echocardiogram, but she said no one told her about any appointments. Asked if there is someone who helps her remember her office visits because her office visits were made in person with her daughter after her first [facility] appointment in clinic. Patient said she just uses her calendar. Patient asked about taking simvastatin which she has at home. She has not been taking this. Told patient to just bring the medication to her visit and we can decide then if she should be taking simvastatin. Reviewed all appointments viewable to me and had her write them down. Patient said she is going to cancel her new appointment with Dr. Freitas. Encouraged follow-up. Cheryl ANDERSEN

## 2017-12-11 ENCOUNTER — OFFICE VISIT (OUTPATIENT)
Dept: CARDIOLOGY | Facility: CLINIC | Age: 77
End: 2017-12-11
Attending: NURSE PRACTITIONER
Payer: COMMERCIAL

## 2017-12-11 VITALS
SYSTOLIC BLOOD PRESSURE: 94 MMHG | OXYGEN SATURATION: 99 % | BODY MASS INDEX: 28.94 KG/M2 | WEIGHT: 173.7 LBS | HEIGHT: 65 IN | DIASTOLIC BLOOD PRESSURE: 64 MMHG | HEART RATE: 70 BPM

## 2017-12-11 DIAGNOSIS — J96.01 ACUTE RESPIRATORY FAILURE WITH HYPOXIA (H): ICD-10-CM

## 2017-12-11 DIAGNOSIS — I50.41 ACUTE COMBINED SYSTOLIC AND DIASTOLIC CHF, NYHA CLASS 3 (H): ICD-10-CM

## 2017-12-11 LAB
ANION GAP SERPL CALCULATED.3IONS-SCNC: 6 MMOL/L (ref 3–14)
BUN SERPL-MCNC: 17 MG/DL (ref 7–30)
CALCIUM SERPL-MCNC: 8.7 MG/DL (ref 8.5–10.1)
CHLORIDE SERPL-SCNC: 100 MMOL/L (ref 94–109)
CO2 SERPL-SCNC: 30 MMOL/L (ref 20–32)
CREAT SERPL-MCNC: 0.96 MG/DL (ref 0.52–1.04)
GFR SERPL CREATININE-BSD FRML MDRD: 57 ML/MIN/1.7M2
GLUCOSE SERPL-MCNC: 113 MG/DL (ref 70–99)
POTASSIUM SERPL-SCNC: 3.8 MMOL/L (ref 3.4–5.3)
SODIUM SERPL-SCNC: 136 MMOL/L (ref 133–144)

## 2017-12-11 PROCEDURE — 99214 OFFICE O/P EST MOD 30 MIN: CPT | Performed by: NURSE PRACTITIONER

## 2017-12-11 PROCEDURE — 36415 COLL VENOUS BLD VENIPUNCTURE: CPT | Performed by: NURSE PRACTITIONER

## 2017-12-11 PROCEDURE — 80048 BASIC METABOLIC PNL TOTAL CA: CPT | Performed by: NURSE PRACTITIONER

## 2017-12-11 RX ORDER — FUROSEMIDE 40 MG
40 TABLET ORAL DAILY
Qty: 30 TABLET | Refills: 3 | Status: SHIPPED | OUTPATIENT
Start: 2017-12-11 | End: 2018-03-30

## 2017-12-11 RX ORDER — FUROSEMIDE 40 MG
40 TABLET ORAL DAILY
COMMUNITY
End: 2017-12-11

## 2017-12-11 RX ORDER — LISINOPRIL 5 MG/1
5 TABLET ORAL DAILY
Qty: 30 TABLET | Refills: 3 | Status: SHIPPED | OUTPATIENT
Start: 2017-12-11 | End: 2018-03-12

## 2017-12-11 RX ORDER — METOPROLOL SUCCINATE 50 MG/1
75 TABLET, EXTENDED RELEASE ORAL DAILY
Qty: 45 TABLET | Refills: 3 | Status: SHIPPED | OUTPATIENT
Start: 2017-12-11 | End: 2017-12-19

## 2017-12-11 NOTE — PROGRESS NOTES
HPI and Plan:   See [id number]    Orders Placed This Encounter   Procedures     Basic metabolic panel     Follow-Up with [facility]       Orders Placed This Encounter   Medications     DISCONTD: furosemide (LASIX) 40 MG tablet     Sig: Take 40 mg by mouth daily     DISCONTD: METOPROLOL TARTRATE PO     Sig: Take 75 mg by mouth 2 times daily     metoprolol (TOPROL-XL) 50 MG 24 hr tablet     Sig: Take 1.5 tablets (75 mg) by mouth daily     Dispense:  45 tablet     Refill:  3     furosemide (LASIX) 40 MG tablet     Sig: Take 1 tablet (40 mg) by mouth daily     Dispense:  30 tablet     Refill:  3     lisinopril (PRINIVIL/ZESTRIL) 5 MG tablet     Sig: Take 1 tablet (5 mg) by mouth daily     Dispense:  30 tablet     Refill:  3       Medications Discontinued During This Encounter   Medication Reason     furosemide (LASIX) 20 MG tablet Dose adjustment     metoprolol (TOPROL-XL) 25 MG 24 hr tablet Dose adjustment     METOPROLOL TARTRATE PO Alternate therapy     furosemide (LASIX) 40 MG tablet Reorder     lisinopril (PRINIVIL/ZESTRIL) 5 MG tablet Reorder         Encounter Diagnoses   Name Primary?     Acute combined systolic and diastolic CHF, NYHA class 3 (H)      Acute respiratory failure with hypoxia (H)        CURRENT MEDICATIONS:  Current Outpatient Prescriptions   Medication Sig Dispense Refill     metoprolol (TOPROL-XL) 50 MG 24 hr tablet Take 1.5 tablets (75 mg) by mouth daily 45 tablet 3     furosemide (LASIX) 40 MG tablet Take 1 tablet (40 mg) by mouth daily 30 tablet 3     lisinopril (PRINIVIL/ZESTRIL) 5 MG tablet Take 1 tablet (5 mg) by mouth daily 30 tablet 3     WARFARIN SODIUM PO Take by mouth daily As directed       meclizine (ANTIVERT) 12.5 MG tablet Take 1 tablet (12.5 mg) by mouth 3 times daily as needed for dizziness 60 tablet 1     PAROXETINE HCL PO Take 30 mg by mouth daily        OMEPRAZOLE PO Take 20 mg by mouth daily as needed        MULTIPLE VITAMINS PO Take 1 tablet by mouth daily        Cholecalciferol (VITAMIN D3 PO) Take 2,000 Units by mouth daily        [DISCONTINUED] furosemide (LASIX) 40 MG tablet Take 40 mg by mouth daily       [DISCONTINUED] furosemide (LASIX) 20 MG tablet Take 2 tablets (40 mg) by mouth daily Take 2 tablet (40 mg) in AM or as directed 90 tablet 3     aspirin 81 MG chewable tablet Take 1 tablet (81 mg) by mouth daily (Patient not taking: Reported on 12/11/2017) 36 tablet 0     [DISCONTINUED] lisinopril (PRINIVIL/ZESTRIL) 5 MG tablet Take 1 tablet (5 mg) by mouth daily 30 tablet 0     [DISCONTINUED] metoprolol (TOPROL-XL) 25 MG 24 hr tablet Take 3 tablets (75 mg) by mouth 2 times daily 30 tablet        ALLERGIES     Allergies   Allergen Reactions     Sulfa Drugs        PAST MEDICAL HISTORY:  Past Medical History:   Diagnosis Date     Atrial fibrillation (H)      Cardiogenic shock (H) 11/05/2017       PAST SURGICAL HISTORY:  Past Surgical History:   Procedure Laterality Date     HEART CATH RIGHT AND LEFT HEART CATH  10/23/2017    Cath no significant obstructive CAD. RHC: mild PHTN/NL wedge/ low CI.        FAMILY HISTORY:  Family History   Problem Relation Age of Onset     DIABETES Mother      Other Cancer Father        SOCIAL HISTORY:  Social History     Social History     Marital status:      Spouse name: N/A     Number of children: N/A     Years of education: N/A     Social History Main Topics     Smoking status: Former Smoker     Types: Cigarettes     Quit date: 1/1/1965     Smokeless tobacco: Never Used     Alcohol use 0.0 oz/week      Comment: 8 oz wine daily     Drug use: No     Sexual activity: Not on file     Other Topics Concern     Not on file     Social History Narrative       Review of Systems:  Skin:  Negative       Eyes:  Positive for glasses    ENT:  Negative      Respiratory:  Positive for dyspnea on exertion;cough;sleep apnea referral to Dr. Freitas, using 2 pillows, not using CPAP, cough supine   Cardiovascular:    Positive for;fatigue;lightheadedness  "   Gastroenterology: Positive for nausea coughing with drinking   Genitourinary:  Negative      Musculoskeletal:  Negative      Neurologic:  Positive for memory problems feels shaky  Psychiatric:  Positive for anxiety    Heme/Lymph/Imm:  Positive for night sweats    Endocrine:  Negative        Physical Exam:  Vitals: BP 94/64 (BP Location: Right arm, Patient Position: Sitting, Cuff Size: Adult Regular)  Pulse 70  Ht 1.651 m (5' 5\")  Wt 78.8 kg (173 lb 11.2 oz)  SpO2 99%  BMI 28.91 kg/m2    Constitutional:  cooperative, alert and oriented, well developed, well nourished, in no acute distress        Skin:  warm and dry to the touch          Head:  not assessed this visit        Eyes:  pupils equal and round, conjunctivae and lids unremarkable, sclera white, no xanthalasma, EOMS intact, no nystagmus        Lymph:      ENT:  no pallor or cyanosis, dentition good        Neck:  carotid pulses are full and equal bilaterally, JVP normal, no carotid bruit        Respiratory:  clear to auscultation         Cardiac:   irregularly irregular rhythm                                                       GI:           Extremities and Muscular Skeletal:  no deformities, clubbing, cyanosis, erythema observed;no edema              Neurological:  no gross motor deficits;affect appropriate        Psych:           CC  Mandie Rose, APRN CNP  [address]  [id number]  [place] [address]              "

## 2017-12-11 NOTE — PATIENT INSTRUCTIONS
Call the C.O.R.E. nurse for any questions or concerns:  [phone]      1. Medication changes from today: stop metoprolol tartrate. Starting tomorrow, start metoprolol succinate 50mg tab: take 1-1/2 tabs ( 75mg) once a day.       2. Weigh yourself daily and write it down. 170#- 176#.       3. Call [facility] nurse if your weight is up more than 2 pounds in one day or 5 pounds in one week.      4. Call [facility] nurse if you feel more short of breath, have more abdominal bloating, or leg swelling.      5. Continue low sodium diet (less than 2000 mg daily). If you eat less salt, you will retain less fluid.      6. Do NOT take Aleve or ibuprofen without talking to your doctor first.      7. Lab Results:  Component      Latest Ref Rng & Units 12/11/2017   Sodium      133 - 144 mmol/L 136   Potassium      3.4 - 5.3 mmol/L 3.8   Chloride      94 - 109 mmol/L 100   Carbon Dioxide      20 - 32 mmol/L 30   Anion Gap      3 - 14 mmol/L 6   Glucose      70 - 99 mg/dL 113 (H)   Urea Nitrogen      7 - 30 mg/dL 17   Creatinine      0.52 - 1.04 mg/dL 0.96   GFR Estimate      >60 mL/min/1.7m2 57 (L)   GFR Estimate If Black      >60 mL/min/1.7m2 69   Calcium      8.5 - 10.1 mg/dL 8.7     8. Call Dr. Leger today for an appt ASAP     [facility]: Cardiomyopathy, Optimization, Rehabilitation, Education  The [facility] is a heart failure specialty clinic within the [facility] where you will work with specialized nurse practitioners, physician assistants, doctors, and registered nurses. They are dedicated to helping patients with heart failure to carefully adjust medications, receive education, and learn who and when to call if symptoms develop. They specialize in helping you better understand your condition, slow the progression of your disease, improve the length and quality of your life, help you detect future heart problems before they become life threatening, and avoid hospitalizations.

## 2017-12-11 NOTE — LETTER
12/11/2017      Arielle Leger MD  [employer] [id number] [facility] [address]      RE: Em Richmond       Dear Colleague,    I had the pleasure of seeing Em Richmond in the [facility].    PRIMARY CARE PHYSICIAN:  Arielle Leger MD.      CARDIOLOGIST:  Den Pickard MD.       C.O.R.E. ENROLLMENT:  11/20/2017.      C.O.LENA.JESSE. NP:  Mandie Rose CNP.      HISTORY OF PRESENT ILLNESS:  Em Richmond is a pleasant 77-year-old female who has had 2 recent admissions.  On her first admission, she had a full cardiac workup.  She was noted to have a drop in her ejection fraction from previously.  Her echocardiogram IN 2014 showed an ejection fraction of 60%-65%.  Echocardiography at [facility] 10/21/2017 showed it dropped to 30%-35%.  There was mild to moderate tricuspid regurgitation, mild to moderate mitral regurgitation.       The patient has a history of chronic atrial fibrillation.  She had a coronary angiography performed 10/23/2017 because of the drop in her ejection fraction.  Coronary angiography showed only mild coronary artery disease with no stenosis greater than 30%.  Working diagnosis with idiopathic cardiomyopathy, although it was felt that it was most likely rate related cardiomyopathy from atrial fibrillation.  The patient's reported history was that she stopped metoprolol succinate.      She was then readmitted on 11/06 due to acute hypoxic respiratory failure requiring intubation in the setting of cardiogenic shock secondary to atrial fibrillation with rapid ventricular response and decompensated nonischemic cardiomyopathy with pulmonary edema.  She was transferred out of ICU on  11/07, extubated and off the pressors.  She was discharged on Lasix 40 mg in the a.m. and 20 mg in the p.m.  She was discharged on metoprolol succinate 75 mg a day.  She did have a GI consultation.  She had shock liver, gallbladder edema and borderline  common bile duct dilatation.  She was asked to follow up with her primary care doctor and with Cardiology.      She then enrolled in the [facility] on 11/20.  She had diuresed 20 pounds in the hospital.  She was not aware that she had gained 20 pounds.  Her medication list was not clear.  She was on metoprolol tartrate ordered by a nurse practitioner from the TCU.  Because of her significant illness in the hospital I asked her to see me weekly until she was seen by Dr. Pickard on 12/19.  She then missed an appointment and now returns in followup today.  She states she is feeling fairly well.  Her weight is stable at 172 pounds.  She complains of diaphoresis.  She denies chest pain, chest pressure, neck or arm pain.  She did not follow up with her primary care doctor.  She was not aware that she needed to.  She is trying to eat a low-salt diet.  She feels that her strength is improving.      PHYSICAL EXAMINATION:   VITAL SIGNS:  Blood pressure 94/64, pulse is 70 and irregular, and weight is 173 pounds.   NECK:  No JVD.   LUNGS:  Clear to auscultation bilaterally.   CARDIOVASCULAR:  Irregularly irregular, no rub or murmur noted.   ABDOMEN:  Soft, nontender.   EXTREMITIES:  No peripheral edema bilaterally.      BASIC METABOLIC PANEL:  Sodium 136, potassium 3.8, BUN 17, creatinine 0.96.      PAST MEDICAL HISTORY:   1.  Cardiomyopathy.   2.  Atrial fibrillation with rapid ventricular response.  Atrial fibrillation is chronic.   3.  Pulmonary hypertension.   4.  Obstructive sleep apnea.   5.  Anxiety.   6.  Essential hypertension.      IMPRESSION AND PLAN:  Em Richmond is a 77-year-old patient with a past medical history significant for nonischemic cardiomyopathy, chronic atrial fibrillation, gastroesophageal reflux disease, anxiety, pulmonary hypertension, obesity, untreated sleep apnea, admitted 11/06/2017 due to acute hypoxic respiratory failure requiring intubation in the setting of cardiogenic shock  secondary to atrial fibrillation with rapid ventricular response and decompensated nonischemic cardiomyopathy with pulmonary edema.  She had a very complicated hospital stay.  She was treated with amiodarone that was discontinued at discharge and she was placed on digoxin and metoprolol XL 75 mg twice a day.  It does not appear that she is on digoxin.  She was transitioned to [facility].  Her medication list was not clear.  I asked her to be seen weekly until she sees Dr. Pickard on 12/19.  She missed her next appointment after enrolling.  She brings her medications in today.  She is not on digoxin.  She felt extremely exhausted after her hospitalizations as well as her TCU stay and feels better today.  She is weighing herself on a daily basis and trying to eat a low-salt diet.  She is now setting up her own medications.  The TCU NP wrote a prescription for metoprolol 75 mg twice a day.  There was no indication if it was metoprolol tartrate or succinate.  The C.O.R.E. nurse called [facility] pharmacy and she indicated it was metoprolol tartrate.  I discontinued metoprolol tartrate and started her on 75 mg a day of metoprolol succinate.  She will see Dr. Pickard in 1 week at which time we possibly can increase the dose.  She did have a repeat echocardiogram that was originally planned on 12/04.  It did not show an improvement in her EF.  Her LVEF again is 30-35%.      1.  Congestive heart failure.  She appears to be close to euvolemia.  Her high weight was about 198 pounds, now down to 173 pounds.  She appears to be euvolemic.  I suggested that she try to keep her weight around the 170-175 pound range and call the C.O.R.E. nurse if it goes out of those parameters.   2.  Nonischemic cardiomyopathy.  Currently on lisinopril, changed from metoprolol to metoprolol XL.  CHF teaching was given.   3.  Chronic atrial fibrillation.  Discharged from the hospital on metoprolol succinate 75 mg daily.  Unfortunately,  that medication was changed to metoprolol tartrate.  Hopefully, we have all her medications straight now.   4.  GI issues.  She was encouraged to see her primary care doctor to follow up on her GI issues.  I asked her to see Dr. Leger in the next week.   5.  Sleep apnea.  The patient cannot remember her sleep doctor.  I referred her to Dr. Freitas.     Outpatient Encounter Prescriptions as of 12/11/2017   Medication Sig Dispense Refill     furosemide (LASIX) 40 MG tablet Take 1 tablet (40 mg) by mouth daily 30 tablet 3     lisinopril (PRINIVIL/ZESTRIL) 5 MG tablet Take 1 tablet (5 mg) by mouth daily 30 tablet 3     [DISCONTINUED] metoprolol (TOPROL-XL) 50 MG 24 hr tablet Take 1.5 tablets (75 mg) by mouth daily 45 tablet 3     WARFARIN SODIUM PO Take by mouth daily As directed       meclizine (ANTIVERT) 12.5 MG tablet Take 1 tablet (12.5 mg) by mouth 3 times daily as needed for dizziness 60 tablet 1     PAROXETINE HCL PO Take 30 mg by mouth daily        OMEPRAZOLE PO Take 20 mg by mouth daily as needed        MULTIPLE VITAMINS PO Take 1 tablet by mouth daily       Cholecalciferol (VITAMIN D3 PO) Take 2,000 Units by mouth daily        [DISCONTINUED] furosemide (LASIX) 40 MG tablet Take 40 mg by mouth daily       [DISCONTINUED] METOPROLOL TARTRATE PO Take 75 mg by mouth 2 times daily       [DISCONTINUED] furosemide (LASIX) 20 MG tablet Take 2 tablets (40 mg) by mouth daily Take 2 tablet (40 mg) in AM or as directed 90 tablet 3     aspirin 81 MG chewable tablet Take 1 tablet (81 mg) by mouth daily 36 tablet 0     [DISCONTINUED] lisinopril (PRINIVIL/ZESTRIL) 5 MG tablet Take 1 tablet (5 mg) by mouth daily 30 tablet 0     [DISCONTINUED] metoprolol (TOPROL-XL) 25 MG 24 hr tablet Take 3 tablets (75 mg) by mouth 2 times daily 30 tablet      No facility-administered encounter medications on file as of 12/11/2017.      Again, thank you for allowing me to participate in the care of your patient.      Sincerely,    Mandie  DOMINIK Webber [facility]

## 2017-12-11 NOTE — MR AVS SNAPSHOT
After Visit Summary   12/11/2017    Em Richmond    MRN: [id number]           Patient Information     Date Of Birth          1940        Visit Information        Provider Department      12/11/2017 10:50 AM Mandie Rose APRN CNP [facility]        Today's Diagnoses     Acute combined systolic and diastolic CHF, NYHA class 3 (H)        Acute respiratory failure with hypoxia (H)          Care Instructions    Call the C.O.R.E. nurse for any questions or concerns:  [phone]      1. Medication changes from today: stop metoprolol tartrate. Starting tomorrow, start metoprolol succinate 50mg tab: take 1-1/2 tabs ( 75mg) once a day.       2. Weigh yourself daily and write it down. 170#- 176#.       3. Call CORE nurse if your weight is up more than 2 pounds in one day or 5 pounds in one week.      4. Call CORE nurse if you feel more short of breath, have more abdominal bloating, or leg swelling.      5. Continue low sodium diet (less than 2000 mg daily). If you eat less salt, you will retain less fluid.      6. Do NOT take Aleve or ibuprofen without talking to your doctor first.      7. Lab Results:  Component      Latest Ref Rng & Units 12/11/2017   Sodium      133 - 144 mmol/L 136   Potassium      3.4 - 5.3 mmol/L 3.8   Chloride      94 - 109 mmol/L 100   Carbon Dioxide      20 - 32 mmol/L 30   Anion Gap      3 - 14 mmol/L 6   Glucose      70 - 99 mg/dL 113 (H)   Urea Nitrogen      7 - 30 mg/dL 17   Creatinine      0.52 - 1.04 mg/dL 0.96   GFR Estimate      >60 mL/min/1.7m2 57 (L)   GFR Estimate If Black      >60 mL/min/1.7m2 69   Calcium      8.5 - 10.1 mg/dL 8.7     8. [facility] Dr[facility]     [facility]: Cardiomyopathy, Optimization, Rehabilitation, Education  The [facility] is a heart failure specialty clinic within the [facility] where you will work with specialized nurse practitioners, physician assistants,  doctors, and registered nurses. They are dedicated to helping patients with heart failure to carefully adjust medications, receive education, and learn who and when to call if symptoms develop. They specialize in helping you better understand your condition, slow the progression of your disease, improve the length and quality of your life, help you detect future heart problems before they become life threatening, and avoid hospitalizations.              Follow-ups after your visit        Additional Services     [facility] with [facility]                 Your next 10 appointments already scheduled     Dec 19, 2017  2:45 PM CST   Return Visit with Den Pickard MD   [facility] ([facility])    [address] [place] [id number]   [phone]            Jan 09, 2018  9:15 AM CST   LAB with RU LAB   [facility] ([facility])    [address] [place] [id number]   [phone]           Please do not eat 10-12 hours before your appointment if you are coming in fasting for labs on lipids, cholesterol, or glucose (sugar). This does not apply to pregnant women. Water, hot tea and black coffee (with nothing added) are okay. Do not drink other fluids, diet soda or chew gum.            Jan 09, 2018 10:10 AM CST   Core Return with DOMINIK White CNP   [facility] ([facility])    [address] [place] [id number]   [phone]              Future tests that were ordered for you today     Open Future Orders        Priority Expected Expires Ordered    Basic metabolic panel Routine 1/11/2018 12/11/2018 12/11/2017    Follow-Up with [facility] Clinic Routine 1/11/2018 12/11/2018 12/11/2017            Who to contact     If you have questions or need follow up information about today's clinic visit or your schedule  "please contact [facility] directly at [phone].  Normal or non-critical lab and imaging results will be communicated to you by MyChart, letter or phone within 4 business days after the clinic has received the results. If you do not hear from us within 7 days, please contact the clinic through MyChart or phone. If you have a critical or abnormal lab result, we will notify you by phone as soon as possible.  Submit refill requests through [employer] or call your pharmacy and they will forward the refill request to us. Please allow 3 business days for your refill to be completed.          Additional Information About Your Visit        NiftyThriftyharYelago Information     W[employer]op lets you send messages to your doctor, view your test results, renew your prescriptions, schedule appointments and more. To sign up, go to [email or web address] . Click on \"Log in\" on the left side of the screen, which will take you to the Welcome page. Then click on \"Sign up Now\" on the right side of the page.     You will be asked to enter the access code listed below, as well as some personal information. Please follow the directions to create your username and password.     Your access code is: [email or web address]  Expires: 2018  1:00 PM     Your access code will  in 90 days. If you need help or a new code, please call your [place] clinic or [phone].        Care EveryWhere ID     This is your Care EveryWhere ID. This could be used by other organizations to access your [place] medical records  [id number]        Your Vitals Were     Pulse Height Pulse Oximetry BMI (Body Mass Index)          70 1.651 m (5' 5\") 99% 28.91 kg/m2         Blood Pressure from Last 3 Encounters:   17 94/64   17 98/66   17 128/65    Weight from Last 3 Encounters:   17 78.8 kg (173 lb 11.2 oz)   17 80.2 kg (176 lb 11.2 oz)   17 82.1 kg (181 lb)              We Performed the Following     " Follow-Up with [facility]          Today's Medication Changes          These changes are accurate as of: 12/11/17 11:52 AM.  If you have any questions, ask your nurse or doctor.               These medicines have changed or have updated prescriptions.        Dose/Directions    furosemide 40 MG tablet   Commonly known as:  LASIX   This may have changed:  Another medication with the same name was removed. Continue taking this medication, and follow the directions you see here.   Used for:  Acute combined systolic and diastolic CHF, NYHA class 3 (H)   Changed by:  Mandie Rose APRN CNP        Dose:  40 mg   Take 1 tablet (40 mg) by mouth daily   Quantity:  30 tablet   Refills:  3       metoprolol 50 MG 24 hr tablet   Commonly known as:  TOPROL-XL   This may have changed:    - medication strength  - when to take this   Used for:  Acute combined systolic and diastolic CHF, NYHA class 3 (H)   Changed by:  Mandie Rose APRN CNP        Dose:  75 mg   Take 1.5 tablets (75 mg) by mouth daily   Quantity:  45 tablet   Refills:  3         Stop taking these medicines if you haven't already. Please contact your care team if you have questions.     METOPROLOL TARTRATE PO   Stopped by:  Mandie Rose APRN CNP                Where to get your medicines      These medications were sent to [facility] [address] [place] [address] [id number] [place], [place] [id number]     Phone:  [phone]     furosemide 40 MG tablet    lisinopril 5 MG tablet    metoprolol 50 MG 24 hr tablet                Primary Care Provider Office Phone # Fax #    Arielle Leger [phone]       [facility] [address] [facility] [address]        Equal Access to Services     [facility]: Hadii corbin castellano Sodara, waaxda luqadaha, qaybta manealscout escobedo. So [facility] [phone].    ATENCIÓN: dana Ackerman  disposición servicios gratuitos de asistencia lingüística. Jovon tim [phone].    We comply with applicable federal civil rights laws and [place] laws. We do not discriminate on the basis of race, color, national origin, age, disability, sex, sexual orientation, or gender identity.            Thank you!     Thank you for choosing [facility]  for your care. Our goal is always to provide you with excellent care. Hearing back from our patients is one way we can continue to improve our services. Please take a few minutes to complete the written survey that you may receive in the mail after your visit with us. Thank you!             Your Updated Medication List - Protect others around you: Learn how to safely use, store and throw away your medicines at www.disposemymeds.org.          This list is accurate as of: 12/11/17 11:52 AM.  Always use your most recent med list.                   Brand Name Dispense Instructions for use Diagnosis    aspirin 81 MG chewable tablet     36 tablet    Take 1 tablet (81 mg) by mouth daily    Acute respiratory failure with hypoxia (H)       furosemide 40 MG tablet    LASIX    30 tablet    Take 1 tablet (40 mg) by mouth daily    Acute combined systolic and diastolic CHF, NYHA class 3 (H)       lisinopril 5 MG tablet    PRINIVIL/ZESTRIL    30 tablet    Take 1 tablet (5 mg) by mouth daily    Acute respiratory failure with hypoxia (H)       meclizine 12.5 MG tablet    ANTIVERT    60 tablet    Take 1 tablet (12.5 mg) by mouth 3 times daily as needed for dizziness    Labyrinthitis, bilateral       metoprolol 50 MG 24 hr tablet    TOPROL-XL    45 tablet    Take 1.5 tablets (75 mg) by mouth daily    Acute combined systolic and diastolic CHF, NYHA class 3 (H)       MULTIPLE VITAMINS PO      Take 1 tablet by mouth daily        OMEPRAZOLE PO      Take 20 mg by mouth daily as needed        PAROXETINE HCL PO      Take 30 mg by mouth daily        VITAMIN  D3 PO      Take 2,000 Units by mouth daily        WARFARIN SODIUM PO      Take by mouth daily As directed

## 2017-12-11 NOTE — PROGRESS NOTES
PRIMARY CARE PHYSICIAN:  Arielle Leger MD.      CARDIOLOGIST:  Den Pickard MD.       C.O.R.E. ENROLLMENT:  11/20/2017.      JOSÉ ANTONIO.CHIKIS. NP:  Mandie Rose CNP.      HISTORY OF PRESENT ILLNESS:  Em Richmond is a pleasant 77-year-old female who has had 2 recent admissions.  On her first admission, she had a full cardiac workup.  She was noted to have a drop in her ejection fraction from previously.  Her echocardiogram IN 2014 showed an ejection fraction of 60%-65%.  Echocardiography at [facility] 10/21/2017 showed it dropped to 30%-35%.  There was mild to moderate tricuspid regurgitation, mild to moderate mitral regurgitation.       The patient has a history of chronic atrial fibrillation.  She had a coronary angiography performed 10/23/2017 because of the drop in her ejection fraction.  Coronary angiography showed only mild coronary artery disease with no stenosis greater than 30%.  Working diagnosis with idiopathic cardiomyopathy, although it was felt that it was most likely rate related cardiomyopathy from atrial fibrillation.  The patient's reported history was that she stopped metoprolol succinate.      She was then readmitted on 11/06 due to acute hypoxic respiratory failure requiring intubation in the setting of cardiogenic shock secondary to atrial fibrillation with rapid ventricular response and decompensated nonischemic cardiomyopathy with pulmonary edema.  She was transferred out of ICU on  11/07, extubated and off the pressors.  She was discharged on Lasix 40 mg in the a.m. and 20 mg in the p.m.  She was discharged on metoprolol succinate 75 mg a day.  She did have a GI consultation.  She had shock liver, gallbladder edema and borderline common bile duct dilatation.  She was asked to follow up with her primary care doctor and with Cardiology.      She then enrolled in the C.O.R.E. Clinic on 11/20.  She had diuresed 20 pounds in the hospital.  She was not aware that she had  gained 20 pounds.  Her medication list was not clear.  She was on metoprolol tartrate ordered by a nurse practitioner from the TCU.  Because of her significant illness in the hospital I asked her to see me weekly until she was seen by Dr. Pickard on 12/19.  She then missed an appointment and now returns in followup today.  She states she is feeling fairly well.  Her weight is stable at 172 pounds.  She complains of diaphoresis.  She denies chest pain, chest pressure, neck or arm pain.  She did not follow up with her primary care doctor.  She was not aware that she needed to.  She is trying to eat a low-salt diet.  She feels that her strength is improving.      PHYSICAL EXAMINATION:   VITAL SIGNS:  Blood pressure 94/64, pulse is 70 and irregular, and weight is 173 pounds.   NECK:  No JVD.   LUNGS:  Clear to auscultation bilaterally.   CARDIOVASCULAR:  Irregularly irregular, no rub or murmur noted.   ABDOMEN:  Soft, nontender.   EXTREMITIES:  No peripheral edema bilaterally.      BASIC METABOLIC PANEL:  Sodium 136, potassium 3.8, BUN 17, creatinine 0.96.      PAST MEDICAL HISTORY:   1.  Cardiomyopathy.   2.  Atrial fibrillation with rapid ventricular response.  Atrial fibrillation is chronic.   3.  Pulmonary hypertension.   4.  Obstructive sleep apnea.   5.  Anxiety.   6.  Essential hypertension.      IMPRESSION AND PLAN:  Em Richmond is a 77-year-old patient with a past medical history significant for nonischemic cardiomyopathy, chronic atrial fibrillation, gastroesophageal reflux disease, anxiety, pulmonary hypertension, obesity, untreated sleep apnea, admitted 11/06/2017 due to acute hypoxic respiratory failure requiring intubation in the setting of cardiogenic shock secondary to atrial fibrillation with rapid ventricular response and decompensated nonischemic cardiomyopathy with pulmonary edema.  She had a very complicated hospital stay.  She was treated with amiodarone that was discontinued at discharge and  she was placed on digoxin and metoprolol XL 75 mg twice a day.  It does not appear that she is on digoxin.  She was transitioned to [facility].  Her medication list was not clear.  I asked her to be seen weekly until she sees Dr. Pickard on 12/19.  She missed her next appointment after enrolling.  She brings her medications in today.  She is not on digoxin.  She felt extremely exhausted after her hospitalizations as well as her TCU stay and feels better today.  She is weighing herself on a daily basis and trying to eat a low-salt diet.  She is now setting up her own medications.  The TCU NP wrote a prescription for metoprolol 75 mg twice a day.  There was no indication if it was metoprolol tartrate or succinate.  The C.O.R.E. nurse called [facility] pharmacy and she indicated it was metoprolol tartrate.  I discontinued metoprolol tartrate and started her on 75 mg a day of metoprolol succinate.  She will see Dr. Pickard in 1 week at which time we possibly can increase the dose.  She did have a repeat echocardiogram that was originally planned on 12/04.  It did not show an improvement in her EF.  Her LVEF again is 30-35%.      1.  Congestive heart failure.  She appears to be close to euvolemia.  Her high weight was about 198 pounds, now down to 173 pounds.  She appears to be euvolemic.  I suggested that she try to keep her weight around the 170-175 pound range and call the C.O.R.E. nurse if it goes out of those parameters.   2.  Nonischemic cardiomyopathy.  Currently on lisinopril, changed from metoprolol to metoprolol XL.  CHF teaching was given.   3.  Chronic atrial fibrillation.  Discharged from the hospital on metoprolol succinate 75 mg daily.  Unfortunately, that medication was changed to metoprolol tartrate.  Hopefully, we have all her medications straight now.   4.  GI issues.  She was encouraged to see her primary care doctor to follow up on her GI issues.  I asked her to see Dr. Leger in the next  week.   5.  Sleep apnea.  The patient cannot remember her sleep doctor.  I referred her to Dr. Freitas.         DOMINIK ELIZABETH, CNP             D: 2017 12:47   T: 2017 16:05   MT: CHARLIE      Name:     RICARDO DORMAN   MRN:      [id number]        Account:      [id number]   :      1940           Service Date: 2017      Document: [id number]

## 2017-12-14 ENCOUNTER — PRE VISIT (OUTPATIENT)
Dept: CARDIOLOGY | Facility: CLINIC | Age: 77
End: 2017-12-14

## 2017-12-19 ENCOUNTER — OFFICE VISIT (OUTPATIENT)
Dept: CARDIOLOGY | Facility: CLINIC | Age: 77
End: 2017-12-19
Attending: NURSE PRACTITIONER
Payer: COMMERCIAL

## 2017-12-19 VITALS
HEIGHT: 65 IN | WEIGHT: 173.5 LBS | DIASTOLIC BLOOD PRESSURE: 69 MMHG | SYSTOLIC BLOOD PRESSURE: 109 MMHG | BODY MASS INDEX: 28.91 KG/M2 | OXYGEN SATURATION: 94 % | HEART RATE: 104 BPM

## 2017-12-19 DIAGNOSIS — R00.0 TACHYCARDIA INDUCED CARDIOMYOPATHY (H): ICD-10-CM

## 2017-12-19 DIAGNOSIS — I42.9 IDIOPATHIC CARDIOMYOPATHY (H): Primary | ICD-10-CM

## 2017-12-19 DIAGNOSIS — I48.91 ATRIAL FIBRILLATION WITH RAPID VENTRICULAR RESPONSE (H): ICD-10-CM

## 2017-12-19 DIAGNOSIS — I43 TACHYCARDIA INDUCED CARDIOMYOPATHY (H): ICD-10-CM

## 2017-12-19 DIAGNOSIS — I50.41 ACUTE COMBINED SYSTOLIC AND DIASTOLIC CHF, NYHA CLASS 3 (H): ICD-10-CM

## 2017-12-19 PROCEDURE — 99214 OFFICE O/P EST MOD 30 MIN: CPT | Performed by: INTERNAL MEDICINE

## 2017-12-19 RX ORDER — METOPROLOL SUCCINATE 50 MG/1
100 TABLET, EXTENDED RELEASE ORAL DAILY
Qty: 1 TABLET | Refills: 0 | COMMUNITY
Start: 2017-12-19 | End: 2018-01-09

## 2017-12-19 NOTE — LETTER
12/19/2017      Arielle Leger MD  [facility] [address] [facility] [address]      RE: Em Richmond       Dear Colleague,    I had the pleasure of seeing Em Richmond in the [facility].    PRIMARY CARE PHYSICIAN:  Dr. Arielle Leger.      HISTORY OF PRESENT ILLNESS:  I again had the pleasure of seeing your patient, Em Richmond, for a nonischemic cardiomyopathy.  The patient was admitted twice in October and November for congestive heart failure and a cardiomyopathy.  Her ejection fraction on echocardiography in 2014 was 60%-65%.  On 10/21 her ejection fraction had dropped to 30%-35%.  There was mild to moderate tricuspid regurgitation and mild to moderate mitral regurgitation.  The patient has a 2-3 year history of chronic atrial fibrillation.  She had stopped her metoprolol and she presented with rapid ventricular response.  Coronary angiography performed 10/23 showed only mild coronary artery disease with all stenoses less than 30%.  We felt that this idiopathic cardiomyopathy was possibly due to atrial fibrillation with rapid ventricular response.  We have initiated her beta blocker.  We continue with her diuresis.  She lost 20 pounds in the hospital.  We have had some difficulty with rate control with the beta blocker alone.  Digoxin used while in the hospital was discontinued at discharge.  She was intubated at one point in the hospital due to her shortness of breath.  The patient is currently on warfarin with excellent INR control.  The patient has a history of pulmonary hypertension and obstructive sleep apnea.  She also has a history of essential hypertension all probably contributing to her history of atrial fibrillation.      The patient had some confusion on medications and followup appointments.  She is now clear and understands all of the medications.  She continues to feel weak and is doing very little exercise.  She notes some diaphoresis as  well.  She denies bleeding diathesis.  Her peripheral edema has resolved.      PHYSICAL EXAMINATION:  As listed below.  Of note is a heart rate of 104 beats per minute.      ASSESSMENT:   1.  Em Richmond is a 77-year-old female who presents in October and November with an idiopathic cardiomyopathy possibly due to atrial fibrillation with rapid ventricular response.  We are endeavoring to control her heart rate with escalating doses of Toprol-XL.  We will increase her Toprol-XL to 100 mg per day using 2 of the 50 mg tablets.  We will ask her to come back in 2 weeks for further evaluation of her heart rate and blood pressure.   2.  We will recheck an echocardiogram in 2 months.  If her ejection fraction remains below 35% we will consider implantation of a cardio-defibrillator.   3.  I will see this patient again in 3 months or earlier on a p.r.n. basis.  She will continue on warfarin therapy.  I have asked her to increase her exercise routine.  She will call for any difficulties with bleeding.      It is my pleasure to assist in the care of Em Richmond.  All her questions were answered to her satisfaction.       Outpatient Encounter Prescriptions as of 12/19/2017   Medication Sig Dispense Refill     metoprolol (TOPROL-XL) 50 MG 24 hr tablet Take 2 tablets (100 mg) by mouth daily 1 tablet 0     furosemide (LASIX) 40 MG tablet Take 1 tablet (40 mg) by mouth daily 30 tablet 3     lisinopril (PRINIVIL/ZESTRIL) 5 MG tablet Take 1 tablet (5 mg) by mouth daily 30 tablet 3     aspirin 81 MG chewable tablet Take 1 tablet (81 mg) by mouth daily 36 tablet 0     WARFARIN SODIUM PO Take by mouth daily As directed       meclizine (ANTIVERT) 12.5 MG tablet Take 1 tablet (12.5 mg) by mouth 3 times daily as needed for dizziness 60 tablet 1     PAROXETINE HCL PO Take 30 mg by mouth daily        OMEPRAZOLE PO Take 20 mg by mouth daily as needed        MULTIPLE VITAMINS PO Take 1 tablet by mouth daily       Cholecalciferol  (VITAMIN D3 PO) Take 2,000 Units by mouth daily        [DISCONTINUED] metoprolol (TOPROL-XL) 50 MG 24 hr tablet Take 1.5 tablets (75 mg) by mouth daily 45 tablet 3     No facility-administered encounter medications on file as of 12/19/2017.      Again, thank you for allowing me to participate in the care of your patient.      Sincerely,    Den Pickard MD     [facility]

## 2017-12-19 NOTE — PROGRESS NOTES
HPI and Plan:   See dictation:[id number]    Orders Placed This Encounter   Procedures     Follow-Up with Cardiac Advanced Practice Provider     Follow-Up with Cardiologist     Echocardiogram       Orders Placed This Encounter   Medications     metoprolol (TOPROL-XL) 50 MG 24 hr tablet     Sig: Take 2 tablets (100 mg) by mouth daily     Dispense:  1 tablet     Refill:  0       Medications Discontinued During This Encounter   Medication Reason     metoprolol (TOPROL-XL) 50 MG 24 hr tablet Reorder         Encounter Diagnoses   Name Primary?     Atrial fibrillation with rapid ventricular response (H)      Acute combined systolic and diastolic CHF, NYHA class 3 (H)      Idiopathic cardiomyopathy (H) Yes       CURRENT MEDICATIONS:  Current Outpatient Prescriptions   Medication Sig Dispense Refill     metoprolol (TOPROL-XL) 50 MG 24 hr tablet Take 2 tablets (100 mg) by mouth daily 1 tablet 0     furosemide (LASIX) 40 MG tablet Take 1 tablet (40 mg) by mouth daily 30 tablet 3     lisinopril (PRINIVIL/ZESTRIL) 5 MG tablet Take 1 tablet (5 mg) by mouth daily 30 tablet 3     aspirin 81 MG chewable tablet Take 1 tablet (81 mg) by mouth daily 36 tablet 0     WARFARIN SODIUM PO Take by mouth daily As directed       meclizine (ANTIVERT) 12.5 MG tablet Take 1 tablet (12.5 mg) by mouth 3 times daily as needed for dizziness 60 tablet 1     PAROXETINE HCL PO Take 30 mg by mouth daily        OMEPRAZOLE PO Take 20 mg by mouth daily as needed        MULTIPLE VITAMINS PO Take 1 tablet by mouth daily       Cholecalciferol (VITAMIN D3 PO) Take 2,000 Units by mouth daily        [DISCONTINUED] metoprolol (TOPROL-XL) 50 MG 24 hr tablet Take 1.5 tablets (75 mg) by mouth daily 45 tablet 3       ALLERGIES     Allergies   Allergen Reactions     Sulfa Drugs        PAST MEDICAL HISTORY:  Past Medical History:   Diagnosis Date     Atrial fibrillation (H)      Cardiogenic shock (H) 11/05/2017       PAST SURGICAL HISTORY:  Past Surgical History:  "  Procedure Laterality Date     HEART CATH RIGHT AND LEFT HEART CATH  10/23/2017    Cath no significant obstructive CAD. RHC: mild PHTN/NL wedge/ low CI.        FAMILY HISTORY:  Family History   Problem Relation Age of Onset     DIABETES Mother      Other Cancer Father        SOCIAL HISTORY:  Social History     Social History     Marital status:      Spouse name: N/A     Number of children: N/A     Years of education: N/A     Social History Main Topics     Smoking status: Former Smoker     Types: Cigarettes     Quit date: 1/1/1965     Smokeless tobacco: Never Used     Alcohol use 0.0 oz/week      Comment: 8 oz wine daily     Drug use: No     Sexual activity: Yes     Partners: Male     Other Topics Concern     None     Social History Narrative       Review of Systems:  Skin:  Negative       Eyes:  Positive for glasses    ENT:  Negative      Respiratory:  Positive for cough;sleep apnea;dyspnea on exertion     Cardiovascular:    fatigue;Positive for    Gastroenterology: Negative      Genitourinary:  Negative      Musculoskeletal:  Positive for joint pain    Neurologic:  Positive for memory problems    Psychiatric:  Positive for anxiety    Heme/Lymph/Imm:  Negative      Endocrine:  Negative        Physical Exam:  Vitals: /69 (BP Location: Right arm, Patient Position: Sitting, Cuff Size: Adult Regular)  Pulse 67  Ht 1.651 m (5' 5\")  Wt 78.7 kg (173 lb 8 oz)  SpO2 94%  Breastfeeding? No  BMI 28.87 kg/m2    Constitutional:  cooperative, alert and oriented, well developed, well nourished, in no acute distress        Skin:  warm and dry to the touch, no apparent skin lesions or masses noted          Head:  normocephalic, no masses or lesions        Eyes:  pupils equal and round, conjunctivae and lids unremarkable, sclera white, no xanthalasma, EOMS intact, no nystagmus        Lymph:      ENT:  no pallor or cyanosis, dentition good        Neck:  carotid pulses are full and equal bilaterally, JVP normal, no " carotid bruit        Respiratory:  normal breath sounds, clear to auscultation, normal A-P diameter, normal symmetry, normal respiratory excursion, no use of accessory muscles         Cardiac: normal S1 and S2;no S3 or S4;apical impulse not displaced irregularly irregular rhythm   no presence of murmur          pulses full and equal, no bruits auscultated                                        GI:  abdomen soft, non-tender, BS normoactive, no mass, no HSM, no bruits        Extremities and Muscular Skeletal:  no deformities, clubbing, cyanosis, erythema observed;no edema              Neurological:  no gross motor deficits;affect appropriate        Psych:  Alert and Oriented x 3        CC  Ying Oreilly, DOMINIK CNP  [address]  [place] [address]

## 2017-12-19 NOTE — MR AVS SNAPSHOT
After Visit Summary   12/19/2017    Em Richmond    MRN: [id number]           Patient Information     Date Of Birth          1940        Visit Information        Provider Department      12/19/2017 2:45 PM Den Pickard MD [facility]        Today's Diagnoses     Idiopathic cardiomyopathy (H)    -  1    Atrial fibrillation with rapid ventricular response (H)        Acute combined systolic and diastolic CHF, NYHA class 3 (H)           Follow-ups after your visit        Additional Services     Follow-Up with Cardiac Advanced Practice Provider           Follow-Up with Cardiologist                 Your next 10 appointments already scheduled     Jan 04, 2018  1:10 PM CST   Core Return with DOMINIK White CNP   [facility] ([facility])    [address] [place] [id number] [phone]            Jan 09, 2018  9:15 AM CST   LAB with RU LAB   [facility] ([facility])    [address] [place] [id number] [phone]           Please do not eat 10-12 hours before your appointment if you are coming in fasting for labs on lipids, cholesterol, or glucose (sugar). This does not apply to pregnant women. Water, hot tea and black coffee (with nothing added) are okay. Do not drink other fluids, diet soda or chew gum.            Jan 09, 2018 10:10 AM CST   Core Return with DOMINIK White CNP   [facility] ([facility])    [address] [place] [id number]   [phone]            Feb 19, 2018  9:30 AM CST   Ech Complete with [address] [facility] ([facility])    [address] [place] [id number] [phone]           1. Please bring or wear a comfortable  "two-piece outfit. 2. You may eat, drink and take your normal medicines. 3. For any questions that cannot be answered, please contact the ordering physician ***Please check-in at the [facility] located in [address] in the [facility] building. When you are finished registering, please go to [address] and have a seat. The technician will call your name for the test.              Future tests that were ordered for you today     Open Future Orders        Priority Expected Expires Ordered    Echocardiogram Routine 2/19/2018 12/19/2018 12/19/2017    Follow-Up with Cardiologist Routine 3/19/2018 12/19/2018 12/19/2017    Follow-Up with Cardiac Advanced Practice Provider Routine 1/2/2018 12/19/2018 12/19/2017            Who to contact     If you have questions or need follow up information about today's clinic visit or your schedule please contact [facility] directly at [phone].  Normal or non-critical lab and imaging results will be communicated to you by Revettohart, letter or phone within 4 business days after the clinic has received the results. If you do not hear from us within 7 days, please contact the clinic through Rowlt or phone. If you have a critical or abnormal lab result, we will notify you by phone as soon as possible.  Submit refill requests through [employer] or call your pharmacy and they will forward the refill request to us. Please allow 3 business days for your refill to be completed.          Additional Information About Your Visit        [employer] Information     [employer] lets you send messages to your doctor, view your test results, renew your prescriptions, schedule appointments and more. To sign up, go to [email or web address] . Click on \"Log in\" on the left side of the screen, which will take you to the Welcome page. Then click on \"Sign up Now\" on the right side of the page.     You will be asked to enter the access code " "listed below, as well as some personal information. Please follow the directions to create your username and password.     Your access code is: [email or web address]  Expires: 2018  1:00 PM     Your access code will  in 90 days. If you need help or a new code, please call your [facility] clinic or [phone].        Care EveryWhere ID     This is your Care EveryWhere ID. This could be used by other organizations to access your [facility] medical records  [id number]        Your Vitals Were     Pulse Height Pulse Oximetry Breastfeeding? BMI (Body Mass Index)       67 1.651 m (5' 5\") 94% No 28.87 kg/m2        Blood Pressure from Last 3 Encounters:   17 109/69   17 94/64   17 98/66    Weight from Last 3 Encounters:   17 78.7 kg (173 lb 8 oz)   17 78.8 kg (173 lb 11.2 oz)   17 80.2 kg (176 lb 11.2 oz)              We Performed the Following     Follow-Up with Cardiologist          Today's Medication Changes          These changes are accurate as of: 17  3:32 PM.  If you have any questions, ask your nurse or doctor.               These medicines have changed or have updated prescriptions.        Dose/Directions    metoprolol 50 MG 24 hr tablet   Commonly known as:  TOPROL-XL   This may have changed:  how much to take   Used for:  Acute combined systolic and diastolic CHF, NYHA class 3 (H)   Changed by:  Den Pickard MD        Dose:  100 mg   Take 2 tablets (100 mg) by mouth daily   Quantity:  1 tablet   Refills:  0                Primary Care Provider Office Phone # Fax #    Arielle Nicole Leger [phone]       [facility] [address] [facility] [address]        Equal Access to Services     [facility] : Cody Barbosa, waaxda luqadaha, qaybta kaalmada marcella, scout campbell. So [facility] [phone].    ATENCIÓN: Si habla español, tiene a delgadillo disposición servicios gratuitos de asistencia lingüística. Llame " al [phone].    We comply with applicable federal civil rights laws and [place] laws. We do not discriminate on the basis of race, color, national origin, age, disability, sex, sexual orientation, or gender identity.            Thank you!     Thank you for choosing [facility]  for your care. Our goal is always to provide you with excellent care. Hearing back from our patients is one way we can continue to improve our services. Please take a few minutes to complete the written survey that you may receive in the mail after your visit with us. Thank you!             Your Updated Medication List - Protect others around you: Learn how to safely use, store and throw away your medicines at www.disposemymeds.org.          This list is accurate as of: 12/19/17  3:32 PM.  Always use your most recent med list.                   Brand Name Dispense Instructions for use Diagnosis    aspirin 81 MG chewable tablet     36 tablet    Take 1 tablet (81 mg) by mouth daily    Acute respiratory failure with hypoxia (H)       furosemide 40 MG tablet    LASIX    30 tablet    Take 1 tablet (40 mg) by mouth daily    Acute combined systolic and diastolic CHF, NYHA class 3 (H)       lisinopril 5 MG tablet    PRINIVIL/ZESTRIL    30 tablet    Take 1 tablet (5 mg) by mouth daily    Acute respiratory failure with hypoxia (H)       meclizine 12.5 MG tablet    ANTIVERT    60 tablet    Take 1 tablet (12.5 mg) by mouth 3 times daily as needed for dizziness    Labyrinthitis, bilateral       metoprolol 50 MG 24 hr tablet    TOPROL-XL    1 tablet    Take 2 tablets (100 mg) by mouth daily    Acute combined systolic and diastolic CHF, NYHA class 3 (H)       MULTIPLE VITAMINS PO      Take 1 tablet by mouth daily        OMEPRAZOLE PO      Take 20 mg by mouth daily as needed        PAROXETINE HCL PO      Take 30 mg by mouth daily        VITAMIN D3 PO      Take 2,000 Units by mouth daily        WARFARIN SODIUM  PO      Take by mouth daily As directed

## 2017-12-20 NOTE — PROGRESS NOTES
PRIMARY CARE PHYSICIAN:  Dr. Arielle Leger.      HISTORY OF PRESENT ILLNESS:  I again had the pleasure of seeing your patient, Em Richmond, for a nonischemic cardiomyopathy.  The patient was admitted twice in October and November for congestive heart failure and a cardiomyopathy.  Her ejection fraction on echocardiography in 2014 was 60%-65%.  On 10/21 her ejection fraction had dropped to 30%-35%.  There was mild to moderate tricuspid regurgitation and mild to moderate mitral regurgitation.  The patient has a 2-3 year history of chronic atrial fibrillation.  She had stopped her metoprolol and she presented with rapid ventricular response.  Coronary angiography performed 10/23 showed only mild coronary artery disease with all stenoses less than 30%.  We felt that this idiopathic cardiomyopathy was possibly due to atrial fibrillation with rapid ventricular response.  We have initiated her beta blocker.  We continue with her diuresis.  She lost 20 pounds in the hospital.  We have had some difficulty with rate control with the beta blocker alone.  Digoxin used while in the hospital was discontinued at discharge.  She was intubated at one point in the hospital due to her shortness of breath.  The patient is currently on warfarin with excellent INR control.  The patient has a history of pulmonary hypertension and obstructive sleep apnea.  She also has a history of essential hypertension all probably contributing to her history of atrial fibrillation.      The patient had some confusion on medications and followup appointments.  She is now clear and understands all of the medications.  She continues to feel weak and is doing very little exercise.  She notes some diaphoresis as well.  She denies bleeding diathesis.  Her peripheral edema has resolved.      PHYSICAL EXAMINATION:  As listed below.  Of note is a heart rate of 104 beats per minute.      ASSESSMENT:   1.  Em Richmond is a 77-year-old female who presents  in October and November with an idiopathic cardiomyopathy possibly due to atrial fibrillation with rapid ventricular response.  We are endeavoring to control her heart rate with escalating doses of Toprol-XL.  We will increase her Toprol-XL to 100 mg per day using 2 of the 50 mg tablets.  We will ask her to come back in 2 weeks for further evaluation of her heart rate and blood pressure.   2.  We will recheck an echocardiogram in 2 months.  If her ejection fraction remains below 35% we will consider implantation of a cardio-defibrillator.   3.  I will see this patient again in 3 months or earlier on a p.r.n. basis.  She will continue on warfarin therapy.  I have asked her to increase her exercise routine.  She will call for any difficulties with bleeding.      It is my pleasure to assist in the care of Em Dorman.  All her questions were answered to her satisfaction.      Ann Hutchinson MD       cc:   Arielle Leger MD    [facility]    [id number] [place], [place] [id number]         [facility]INSON MD[facility]             D: 2017 15:35   T: 2017 20:28   MT: [id number]      Name:     EM DORMAN   MRN:      [id number]        Account:      [id number]   :      1940           Service Date: 2017      Document: [id number]

## 2018-01-09 ENCOUNTER — OFFICE VISIT (OUTPATIENT)
Dept: CARDIOLOGY | Facility: CLINIC | Age: 78
End: 2018-01-09
Attending: NURSE PRACTITIONER
Payer: COMMERCIAL

## 2018-01-09 VITALS
HEIGHT: 65 IN | BODY MASS INDEX: 28.78 KG/M2 | SYSTOLIC BLOOD PRESSURE: 118 MMHG | HEART RATE: 70 BPM | DIASTOLIC BLOOD PRESSURE: 80 MMHG | WEIGHT: 172.7 LBS | OXYGEN SATURATION: 97 %

## 2018-01-09 DIAGNOSIS — I42.9 IDIOPATHIC CARDIOMYOPATHY (H): ICD-10-CM

## 2018-01-09 DIAGNOSIS — I50.41 ACUTE COMBINED SYSTOLIC AND DIASTOLIC CHF, NYHA CLASS 3 (H): ICD-10-CM

## 2018-01-09 DIAGNOSIS — I48.91 ATRIAL FIBRILLATION WITH RAPID VENTRICULAR RESPONSE (H): ICD-10-CM

## 2018-01-09 LAB
ANION GAP SERPL CALCULATED.3IONS-SCNC: 8 MMOL/L (ref 3–14)
BUN SERPL-MCNC: 21 MG/DL (ref 7–30)
CALCIUM SERPL-MCNC: 9 MG/DL (ref 8.5–10.1)
CHLORIDE SERPL-SCNC: 99 MMOL/L (ref 94–109)
CO2 SERPL-SCNC: 26 MMOL/L (ref 20–32)
CREAT SERPL-MCNC: 0.84 MG/DL (ref 0.52–1.04)
GFR SERPL CREATININE-BSD FRML MDRD: 66 ML/MIN/1.7M2
GLUCOSE SERPL-MCNC: 128 MG/DL (ref 70–99)
POTASSIUM SERPL-SCNC: 4.1 MMOL/L (ref 3.4–5.3)
SODIUM SERPL-SCNC: 133 MMOL/L (ref 133–144)

## 2018-01-09 PROCEDURE — 36415 COLL VENOUS BLD VENIPUNCTURE: CPT | Performed by: NURSE PRACTITIONER

## 2018-01-09 PROCEDURE — 80048 BASIC METABOLIC PNL TOTAL CA: CPT | Performed by: NURSE PRACTITIONER

## 2018-01-09 PROCEDURE — 99214 OFFICE O/P EST MOD 30 MIN: CPT | Performed by: NURSE PRACTITIONER

## 2018-01-09 RX ORDER — METOPROLOL SUCCINATE 100 MG/1
100 TABLET, EXTENDED RELEASE ORAL DAILY
Qty: 90 TABLET | Refills: 3 | Status: SHIPPED | OUTPATIENT
Start: 2018-01-09 | End: 2018-03-08 | Stop reason: DRUGHIGH

## 2018-01-09 NOTE — MR AVS SNAPSHOT
After Visit Summary   1/9/2018    Em Richmond    MRN: [id number]           Patient Information     Date Of Birth          1940        Visit Information        Provider Department      1/9/2018 10:10 AM Mandie Rose APRN CNP [facility]        Today's Diagnoses     Acute combined systolic and diastolic CHF, NYHA class 3 (H)        Atrial fibrillation with rapid ventricular response (H)        Idiopathic cardiomyopathy (H)          Care Instructions    Call the C.O.R.E. nurse for any questions or concerns:  [phone]    1.  Medication changes from today: no change    2.  Weigh yourself daily and write it down.    3.  Call CORE nurse if your weight is up more than 2 pounds in one day or 5 pounds in one week.    4.  Call CORE nurse if you feel more short of breath, have more abdominal bloating, or leg swelling.    5.  Continue low sodium diet (less than 2000 mg daily). If you eat less salt, you will retain less fluid.    6.  Alcohol can weaken your heart further. You should avoid alcohol or limit its use to special times, such as a holiday or birthday.     7.  Do NOT take Aleve (naproxen) or Advil (ibuprofen) without talking to your doctor first.     8.  Lab Results:   Component      Latest Ref Rng & Units 1/9/2018   Sodium      133 - 144 mmol/L 133   Potassium      3.4 - 5.3 mmol/L 4.1   Chloride      94 - 109 mmol/L 99   Carbon Dioxide      20 - 32 mmol/L 26   Anion Gap      3 - 14 mmol/L 8   Glucose      70 - 99 mg/dL 128 (H)   Urea Nitrogen      7 - 30 mg/dL 21   Creatinine      0.52 - 1.04 mg/dL 0.84   GFR Estimate      >60 mL/min/1.7m2 66   GFR Estimate If Black      >60 mL/min/1.7m2 80   Calcium      8.5 - 10.1 mg/dL 9.0     [facility]: Cardiomyopathy, Optimization, Rehabilitation, Education  The [facility] Clinic is a heart failure specialty clinic within the [facility] where you will work with your  cardiologist, nurse practitioners, physician assistants and registered nurses who specialize in heart failure care. They are dedicated to helping patients with heart failure to carefully adjust medications, receive education, and learn who and when to call if symptoms develop. They specialize in helping you better understand your condition, slow the progression of your disease, improve the length and quality of your life, help you detect future heart problems before they become life threatening, and avoid hospitalizations.                Follow-ups after your visit        Your next 10 appointments already scheduled     Feb 19, 2018  9:30 AM CST   Ech Complete with [id number] ([facility])    [address] [address] [facility] [address]  [facility] [id number] [phone]           1. Please bring or wear a comfortable two-piece outfit. 2. You may eat, drink and take your normal medicines. 3. For any questions that cannot be answered, please contact the ordering physician ***Please check-in at the [facility] located in [address] in the [facility] building. When you are finished registering, please go to [address] and have a seat. The technician will call your name for the test.            Mar 22, 2018  4:15 PM CDT   Return Visit with Den Pickard MD   [facility] ([facility])    [address] [address]  [place] [address] [phone]              Who to contact     If you have questions or need follow up information about today's clinic visit or your schedule please contact [facility] directly at [phone].  Normal or non-critical lab and imaging results will be communicated to you by MyChart, letter or phone within 4 business days after the clinic has received the results. If you do not hear from us within 7 days, please  "contact the clinic through BetterWorks (Closed) or phone. If you have a critical or abnormal lab result, we will notify you by phone as soon as possible.  Submit refill requests through BetterWorks (Closed) or call your pharmacy and they will forward the refill request to us. Please allow 3 business days for your refill to be completed.          Additional Information About Your Visit        LilLuxeharNovel SuperTV Information     BetterWorks (Closed) lets you send messages to your doctor, view your test results, renew your prescriptions, schedule appointments and more. To sign up, go to [email or web address] (Closed) . Click on \"Log in\" on the left side of the screen, which will take you to the Welcome page. Then click on \"Sign up Now\" on the right side of the page.     You will be asked to enter the access code listed below, as well as some personal information. Please follow the directions to create your username and password.     Your access code is: [email or web address]  Expires: 2018  1:00 PM     Your access code will  in 90 days. If you need help or a new code, please call your [place] clinic or [phone].        Care EveryWhere ID     This is your Care EveryWhere ID. This could be used by other organizations to access your [place] medical records  [id number]        Your Vitals Were     Pulse Height Pulse Oximetry BMI (Body Mass Index)          70 1.651 m (5' 5\") 97% 28.74 kg/m2         Blood Pressure from Last 3 Encounters:   18 118/80   17 109/69   17 94/64    Weight from Last 3 Encounters:   18 78.3 kg (172 lb 11.2 oz)   17 78.7 kg (173 lb 8 oz)   17 78.8 kg (173 lb 11.2 oz)              We Performed the Following     Follow-Up with Cardiac Advanced Practice Provider     Follow-Up with [facility] Clinic          Today's Medication Changes          These changes are accurate as of: 18 10:59 AM.  If you have any questions, ask your nurse or doctor.               These medicines have changed or have updated prescriptions.     "    Dose/Directions    metoprolol 100 MG 24 hr tablet   Commonly known as:  TOPROL-XL   This may have changed:  medication strength   Used for:  Acute combined systolic and diastolic CHF, NYHA class 3 (H)   Changed by:  Mandie Rose APRN CNP        Dose:  100 mg   Take 1 tablet (100 mg) by mouth daily   Quantity:  90 tablet   Refills:  3            Where to get your medicines      These medications were sent to [facility] [address] [place] [address] [facility]  [address] [facility] [address]     Phone:  [phone]     metoprolol 100 MG 24 hr tablet                Primary Care Provider Office Phone # Fax #    Arielle Nicole eLger [phone]       [address] [address] [facility] [address]        Equal Access to Services     DANISH MARTINEZ : Hadii aad ku hadasho Soomaali, waaxda luqadaha, qaybta kaalmada adeegyada, scout nixon . [facility] [phone].    ATENCIÓN: Si habla español, tiene a delgadillo disposición servicios gratuitos de asistencia lingüística. [facility] [phone].    We comply with applicable federal civil rights laws and [place] laws. We do not discriminate on the basis of race, color, national origin, age, disability, sex, sexual orientation, or gender identity.            Thank you!     Thank you for choosing [facility]  for your care. Our goal is always to provide you with excellent care. Hearing back from our patients is one way we can continue to improve our services. Please take a few minutes to complete the written survey that you may receive in the mail after your visit with us. Thank you!             Your Updated Medication List - Protect others around you: Learn how to safely use, store and throw away your medicines at www.disposemymeds.org.          This list is accurate as of: 1/9/18 10:59 AM.  Always use your most recent med list.                   Brand Name  Dispense Instructions for use Diagnosis    aspirin 81 MG chewable tablet     36 tablet    Take 1 tablet (81 mg) by mouth daily    Acute respiratory failure with hypoxia (H)       furosemide 40 MG tablet    LASIX    30 tablet    Take 1 tablet (40 mg) by mouth daily    Acute combined systolic and diastolic CHF, NYHA class 3 (H)       lisinopril 5 MG tablet    PRINIVIL/ZESTRIL    30 tablet    Take 1 tablet (5 mg) by mouth daily    Acute respiratory failure with hypoxia (H)       meclizine 12.5 MG tablet    ANTIVERT    60 tablet    Take 1 tablet (12.5 mg) by mouth 3 times daily as needed for dizziness    Labyrinthitis, bilateral       metoprolol 100 MG 24 hr tablet    TOPROL-XL    90 tablet    Take 1 tablet (100 mg) by mouth daily    Acute combined systolic and diastolic CHF, NYHA class 3 (H)       MULTIPLE VITAMINS PO      Take 1 tablet by mouth daily        OMEPRAZOLE PO      Take 20 mg by mouth daily as needed        PAROXETINE HCL PO      Take 30 mg by mouth daily        VITAMIN D3 PO      Take 2,000 Units by mouth daily        WARFARIN SODIUM PO      Take by mouth daily As directed

## 2018-01-09 NOTE — LETTER
1/9/2018      Arielle Leger MD  [place] [address] [facility] [address]      RE: Em Richmond       Dear Colleague,    I had the pleasure of seeing Em Richmond in the [facility].    HISTORY OF PRESENT ILLNESS:     I had the pleasure of seeing Em Richmond, a pleasant 77-year-old patient who has a nonischemic cardiomyopathy.  She was admitted twice in October and November for congestive heart failure and cardiomyopathy.  Her ejection fraction on echocardiography in 2014 was 60%-65%.  On 10/21 her LVEF was 30%-35%.  Mild to moderate tricuspid regurgitation and mild to moderate mitral regurgitation.      The patient has a 2-3 year history of chronic atrial fibrillation.  She had stopped her metoprolol and presented with rapid ventricular response.      Coronary angiography performed 10/23 showed only mild coronary artery disease with all stenosis less than 30%.  We felt that the idiopathic cardiomyopathy was possibly due to atrial fibrillation with rapid ventricular response.  She was initiated on beta blockers.  We diuresed her.  She lost 20 pounds in the hospital.  We have had some difficulty with rate control with beta blocker alone.  Digoxin used while in the hospital was discontinued at discharge.  She was intubated at one point in the hospital due to shortness of breath.  The patient is currently on warfarin with excellent INR.  She has a history of pulmonary hypertension and obstructive sleep apnea.  She has a history of essential hypertension and probably contributing to her history of atrial fibrillation.      She had some confusion on medications.  It appears that she is clear on it.  She was recently seen by Dr. Pickard, at which time metoprolol was increased to 100 mg daily.      She returns today in followup.  Her heart rates at home have been 70-94.  She states her shortness of breath has improved.      PHYSICAL EXAMINATION:   VITAL SIGNS:   Blood pressure 118/80, pulse is 70 and irregular, weight is 172 pounds.     See complete physical examination below.      BASIC METABOLIC PANEL:  Sodium 133, potassium 4.1, BUN 21, creatinine 0.84, GFR 66.      IMPRESSION AND PLAN:   1.  Em Richmond is a pleasant 77-year-old female who presented to the hospital October and November with idiopathic cardiomyopathy, possibly due to atrial fibrillation with rapid ventricular response.  Rate control strategy planned.  Metoprolol was increased to 100 mg a day.  She states that her shortness of breath has improved.  She continues with Metoprolol 2 tablets once a day.  I have sent an updated prescription today for 100 mg tablets.  She will have a repeat echocardiogram in February, and if her ejection fraction remains below 35%, we will consider implantation of cardioverter-defibrillator.      2. Follow up with Dr. Pickard in March to review her echocardiogram.     3. Return to see me, Dusty Rose CNP in April or sooner if needed.  She is at risk for readmissions, close follow up recomended.     Outpatient Encounter Prescriptions as of 1/9/2018   Medication Sig Dispense Refill     metoprolol (TOPROL-XL) 100 MG 24 hr tablet Take 1 tablet (100 mg) by mouth daily 90 tablet 3     furosemide (LASIX) 40 MG tablet Take 1 tablet (40 mg) by mouth daily 30 tablet 3     lisinopril (PRINIVIL/ZESTRIL) 5 MG tablet Take 1 tablet (5 mg) by mouth daily 30 tablet 3     aspirin 81 MG chewable tablet Take 1 tablet (81 mg) by mouth daily 36 tablet 0     WARFARIN SODIUM PO Take by mouth daily As directed       meclizine (ANTIVERT) 12.5 MG tablet Take 1 tablet (12.5 mg) by mouth 3 times daily as needed for dizziness 60 tablet 1     PAROXETINE HCL PO Take 30 mg by mouth daily        OMEPRAZOLE PO Take 20 mg by mouth daily as needed        MULTIPLE VITAMINS PO Take 1 tablet by mouth daily       Cholecalciferol (VITAMIN D3 PO) Take 2,000 Units by mouth daily        [DISCONTINUED] metoprolol  (TOPROL-XL) 50 MG 24 hr tablet Take 2 tablets (100 mg) by mouth daily 1 tablet 0     No facility-administered encounter medications on file as of 1/9/2018.      Again, thank you for allowing me to participate in the care of your patient.      Sincerely,    DOMINIK [facility]

## 2018-01-09 NOTE — PROGRESS NOTES
HISTORY OF PRESENT ILLNESS:     I had the pleasure of seeing Em Richmond, a pleasant 77-year-old patient who has a nonischemic cardiomyopathy.  She was admitted twice in October and November for congestive heart failure and cardiomyopathy.  Her ejection fraction on echocardiography in 2014 was 60%-65%.  On 10/21 her LVEF was 30%-35%.  Mild to moderate tricuspid regurgitation and mild to moderate mitral regurgitation.      The patient has a 2-3 year history of chronic atrial fibrillation.  She had stopped her metoprolol and presented with rapid ventricular response.      Coronary angiography performed 10/23 showed only mild coronary artery disease with all stenosis less than 30%.  We felt that the idiopathic cardiomyopathy was possibly due to atrial fibrillation with rapid ventricular response.  She was initiated on beta blockers.  We diuresed her.  She lost 20 pounds in the hospital.  We have had some difficulty with rate control with beta blocker alone.  Digoxin used while in the hospital was discontinued at discharge.  She was intubated at one point in the hospital due to shortness of breath.  The patient is currently on warfarin with excellent INR.  She has a history of pulmonary hypertension and obstructive sleep apnea.  She has a history of essential hypertension and probably contributing to her history of atrial fibrillation.      She had some confusion on medications.  It appears that she is clear on it.  She was recently seen by Dr. Pickard, at which time metoprolol was increased to 100 mg daily.      She returns today in followup.  Her heart rates at home have been 70-94.  She states her shortness of breath has improved.      PHYSICAL EXAMINATION:   VITAL SIGNS:  Blood pressure 118/80, pulse is 70 and irregular, weight is 172 pounds.     See complete physical examination below.      BASIC METABOLIC PANEL:  Sodium 133, potassium 4.1, BUN 21, creatinine 0.84, GFR 66.      IMPRESSION AND PLAN:   1.   Em Dorman is a pleasant 77-year-old female who presented to the hospital October and November with idiopathic cardiomyopathy, possibly due to atrial fibrillation with rapid ventricular response.  Rate control strategy planned.  Metoprolol was increased to 100 mg a day.  She states that her shortness of breath has improved.  She continues with Metoprolol 2 tablets once a day.  I have sent an updated prescription today for 100 mg tablets.  She will have a repeat echocardiogram in February, and if her ejection fraction remains below 35%, we will consider implantation of cardioverter-defibrillator.      2. Follow up with Dr. Pickard in March to review her echocardiogram.     3. Return to see Dusty heredia CNP in April or sooner if needed.  She is at risk for readmissions, close follow up recomended.      DOMINIK ELIZABETH, CNP             D: 2018 11:10   T: 2018 11:53   MT: NASEEM      Name:     EM DORMAN   MRN:      [id number]        Account:      [id number]   :      1940           Service Date: 2018      Document: [id number]

## 2018-01-09 NOTE — PROGRESS NOTES
HPI and Plan:   See [id number]    No orders of the defined types were placed in this encounter.      Orders Placed This Encounter   Medications     metoprolol (TOPROL-XL) 100 MG 24 hr tablet     Sig: Take 1 tablet (100 mg) by mouth daily     Dispense:  90 tablet     Refill:  3     New prescription.       Medications Discontinued During This Encounter   Medication Reason     metoprolol (TOPROL-XL) 50 MG 24 hr tablet Reorder         Encounter Diagnoses   Name Primary?     Acute combined systolic and diastolic CHF, NYHA class 3 (H)      Atrial fibrillation with rapid ventricular response (H)      Idiopathic cardiomyopathy (H)        CURRENT MEDICATIONS:  Current Outpatient Prescriptions   Medication Sig Dispense Refill     metoprolol (TOPROL-XL) 100 MG 24 hr tablet Take 1 tablet (100 mg) by mouth daily 90 tablet 3     furosemide (LASIX) 40 MG tablet Take 1 tablet (40 mg) by mouth daily 30 tablet 3     lisinopril (PRINIVIL/ZESTRIL) 5 MG tablet Take 1 tablet (5 mg) by mouth daily 30 tablet 3     aspirin 81 MG chewable tablet Take 1 tablet (81 mg) by mouth daily 36 tablet 0     WARFARIN SODIUM PO Take by mouth daily As directed       meclizine (ANTIVERT) 12.5 MG tablet Take 1 tablet (12.5 mg) by mouth 3 times daily as needed for dizziness 60 tablet 1     PAROXETINE HCL PO Take 30 mg by mouth daily        OMEPRAZOLE PO Take 20 mg by mouth daily as needed        MULTIPLE VITAMINS PO Take 1 tablet by mouth daily       Cholecalciferol (VITAMIN D3 PO) Take 2,000 Units by mouth daily        [DISCONTINUED] metoprolol (TOPROL-XL) 50 MG 24 hr tablet Take 2 tablets (100 mg) by mouth daily 1 tablet 0       ALLERGIES     Allergies   Allergen Reactions     Sulfa Drugs        PAST MEDICAL HISTORY:  Past Medical History:   Diagnosis Date     Atrial fibrillation (H)      Cardiogenic shock (H) 11/05/2017       PAST SURGICAL HISTORY:  Past Surgical History:   Procedure Laterality Date     HEART CATH RIGHT AND LEFT HEART CATH   "10/23/2017    Cath no significant obstructive CAD. RHC: mild PHTN/NL wedge/ low CI.        FAMILY HISTORY:  Family History   Problem Relation Age of Onset     DIABETES Mother      Other Cancer Father        SOCIAL HISTORY:  Social History     Social History     Marital status:      Spouse name: N/A     Number of children: N/A     Years of education: N/A     Social History Main Topics     Smoking status: Former Smoker     Types: Cigarettes     Quit date: 1/1/1965     Smokeless tobacco: Never Used     Alcohol use 0.0 oz/week      Comment: 8 oz wine daily     Drug use: No     Sexual activity: Yes     Partners: Male     Other Topics Concern     None     Social History Narrative       Review of Systems:  Skin:  Negative       Eyes:  Positive for glasses    ENT:  Negative      Respiratory:  Positive for cough;dyspnea on exertion;sleep apnea referral to Dr. Freitas, not using CPAP   Cardiovascular:    Positive for;fatigue lightheaded if stands up too quickly  Gastroenterology: Positive for diarrhea;nausea took immodium  Genitourinary:  Negative      Musculoskeletal:  Negative      Neurologic:  Positive for memory problems    Psychiatric:  Positive for anxiety    Heme/Lymph/Imm:         Endocrine:  Positive for hot flashes;night sweats      Physical Exam:  Vitals: /80 (BP Location: Right arm, Patient Position: Sitting, Cuff Size: Adult Regular)  Pulse 70  Ht 1.651 m (5' 5\")  Wt 78.3 kg (172 lb 11.2 oz)  SpO2 97%  BMI 28.74 kg/m2    Constitutional:  cooperative, alert and oriented, well developed, well nourished, in no acute distress        Skin:  warm and dry to the touch, no apparent skin lesions or masses noted          Head:  normocephalic, no masses or lesions        Eyes:  pupils equal and round, conjunctivae and lids unremarkable, sclera white, no xanthalasma, EOMS intact, no nystagmus        Lymph:      ENT:  no pallor or cyanosis, dentition good        Neck:  carotid pulses are full and equal " bilaterally, JVP normal, no carotid bruit        Respiratory:  normal breath sounds, clear to auscultation, normal A-P diameter, normal symmetry, normal respiratory excursion, no use of accessory muscles         Cardiac: normal S1 and S2;no S3 or S4;apical impulse not displaced irregularly irregular rhythm   no presence of murmur          pulses full and equal, no bruits auscultated                                        GI:  abdomen soft, non-tender, BS normoactive, no mass, no HSM, no bruits        Extremities and Muscular Skeletal:  no deformities, clubbing, cyanosis, erythema observed;no edema              Neurological:  no gross motor deficits;affect appropriate        Psych:  Alert and Oriented x 3        CC  Mandie RIVER Mannken, APRN CNP  [address]  [address]  [place] [address]

## 2018-01-09 NOTE — PATIENT INSTRUCTIONS
Call the C.O.R.E. nurse for any questions or concerns:  [phone]    1.  Medication changes from today: no change    2.  Weigh yourself daily and write it down.    3.  Call CORE nurse if your weight is up more than 2 pounds in one day or 5 pounds in one week.    4.  Call CORE nurse if you feel more short of breath, have more abdominal bloating, or leg swelling.    5.  Continue low sodium diet (less than 2000 mg daily). If you eat less salt, you will retain less fluid.    6.  Alcohol can weaken your heart further. You should avoid alcohol or limit its use to special times, such as a holiday or birthday.     7.  Do NOT take Aleve (naproxen) or Advil (ibuprofen) without talking to your doctor first.     8.  Lab Results:   Component      Latest Ref Rng & Units 1/9/2018   Sodium      133 - 144 mmol/L 133   Potassium      3.4 - 5.3 mmol/L 4.1   Chloride      94 - 109 mmol/L 99   Carbon Dioxide      20 - 32 mmol/L 26   Anion Gap      3 - 14 mmol/L 8   Glucose      70 - 99 mg/dL 128 (H)   Urea Nitrogen      7 - 30 mg/dL 21   Creatinine      0.52 - 1.04 mg/dL 0.84   GFR Estimate      >60 mL/min/1.7m2 66   GFR Estimate If Black      >60 mL/min/1.7m2 80   Calcium      8.5 - 10.1 mg/dL 9.0     [facility]: Cardiomyopathy, Optimization, Rehabilitation, Education  The [facility] is a heart failure specialty clinic within the [facility] where you will work with your cardiologist, nurse practitioners, physician assistants and registered nurses who specialize in heart failure care. They are dedicated to helping patients with heart failure to carefully adjust medications, receive education, and learn who and when to call if symptoms develop. They specialize in helping you better understand your condition, slow the progression of your disease, improve the length and quality of your life, help you detect future heart problems before they become life threatening, and avoid hospitalizations.

## 2018-03-06 ENCOUNTER — HOSPITAL ENCOUNTER (OUTPATIENT)
Dept: CARDIOLOGY | Facility: CLINIC | Age: 78
Discharge: HOME | End: 2018-03-06
Attending: INTERNAL MEDICINE | Admitting: INTERNAL MEDICINE
Payer: MEDICARE

## 2018-03-06 DIAGNOSIS — I50.41 ACUTE COMBINED SYSTOLIC AND DIASTOLIC CHF, NYHA CLASS 3 (H): ICD-10-CM

## 2018-03-06 DIAGNOSIS — I48.91 ATRIAL FIBRILLATION WITH RAPID VENTRICULAR RESPONSE (H): ICD-10-CM

## 2018-03-06 DIAGNOSIS — I42.9 IDIOPATHIC CARDIOMYOPATHY (H): ICD-10-CM

## 2018-03-06 PROCEDURE — 93306 TTE W/DOPPLER COMPLETE: CPT

## 2018-03-06 PROCEDURE — 93306 TTE W/DOPPLER COMPLETE: CPT | Mod: 26 | Performed by: INTERNAL MEDICINE

## 2018-03-06 PROCEDURE — 25500064 ZZH RX 255 OP 636: Performed by: INTERNAL MEDICINE

## 2018-03-06 RX ADMIN — HUMAN ALBUMIN MICROSPHERES AND PERFLUTREN 5 ML: 10; .22 INJECTION, SOLUTION INTRAVENOUS at 10:15

## 2018-03-07 ENCOUNTER — TELEPHONE (OUTPATIENT)
Dept: CARDIOLOGY | Facility: CLINIC | Age: 78
End: 2018-03-07

## 2018-03-07 DIAGNOSIS — I48.91 ATRIAL FIBRILLATION (H): Primary | ICD-10-CM

## 2018-03-07 RX ORDER — METOPROLOL SUCCINATE 25 MG/1
25 TABLET, EXTENDED RELEASE ORAL DAILY
Qty: 30 TABLET | Status: CANCELLED | OUTPATIENT
Start: 2018-03-07

## 2018-03-07 NOTE — TELEPHONE ENCOUNTER
Notes Recorded by Den Pickard MD on 3/6/2018 at 5:54 PM  The echo shows LVEF 35-40%.  Patient does not need implantation of ICD at this time.  She continues to have rapid ventricular response around 100-110 bpm.  I would suggest moving the Toprol XL to 125 daily.  I would then recheck heart rate with my MELISSA in two weeks.  If heart rate is still above 90 bpm, I would move to Toprol XL 75 mg twice a day.  Den Pickard MD, [facility]  March 6, 2018 5:54 PM    Spoke to patient regarding result above. Pt stated that since increasing his Toprol XL from 75 mg daily to 100 mg daily she has been experiencing a significant amount of diarrhea. Pt stated that she has it multiple times a day and believes it started with the medication change. Pt nervous about increasing to 125 mg daily. Pt wondering if she should split up the dose to BID or try a different medication.     Will route to Dr. Pickard to review and advise. Pt has follow up OV with Dr. Jensen 3/22/18.

## 2018-03-08 RX ORDER — METOPROLOL SUCCINATE 25 MG/1
75 TABLET, EXTENDED RELEASE ORAL 2 TIMES DAILY
Qty: 180 TABLET | Refills: 11 | Status: SHIPPED | OUTPATIENT
Start: 2018-03-08 | End: 2018-03-22

## 2018-03-08 NOTE — TELEPHONE ENCOUNTER
Let's try Toprol XL 75 mg twice a day to see if her heart rate is better controlled and she has less diarrhea.  I would recheck her heart rate after two weeks with appointment with MELISSA if patient feels well.  Den Pickard MD, FACC  March 7, 2018 11:09 PM

## 2018-03-08 NOTE — TELEPHONE ENCOUNTER
Tried to call patient to review Dr. Pickard's recommendations. No answer. Left vm to call team 4 back with direct number.

## 2018-03-08 NOTE — TELEPHONE ENCOUNTER
Spoke to patient and informed her of Dr. Pickard's recommendation to take Toprol XL 75 mg BID. Pt agreed to plan. Prescription sent to pharmacy of choice. No further questions or concerns.

## 2018-03-12 DIAGNOSIS — J96.01 ACUTE RESPIRATORY FAILURE WITH HYPOXIA (H): ICD-10-CM

## 2018-03-12 RX ORDER — LISINOPRIL 5 MG/1
5 TABLET ORAL DAILY
Qty: 90 TABLET | Refills: 3 | Status: SHIPPED | OUTPATIENT
Start: 2018-03-12 | End: 2018-03-30

## 2018-03-22 ENCOUNTER — OFFICE VISIT (OUTPATIENT)
Dept: CARDIOLOGY | Facility: CLINIC | Age: 78
End: 2018-03-22
Attending: INTERNAL MEDICINE
Payer: COMMERCIAL

## 2018-03-22 VITALS
SYSTOLIC BLOOD PRESSURE: 94 MMHG | BODY MASS INDEX: 29.07 KG/M2 | WEIGHT: 174.5 LBS | HEART RATE: 104 BPM | HEIGHT: 65 IN | DIASTOLIC BLOOD PRESSURE: 62 MMHG

## 2018-03-22 DIAGNOSIS — I48.19 PERSISTENT ATRIAL FIBRILLATION (H): ICD-10-CM

## 2018-03-22 DIAGNOSIS — I43 TACHYCARDIA INDUCED CARDIOMYOPATHY (H): Primary | ICD-10-CM

## 2018-03-22 DIAGNOSIS — I48.91 ATRIAL FIBRILLATION WITH RAPID VENTRICULAR RESPONSE (H): ICD-10-CM

## 2018-03-22 DIAGNOSIS — I50.41 ACUTE COMBINED SYSTOLIC AND DIASTOLIC CHF, NYHA CLASS 3 (H): ICD-10-CM

## 2018-03-22 DIAGNOSIS — R00.0 TACHYCARDIA INDUCED CARDIOMYOPATHY (H): Primary | ICD-10-CM

## 2018-03-22 DIAGNOSIS — I42.9 IDIOPATHIC CARDIOMYOPATHY (H): ICD-10-CM

## 2018-03-22 PROCEDURE — 99215 OFFICE O/P EST HI 40 MIN: CPT | Performed by: INTERNAL MEDICINE

## 2018-03-22 RX ORDER — DIGOXIN 125 MCG
125 TABLET ORAL DAILY
Qty: 90 TABLET | Refills: 3 | Status: SHIPPED | OUTPATIENT
Start: 2018-03-22 | End: 2019-03-08

## 2018-03-22 RX ORDER — METOPROLOL SUCCINATE 25 MG/1
50 TABLET, EXTENDED RELEASE ORAL 2 TIMES DAILY
Qty: 180 TABLET | Refills: 3 | COMMUNITY
Start: 2018-03-22 | End: 2018-03-30

## 2018-03-22 NOTE — LETTER
3/22/2018    Arielle Leger MD  [place] [address] [facility] [address]    RE: Em Richmond       Dear Colleague,    I had the pleasure of seeing Em Richmond in the [facility].    HPI and Plan:   See dictation:[id number]    Orders Placed This Encounter   Procedures     Follow-Up with Cardiac Advanced Practice Provider     Follow-Up with Cardiologist       Orders Placed This Encounter   Medications     metoprolol succinate (TOPROL XL) 25 MG 24 hr tablet     Sig: Take 2 tablets (50 mg) by mouth 2 times daily     Dispense:  180 tablet     Refill:  3     digoxin (LANOXIN) 125 MCG tablet     Sig: Take 1 tablet (125 mcg) by mouth daily     Dispense:  90 tablet     Refill:  3       Medications Discontinued During This Encounter   Medication Reason     metoprolol succinate (TOPROL XL) 25 MG 24 hr tablet Reorder         Encounter Diagnoses   Name Primary?     Tachycardia induced cardiomyopathy (H) Yes     Atrial fibrillation with rapid ventricular response (H)      Acute combined systolic and diastolic CHF, NYHA class 3 (H)      Idiopathic cardiomyopathy (H)      Persistent atrial fibrillation (H)        CURRENT MEDICATIONS:  Current Outpatient Prescriptions   Medication Sig Dispense Refill     metoprolol succinate (TOPROL XL) 25 MG 24 hr tablet Take 2 tablets (50 mg) by mouth 2 times daily 180 tablet 3     digoxin (LANOXIN) 125 MCG tablet Take 1 tablet (125 mcg) by mouth daily 90 tablet 3     lisinopril (PRINIVIL/ZESTRIL) 5 MG tablet Take 1 tablet (5 mg) by mouth daily 90 tablet 3     furosemide (LASIX) 40 MG tablet Take 1 tablet (40 mg) by mouth daily 30 tablet 3     aspirin 81 MG chewable tablet Take 1 tablet (81 mg) by mouth daily 36 tablet 0     WARFARIN SODIUM PO Take by mouth daily As directed       meclizine (ANTIVERT) 12.5 MG tablet Take 1 tablet (12.5 mg) by mouth 3 times daily as needed for dizziness 60 tablet 1     PAROXETINE HCL PO Take 30 mg by mouth daily         OMEPRAZOLE PO Take 20 mg by mouth daily as needed        MULTIPLE VITAMINS PO Take 1 tablet by mouth daily       Cholecalciferol (VITAMIN D3 PO) Take 2,000 Units by mouth daily        [DISCONTINUED] metoprolol succinate (TOPROL XL) 25 MG 24 hr tablet Take 3 tablets (75 mg) by mouth 2 times daily 180 tablet 11       ALLERGIES     Allergies   Allergen Reactions     Sulfa Drugs        PAST MEDICAL HISTORY:  Past Medical History:   Diagnosis Date     Atrial fibrillation (H)      Cardiogenic shock (H) 11/05/2017       PAST SURGICAL HISTORY:  Past Surgical History:   Procedure Laterality Date     HEART CATH RIGHT AND LEFT HEART CATH  10/23/2017    Cath no significant obstructive CAD. RHC: mild PHTN/NL wedge/ low CI.        FAMILY HISTORY:  Family History   Problem Relation Age of Onset     DIABETES Mother      Other Cancer Father        SOCIAL HISTORY:  Social History     Social History     Marital status:      Spouse name: N/A     Number of children: N/A     Years of education: N/A     Social History Main Topics     Smoking status: Former Smoker     Types: Cigarettes     Quit date: 1/1/1965     Smokeless tobacco: Never Used     Alcohol use 0.0 oz/week      Comment: 8 oz wine daily     Drug use: No     Sexual activity: Yes     Partners: Male     Other Topics Concern     None     Social History Narrative       Review of Systems:  Skin:  Negative       Eyes:  Positive for glasses    ENT:  Positive for nasal congestion;postnasal drainage    Respiratory:  Positive for sleep apnea;dyspnea on exertion     Cardiovascular:    fatigue;lightheadedness;Positive for;dizziness    Gastroenterology: Positive for nausea    Genitourinary:  Negative      Musculoskeletal:  Negative      Neurologic:  Negative      Psychiatric:  Negative      Heme/Lymph/Imm:  Negative      Endocrine:  Negative        Physical Exam:  Vitals: BP 94/62 (BP Location: Right arm, Patient Position: Sitting, Cuff Size: Adult Large)  Pulse 104  Ht  "1.651 m (5' 5\")  Wt 79.2 kg (174 lb 8 oz)  Breastfeeding? No  BMI 29.04 kg/m2    Constitutional:  cooperative, alert and oriented, well developed, well nourished, in no acute distress        Skin:  warm and dry to the touch, no apparent skin lesions or masses noted          Head:  normocephalic, no masses or lesions        Eyes:  pupils equal and round;conjunctivae and lids unremarkable;sclera white;no xanthalasma        Lymph:      ENT:  no pallor or cyanosis, dentition good        Neck:  carotid pulses are full and equal bilaterally, JVP normal, no carotid bruit        Respiratory:  normal breath sounds, clear to auscultation, normal A-P diameter, normal symmetry, normal respiratory excursion, no use of accessory muscles         Cardiac: normal S1 and S2;no S3 or S4;apical impulse not displaced irregularly irregular rhythm;tachycardic   no presence of murmur          pulses full and equal, no bruits auscultated                                        GI:  abdomen soft, non-tender, BS normoactive, no mass, no HSM, no bruits        Extremities and Muscular Skeletal:  no deformities, clubbing, cyanosis, erythema observed;no edema              Neurological:  no gross motor deficits;affect appropriate        Psych:  Alert and Oriented x 3        CC  Den Pickard MD  [address]  [place] [address]                Thank you for allowing me to participate in the care of your patient.      Sincerely,     Den Pickard MD     [facility]    cc:   Den Pickard MD  [address]  [place] [address]        "

## 2018-03-22 NOTE — MR AVS SNAPSHOT
After Visit Summary   3/22/2018    Em Richmond    MRN: [id number]           Patient Information     Date Of Birth          1940        Visit Information        Provider Department      3/22/2018 4:15 PM Den Pickard MD [facility]        Today's Diagnoses     Tachycardia induced cardiomyopathy (H)    -  1    Atrial fibrillation with rapid ventricular response (H)        Acute combined systolic and diastolic CHF, NYHA class 3 (H)        Idiopathic cardiomyopathy (H)        Persistent atrial fibrillation (H)           Follow-ups after your visit        Additional Services     Follow-Up with Cardiac Advanced Practice Provider           Follow-Up with Cardiologist                 Future tests that were ordered for you today     Open Future Orders        Priority Expected Expires Ordered    Follow-Up with Cardiac Advanced Practice Provider Routine 4/21/2018 3/22/2019 3/22/2018    Follow-Up with Cardiologist Routine 6/21/2018 3/22/2019 3/22/2018            Who to contact     If you have questions or need follow up information about today's clinic visit or your schedule please contact [facility] directly at [phone].  Normal or non-critical lab and imaging results will be communicated to you by Zyncdhart, letter or phone within 4 business days after the clinic has received the results. If you do not hear from us within 7 days, please contact the clinic through [employer] or phone. If you have a critical or abnormal lab result, we will notify you by phone as soon as possible.  Submit refill requests through [employer] or call your pharmacy and they will forward the refill request to us. Please allow 3 business days for your refill to be completed.          Additional Information About Your Visit        MyChart Information     [employer] lets you send messages to your doctor, view your test results, renew your  "prescriptions, schedule appointments and more. To sign up, go to [email or web address] . Click on \"Log in\" on the left side of the screen, which will take you to the Welcome page. Then click on \"Sign up Now\" on the right side of the page.     You will be asked to enter the access code listed below, as well as some personal information. Please follow the directions to create your username and password.     Your access code is: [email or web address]  Expires: 2018  5:17 PM     Your access code will  in 90 days. If you need help or a new code, please call your [facility] clinic or [phone].        Care EveryWhere ID     This is your Care EveryWhere ID. This could be used by other organizations to access your [facility] medical records  [id number]        Your Vitals Were     Pulse Height Breastfeeding? BMI (Body Mass Index)          104 1.651 m (5' 5\") No 29.04 kg/m2         Blood Pressure from Last 3 Encounters:   18 94/62   18 118/80   17 109/69    Weight from Last 3 Encounters:   18 79.2 kg (174 lb 8 oz)   18 78.3 kg (172 lb 11.2 oz)   17 78.7 kg (173 lb 8 oz)              We Performed the Following     Follow-Up with Cardiologist          Today's Medication Changes          These changes are accurate as of 3/22/18  5:17 PM.  If you have any questions, ask your nurse or doctor.               Start taking these medicines.        Dose/Directions    digoxin 125 MCG tablet   Commonly known as:  LANOXIN   Used for:  Atrial fibrillation with rapid ventricular response (H)   Started by:  Den Pickard MD        Dose:  125 mcg   Take 1 tablet (125 mcg) by mouth daily   Quantity:  90 tablet   Refills:  3         These medicines have changed or have updated prescriptions.        Dose/Directions    TOPROL XL 25 MG 24 hr tablet   This may have changed:  how much to take   Used for:  Persistent atrial fibrillation (H)   Generic drug:  metoprolol succinate   Changed by:  Neeraj" Den RIVER MD        Dose:  50 mg   Take 2 tablets (50 mg) by mouth 2 times daily   Quantity:  180 tablet   Refills:  3            Where to get your medicines      These medications were sent to [facility] [address] [place] [address] [facility]  [address] [facility] [address]     Phone:  [phone]     digoxin 125 MCG tablet                Primary Care Provider Office Phone # Fax #    Arielle Nicole Leger [phone]       [facility] [address] [facility] [address]        Equal Access to Services     [facility]: Hadii aad ku hadasho Soomaali, waaxda luqadaha, qaybta kaalmada adeegyada, waxay lizbet hayleticia nixon . So [facility] [phone].    ATENCIÓN: Si habla español, tiene a delgadillo disposición servicios gratuitos de asistencia lingüística. [facility] [phone].    We comply with applicable federal civil rights laws and [place] laws. We do not discriminate on the basis of race, color, national origin, age, disability, sex, sexual orientation, or gender identity.            Thank you!     Thank you for choosing [facility]  for your care. Our goal is always to provide you with excellent care. Hearing back from our patients is one way we can continue to improve our services. Please take a few minutes to complete the written survey that you may receive in the mail after your visit with us. Thank you!             Your Updated Medication List - Protect others around you: Learn how to safely use, store and throw away your medicines at www.disposemymeds.org.          This list is accurate as of 3/22/18  5:17 PM.  Always use your most recent med list.                   Brand Name Dispense Instructions for use Diagnosis    aspirin 81 MG chewable tablet     36 tablet    Take 1 tablet (81 mg) by mouth daily    Acute respiratory failure with hypoxia (H)       digoxin 125 MCG tablet    LANOXIN    90 tablet    Take 1  tablet (125 mcg) by mouth daily    Atrial fibrillation with rapid ventricular response (H)       furosemide 40 MG tablet    LASIX    30 tablet    Take 1 tablet (40 mg) by mouth daily    Acute combined systolic and diastolic CHF, NYHA class 3 (H)       lisinopril 5 MG tablet    PRINIVIL/ZESTRIL    90 tablet    Take 1 tablet (5 mg) by mouth daily    Acute respiratory failure with hypoxia (H)       meclizine 12.5 MG tablet    ANTIVERT    60 tablet    Take 1 tablet (12.5 mg) by mouth 3 times daily as needed for dizziness    Labyrinthitis, bilateral       MULTIPLE VITAMINS PO      Take 1 tablet by mouth daily        OMEPRAZOLE PO      Take 20 mg by mouth daily as needed        PAROXETINE HCL PO      Take 30 mg by mouth daily        TOPROL XL 25 MG 24 hr tablet   Generic drug:  metoprolol succinate     180 tablet    Take 2 tablets (50 mg) by mouth 2 times daily    Persistent atrial fibrillation (H)       VITAMIN D3 PO      Take 2,000 Units by mouth daily        WARFARIN SODIUM PO      Take by mouth daily As directed

## 2018-03-22 NOTE — PROGRESS NOTES
HPI and Plan:   See dictation:[id number]    Orders Placed This Encounter   Procedures     Follow-Up with Cardiac Advanced Practice Provider     Follow-Up with Cardiologist       Orders Placed This Encounter   Medications     metoprolol succinate (TOPROL XL) 25 MG 24 hr tablet     Sig: Take 2 tablets (50 mg) by mouth 2 times daily     Dispense:  180 tablet     Refill:  3     digoxin (LANOXIN) 125 MCG tablet     Sig: Take 1 tablet (125 mcg) by mouth daily     Dispense:  90 tablet     Refill:  3       Medications Discontinued During This Encounter   Medication Reason     metoprolol succinate (TOPROL XL) 25 MG 24 hr tablet Reorder         Encounter Diagnoses   Name Primary?     Tachycardia induced cardiomyopathy (H) Yes     Atrial fibrillation with rapid ventricular response (H)      Acute combined systolic and diastolic CHF, NYHA class 3 (H)      Idiopathic cardiomyopathy (H)      Persistent atrial fibrillation (H)        CURRENT MEDICATIONS:  Current Outpatient Prescriptions   Medication Sig Dispense Refill     metoprolol succinate (TOPROL XL) 25 MG 24 hr tablet Take 2 tablets (50 mg) by mouth 2 times daily 180 tablet 3     digoxin (LANOXIN) 125 MCG tablet Take 1 tablet (125 mcg) by mouth daily 90 tablet 3     lisinopril (PRINIVIL/ZESTRIL) 5 MG tablet Take 1 tablet (5 mg) by mouth daily 90 tablet 3     furosemide (LASIX) 40 MG tablet Take 1 tablet (40 mg) by mouth daily 30 tablet 3     aspirin 81 MG chewable tablet Take 1 tablet (81 mg) by mouth daily 36 tablet 0     WARFARIN SODIUM PO Take by mouth daily As directed       meclizine (ANTIVERT) 12.5 MG tablet Take 1 tablet (12.5 mg) by mouth 3 times daily as needed for dizziness 60 tablet 1     PAROXETINE HCL PO Take 30 mg by mouth daily        OMEPRAZOLE PO Take 20 mg by mouth daily as needed        MULTIPLE VITAMINS PO Take 1 tablet by mouth daily       Cholecalciferol (VITAMIN D3 PO) Take 2,000 Units by mouth daily        [DISCONTINUED] metoprolol succinate (TOPROL XL)  "25 MG 24 hr tablet Take 3 tablets (75 mg) by mouth 2 times daily 180 tablet 11       ALLERGIES     Allergies   Allergen Reactions     Sulfa Drugs        PAST MEDICAL HISTORY:  Past Medical History:   Diagnosis Date     Atrial fibrillation (H)      Cardiogenic shock (H) 11/05/2017       PAST SURGICAL HISTORY:  Past Surgical History:   Procedure Laterality Date     HEART CATH RIGHT AND LEFT HEART CATH  10/23/2017    Cath no significant obstructive CAD. RHC: mild PHTN/NL wedge/ low CI.        FAMILY HISTORY:  Family History   Problem Relation Age of Onset     DIABETES Mother      Other Cancer Father        SOCIAL HISTORY:  Social History     Social History     Marital status:      Spouse name: N/A     Number of children: N/A     Years of education: N/A     Social History Main Topics     Smoking status: Former Smoker     Types: Cigarettes     Quit date: 1/1/1965     Smokeless tobacco: Never Used     Alcohol use 0.0 oz/week      Comment: 8 oz wine daily     Drug use: No     Sexual activity: Yes     Partners: Male     Other Topics Concern     None     Social History Narrative       Review of Systems:  Skin:  Negative       Eyes:  Positive for glasses    ENT:  Positive for nasal congestion;postnasal drainage    Respiratory:  Positive for sleep apnea;dyspnea on exertion     Cardiovascular:    fatigue;lightheadedness;Positive for;dizziness    Gastroenterology: Positive for nausea    Genitourinary:  Negative      Musculoskeletal:  Negative      Neurologic:  Negative      Psychiatric:  Negative      Heme/Lymph/Imm:  Negative      Endocrine:  Negative        Physical Exam:  Vitals: BP 94/62 (BP Location: Right arm, Patient Position: Sitting, Cuff Size: Adult Large)  Pulse 104  Ht 1.651 m (5' 5\")  Wt 79.2 kg (174 lb 8 oz)  Breastfeeding? No  BMI 29.04 kg/m2    Constitutional:  cooperative, alert and oriented, well developed, well nourished, in no acute distress        Skin:  warm and dry to the touch, no apparent " skin lesions or masses noted          Head:  normocephalic, no masses or lesions        Eyes:  pupils equal and round;conjunctivae and lids unremarkable;sclera white;no xanthalasma        Lymph:      ENT:  no pallor or cyanosis, dentition good        Neck:  carotid pulses are full and equal bilaterally, JVP normal, no carotid bruit        Respiratory:  normal breath sounds, clear to auscultation, normal A-P diameter, normal symmetry, normal respiratory excursion, no use of accessory muscles         Cardiac: normal S1 and S2;no S3 or S4;apical impulse not displaced irregularly irregular rhythm;tachycardic   no presence of murmur          pulses full and equal, no bruits auscultated                                        GI:  abdomen soft, non-tender, BS normoactive, no mass, no HSM, no bruits        Extremities and Muscular Skeletal:  no deformities, clubbing, cyanosis, erythema observed;no edema              Neurological:  no gross motor deficits;affect appropriate        Psych:  Alert and Oriented x 3        CC  Den Pickard MD  [address]  [place] [address]

## 2018-03-22 NOTE — LETTER
3/22/2018      Arielle Leger MD  [employer] [address] [facility] [address]      RE: Em Richmond       Dear Colleague,    I had the pleasure of seeing Em Richmond in the [facility].    Service Date: 03/22/2018      PRIMARY CARE PHYSICIAN:  Dr. Arielle Leger.      HISTORY OF PRESENT ILLNESS:  I again had the pleasure of seeing your patient, Em Richmond, at [facility] for evaluation of a nonischemic cardiomyopathy.  The patient was admitted twice in October and November for congestive heart failure and a cardiomyopathy.  Her ejection fraction on echocardiography in 2014 was 60%-65%.  On 10/21 her ejection fraction had dropped to 30%-35%.  There was mild to moderate tricuspid regurgitation and mild to moderate mitral insufficiency.  The patient has a 2-3 year history of chronic atrial fibrillation.  She had stopped her metoprolol and she presented with rapid ventricular response.  Coronary angiography 10/23/2017 showed only mild coronary artery disease with all stenoses less than 30%.  We felt that the idiopathic cardiomyopathy was possibly due to atrial fibrillation with rapid ventricular response.  We initiated her beta blocker again.  We continued with her diuresis.  She lost 20 pounds in the hospital.  We continue to have difficulty with rate control with beta blocker alone.  Digoxin was used while in the hospital but this was discontinued at discharge.  She was intubated at one point in the hospital due to her shortness of breath.  She remains on warfarin without bleeding diathesis and her current INR not available to me.  She is having some insurance problems obtaining Toprol-XL 75 mg twice a day due to shortages of the 25 mg tablets.  The patient was on tele-management and disenrolled 11/21 because she is unable to remember her weight or call in.  She is not using her CPAP for obstructive sleep apnea as she is claustrophobic  and cannot get used to the mask.  An echocardiogram was performed on 03/06/2018 showing an ejection fraction of 35%-40%.  This suggests that she did not need implantation of her ICD.  Her heart rate remained elevated at 100-110 beats per minute.  She was at Toprol- mg per day and I suggested increasing this to 125 mg per day.  Her heart rate was still elevated and we tried to move to Toprol-XL 75 mg twice a day but met resistance from her insurance company.  The patient denies PND, orthopnea or peripheral edema.  She denies chest pain.  She has a history of essential hypertension but her blood pressure has been quite low lately.  She also complains of diarrhea.  She has dyspnea on exertion with carrying laundry up stairs but otherwise no shortness of breath.  She has had lightheadedness since October.      PHYSICAL EXAMINATION:   VITAL SIGNS:  Current blood pressure is 94/62, pulse is 104 and irregular.  Weight is 174-1/2 pounds.   CHEST:  Clear to auscultation.   CARDIAC:  Irregularly irregular rhythm, tachycardia, normal S1 and S2 without S3 gallop or murmur.  No JVD.  Pulses were all intact without bruits.   ABDOMEN:  Benign without organomegaly.   EXTREMITIES:  Without cyanosis, clubbing or edema.      ASSESSMENT:   1.  Em Richmond is a 77-year-old female with evidence of an idiopathic cardiomyopathy possibly due to atrial fibrillation with rapid ventricular response.  Because her blood pressure is so low I am going to back her Toprol-XL down to 50 mg b.i.d. and add digoxin 0.125 mg daily.  If we are unable to control her heart rate with these 2 medications I am going to ask her to see my electrophysiologist partner for possible AV amy ablation and permanent pacemaker implantation.  We will have her come back in 1 month for further evaluation of this.   2.  We will recheck an echocardiogram 2-3 months after we obtain rate control to see if her ejection fraction improves.   3.  The patient will  continue on warfarin for her atrial fibrillation.   4.  The patient will continue on furosemide for the time being.  This can be discontinued in the future should her volume status remain stable.   5.  Obstructive sleep apnea.  I have asked the patient to return to the sleep lab and physician for further evaluation and treatment options.      It is my pleasure to assist in the care of Em Dorman.  All her questions were answered to her satisfaction.      Ann Hutchinson MD       cc:   Arielle Leger MD    [place] [id number]         ANN HUTCHINSON MD, [facility]             D: 2018   T: 2018   MT: NASEEM      Name:     EM DORMAN   MRN:      [id number]        Account:      [id number]   :      1940           Service Date: 2018      Document: [id number]         Outpatient Encounter Prescriptions as of 3/22/2018   Medication Sig Dispense Refill     metoprolol succinate (TOPROL XL) 25 MG 24 hr tablet Take 2 tablets (50 mg) by mouth 2 times daily 180 tablet 3     digoxin (LANOXIN) 125 MCG tablet Take 1 tablet (125 mcg) by mouth daily 90 tablet 3     lisinopril (PRINIVIL/ZESTRIL) 5 MG tablet Take 1 tablet (5 mg) by mouth daily 90 tablet 3     furosemide (LASIX) 40 MG tablet Take 1 tablet (40 mg) by mouth daily 30 tablet 3     aspirin 81 MG chewable tablet Take 1 tablet (81 mg) by mouth daily 36 tablet 0     WARFARIN SODIUM PO Take by mouth daily As directed       meclizine (ANTIVERT) 12.5 MG tablet Take 1 tablet (12.5 mg) by mouth 3 times daily as needed for dizziness 60 tablet 1     PAROXETINE HCL PO Take 30 mg by mouth daily        OMEPRAZOLE PO Take 20 mg by mouth daily as needed        MULTIPLE VITAMINS PO Take 1 tablet by mouth daily       Cholecalciferol (VITAMIN D3 PO) Take 2,000 Units by mouth daily        [DISCONTINUED] metoprolol succinate (TOPROL XL) 25 MG 24 hr tablet Take 3 tablets (75 mg) by mouth 2 times daily 180 tablet 11      No facility-administered encounter medications on file as of 3/22/2018.        Again, thank you for allowing me to participate in the care of your patient.      Sincerely,    Den Pickard MD     [facility]

## 2018-03-23 NOTE — PROGRESS NOTES
Service Date: 03/22/2018      PRIMARY CARE PHYSICIAN:  Dr. Arielle Leger.      HISTORY OF PRESENT ILLNESS:  I again had the pleasure of seeing your patient, Em Richmond, at [facility] for evaluation of a nonischemic cardiomyopathy.  The patient was admitted twice in October and November for congestive heart failure and a cardiomyopathy.  Her ejection fraction on echocardiography in 2014 was 60%-65%.  On 10/21 her ejection fraction had dropped to 30%-35%.  There was mild to moderate tricuspid regurgitation and mild to moderate mitral insufficiency.  The patient has a 2-3 year history of chronic atrial fibrillation.  She had stopped her metoprolol and she presented with rapid ventricular response.  Coronary angiography 10/23/2017 showed only mild coronary artery disease with all stenoses less than 30%.  We felt that the idiopathic cardiomyopathy was possibly due to atrial fibrillation with rapid ventricular response.  We initiated her beta blocker again.  We continued with her diuresis.  She lost 20 pounds in the hospital.  We continue to have difficulty with rate control with beta blocker alone.  Digoxin was used while in the hospital but this was discontinued at discharge.  She was intubated at one point in the hospital due to her shortness of breath.  She remains on warfarin without bleeding diathesis and her current INR not available to me.  She is having some insurance problems obtaining Toprol-XL 75 mg twice a day due to shortages of the 25 mg tablets.  The patient was on tele-management and disenrolled 11/21 because she is unable to remember her weight or call in.  She is not using her CPAP for obstructive sleep apnea as she is claustrophobic and cannot get used to the mask.  An echocardiogram was performed on 03/06/2018 showing an ejection fraction of 35%-40%.  This suggests that she did not need implantation of her ICD.  Her heart rate remained elevated at 100-110 beats per  minute.  She was at Toprol- mg per day and I suggested increasing this to 125 mg per day.  Her heart rate was still elevated and we tried to move to Toprol-XL 75 mg twice a day but met resistance from her insurance company.  The patient denies PND, orthopnea or peripheral edema.  She denies chest pain.  She has a history of essential hypertension but her blood pressure has been quite low lately.  She also complains of diarrhea.  She has dyspnea on exertion with carrying laundry up stairs but otherwise no shortness of breath.  She has had lightheadedness since October.      PHYSICAL EXAMINATION:   VITAL SIGNS:  Current blood pressure is 94/62, pulse is 104 and irregular.  Weight is 174-1/2 pounds.   CHEST:  Clear to auscultation.   CARDIAC:  Irregularly irregular rhythm, tachycardia, normal S1 and S2 without S3 gallop or murmur.  No JVD.  Pulses were all intact without bruits.   ABDOMEN:  Benign without organomegaly.   EXTREMITIES:  Without cyanosis, clubbing or edema.      ASSESSMENT:   1.  Em Richmond is a 77-year-old female with evidence of an idiopathic cardiomyopathy possibly due to atrial fibrillation with rapid ventricular response.  Because her blood pressure is so low I am going to back her Toprol-XL down to 50 mg b.i.d. and add digoxin 0.125 mg daily.  If we are unable to control her heart rate with these 2 medications I am going to ask her to see my electrophysiologist partner for possible AV amy ablation and permanent pacemaker implantation.  We will have her come back in 1 month for further evaluation of this.   2.  We will recheck an echocardiogram 2-3 months after we obtain rate control to see if her ejection fraction improves.   3.  The patient will continue on warfarin for her atrial fibrillation.   4.  The patient will continue on furosemide for the time being.  This can be discontinued in the future should her volume status remain stable.   5.  Obstructive sleep apnea.  I have asked the  patient to return to the sleep lab and physician for further evaluation and treatment options.      It is my pleasure to assist in the care of Em Dorman.  All her questions were answered to her satisfaction.      Ann Hutchinson MD       cc:   Arielle Leger MD    [facility]    [address] [place]  [address]         ANN HUTCHINSON MD, [facility]             D: 2018   T: 2018   MT: NASEEM      Name:     EM DORMAN   MRN:      [id number]        Account:      [id number]   :      1940           Service Date: 2018      Document: [id number]

## 2018-03-30 ENCOUNTER — CARE COORDINATION (OUTPATIENT)
Dept: CARDIOLOGY | Facility: CLINIC | Age: 78
End: 2018-03-30

## 2018-03-30 DIAGNOSIS — I48.19 PERSISTENT ATRIAL FIBRILLATION (H): ICD-10-CM

## 2018-03-30 DIAGNOSIS — I50.41 ACUTE COMBINED SYSTOLIC AND DIASTOLIC CHF, NYHA CLASS 3 (H): ICD-10-CM

## 2018-03-30 DIAGNOSIS — J96.01 ACUTE RESPIRATORY FAILURE WITH HYPOXIA (H): ICD-10-CM

## 2018-03-30 RX ORDER — METOPROLOL SUCCINATE 25 MG/1
50 TABLET, EXTENDED RELEASE ORAL 2 TIMES DAILY
Qty: 360 TABLET | Refills: 3 | Status: SHIPPED | OUTPATIENT
Start: 2018-03-30 | End: 2018-05-03

## 2018-03-30 RX ORDER — LISINOPRIL 5 MG/1
5 TABLET ORAL DAILY
Qty: 90 TABLET | Refills: 3 | Status: SHIPPED | OUTPATIENT
Start: 2018-03-30 | End: 2018-04-26

## 2018-03-30 RX ORDER — FUROSEMIDE 40 MG
40 TABLET ORAL DAILY
Qty: 90 TABLET | Refills: 3 | Status: SHIPPED | OUTPATIENT
Start: 2018-03-30 | End: 2019-04-01

## 2018-04-19 ENCOUNTER — TELEPHONE (OUTPATIENT)
Dept: CARDIOLOGY | Facility: CLINIC | Age: 78
End: 2018-04-19

## 2018-04-19 NOTE — TELEPHONE ENCOUNTER
RN received phone call from patient reporting that she has been having Diarrhea for the past few months. Patient reported to RN that her PMD tested her for c-diff and she just saw the results in [facility] that showed it was negative. RN advised patient to call her PMD's office to inform them that she is still having diarrhea despite the negative c-diff test as the cardiac medications she is taking typically do not cause diarrhea. Patient acknowledged understanding and agreed with plan.

## 2018-04-23 ENCOUNTER — CARE COORDINATION (OUTPATIENT)
Dept: CARDIOLOGY | Facility: CLINIC | Age: 78
End: 2018-04-23

## 2018-04-23 DIAGNOSIS — I42.8 NONISCHEMIC CARDIOMYOPATHY (H): Primary | ICD-10-CM

## 2018-04-23 NOTE — PROGRESS NOTES
Patient's last BMP check is over 3 months ago. BMP order placed based on standard guidelines to be drawn prior to [facility] office visit. Spoke with patient's  and scheduled the BMP appointment prior to the office visit per request. Cheryl ANDERSEN

## 2018-04-26 ENCOUNTER — OFFICE VISIT (OUTPATIENT)
Dept: CARDIOLOGY | Facility: CLINIC | Age: 78
End: 2018-04-26
Attending: INTERNAL MEDICINE
Payer: COMMERCIAL

## 2018-04-26 VITALS
BODY MASS INDEX: 29.07 KG/M2 | WEIGHT: 174.5 LBS | HEIGHT: 65 IN | OXYGEN SATURATION: 99 % | HEART RATE: 70 BPM | SYSTOLIC BLOOD PRESSURE: 94 MMHG | DIASTOLIC BLOOD PRESSURE: 64 MMHG

## 2018-04-26 DIAGNOSIS — I43 TACHYCARDIA INDUCED CARDIOMYOPATHY (H): ICD-10-CM

## 2018-04-26 DIAGNOSIS — J96.01 ACUTE RESPIRATORY FAILURE WITH HYPOXIA (H): ICD-10-CM

## 2018-04-26 DIAGNOSIS — I42.8 NONISCHEMIC CARDIOMYOPATHY (H): ICD-10-CM

## 2018-04-26 DIAGNOSIS — I48.91 ATRIAL FIBRILLATION WITH RAPID VENTRICULAR RESPONSE (H): ICD-10-CM

## 2018-04-26 DIAGNOSIS — I42.9 IDIOPATHIC CARDIOMYOPATHY (H): ICD-10-CM

## 2018-04-26 DIAGNOSIS — I50.41 ACUTE COMBINED SYSTOLIC AND DIASTOLIC CHF, NYHA CLASS 3 (H): ICD-10-CM

## 2018-04-26 DIAGNOSIS — R00.0 TACHYCARDIA INDUCED CARDIOMYOPATHY (H): ICD-10-CM

## 2018-04-26 LAB
ANION GAP SERPL CALCULATED.3IONS-SCNC: 9 MMOL/L (ref 3–14)
BUN SERPL-MCNC: 15 MG/DL (ref 7–30)
CALCIUM SERPL-MCNC: 8.5 MG/DL (ref 8.5–10.1)
CHLORIDE SERPL-SCNC: 100 MMOL/L (ref 94–109)
CO2 SERPL-SCNC: 27 MMOL/L (ref 20–32)
CREAT SERPL-MCNC: 0.98 MG/DL (ref 0.52–1.04)
GFR SERPL CREATININE-BSD FRML MDRD: 56 ML/MIN/1.7M2
GLUCOSE SERPL-MCNC: 112 MG/DL (ref 70–99)
POTASSIUM SERPL-SCNC: 3.9 MMOL/L (ref 3.4–5.3)
SODIUM SERPL-SCNC: 136 MMOL/L (ref 133–144)

## 2018-04-26 PROCEDURE — 99214 OFFICE O/P EST MOD 30 MIN: CPT | Performed by: NURSE PRACTITIONER

## 2018-04-26 PROCEDURE — 36415 COLL VENOUS BLD VENIPUNCTURE: CPT | Performed by: NURSE PRACTITIONER

## 2018-04-26 PROCEDURE — 80048 BASIC METABOLIC PNL TOTAL CA: CPT | Performed by: NURSE PRACTITIONER

## 2018-04-26 RX ORDER — LISINOPRIL 5 MG/1
TABLET ORAL
Qty: 90 TABLET | Refills: 3 | COMMUNITY
Start: 2018-04-26 | End: 2018-05-03 | Stop reason: SINTOL

## 2018-04-26 RX ORDER — LOPERAMIDE HCL 2 MG
2 CAPSULE ORAL PRN
COMMUNITY

## 2018-04-26 NOTE — LETTER
4/26/2018      Arielle Leger MD  [employer] [address] [facility] [address]      RE: Em Richmond       Dear Colleague,    I had the pleasure of seeing Em Richmond in the [facility].    Service Date: 04/26/2018      PRIMARY CARE PHYSICIAN:  Dr. Arielle Leger.      REASON FOR VISIT:  [facility] followup/CHF management.      HISTORY OF PRESENT ILLNESS:    I had the pleasure of seeing Em Richmond, a pleasant 77-year-old patient with a nonischemic cardiomyopathy.  The patient was admitted twice in October and November for congestive heart failure and cardiomyopathy.  Ejection fraction in 2014 was 60%-65%.      In 10/2017 her ejection fraction dropped to 30%-35%.  There was mild to moderate tricuspid regurgitation and mild to moderate mitral insufficiency.  The patient has a 2-3 history of chronic atrial fibrillation.  She had stopped her metoprolol and presented with rapid ventricular response.      Coronary angiography 10/23/2017 showed only mild coronary artery disease with all stenosis less than 30%.  We felt that the idiopathic cardiomyopathy was probably due to atrial fibrillation with rapid ventricular response.  We initiated her beta blocker again.  She continued with diuresis.  She lost 20 pounds in the hospital.  We continued to have difficulty with rate control with beta blocker alone.  Digoxin was used in the hospital, but this was discontinued at discharge.  She was intubated at one point in the hospital due to shortness of breath.  She remains on warfarin.  She has had insurance problems with Toprol-XL 75 mg twice a day due to shortages of the 25 mg tablets.  She enrolled in the [facility].  She enrolled in the Telemanagement, but she had trouble remembering to call in, so she was disenrolled.  She does not use a CPAP because she is claustrophobic, but she is open to seeing a sleep doctor.      An echocardiogram repeated on 03/06/2018  showed an LVEF of 35%-40%.  This suggests she does not meet criteria for ICD.  She was recently seen by Dr. Pickard, and her heart rates remained 100-110 beats per minute.  Metoprolol was decreased to 50 mg twice a day, and she was started on digoxin 125 mcg a day.  Heart rate today has improved.  She states that she has had diarrhea for 3 months.  She does not know if it was after she started the medication.  She has had a few new medications as of late.  She was seen by Dr. Leger, and she suggested to take Imodium.      She denies increased shortness of breath, PND, orthopnea, syncope or near syncope.  Her weight has been stable at home at 171 pounds.      PHYSICAL EXAMINATION:   VITAL SIGNS:  Blood pressure 94/64, pulse is 70 and irregular.  Weight is 174 pounds in the clinic and 171 pounds at home.  Please see complete physical examination below.      BASIC METABOLIC PANEL:  performed:  Sodium 136, potassium 3.9, BUN 15, creatinine 0.98, GFR 56.      IMPRESSION AND PLAN:   1.  Em Richmond is a pleasant 77-year-old female with evidence of nonischemic cardiomyopathy, possibly due to atrial fibrillation with rapid ventricular response.  I have asked her to hold her lisinopril for 1 week to determine if this is the medication that is causing diarrhea.  I have asked the [facility] nurse to call her on 05/03 to ask her if the diarrhea subsided after holding lisinopril.  If in fact it stopped the diarrhea, then I would suggest to discontinue lisinopril altogether and start losartan 25 mg a day.  I have asked her to return in 3 weeks to reassess the situation.      Dr. Pickard suggested repeat echocardiogram in 2-3 months as we obtain rate control.      2.  The patient will continue on warfarin for her atrial fibrillation.   3.  Continue furosemide for the time being.   4.  Obstructive sleep apnea.  I have referred her to Dr. Freitas.         DOMINIK ELIZABETH, CNP             D: 04/26/2018   T:  2018   MT: NASEEM      Name:     RICARDO DORMAN   MRN:      [id number]        Account:      [id number]   :      1940           Service Date: 2018      Document: [id number]         Outpatient Encounter Prescriptions as of 2018   Medication Sig Dispense Refill     aspirin 81 MG chewable tablet Take 1 tablet (81 mg) by mouth daily 36 tablet 0     Cholecalciferol (VITAMIN D3 PO) Take 2,000 Units by mouth daily        digoxin (LANOXIN) 125 MCG tablet Take 1 tablet (125 mcg) by mouth daily 90 tablet 3     furosemide (LASIX) 40 MG tablet Take 1 tablet (40 mg) by mouth daily 90 tablet 3     lisinopril (PRINIVIL/ZESTRIL) 5 MG tablet Hold for a week. 90 tablet 3     loperamide (IMODIUM A-D) 2 MG capsule Take 2 mg by mouth as needed for diarrhea       metoprolol succinate (TOPROL XL) 25 MG 24 hr tablet Take 2 tablets (50 mg) by mouth 2 times daily 360 tablet 3     MULTIPLE VITAMINS PO Take 1 tablet by mouth daily       OMEPRAZOLE PO Take 20 mg by mouth daily as needed        PAROXETINE HCL PO Take 30 mg by mouth daily        WARFARIN SODIUM PO Take by mouth daily As directed       meclizine (ANTIVERT) 12.5 MG tablet Take 1 tablet (12.5 mg) by mouth 3 times daily as needed for dizziness (Patient not taking: Reported on 2018) 60 tablet 1     [DISCONTINUED] lisinopril (PRINIVIL/ZESTRIL) 5 MG tablet Take 1 tablet (5 mg) by mouth daily 90 tablet 3     No facility-administered encounter medications on file as of 2018.        Again, thank you for allowing me to participate in the care of your patient.      Sincerely,    DOMINIK [facility]

## 2018-04-26 NOTE — MR AVS SNAPSHOT
After Visit Summary   4/26/2018    Em Richmond    MRN: [id number]           Patient Information     Date Of Birth          1940        Visit Information        Provider Department      4/26/2018 3:50 PM Mandie Rose APRN CNP [facility]        Today's Diagnoses     Atrial fibrillation with rapid ventricular response (H)        Acute combined systolic and diastolic CHF, NYHA class 3 (H)        Idiopathic cardiomyopathy (H)        Tachycardia induced cardiomyopathy (H)        Acute respiratory failure with hypoxia (H)          Care Instructions    Call the C.O.R.E. nurse for any questions or concerns:  [phone]    1.  Medication changes from today: Restart omeprazole and hold lisinopril for one week. The CORE Nurse will call you on MAY 3 to review it.     2.  Weigh yourself daily and write it down.    3.  Call CORE nurse if your weight is up more than 2 pounds in one day or 5 pounds in one week.    4.  Call CORE nurse if you feel more short of breath, have more abdominal bloating, or leg swelling.    5.  Continue low sodium diet (less than 2000 mg daily). If you eat less salt, you will retain less fluid.    6.  Alcohol can weaken your heart further. You should avoid alcohol or limit its use to special times, such as a holiday or birthday.     7.  Do NOT take Aleve (naproxen) or Advil (ibuprofen) without talking to your doctor first.     8.  Lab Results: In process.     CORE Clinic: Cardiomyopathy, Optimization, Rehabilitation, Education  The CORE Clinic is a heart failure specialty clinic within the [facility] where you will work with your cardiologist, nurse practitioners, physician assistants and registered nurses who specialize in heart failure care. They are dedicated to helping patients with heart failure to carefully adjust medications, receive education, and learn who and when to call if symptoms  develop. They specialize in helping you better understand your condition, slow the progression of your disease, improve the length and quality of your life, help you detect future heart problems before they become life threatening, and avoid hospitalizations.            Follow-ups after your visit        Additional Services     SLEEP EVALUATION & MANAGEMENT REFERRAL - ADULT -[facility]  [phone] (Age 18 and up)       Please be aware that coverage of these services is subject to the terms and limitations of your health insurance plan.  Call member services at your health plan with any benefit or coverage questions.      Please bring the following to your appointment:    >>   List of current medications   >>   This referral request   >>   Any documents/labs given to you for this referral                Follow-Up with CORE Clinic                 Your next 10 appointments already scheduled     May 17, 2018 11:30 AM CDT   LAB with RU LAB   [facility] ([facility])    [address] [place] [id number] [phone]           Please do not eat 10-12 hours before your appointment if you are coming in fasting for labs on lipids, cholesterol, or glucose (sugar). This does not apply to pregnant women. Water, hot tea and black coffee (with nothing added) are okay. Do not drink other fluids, diet soda or chew gum.            May 17, 2018 12:30 PM CDT   Core Return with DOMINIK White CNP   [facility] ([facility])    [address] [place] [id number] [phone]            Jun 25, 2018  2:45 PM CDT   Return Visit with Den Pickard MD   [facility] ([facility])    [address] [place] [id number] [phone]              Future tests that were ordered for you today      "Open Future Orders        Priority Expected Expires Ordered    Follow-Up with CORE Clinic Routine 2018    SLEEP EVALUATION & MANAGEMENT REFERRAL - ADULT -[facility] [place]  [phone] (Age 18 and up) Routine 5/3/2018 2019 2018            Who to contact     If you have questions or need follow up information about today's clinic visit or your schedule please contact [facility] directly at [phone].  Normal or non-critical lab and imaging results will be communicated to you by Infoxelhart, letter or phone within 4 business days after the clinic has received the results. If you do not hear from us within 7 days, please contact the clinic through CenTrakt or phone. If you have a critical or abnormal lab result, we will notify you by phone as soon as possible.  Submit refill requests through AkesoGenX or call your pharmacy and they will forward the refill request to us. Please allow 3 business days for your refill to be completed.          Additional Information About Your Visit        InfoxelharNanoVelos Information     AkesoGenX lets you send messages to your doctor, view your test results, renew your prescriptions, schedule appointments and more. To sign up, go to [email or web address] . Click on \"Log in\" on the left side of the screen, which will take you to the Welcome page. Then click on \"Sign up Now\" on the right side of the page.     You will be asked to enter the access code listed below, as well as some personal information. Please follow the directions to create your username and password.     Your access code is: [email or web address]  Expires: 2018  5:17 PM     Your access code will  in 90 days. If you need help or a new code, please call your [place] clinic or [phone].        Care EveryWhere ID     This is your Care EveryWhere ID. This could be used by other organizations to access your [place] medical " "records  [id number]        Your Vitals Were     Pulse Height Pulse Oximetry BMI (Body Mass Index)          70 1.651 m (5' 5\") 99% 29.04 kg/m2         Blood Pressure from Last 3 Encounters:   04/26/18 94/64   03/22/18 94/62   01/09/18 118/80    Weight from Last 3 Encounters:   04/26/18 79.2 kg (174 lb 8 oz)   03/22/18 79.2 kg (174 lb 8 oz)   01/09/18 78.3 kg (172 lb 11.2 oz)              We Performed the Following     Follow-Up with Cardiac Advanced Practice Provider          Today's Medication Changes          These changes are accurate as of 4/26/18  4:28 PM.  If you have any questions, ask your nurse or doctor.               These medicines have changed or have updated prescriptions.        Dose/Directions    lisinopril 5 MG tablet   Commonly known as:  PRINIVIL/ZESTRIL   This may have changed:    - how much to take  - how to take this  - when to take this  - additional instructions   Used for:  Acute respiratory failure with hypoxia (H)   Changed by:  Mandie Rose APRN CNP        Hold for a week.   Quantity:  90 tablet   Refills:  3                Primary Care Provider Office Phone # Fax #    Arielle Nicole Leger [phone]       [facility] [address] [facility] [address]        Equal Access to Services     DANISH MARTINEZ AH: Hadii corbin ku hadasho Soomaali, waaxda luqadaha, qaybta kaalmada adeegyada, scout campbell. So [facility] [phone].    ATENCIÓN: Si habla español, tiene a delgadillo disposición servicios gratuitos de asistencia lingüística. Jovon al [phone].    We comply with applicable federal civil rights laws and [place] laws. We do not discriminate on the basis of race, color, national origin, age, disability, sex, sexual orientation, or gender identity.            Thank you!     Thank you for choosing [facility]  for your care. Our goal is always to provide you with excellent care. Hearing back from our " patients is one way we can continue to improve our services. Please take a few minutes to complete the written survey that you may receive in the mail after your visit with us. Thank you!             Your Updated Medication List - Protect others around you: Learn how to safely use, store and throw away your medicines at www.disposemymeds.org.          This list is accurate as of 4/26/18  4:28 PM.  Always use your most recent med list.                   Brand Name Dispense Instructions for use Diagnosis    aspirin 81 MG chewable tablet     36 tablet    Take 1 tablet (81 mg) by mouth daily    Acute respiratory failure with hypoxia (H)       digoxin 125 MCG tablet    LANOXIN    90 tablet    Take 1 tablet (125 mcg) by mouth daily    Atrial fibrillation with rapid ventricular response (H)       furosemide 40 MG tablet    LASIX    90 tablet    Take 1 tablet (40 mg) by mouth daily    Acute combined systolic and diastolic CHF, NYHA class 3 (H)       IMODIUM A-D 2 MG capsule   Generic drug:  loperamide      Take 2 mg by mouth as needed for diarrhea        lisinopril 5 MG tablet    PRINIVIL/ZESTRIL    90 tablet    Hold for a week.    Acute respiratory failure with hypoxia (H)       meclizine 12.5 MG tablet    ANTIVERT    60 tablet    Take 1 tablet (12.5 mg) by mouth 3 times daily as needed for dizziness    Labyrinthitis, bilateral       metoprolol succinate 25 MG 24 hr tablet    TOPROL XL    360 tablet    Take 2 tablets (50 mg) by mouth 2 times daily    Persistent atrial fibrillation (H)       MULTIPLE VITAMINS PO      Take 1 tablet by mouth daily        OMEPRAZOLE PO      Take 20 mg by mouth daily as needed        PAROXETINE HCL PO      Take 30 mg by mouth daily        VITAMIN D3 PO      Take 2,000 Units by mouth daily        WARFARIN SODIUM PO      Take by mouth daily As directed           non-pitting

## 2018-04-26 NOTE — PATIENT INSTRUCTIONS
Call the C.O.R.E. nurse for any questions or concerns:  [phone]    1.  Medication changes from today: Restart omeprazole and hold lisinopril for one week. The CORE Nurse will call you on MAY 3 to review it.     2.  Weigh yourself daily and write it down.    3.  Call CORE nurse if your weight is up more than 2 pounds in one day or 5 pounds in one week.    4.  Call CORE nurse if you feel more short of breath, have more abdominal bloating, or leg swelling.    5.  Continue low sodium diet (less than 2000 mg daily). If you eat less salt, you will retain less fluid.    6.  Alcohol can weaken your heart further. You should avoid alcohol or limit its use to special times, such as a holiday or birthday.     7.  Do NOT take Aleve (naproxen) or Advil (ibuprofen) without talking to your doctor first.     8.  Lab Results: In process.     CORE Clinic: Cardiomyopathy, Optimization, Rehabilitation, Education  The CORE Clinic is a heart failure specialty clinic within the [facility] where you will work with your cardiologist, nurse practitioners, physician assistants and registered nurses who specialize in heart failure care. They are dedicated to helping patients with heart failure to carefully adjust medications, receive education, and learn who and when to call if symptoms develop. They specialize in helping you better understand your condition, slow the progression of your disease, improve the length and quality of your life, help you detect future heart problems before they become life threatening, and avoid hospitalizations.

## 2018-04-26 NOTE — PROGRESS NOTES
HPI and Plan:   See [id number]    Orders Placed This Encounter   Procedures     SLEEP EVALUATION & MANAGEMENT REFERRAL - ADULT -[facility]  [phone] (Age 18 and up)     Follow-Up with CORE Clinic       Orders Placed This Encounter   Medications     loperamide (IMODIUM A-D) 2 MG capsule     Sig: Take 2 mg by mouth as needed for diarrhea     lisinopril (PRINIVIL/ZESTRIL) 5 MG tablet     Sig: Hold for a week.     Dispense:  90 tablet     Refill:  3       Medications Discontinued During This Encounter   Medication Reason     lisinopril (PRINIVIL/ZESTRIL) 5 MG tablet Reorder         Encounter Diagnoses   Name Primary?     Atrial fibrillation with rapid ventricular response (H)      Acute combined systolic and diastolic CHF, NYHA class 3 (H)      Idiopathic cardiomyopathy (H)      Tachycardia induced cardiomyopathy (H)      Acute respiratory failure with hypoxia (H)        CURRENT MEDICATIONS:  Current Outpatient Prescriptions   Medication Sig Dispense Refill     aspirin 81 MG chewable tablet Take 1 tablet (81 mg) by mouth daily 36 tablet 0     Cholecalciferol (VITAMIN D3 PO) Take 2,000 Units by mouth daily        digoxin (LANOXIN) 125 MCG tablet Take 1 tablet (125 mcg) by mouth daily 90 tablet 3     furosemide (LASIX) 40 MG tablet Take 1 tablet (40 mg) by mouth daily 90 tablet 3     lisinopril (PRINIVIL/ZESTRIL) 5 MG tablet Hold for a week. 90 tablet 3     loperamide (IMODIUM A-D) 2 MG capsule Take 2 mg by mouth as needed for diarrhea       metoprolol succinate (TOPROL XL) 25 MG 24 hr tablet Take 2 tablets (50 mg) by mouth 2 times daily 360 tablet 3     MULTIPLE VITAMINS PO Take 1 tablet by mouth daily       OMEPRAZOLE PO Take 20 mg by mouth daily as needed        PAROXETINE HCL PO Take 30 mg by mouth daily        WARFARIN SODIUM PO Take by mouth daily As directed       meclizine (ANTIVERT) 12.5 MG tablet Take 1 tablet (12.5 mg) by mouth 3 times daily as needed for dizziness (Patient not  "taking: Reported on 4/26/2018) 60 tablet 1     [DISCONTINUED] lisinopril (PRINIVIL/ZESTRIL) 5 MG tablet Take 1 tablet (5 mg) by mouth daily 90 tablet 3       ALLERGIES     Allergies   Allergen Reactions     Sulfa Drugs        PAST MEDICAL HISTORY:  Past Medical History:   Diagnosis Date     Atrial fibrillation (H)      Cardiogenic shock (H) 11/05/2017       PAST SURGICAL HISTORY:  Past Surgical History:   Procedure Laterality Date     HEART CATH RIGHT AND LEFT HEART CATH  10/23/2017    Cath no significant obstructive CAD. RHC: mild PHTN/NL wedge/ low CI.        FAMILY HISTORY:  Family History   Problem Relation Age of Onset     DIABETES Mother      Other Cancer Father        SOCIAL HISTORY:  Social History     Social History     Marital status:      Spouse name: N/A     Number of children: N/A     Years of education: N/A     Social History Main Topics     Smoking status: Former Smoker     Types: Cigarettes     Quit date: 1/1/1965     Smokeless tobacco: Never Used     Alcohol use 0.0 oz/week      Comment: 8 oz wine daily     Drug use: No     Sexual activity: Yes     Partners: Male     Other Topics Concern     None     Social History Narrative       Review of Systems:  Skin:  Negative       Eyes:  Positive for glasses    ENT:  Positive for nasal congestion;postnasal drainage    Respiratory:  Positive for sleep apnea;dyspnea on exertion     Cardiovascular:    fatigue;lightheadedness;Positive for;dizziness;edema edema in right foot  Gastroenterology: Positive for nausea;diarrhea;poor appetite;abdominal pain;excessive gas or bloating    Genitourinary:  Negative      Musculoskeletal:  Negative      Neurologic:  Positive for incoordination states she gets \"off balance\"   Psychiatric:  Negative      Heme/Lymph/Imm:  Negative      Endocrine:  Negative        Physical Exam:  Vitals: BP 94/64 (BP Location: Right arm, Patient Position: Chair, Cuff Size: Adult Regular)  Pulse 70  Ht 1.651 m (5' 5\")  Wt 79.2 kg (174 lb " 8 oz)  SpO2 99%  BMI 29.04 kg/m2    Constitutional:  cooperative, alert and oriented, well developed, well nourished, in no acute distress        Skin:  warm and dry to the touch, no apparent skin lesions or masses noted          Head:  normocephalic, no masses or lesions        Eyes:  pupils equal and round;conjunctivae and lids unremarkable;sclera white;no xanthalasma        Lymph:      ENT:  no pallor or cyanosis, dentition good        Neck:  carotid pulses are full and equal bilaterally, JVP normal, no carotid bruit        Respiratory:  normal breath sounds, clear to auscultation, normal A-P diameter, normal symmetry, normal respiratory excursion, no use of accessory muscles         Cardiac: normal S1 and S2;no S3 or S4;apical impulse not displaced irregularly irregular rhythm;tachycardic   no presence of murmur                                                   GI:  abdomen soft, non-tender, BS normoactive, no mass, no HSM, no bruits        Extremities and Muscular Skeletal:  no deformities, clubbing, cyanosis, erythema observed;no edema              Neurological:  no gross motor deficits;affect appropriate        Psych:  Alert and Oriented x 3        CC  Den Pickard MD  [address]  [place] [address]

## 2018-04-26 NOTE — LETTER
4/26/2018    Arielle Leger MD  [facility] [address] [facility] [id number]    RE: Em Richmond       Dear Colleague,    I had the pleasure of seeing Em Richmond in the [facility].    HPI and Plan:   See [id number]    Orders Placed This Encounter   Procedures     SLEEP EVALUATION & MANAGEMENT REFERRAL - [facility]  [phone] (Age 18 and up)     Follow-Up with CORE Clinic       Orders Placed This Encounter   Medications     loperamide (IMODIUM A-D) 2 MG capsule     Sig: Take 2 mg by mouth as needed for diarrhea     lisinopril (PRINIVIL/ZESTRIL) 5 MG tablet     Sig: Hold for a week.     Dispense:  90 tablet     Refill:  3       Medications Discontinued During This Encounter   Medication Reason     lisinopril (PRINIVIL/ZESTRIL) 5 MG tablet Reorder         Encounter Diagnoses   Name Primary?     Atrial fibrillation with rapid ventricular response (H)      Acute combined systolic and diastolic CHF, NYHA class 3 (H)      Idiopathic cardiomyopathy (H)      Tachycardia induced cardiomyopathy (H)      Acute respiratory failure with hypoxia (H)        CURRENT MEDICATIONS:  Current Outpatient Prescriptions   Medication Sig Dispense Refill     aspirin 81 MG chewable tablet Take 1 tablet (81 mg) by mouth daily 36 tablet 0     Cholecalciferol (VITAMIN D3 PO) Take 2,000 Units by mouth daily        digoxin (LANOXIN) 125 MCG tablet Take 1 tablet (125 mcg) by mouth daily 90 tablet 3     furosemide (LASIX) 40 MG tablet Take 1 tablet (40 mg) by mouth daily 90 tablet 3     lisinopril (PRINIVIL/ZESTRIL) 5 MG tablet Hold for a week. 90 tablet 3     loperamide (IMODIUM A-D) 2 MG capsule Take 2 mg by mouth as needed for diarrhea       metoprolol succinate (TOPROL XL) 25 MG 24 hr tablet Take 2 tablets (50 mg) by mouth 2 times daily 360 tablet 3     MULTIPLE VITAMINS PO Take 1 tablet by mouth daily       OMEPRAZOLE PO Take 20 mg by mouth daily as  "needed        PAROXETINE HCL PO Take 30 mg by mouth daily        WARFARIN SODIUM PO Take by mouth daily As directed       meclizine (ANTIVERT) 12.5 MG tablet Take 1 tablet (12.5 mg) by mouth 3 times daily as needed for dizziness (Patient not taking: Reported on 4/26/2018) 60 tablet 1     [DISCONTINUED] lisinopril (PRINIVIL/ZESTRIL) 5 MG tablet Take 1 tablet (5 mg) by mouth daily 90 tablet 3       ALLERGIES     Allergies   Allergen Reactions     Sulfa Drugs        PAST MEDICAL HISTORY:  Past Medical History:   Diagnosis Date     Atrial fibrillation (H)      Cardiogenic shock (H) 11/05/2017       PAST SURGICAL HISTORY:  Past Surgical History:   Procedure Laterality Date     HEART CATH RIGHT AND LEFT HEART CATH  10/23/2017    Cath no significant obstructive CAD. RHC: mild PHTN/NL wedge/ low CI.        FAMILY HISTORY:  Family History   Problem Relation Age of Onset     DIABETES Mother      Other Cancer Father        SOCIAL HISTORY:  Social History     Social History     Marital status:      Spouse name: N/A     Number of children: N/A     Years of education: N/A     Social History Main Topics     Smoking status: Former Smoker     Types: Cigarettes     Quit date: 1/1/1965     Smokeless tobacco: Never Used     Alcohol use 0.0 oz/week      Comment: 8 oz wine daily     Drug use: No     Sexual activity: Yes     Partners: Male     Other Topics Concern     None     Social History Narrative       Review of Systems:  Skin:  Negative       Eyes:  Positive for glasses    ENT:  Positive for nasal congestion;postnasal drainage    Respiratory:  Positive for sleep apnea;dyspnea on exertion     Cardiovascular:    fatigue;lightheadedness;Positive for;dizziness;edema edema in right foot  Gastroenterology: Positive for nausea;diarrhea;poor appetite;abdominal pain;excessive gas or bloating    Genitourinary:  Negative      Musculoskeletal:  Negative      Neurologic:  Positive for incoordination states she gets \"off balance\" " "  Psychiatric:  Negative      Heme/Lymph/Imm:  Negative      Endocrine:  Negative        Physical Exam:  Vitals: BP 94/64 (BP Location: Right arm, Patient Position: Chair, Cuff Size: Adult Regular)  Pulse 70  Ht 1.651 m (5' 5\")  Wt 79.2 kg (174 lb 8 oz)  SpO2 99%  BMI 29.04 kg/m2    Constitutional:  cooperative, alert and oriented, well developed, well nourished, in no acute distress        Skin:  warm and dry to the touch, no apparent skin lesions or masses noted          Head:  normocephalic, no masses or lesions        Eyes:  pupils equal and round;conjunctivae and lids unremarkable;sclera white;no xanthalasma        Lymph:      ENT:  no pallor or cyanosis, dentition good        Neck:  carotid pulses are full and equal bilaterally, JVP normal, no carotid bruit        Respiratory:  normal breath sounds, clear to auscultation, normal A-P diameter, normal symmetry, normal respiratory excursion, no use of accessory muscles         Cardiac: normal S1 and S2;no S3 or S4;apical impulse not displaced irregularly irregular rhythm;tachycardic   no presence of murmur                                                   GI:  abdomen soft, non-tender, BS normoactive, no mass, no HSM, no bruits        Extremities and Muscular Skeletal:  no deformities, clubbing, cyanosis, erythema observed;no edema              Neurological:  no gross motor deficits;affect appropriate        Psych:  Alert and Oriented x 3        CC  Den Pickard MD  [address]  [place] [address]                Thank you for allowing me to participate in the care of your patient.      Sincerely,     DOMINIK [facility]    cc:   Den Pickard MD  [address]  [place] [address]        "

## 2018-04-27 NOTE — PROGRESS NOTES
Service Date: 04/26/2018      PRIMARY CARE PHYSICIAN:  Dr. Arielle Leger.      REASON FOR VISIT:  [facility] followup/CHF management.      HISTORY OF PRESENT ILLNESS:    I had the pleasure of seeing Em Richmond, a pleasant 77-year-old patient with a nonischemic cardiomyopathy.  The patient was admitted twice in October and November for congestive heart failure and cardiomyopathy.  Ejection fraction in 2014 was 60%-65%.      In 10/2017 her ejection fraction dropped to 30%-35%.  There was mild to moderate tricuspid regurgitation and mild to moderate mitral insufficiency.  The patient has a 2-3 history of chronic atrial fibrillation.  She had stopped her metoprolol and presented with rapid ventricular response.      Coronary angiography 10/23/2017 showed only mild coronary artery disease with all stenosis less than 30%.  We felt that the idiopathic cardiomyopathy was probably due to atrial fibrillation with rapid ventricular response.  We initiated her beta blocker again.  She continued with diuresis.  She lost 20 pounds in the hospital.  We continued to have difficulty with rate control with beta blocker alone.  Digoxin was used in the hospital, but this was discontinued at discharge.  She was intubated at one point in the hospital due to shortness of breath.  She remains on warfarin.  She has had insurance problems with Toprol-XL 75 mg twice a day due to shortages of the 25 mg tablets.  She enrolled in the [facility].  She enrolled in the [employer], but she had trouble remembering to call in, so she was disenrolled.  She does not use a CPAP because she is claustrophobic, but she is open to seeing a sleep doctor.      An echocardiogram repeated on 03/06/2018 showed an LVEF of 35%-40%.  This suggests she does not meet criteria for ICD.  She was recently seen by Dr. Pickard, and her heart rates remained 100-110 beats per minute.  Metoprolol was decreased to 50 mg twice a day, and she was started on  digoxin 125 mcg a day.  Heart rate today has improved.  She states that she has had diarrhea for 3 months.  She does not know if it was after she started the medication.  She has had a few new medications as of late.  She was seen by Dr. Leger, and she suggested to take Imodium.      She denies increased shortness of breath, PND, orthopnea, syncope or near syncope.  Her weight has been stable at home at 171 pounds.      PHYSICAL EXAMINATION:   VITAL SIGNS:  Blood pressure 94/64, pulse is 70 and irregular.  Weight is 174 pounds in the clinic and 171 pounds at home.  Please see complete physical examination below.      BASIC METABOLIC PANEL:  performed:  Sodium 136, potassium 3.9, BUN 15, creatinine 0.98, GFR 56.      IMPRESSION AND PLAN:   1.  Ricardo Dorman is a pleasant 77-year-old female with evidence of nonischemic cardiomyopathy, possibly due to atrial fibrillation with rapid ventricular response.  I have asked her to hold her lisinopril for 1 week to determine if this is the medication that is causing diarrhea.  I have asked the C.O.R.E. nurse to call her on  to ask her if the diarrhea subsided after holding lisinopril.  If in fact it stops the diarrhea, then I would suggest to discontinue lisinopril altogether and start losartan 25 mg a day.  I have asked her to return in 3 weeks to reassess the situation. Dr. Pickard suggested repeat echocardiogram in 2-3 months.      2.  The patient will continue on warfarin for her atrial fibrillation.   3.  Continue furosemide for the time being.   4.  Obstructive sleep apnea.  I have referred her to Dr. Freitas.         DOMINIK ELIZABETH, CNP             D: 2018   T: 2018   MT: NASEEM      Name:     RICARDO DORMAN   MRN:      [id number]        Account:      [id number]   :      1940           Service Date: 2018      Document: [id number]

## 2018-05-03 ENCOUNTER — CARE COORDINATION (OUTPATIENT)
Dept: CARDIOLOGY | Facility: CLINIC | Age: 78
End: 2018-05-03

## 2018-05-03 DIAGNOSIS — I48.19 PERSISTENT ATRIAL FIBRILLATION (H): ICD-10-CM

## 2018-05-03 DIAGNOSIS — I42.8 NONISCHEMIC CARDIOMYOPATHY (H): Primary | ICD-10-CM

## 2018-05-03 RX ORDER — LOSARTAN POTASSIUM 25 MG/1
25 TABLET ORAL DAILY
Qty: 90 TABLET | Refills: 3 | Status: SHIPPED | OUTPATIENT
Start: 2018-05-03 | End: 2019-04-30

## 2018-05-03 RX ORDER — METOPROLOL SUCCINATE 50 MG/1
50 TABLET, EXTENDED RELEASE ORAL 2 TIMES DAILY
Qty: 180 TABLET | Refills: 3 | Status: SHIPPED | OUTPATIENT
Start: 2018-05-03 | End: 2019-04-25

## 2018-05-03 NOTE — PROGRESS NOTES
Patient was seen by Dusty Rose CNP 4/26/18. Lisinopril was stopped to see if her diarrhea would improve. Patient said the diarrhea improved. Per Dusty Rose CNP dictation plan, told patient to no longer take lisinopril and start losartan 25 mg daily. She wrote down and repeated back the instruction. She also requested metoprolol XL in 50 mg tablet size per a letter from her insurance that she received. E-prescriptions sent. Dyan ANDERSEN

## 2018-05-15 DIAGNOSIS — I50.22 CHRONIC SYSTOLIC HEART FAILURE (H): Primary | ICD-10-CM

## 2018-05-17 ENCOUNTER — OFFICE VISIT (OUTPATIENT)
Dept: CARDIOLOGY | Facility: CLINIC | Age: 78
End: 2018-05-17
Attending: NURSE PRACTITIONER
Payer: COMMERCIAL

## 2018-05-17 VITALS
WEIGHT: 175.4 LBS | HEIGHT: 65 IN | HEART RATE: 71 BPM | BODY MASS INDEX: 29.22 KG/M2 | RESPIRATION RATE: 18 BRPM | DIASTOLIC BLOOD PRESSURE: 62 MMHG | SYSTOLIC BLOOD PRESSURE: 112 MMHG | OXYGEN SATURATION: 99 %

## 2018-05-17 DIAGNOSIS — I50.22 CHRONIC SYSTOLIC HEART FAILURE (H): ICD-10-CM

## 2018-05-17 DIAGNOSIS — I48.91 ATRIAL FIBRILLATION WITH RAPID VENTRICULAR RESPONSE (H): ICD-10-CM

## 2018-05-17 LAB
ANION GAP SERPL CALCULATED.3IONS-SCNC: 7 MMOL/L (ref 3–14)
BUN SERPL-MCNC: 10 MG/DL (ref 7–30)
CALCIUM SERPL-MCNC: 8.8 MG/DL (ref 8.5–10.1)
CHLORIDE SERPL-SCNC: 105 MMOL/L (ref 94–109)
CO2 SERPL-SCNC: 26 MMOL/L (ref 20–32)
CREAT SERPL-MCNC: 0.74 MG/DL (ref 0.52–1.04)
GFR SERPL CREATININE-BSD FRML MDRD: 76 ML/MIN/1.7M2
GLUCOSE SERPL-MCNC: 116 MG/DL (ref 70–99)
POTASSIUM SERPL-SCNC: 4 MMOL/L (ref 3.4–5.3)
SODIUM SERPL-SCNC: 138 MMOL/L (ref 133–144)

## 2018-05-17 PROCEDURE — 80048 BASIC METABOLIC PNL TOTAL CA: CPT | Performed by: NURSE PRACTITIONER

## 2018-05-17 PROCEDURE — 99214 OFFICE O/P EST MOD 30 MIN: CPT | Performed by: NURSE PRACTITIONER

## 2018-05-17 PROCEDURE — 36415 COLL VENOUS BLD VENIPUNCTURE: CPT | Performed by: NURSE PRACTITIONER

## 2018-05-17 ASSESSMENT — PAIN SCALES - GENERAL: PAINLEVEL: NO PAIN (0)

## 2018-05-17 NOTE — NURSING NOTE
The After Visit Summary was reviewed with the patient following their office visit with Mandie Rose CNP. Patient was educated about any medication changes, the importance of following a low sodium diet, importance of recording daily weights, and when to call [facility] clinic. Patient verbalized understanding of the information and agrees to call with any questions or concerns.     Labs: Lab results from today were reviewed with the patient during the office visit. A copy of the results were provided on the After Visit Summary.     Return appointment: Patient was given instructions on when and how to schedule their next office visit with the [facility] clinic.     Radha High RN  [facility] Care Coordinator  [facility]  [phone]

## 2018-05-17 NOTE — MR AVS SNAPSHOT
After Visit Summary   5/17/2018    Em Richmond    MRN: [id number]           Patient Information     Date Of Birth          1940        Visit Information        Provider Department      5/17/2018 12:30 PM Mandie Rose APRN CNP [facility]        Today's Diagnoses     Atrial fibrillation with rapid ventricular response (H)          Care Instructions    Call the C.O.R.E. nurse for any questions or concerns:  [phone]  *If you have concerns after hours, please call [phone], option 2 to speak with on call Cardiologist.    1.  Medication changes from today: no changes    2.  Weigh yourself daily and write it down.    3.  Call [facility] nurse if your weight is up more than 2 pounds in one day or 5 pounds in one week.    4.  Call [facility] nurse if you feel more short of breath, have more abdominal bloating, or leg swelling.    5.  Continue low sodium diet (less than 2000 mg daily). If you eat less salt, you will retain less fluid.    6.  Alcohol can weaken your heart further. You should avoid alcohol or limit its use to special times, such as a holiday or birthday.     7.  Do NOT take Aleve (naproxen) or Advil (ibuprofen) without talking to your doctor first.     8.  Lab Results:   Component      Latest Ref Rng & Units 5/17/2018   Sodium      133 - 144 mmol/L 138   Potassium      3.4 - 5.3 mmol/L 4.0   Chloride      94 - 109 mmol/L 105   Carbon Dioxide      20 - 32 mmol/L 26   Anion Gap      3 - 14 mmol/L 7   Glucose      70 - 99 mg/dL 116 (H)   Urea Nitrogen      7 - 30 mg/dL 10   Creatinine      0.52 - 1.04 mg/dL 0.74   GFR Estimate      >60 mL/min/1.7m2 76   GFR Estimate If Black      >60 mL/min/1.7m2 >90   Calcium      8.5 - 10.1 mg/dL 8.8       [facility]: Cardiomyopathy, Optimization, Rehabilitation, Education  The [facility] is a heart failure specialty clinic within the [facility] where you will work with  your cardiologist, nurse practitioners, physician assistants and registered nurses who specialize in heart failure care. They are dedicated to helping patients with heart failure to carefully adjust medications, receive education, and learn who and when to call if symptoms develop. They specialize in helping you better understand your condition, slow the progression of your disease, improve the length and quality of your life, help you detect future heart problems before they become life threatening, and avoid hospitalizations.            Follow-ups after your visit        Your next 10 appointments already scheduled     May 31, 2018  1:00 PM CDT   New Sleep Patient with Shawn Freitas MD   [facility]    [address]  [facility] [id number] [phone]            Jun 22, 2018  1:30 PM CDT   Ech Complete with [id number] ([facility])    [address]  [facility] [place] [id number] [phone]           1. Please bring or wear a comfortable two-piece outfit. 2. You may eat, drink and take your normal medicines. 3. For any questions that cannot be answered, please contact the ordering physician ***Please check-in at the [facility] Registration Office located in [address] in the [facility] building. When you are finished registering, please go to [address] and have a seat. The technician will call your name for the test.            Jun 25, 2018  2:45 PM CDT   Return Visit with Den Pickard MD   [facility])    [address]  [place] [facility] [place] [id number] [phone]              Future tests that were ordered for you today     Open Future Orders        Priority Expected Expires Ordered    Echocardiogram Routine 6/22/2018 5/17/2019 5/17/2018            Who to contact     If you  "have questions or need follow up information about today's clinic visit or your schedule please contact [facility] directly at [phone].  Normal or non-critical lab and imaging results will be communicated to you by MyChart, letter or phone within 4 business days after the clinic has received the results. If you do not hear from us within 7 days, please contact the clinic through style[employer] or phone. If you have a critical or abnormal lab result, we will notify you by phone as soon as possible.  Submit refill requests through [employer] or call your pharmacy and they will forward the refill request to us. Please allow 3 business days for your refill to be completed.          Additional Information About Your Visit        [employer] Information     [employer] lets you send messages to your doctor, view your test results, renew your prescriptions, schedule appointments and more. To sign up, go to [email or web address] . Click on \"Log in\" on the left side of the screen, which will take you to the Welcome page. Then click on \"Sign up Now\" on the right side of the page.     You will be asked to enter the access code listed below, as well as some personal information. Please follow the directions to create your username and password.     Your access code is: [email or web address]  Expires: 2018  5:17 PM     Your access code will  in 90 days. If you need help or a new code, please call your [facility] clinic or [phone].        Care EveryWhere ID     This is your Care EveryWhere ID. This could be used by other organizations to access your [facility] medical records  [id number]        Your Vitals Were     Pulse Respirations Height Pulse Oximetry BMI (Body Mass Index)       71 18 1.638 m (5' 4.5\") 99% 29.64 kg/m2        Blood Pressure from Last 3 Encounters:   18 112/62   18 94/64   18 94/62    Weight from Last 3 Encounters:   18 79.6 kg (175 lb 6.4 oz) "   04/26/18 79.2 kg (174 lb 8 oz)   03/22/18 79.2 kg (174 lb 8 oz)              We Performed the Following     Follow-Up with [facility]        Primary Care Provider Office Phone # Fax #    Arielle Leger [phone]       [facility] [address] [address] [address] [address]        Equal Access to Services     [facility] : Hadii aad ku hadasho Soomaali, waaxda luqadaha, qaybta kaalmada adeegyada, waxay idiin hayaan adeeg kharash la'aan ah. So [facility] [phone].    ATENCIÓN: Si habla español, tiene a delgadillo disposición servicios gratuitos de asistencia lingüística. Angyame al [phone].    We comply with applicable federal civil rights laws and [place] laws. We do not discriminate on the basis of race, color, national origin, age, disability, sex, sexual orientation, or gender identity.            Thank you!     Thank you for choosing [facility]  for your care. Our goal is always to provide you with excellent care. Hearing back from our patients is one way we can continue to improve our services. Please take a few minutes to complete the written survey that you may receive in the mail after your visit with us. Thank you!             Your Updated Medication List - Protect others around you: Learn how to safely use, store and throw away your medicines at www.disposemymeds.org.          This list is accurate as of 5/17/18  1:00 PM.  Always use your most recent med list.                   Brand Name Dispense Instructions for use Diagnosis    aspirin 81 MG chewable tablet     36 tablet    Take 1 tablet (81 mg) by mouth daily    Acute respiratory failure with hypoxia (H)       digoxin 125 MCG tablet    LANOXIN    90 tablet    Take 1 tablet (125 mcg) by mouth daily    Atrial fibrillation with rapid ventricular response (H)       furosemide 40 MG tablet    LASIX    90 tablet    Take 1 tablet (40 mg) by mouth daily    Acute combined systolic and diastolic CHF,  NYHA class 3 (H)       IMODIUM A-D 2 MG capsule   Generic drug:  loperamide      Take 2 mg by mouth as needed for diarrhea        losartan 25 MG tablet    COZAAR    90 tablet    Take 1 tablet (25 mg) by mouth daily    Nonischemic cardiomyopathy (H)       meclizine 12.5 MG tablet    ANTIVERT    60 tablet    Take 1 tablet (12.5 mg) by mouth 3 times daily as needed for dizziness    Labyrinthitis, bilateral       metoprolol succinate 50 MG 24 hr tablet    TOPROL XL    180 tablet    Take 1 tablet (50 mg) by mouth 2 times daily    Persistent atrial fibrillation (H)       MULTIPLE VITAMINS PO      Take 1 tablet by mouth daily        OMEPRAZOLE PO      Take 20 mg by mouth daily as needed        PAROXETINE HCL PO      Take 30 mg by mouth daily        VITAMIN D3 PO      Take 2,000 Units by mouth daily        WARFARIN SODIUM PO      Take by mouth daily As directed

## 2018-05-17 NOTE — PROGRESS NOTES
HPI and Plan:   See [id number]    Orders Placed This Encounter   Procedures     Echocardiogram       No orders of the defined types were placed in this encounter.      There are no discontinued medications.      Encounter Diagnosis   Name Primary?     Atrial fibrillation with rapid ventricular response (H)        CURRENT MEDICATIONS:  Current Outpatient Prescriptions   Medication Sig Dispense Refill     aspirin 81 MG chewable tablet Take 1 tablet (81 mg) by mouth daily 36 tablet 0     Cholecalciferol (VITAMIN D3 PO) Take 2,000 Units by mouth daily        digoxin (LANOXIN) 125 MCG tablet Take 1 tablet (125 mcg) by mouth daily 90 tablet 3     furosemide (LASIX) 40 MG tablet Take 1 tablet (40 mg) by mouth daily 90 tablet 3     loperamide (IMODIUM A-D) 2 MG capsule Take 2 mg by mouth as needed for diarrhea       losartan (COZAAR) 25 MG tablet Take 1 tablet (25 mg) by mouth daily 90 tablet 3     meclizine (ANTIVERT) 12.5 MG tablet Take 1 tablet (12.5 mg) by mouth 3 times daily as needed for dizziness 60 tablet 1     metoprolol succinate (TOPROL-XL) 50 MG 24 hr tablet Take 1 tablet (50 mg) by mouth 2 times daily 180 tablet 3     MULTIPLE VITAMINS PO Take 1 tablet by mouth daily       OMEPRAZOLE PO Take 20 mg by mouth daily as needed        PAROXETINE HCL PO Take 30 mg by mouth daily        WARFARIN SODIUM PO Take by mouth daily As directed         ALLERGIES     Allergies   Allergen Reactions     Lisinopril Diarrhea     Sulfa Drugs        PAST MEDICAL HISTORY:  Past Medical History:   Diagnosis Date     Atrial fibrillation (H)      Cardiogenic shock (H) 11/05/2017       PAST SURGICAL HISTORY:  Past Surgical History:   Procedure Laterality Date     HEART CATH RIGHT AND LEFT HEART CATH  10/23/2017    Cath no significant obstructive CAD. RHC: mild PHTN/NL wedge/ low CI.        FAMILY HISTORY:  Family History   Problem Relation Age of Onset     DIABETES Mother      Other Cancer Father        SOCIAL HISTORY:  Social  "History     Social History     Marital status:      Spouse name: N/A     Number of children: N/A     Years of education: N/A     Social History Main Topics     Smoking status: Former Smoker     Types: Cigarettes     Quit date: 1/1/1965     Smokeless tobacco: Never Used     Alcohol use 0.0 oz/week      Comment: 8 oz wine daily     Drug use: No     Sexual activity: Yes     Partners: Male     Other Topics Concern     None     Social History Narrative       Review of Systems:  Skin:  Negative       Eyes:  Positive for glasses    ENT:  Positive for postnasal drainage seasonal allergies  Respiratory:  Positive for dyspnea on exertion has not made appt with Dr Freitas, feels overwhelmed   Cardiovascular:    lower extremity symptoms;Positive for;dizziness Dizzy this mornign before breakfast. R LE. RLE swelling, LE aches  Gastroenterology: Positive for diarrhea Diarrhea x1 a week  Genitourinary:  Negative      Musculoskeletal:  Positive for joint pain R toe  Neurologic:  Positive for   Occ balance issues  Psychiatric:  Positive for anxiety    Heme/Lymph/Imm:  Negative      Endocrine:  Negative        Physical Exam:  Vitals: /62 (BP Location: Right arm, Patient Position: Sitting, Cuff Size: Adult Regular)  Pulse 71  Resp 18  Ht 1.638 m (5' 4.5\")  Wt 79.6 kg (175 lb 6.4 oz)  SpO2 99%  BMI 29.64 kg/m2    Constitutional:  cooperative, alert and oriented, well developed, well nourished, in no acute distress        Skin:  warm and dry to the touch, no apparent skin lesions or masses noted          Head:  normocephalic, no masses or lesions        Eyes:  pupils equal and round;conjunctivae and lids unremarkable;sclera white;no xanthalasma        Lymph:      ENT:  no pallor or cyanosis, dentition good        Neck:  carotid pulses are full and equal bilaterally, JVP normal, no carotid bruit        Respiratory:  normal breath sounds, clear to auscultation, normal A-P diameter, normal symmetry, normal respiratory " excursion, no use of accessory muscles         Cardiac: normal S1 and S2;no S3 or S4;apical impulse not displaced irregularly irregular rhythm;tachycardic              pulses full and equal, no bruits auscultated                                        GI:  abdomen soft, non-tender, BS normoactive, no mass, no HSM, no bruits        Extremities and Muscular Skeletal:  no deformities, clubbing, cyanosis, erythema observed;no edema              Neurological:  no gross motor deficits;affect appropriate        Psych:  Alert and Oriented x 3        CC  Mandie Rose, APRN CNP  [address]  [address]  [place] [address]

## 2018-05-17 NOTE — PATIENT INSTRUCTIONS
Call the C.O.R.E. nurse for any questions or concerns:  [phone]  *If you have concerns after hours, please call [phone], option 2 to speak with on call Cardiologist.    1.  Medication changes from today: no changes    2.  Weigh yourself daily and write it down.    3.  Call [facility] nurse if your weight is up more than 2 pounds in one day or 5 pounds in one week.    4.  Call [facility] nurse if you feel more short of breath, have more abdominal bloating, or leg swelling.    5.  Continue low sodium diet (less than 2000 mg daily). If you eat less salt, you will retain less fluid.    6.  Alcohol can weaken your heart further. You should avoid alcohol or limit its use to special times, such as a holiday or birthday.     7.  Do NOT take Aleve (naproxen) or Advil (ibuprofen) without talking to your doctor first.     8.  Lab Results:   Component      Latest Ref Rng & Units 5/17/2018   Sodium      133 - 144 mmol/L 138   Potassium      3.4 - 5.3 mmol/L 4.0   Chloride      94 - 109 mmol/L 105   Carbon Dioxide      20 - 32 mmol/L 26   Anion Gap      3 - 14 mmol/L 7   Glucose      70 - 99 mg/dL 116 (H)   Urea Nitrogen      7 - 30 mg/dL 10   Creatinine      0.52 - 1.04 mg/dL 0.74   GFR Estimate      >60 mL/min/1.7m2 76   GFR Estimate If Black      >60 mL/min/1.7m2 >90   Calcium      8.5 - 10.1 mg/dL 8.8       [facility]: Cardiomyopathy, Optimization, Rehabilitation, Education  The [facility] is a heart failure specialty clinic within the [facility] where you will work with your cardiologist, nurse practitioners, physician assistants and registered nurses who specialize in heart failure care. They are dedicated to helping patients with heart failure to carefully adjust medications, receive education, and learn who and when to call if symptoms develop. They specialize in helping you better understand your condition, slow the progression of your disease, improve the length and quality of your life,  help you detect future heart problems before they become life threatening, and avoid hospitalizations.

## 2018-05-17 NOTE — LETTER
5/17/2018      Arielle Leger MD  [employer] [id number] [facility] [address]      RE: Em Richmond       Dear Colleague,    I had the pleasure of seeing Em Richmond in the [facility].    Service Date: 05/17/2018      PRIMARY CARE PHYSICIAN:  Arielle Leger MD.      PRIMARY CARDIOLOGIST:  Den Pickard MD.      C.O.R.E. NP:  Mandie Rose CNP.      REASON FOR VISIT:  CHF management, medication optimization and education.      HISTORY OF PRESENT ILLNESS:     I had the pleasure of seeing Em Richmond in the [facility] regarding her nonischemic cardiomyopathy.  The patient was admitted twice in October and November for congestive heart failure and cardiomyopathy.  Her ejection fraction on echocardiography in 2014 with 60%-65%.  On 10/21/2017 her ejection fraction dropped to 30%-35%.  Mild to moderate tricuspid regurgitation and mild to moderate mitral insufficiency.  Em has a history of chronic atrial fibrillation.  She had stopped her metoprolol and presented with rapid ventricular response.  Coronary angiography 10/23/2017 showed only mild coronary artery disease with all stenosis less than 30%.  We felt the idiopathic cardiomyopathy was possibly due to atrial fibrillation with rapid ventricular response.  We initiated beta blocker again.  She continued with diuresis.  She lost about 20 pounds in the hospital.  We continued to have difficulty with rate control with beta blocker alone.  Digoxin was recently added and metoprolol decreased to 50 mg twice a day.  Her heart rate has greatly improved from 104 beats per minute to 70 beats per minute since 04/2018.  She returns today in followup.  She denies increased shortness of breath, orthopnea, PND, syncope or near-syncope.  She denies chest pain, chest pressure, neck or arm pain.      PHYSICAL EXAMINATION:   VITAL SIGNS:  Blood pressure 112/62, pulse is 71, weight is 175 pounds.       LABORATORY:    Basic metabolic panel:  Sodium 138, potassium 4.0, BUN 10, creatinine 0.74, GFR 76.      ASSESSMENT AND PLAN:   1.  Ricardo Dorman is a pleasant 77-year-old patient who has evidence of idiopathic cardiomyopathy, possibly due to atrial fibrillation with rapid ventricular response and possibly also related to untreated sleep apnea.  Her blood pressure is excellent today.  Her heart rate has improved since April.  Dr. Pickard requested an echocardiogram to be done 2-3 months after her rate was controlled.  We will plan to repeat the echocardiogram just before the  appointment.  I commended her on being involved in her care.  She weighs herself every day.  She is trying to eat a low-salt diet.  She is trying to exercise (chair exercises).   2.  Referral to Dr. Freitas for sleep evaluation.  She has agreed to do this before her appointment with Dr. Pickard.   3.  Continue warfarin therapy for her atrial fibrillation.   4.  Continue furosemide at the current dose.  Her basic metabolic panel is excellent.   5.  Return to see me in the [facility] in 1-2 months, after seen by Dr. Pickard or as needed.         DOMINIK ELIZABETH, CNP             D: 2018   T: 2018   MT: NASEEM      Name:     RICARDO ODRMAN   MRN:      [id number]        Account:      [id number]   :      1940           Service Date: 2018      Document: [id number]         Outpatient Encounter Prescriptions as of 2018   Medication Sig Dispense Refill     aspirin 81 MG chewable tablet Take 1 tablet (81 mg) by mouth daily 36 tablet 0     Cholecalciferol (VITAMIN D3 PO) Take 2,000 Units by mouth daily        digoxin (LANOXIN) 125 MCG tablet Take 1 tablet (125 mcg) by mouth daily 90 tablet 3     furosemide (LASIX) 40 MG tablet Take 1 tablet (40 mg) by mouth daily 90 tablet 3     loperamide (IMODIUM A-D) 2 MG capsule Take 2 mg by mouth as needed for diarrhea       losartan (COZAAR) 25 MG tablet  Take 1 tablet (25 mg) by mouth daily 90 tablet 3     meclizine (ANTIVERT) 12.5 MG tablet Take 1 tablet (12.5 mg) by mouth 3 times daily as needed for dizziness 60 tablet 1     metoprolol succinate (TOPROL-XL) 50 MG 24 hr tablet Take 1 tablet (50 mg) by mouth 2 times daily 180 tablet 3     MULTIPLE VITAMINS PO Take 1 tablet by mouth daily       OMEPRAZOLE PO Take 20 mg by mouth daily as needed        PAROXETINE HCL PO Take 30 mg by mouth daily        WARFARIN SODIUM PO Take by mouth daily As directed       No facility-administered encounter medications on file as of 5/17/2018.        Again, thank you for allowing me to participate in the care of your patient.      Sincerely,    DOMINIK Ritchie [facility]

## 2018-05-17 NOTE — LETTER
5/17/2018    Arielle Leger MD  [employer] [address] [facility] [address]    RE: Em Richmond       Dear Colleague,    I had the pleasure of seeing Em Richmond in the [facility].    HPI and Plan:   See [id number]    Orders Placed This Encounter   Procedures     Echocardiogram       No orders of the defined types were placed in this encounter.      There are no discontinued medications.      Encounter Diagnosis   Name Primary?     Atrial fibrillation with rapid ventricular response (H)        CURRENT MEDICATIONS:  Current Outpatient Prescriptions   Medication Sig Dispense Refill     aspirin 81 MG chewable tablet Take 1 tablet (81 mg) by mouth daily 36 tablet 0     Cholecalciferol (VITAMIN D3 PO) Take 2,000 Units by mouth daily        digoxin (LANOXIN) 125 MCG tablet Take 1 tablet (125 mcg) by mouth daily 90 tablet 3     furosemide (LASIX) 40 MG tablet Take 1 tablet (40 mg) by mouth daily 90 tablet 3     loperamide (IMODIUM A-D) 2 MG capsule Take 2 mg by mouth as needed for diarrhea       losartan (COZAAR) 25 MG tablet Take 1 tablet (25 mg) by mouth daily 90 tablet 3     meclizine (ANTIVERT) 12.5 MG tablet Take 1 tablet (12.5 mg) by mouth 3 times daily as needed for dizziness 60 tablet 1     metoprolol succinate (TOPROL-XL) 50 MG 24 hr tablet Take 1 tablet (50 mg) by mouth 2 times daily 180 tablet 3     MULTIPLE VITAMINS PO Take 1 tablet by mouth daily       OMEPRAZOLE PO Take 20 mg by mouth daily as needed        PAROXETINE HCL PO Take 30 mg by mouth daily        WARFARIN SODIUM PO Take by mouth daily As directed         ALLERGIES     Allergies   Allergen Reactions     Lisinopril Diarrhea     Sulfa Drugs        PAST MEDICAL HISTORY:  Past Medical History:   Diagnosis Date     Atrial fibrillation (H)      Cardiogenic shock (H) 11/05/2017       PAST SURGICAL HISTORY:  Past Surgical History:   Procedure Laterality Date     HEART CATH RIGHT AND LEFT HEART  "CATH  10/23/2017    Cath no significant obstructive CAD. RHC: mild PHTN/NL wedge/ low CI.        FAMILY HISTORY:  Family History   Problem Relation Age of Onset     DIABETES Mother      Other Cancer Father        SOCIAL HISTORY:  Social History     Social History     Marital status:      Spouse name: N/A     Number of children: N/A     Years of education: N/A     Social History Main Topics     Smoking status: Former Smoker     Types: Cigarettes     Quit date: 1/1/1965     Smokeless tobacco: Never Used     Alcohol use 0.0 oz/week      Comment: 8 oz wine daily     Drug use: No     Sexual activity: Yes     Partners: Male     Other Topics Concern     None     Social History Narrative       Review of Systems:  Skin:  Negative       Eyes:  Positive for glasses    ENT:  Positive for postnasal drainage seasonal allergies  Respiratory:  Positive for dyspnea on exertion has not made appt with Dr Freitas, feels overwhelmed   Cardiovascular:    lower extremity symptoms;Positive for;dizziness Dizzy this mornign before breakfast. R LE. RLE swelling, LE aches  Gastroenterology: Positive for diarrhea Diarrhea x1 a week  Genitourinary:  Negative      Musculoskeletal:  Positive for joint pain R toe  Neurologic:  Positive for   Occ balance issues  Psychiatric:  Positive for anxiety    Heme/Lymph/Imm:  Negative      Endocrine:  Negative        Physical Exam:  Vitals: /62 (BP Location: Right arm, Patient Position: Sitting, Cuff Size: Adult Regular)  Pulse 71  Resp 18  Ht 1.638 m (5' 4.5\")  Wt 79.6 kg (175 lb 6.4 oz)  SpO2 99%  BMI 29.64 kg/m2    Constitutional:  cooperative, alert and oriented, well developed, well nourished, in no acute distress        Skin:  warm and dry to the touch, no apparent skin lesions or masses noted          Head:  normocephalic, no masses or lesions        Eyes:  pupils equal and round;conjunctivae and lids unremarkable;sclera white;no xanthalasma        Lymph:      ENT:  no pallor or " cyanosis, dentition good        Neck:  carotid pulses are full and equal bilaterally, JVP normal, no carotid bruit        Respiratory:  normal breath sounds, clear to auscultation, normal A-P diameter, normal symmetry, normal respiratory excursion, no use of accessory muscles         Cardiac: normal S1 and S2;no S3 or S4;apical impulse not displaced irregularly irregular rhythm;tachycardic              pulses full and equal, no bruits auscultated                                        GI:  abdomen soft, non-tender, BS normoactive, no mass, no HSM, no bruits        Extremities and Muscular Skeletal:  no deformities, clubbing, cyanosis, erythema observed;no edema              Neurological:  no gross motor deficits;affect appropriate        Psych:  Alert and Oriented x 3        CC  DOMINIK White CNP  [id number]  [place] [address]                Thank you for allowing me to participate in the care of your patient.      Sincerely,     DOMINIK Ritchie CNP     [facility]    cc:   DOMINIK White CNP  [address]  [address]  [place] [address]

## 2018-05-18 NOTE — PROGRESS NOTES
Service Date: 05/17/2018      PRIMARY CARE PHYSICIAN:  Arielle Leger MD.      PRIMARY CARDIOLOGIST:  Den Pickard MD.      C.O.R.E. NP:  Mandie Rose CNP.      REASON FOR VISIT:  CHF management, medication optimization and education.      HISTORY OF PRESENT ILLNESS:     I had the pleasure of seeing Em Richmond in the [facility] regarding her nonischemic cardiomyopathy.  The patient was admitted twice in October and November for congestive heart failure and cardiomyopathy.  Her ejection fraction on echocardiography in 2014 with 60%-65%.  On 10/21/2017 her ejection fraction dropped to 30%-35%.  Mild to moderate tricuspid regurgitation and mild to moderate mitral insufficiency.  Em has a history of chronic atrial fibrillation.  She had stopped her metoprolol and presented with rapid ventricular response.  Coronary angiography 10/23/2017 showed only mild coronary artery disease with all stenosis less than 30%.  We felt the idiopathic cardiomyopathy was possibly due to atrial fibrillation with rapid ventricular response.  We initiated beta blocker again.  She continued with diuresis.  She lost about 20 pounds in the hospital.  We continued to have difficulty with rate control with beta blocker alone.  Digoxin was recently added and metoprolol decreased to 50 mg twice a day.  Her heart rate has greatly improved from 104 beats per minute to 70 beats per minute since 04/2018.  She returns today in followup.  She denies increased shortness of breath, orthopnea, PND, syncope or near-syncope.  She denies chest pain, chest pressure, neck or arm pain.      PHYSICAL EXAMINATION:   VITAL SIGNS:  Blood pressure 112/62, pulse is 71, weight is 175 pounds.      LABORATORY:    Basic metabolic panel:  Sodium 138, potassium 4.0, BUN 10, creatinine 0.74, GFR 76.      ASSESSMENT AND PLAN:   1.  Em Richmond is a pleasant 77-year-old patient who has evidence of idiopathic cardiomyopathy, possibly due to atrial  fibrillation with rapid ventricular response and possibly also related to untreated sleep apnea.  Her blood pressure is excellent today.  Her heart rate has improved since April.  Dr. Pickard requested an echocardiogram to be done 2-3 months after her rate was controlled.  We will plan to repeat the echocardiogram just before the  appointment.  I commended her on being involved in her care.  She weighs herself every day.  She is trying to eat a low-salt diet.  She is trying to exercise (chair exercises).   2.  Referral to Dr. Freitas for sleep evaluation.  She has agreed to do this before her appointment with Dr. Pickard.   3.  Continue warfarin therapy for her atrial fibrillation.   4.  Continue furosemide at the current dose.  Her basic metabolic panel is excellent.   5.  Return to see me in the [facility] in 1-2 months, after seen by Dr. Pickard or as needed.         DOMINIK ELIZABETH, CNP             D: 2018   T: 2018   MT: NASEEM      Name:     RICARDO DORMAN   MRN:      [id number]        Account:      [id number]   :      1940           Service Date: 2018      Document: [id number]

## 2018-05-31 ENCOUNTER — DOCUMENTATION ONLY (OUTPATIENT)
Dept: SLEEP MEDICINE | Facility: CLINIC | Age: 78
End: 2018-05-31

## 2018-05-31 ENCOUNTER — OFFICE VISIT (OUTPATIENT)
Dept: SLEEP MEDICINE | Facility: CLINIC | Age: 78
End: 2018-05-31
Payer: COMMERCIAL

## 2018-05-31 VITALS
HEART RATE: 78 BPM | BODY MASS INDEX: 29.19 KG/M2 | OXYGEN SATURATION: 98 % | HEIGHT: 64 IN | DIASTOLIC BLOOD PRESSURE: 75 MMHG | SYSTOLIC BLOOD PRESSURE: 114 MMHG | WEIGHT: 171 LBS | RESPIRATION RATE: 12 BRPM

## 2018-05-31 DIAGNOSIS — I48.91 ATRIAL FIBRILLATION WITH RAPID VENTRICULAR RESPONSE (H): ICD-10-CM

## 2018-05-31 DIAGNOSIS — G47.33 OSA (OBSTRUCTIVE SLEEP APNEA): Primary | ICD-10-CM

## 2018-05-31 DIAGNOSIS — I50.22 CHRONIC SYSTOLIC CONGESTIVE HEART FAILURE (H): ICD-10-CM

## 2018-05-31 PROCEDURE — 99205 OFFICE O/P NEW HI 60 MIN: CPT | Performed by: FAMILY MEDICINE

## 2018-05-31 PROCEDURE — 99354 ZZC PROLONGED SERV,OFFICE,1ST HR: CPT | Performed by: FAMILY MEDICINE

## 2018-05-31 NOTE — PROGRESS NOTES
"[facility]  Outpatient Sleep Medicine Consultation  May 31, 2018    Name: Em Richmond MRN# [id number]   Age: 77 year old YOB: 1940     Date of Consultation: May 31, 2018  Consultation is requested by: DOMINIK White CNP  [address]  [address]  [place] [address]  Primary care provider: Arielle Leger    Patient is accompanied by: Patient presents with , Carson, today.       Reason for Sleep Consult:     Em Richmond is a 77 year old female patient that presents here for an initial evaluation due to \"new CPAP.\"         Assessment and Plan:     Summary Sleep Diagnoses:    (1) Moderate JERSON (AHI of 22 events per hour)  (2) Atrial Fibrillation  (3) Chronic Systolic Congestive Heart Failure    Summary Recommendations / Discussion:    This patient was diagnosed in the past with moderate JERSON with an AHI of 22 events per hour. Since the diagnosis, the patient has been on CPAP therapy with a fixed pressure of 10 cmH2O. PAP download today shows a non-compliant patient and the device was objectively effective at treating the condition with a residual AHI of 0.9 events per hour. Given that the patient's most recent echocardiogram demonstrated a decreased systolic function with a LVEF estimated at 35 - 40%, the patient should only be treated with CPAP and, if necessary, oxygen supplementation. As such, given that the PAP download shows no elevated residual AHI, no new PSG sleep study will be obtained at this point. Instead, an overnight oximetry test with the CPAP device in place will be obtained to evaluate for sleep associated hypoxemia. If prolonged sleep associated hypoxemia is noted, a PSG titration study with TCM, ABG, oxygen supplementation will be considered. If periodic desaturations are also observed during the overnight oximetry test, sleep positional device and elevation of the head of the bed will be also implemented. The patient was encouraged to use " the PAP device every time she sleeps and a mask fitting with a possible new interface was ordered. Today, the nature and pathophysiology of JERSON were discussed. The different treatment options for JERSON were also reviewed and explained today. Lifestyle recommendations including healthy dietary and exercising habits were discussed. Pt will follow up with me within 4 weeks after having completed the overnight oximetry test with the PAP device in place.    Visit Summary:   -Re-start PAP therapy with CPAP at fixed pressure of 10 cmH2O   -Obtain a mask fitting with new possible interface   -Overnight oximetry with PAP device in place (if hypoxemia noted, PSG sleep study with TCM and possible ABG may be considered)   -Follow up with me after overnight oximetry with PAP device in place (within 4 weeks)    Coding:  (G47.33) JERSON (obstructive sleep apnea)  (primary encounter diagnosis)  (I48.91) Atrial fibrillation with rapid ventricular response (H)  (I50.22) Chronic systolic congestive heart failure (H)    Counseling included a comprehensive review of diagnostic and therapeutic strategies as well as risks of inadequate therapy.    Educational materials provided in instructions. The patient was instructed to avoid driving or operating any heavy machinery when experiencing drowsiness.    All questions and concerns were addressed today. Pt agrees and understands the assessment and plan.         History of Present Illness:   Em Richmond is a 77 year old  RHD female pt with PMH of atrial fibrillation, syncope, combined systolic and diastolic congestive heart failure, hypertension, nonischemic / idiopathic cardiomyopathy, and previously diagnosed JERSON presents for an initial evaluation today due to previously diagnosed JERSON.    This is a medically complex individual with history of nonischemic / idiopathic cardiomyopathy with recent hospitalizations secondary to congestive heart failure and cardiomyopathy. Recent  "echocardiogram showed a LVEF estimated at 35 - 40%.     The patient underwent a coronary angiography in 2017 and this demonstrated only mild coronary artery disease. As such, it was believed that the cardiomyopathy was probably secondary to atrial fibrillation with rapid ventricular response.    The patient is currently on aspirin 81 mg, furosemide 40 mg, losartan 25 mg, metropolol succinate 50 mg, warfarin, and digoxin 125 mg.    The patient completed a PSG sleep study in [facility] in 2014 and this demonstrated moderate JERSON with an AHI of 22 events per hour. The mean oxygen saturation during the study was 93%.    Given the level of complexity of this patient and since she was placed on CPAP therapy for JERSON, I was asked by provider Mannchen to evaluate and manage this patient with a possible sleep disordered breathing.     The patient explains that she is currently sleeping very good with no \"sleeping problems whatsoever.\" The patient explains that she stopped using the PAP device several months ago due to interface discomfort (pt has a nasal pillow interface with nasal discomfort). The patient explains that she always used the device on and off. The patient explains that she has never noticed any benefits from the PAP device. Currently, the patient is still having occasional snoring with no gasping / choking for air or witnessed apneas. The patient reports non-restorative sleep with some mild sleep inertia. The patient denies any EDS with no inadvertent naps. Pt admits having xerostomia in the morning. The patient denies any drowsiness when driving with no near accidents. No CP, SOB, morning cephalgia, orthopnea, nocturia, or any other sxs or concerns.    The PAP download today shows a non-compliant patient who is using the device 28.9% of the time with 2.2% over 4 hours. The device is effective at treating the condition with a residual AHI of 0.9 events per hour. No significant leaks noted. The patient used the PAP " device for the last time in January.    CPAP Compliance:   Dates: May 31, 2018       28.9 % > 4 hour use    Average Use: 2 hours 20 minutes   Leak: 0.9 seconds    Residual AHI: 0.9 events per hour    PREVIOUS IN- LAB or HOME SLEEP STUDIES: The patient reports the [facility]   Date: 2014   TYPE: PSG   AHI: 22 events per hour   BMI: 32.0 kg/m2   Intervention: CPAP    SLEEP-WAKE SCHEDULE:     Em Richmond      -Describes herself as a night person; prefers to go to sleep at 10:00 PM and wakes up at 9:00 AM.      -The patient takes 2 naps a week and each lasts about 40 minutes. Pt feels refreshed after naps; takes no inadvertent naps.      -ON WEEKDAYS, goes to sleep at 10:30 PM during the week; awakens 9:00 AM with an alarm; falls asleep in 20 minutes; denies difficulty falling asleep.      -ON WEEKENDS, goes to sleep at 10:30 PM and wakes up at 9:00 AM with an alarm; falls asleep in 20 minutes.        -Awakens 1-2 times a night for 15 minutes before falling back to sleep; awakens to go to the bathroom.      BEDTIME ACTIVITIES AND SHIFT WORK:    Em PB Basilio     -Bedtime Activities and Other Sleeping Information: Pt lives alone with . Pt sleeps alone on a queen size bed. No pets in the bedroom. Pt sleeps on her sides. No electronics used in bed.     -Occupation: Retired     SCALES        -SLEEP APNEA: Stopbang score: 4 / 8        -SLEEPINESS: [place] sleepiness scale (ESS):  4 / 24    Drowsy driving / near accidents: Denies any near accidents    Consequences: Non-restorative sleep    SLEEP COMPLAINTS:  Cardio-respiratory     -Snoring: Significant snoring reported    -Dyspnea: Pt denies having any witnessed apneas with PAP device on   -Morning headaches or confusion: Denies any morning cephalgia   -Coexisting Lung disease: See Above     -Coexisting Heart disease: See Above     -Does patient have a bed partner: Patient sleeps alone   -Has bed partner been sleeping separately because of  snoring:  Yes            RLS Screen:    -When you try to relax in the evening or sleep at night, do you ever have unpleasant, restless feelings in your legs that can be relieved by walking or movement? None Reported     -Periodic limb movement: None Reported    Narcolepsy:     - Denies sudden urges of sleep attacks   - Denies cataplexy   - Denies sleep paralysis    - Denies hallucinations    - Admits feeling refreshed after a nap.    Sleep Behaviors:   - Denies leg symptoms/movements   - Denies motor restlessness   - Denies night terrors   - Admits bruxism   - Denies automatic behaviors    Other Subjective Complaints:   - Denies anxiety or rumination    - Denies pain and discomfort at night   - Denies waking up with heart pounding or racing   - Denies GERD or aspiration         Parasomnia:    -NREM - Denies recurrent persistent confusional arousal, night eating, sleep walking or sleep terrors.      -REM - Denies dream enactment or injuries.          Medications:     Current Outpatient Prescriptions   Medication Sig     aspirin 81 MG chewable tablet Take 1 tablet (81 mg) by mouth daily     Cholecalciferol (VITAMIN D3 PO) Take 2,000 Units by mouth daily      digoxin (LANOXIN) 125 MCG tablet Take 1 tablet (125 mcg) by mouth daily     furosemide (LASIX) 40 MG tablet Take 1 tablet (40 mg) by mouth daily     loperamide (IMODIUM A-D) 2 MG capsule Take 2 mg by mouth as needed for diarrhea     losartan (COZAAR) 25 MG tablet Take 1 tablet (25 mg) by mouth daily     meclizine (ANTIVERT) 12.5 MG tablet Take 1 tablet (12.5 mg) by mouth 3 times daily as needed for dizziness     metoprolol succinate (TOPROL-XL) 50 MG 24 hr tablet Take 1 tablet (50 mg) by mouth 2 times daily     MULTIPLE VITAMINS PO Take 1 tablet by mouth daily     OMEPRAZOLE PO Take 20 mg by mouth daily as needed      PAROXETINE HCL PO Take 30 mg by mouth daily      WARFARIN SODIUM PO Take by mouth daily As directed     No current facility-administered medications  for this visit.       Allergies   Allergen Reactions     Lisinopril Diarrhea     Sulfa Drugs           Past Medical History:     Denies needing any 02 supplement at night.    Past Medical History:   Diagnosis Date     Atrial fibrillation (H)      Cardiogenic shock (H) 11/05/2017           Past Surgical History:    Denies previous upper airway surgery.     Past Surgical History:   Procedure Laterality Date     HEART CATH RIGHT AND LEFT HEART CATH  10/23/2017    Cath no significant obstructive CAD. RHC: mild PHTN/NL wedge/ low CI.           Social History:     Social History   Substance Use Topics     Smoking status: Former Smoker     Types: Cigarettes     Quit date: 1/1/1965     Smokeless tobacco: Never Used     Alcohol use 0.0 oz/week      Comment: 8 oz wine daily     Chemical History:     Tobacco: Quit smoking about 53 yrs ago     Uses 1.5 cups/day of coffee. Last caffeine intake is usually before 10 AM.    Supplements for wakefulness: Patient does not use any supplements to stay awake    EtOH: 5 to 8 drinks a week  Recreational Drugs: Patient denies using any recreational drugs     Psych Hx:   Current dangers to self or others: No. Pt denies any SI / HI, hallucinations, or delusions         Family History:     Family History   Problem Relation Age of Onset     DIABETES Mother      Other Cancer Father       Sleep Family Hx:       RLS - None reported   JERSON - Sister with JERSON  Insomnia - None reported  Parasomnia - None reported         Review of Systems:     A complete 10 point review of systems was negative other than HPI or as commented below:     CONSTITUTIONAL: Positive for weight gain, sweats / night sweats, and allergies.  EYES: NEGATIVE for changes in vision, blind spots, double vision.  ENT: NEGATIVE for ear pain, sore throat, bloody nose. Pt reports sinus pain, post-nasal drip, and runny nose.  CARDIAC: NEGATIVE for chest pain or pressure, breathlessness when lying flat, swollen legs or swollen feet. Pt  "reports fast heartbeats / fluttering in chest.  NEUROLOGIC: NEGATIVE headaches, weakness or numbness in the arms or legs.  DERMATOLOGIC: NEGATIVE for rashes, new moles or change in mole(s)  PULMONARY: NEGATIVE SOB at rest, coughing up blood, wheezing or whistling when breathing. Pt reports SOB with activity, dry cough, productive cough.  GASTROINTESTINAL: NEGATIVE for nausea or vomitting, loose or watery stools, fat or grease in stools, constipation, abdominal pain, bowel movements black in color or blood noted.  GENITOURINARY: NEGATIVE for pain during urination, blood in urine, urinating more frequently than usual, irregular menstrual periods.  MUSCULOSKELETAL: Positive for muscle pain, bone / joint pain, and swollen joints.  ENDOCRINE: NEGATIVE for increased thirst or urination, diabetes.  LYMPHATIC: NEGATIVE for swollen lymph nodes, lumps or bumps in the breasts or nipple discharge.         Physical Examination:   /75  Pulse 78  Resp 12  Ht 1.638 m (5' 4.49\")  Wt 77.6 kg (171 lb)  SpO2 98%  BMI 28.91 kg/m2     VS: Reviewed and normal.  General: Alert, oriented, not in distress. Dressed casually; Good eye contact; Comfortably sitting in a chair; in no apparent distress  HEENT: Normocephalic and atraumatic; NL TM x 2; pupils are isocoric and equally responsive to the light. PERRLA. EOMI. Normal fundoscopic examination; Nasal turbinates are normal with a normal septal alignment;  Mallampati score: Grade IV; Tonsillar hypertrophy: 2  visible at pillars; Pharynx with no erythema or exudates.  NECK: Neck supple; symmetrical; no lymphadenopathy; no thyromegaly, bruit, JVD noted. Neck circumference of 15 inches (38 cm).  Lungs: Both hemithoraces are symmetrical, normal to palpation, no dullness to percussion, auscultation of lungs revealed normal breath sounds with no expirium prolongation, wheezing, rhonci and crackles.  CVS: Normal S1 and S2 heart sounds with no extra heart sounds. No murmur, rubs, or " clicks. Normal peripheral pulses throughout with no obvious peripheral edema. Irregularly irregular rhythm.  Abdomen: Bowel sounds present. Abdomen is soft, non-tender, and non-distended. No organomegaly, ascitis, or obvious masses noted. Negative CVA tenderness.  Extremities/musculoskeletal: No deformity, cyanosis, or clubbing noted.  Neurology: Awake, alert, and oriented x 3. No obvious gross motor / sensorial deficits with normal strength in all extremities at 5/5 and normal sensation throughout. Cranial nerves are grossly intact with normal II to XII CN functions. Negative Romberg's test with normal gait. DTR are symmetric and normal at 2+/4.  Integumentary: No obvious skin rash.  Psychiatry: Mood and affect are appropriate. Euthymic with affect congruent with full range and intensity. No SI/HI with adequate insight and judgement.          Data: All pertinent previous laboratory data reviewed     Lab Results   Component Value Date    PH 7.40 11/07/2017    PH 7.39 11/06/2017    PO2 114 (H) 11/07/2017    PO2 105 11/06/2017    PCO2 37 11/07/2017    PCO2 28 (L) 11/06/2017    HCO3 23 11/07/2017    HCO3 17 (L) 11/06/2017    ADAM Canceled, Test credited 11/06/2017     Lab Results   Component Value Date    TSH 3.02 11/20/2017    TSH 1.75 11/06/2017     Lab Results   Component Value Date     (H) 05/17/2018     (H) 04/26/2018     Lab Results   Component Value Date    HGB 13.0 11/20/2017    HGB 10.7 (L) 11/08/2017     Lab Results   Component Value Date    BUN 10 05/17/2018    BUN 15 04/26/2018    CR 0.74 05/17/2018    CR 0.98 04/26/2018     Echocardiology:    -Echocardiogram obtained on 03/06/0218 showed normal left ventricular size and wall thickness. The left ventricular systolic function was moderately reduced with a LVEF estimated at 35 - 40%. Moderate global hypokinesia of the left ventricle was noted. The right ventricle was normal in size with mildly decreased right ventricular systolic function. There  was moderate biatrial enlargement. Mild mitral, pulmonic, and tricuspid regurgitation was noted. Normal aortic valve observed. Rhythm was described as atrial fibrillation.    Chest x-ray:    -Chest x-ray films obtained in 2017 showed pulmonary vascular congestion with no infiltrate. The right hemidiaphragm was elevated with slightly blunted right costophrenic angle.    PFT: None Available    Laboratory Studies:   Component Value Flag Ref Range Units Status Collected Lab   Sodium 138  133 - 144 mmol/L Final 05/17/2018 10:57 AM [facility]   Potassium 4.0  3.4 - 5.3 mmol/L Final 05/17/2018 10:57 AM [facility]   Chloride 105  94 - 109 mmol/L Final 05/17/2018 10:57 AM [facility]   Carbon Dioxide 26  20 - 32 mmol/L Final 05/17/2018 10:57 AM [facility]   Anion Gap 7  3 - 14 mmol/L Final 05/17/2018 10:57 AM [facility]   Glucose 116 (H) 70 - 99 mg/dL Final 05/17/2018 10:57 AM [facility]   Urea Nitrogen 10  7 - 30 mg/dL Final 05/17/2018 10:57 AM [facility]   Creatinine 0.74  0.52 - 1.04 mg/dL Final 05/17/2018 10:57 AM [facility]   GFR Estimate 76  >60 mL/min/1.7m2 Final 05/17/2018 10:57 AM [facility]   Comment:   Non  GFR Calc   GFR Estimate If Black >90  >60 mL/min/1.7m2 Final 05/17/2018 10:57 AM [facility]   Comment:   African American GFR Calc   Calcium 8.8  8.5 - 10.1 mg/dL Final 05/17/2018 10:57 AM [facility]     Component Value Flag Ref Range Units Status Collected Lab   Hemoglobin 13.0  11.7 - 15.7 g/dL Final 11/20/2017  7:43 AM [facility]     Component Value Flag Ref Range Units Status Collected Lab   TSH 3.02  0.40 - 4.00 mU/L Final 11/20/2017  7:43 AM [facility]     Shawn Freitas MD, MPH  Clinical Sleep Medicine    Total time spent with patient: 90 min. Over >50% of the time was spent for face to face counseling, education, and evaluation.

## 2018-05-31 NOTE — MR AVS SNAPSHOT
After Visit Summary   5/31/2018    Em Richmond    MRN: [id number]           Patient Information     Date Of Birth          1940        Visit Information        Provider Department      5/31/2018 9:30 AM Shawn Freitas MD [facility]        Today's Diagnoses     JERSON (obstructive sleep apnea)    -  1    Atrial fibrillation with rapid ventricular response (H)        Chronic systolic congestive heart failure (H)          Care Instructions    Your blood pressure was checked while you were in clinic today.  Please read the guidelines below about what these numbers mean and what you should do about them.  Your systolic blood pressure is the top number.  This is the pressure when the heart is pumping.  Your diastolic blood pressure is the bottom number.  This is the pressure in between beats.  If your systolic blood pressure is less than 120 and your diastolic blood pressure is less than 80, then your blood pressure is normal. There is nothing more that you need to do about it  If your systolic blood pressure is 120-139 or your diastolic blood pressure is 80-89, your blood pressure may be higher than it should be.  You should have your blood pressure re-checked within a year by a primary care provider.  If your systolic blood pressure is 140 or greater or your diastolic blood pressure is 90 or greater, you may have high blood pressure.  High blood pressure is treatable, but if left untreated over time it can put you at risk for heart attack, stroke, or kidney failure.  You should have your blood pressure re-checked by a primary care provider within the next four weeks.  Your BMI is Body mass index is 28.91 kg/(m^2).  Weight management is a personal decision.  If you are interested in exploring weight loss strategies, the following discussion covers the approaches that may be successful. Body mass index (BMI) is one way to tell whether you are at a healthy weight,  overweight, or obese. It measures your weight in relation to your height.  A BMI of 18.5 to 24.9 is in the healthy range. A person with a BMI of 25 to 29.9 is considered overweight, and someone with a BMI of 30 or greater is considered obese. More than two-thirds of American adults are considered overweight or obese.  Being overweight or obese increases the risk for further weight gain. Excess weight may lead to heart disease and diabetes.  Creating and following plans for healthy eating and physical activity may help you improve your health.  Weight control is part of healthy lifestyle and includes exercise, emotional health, and healthy eating habits. Careful eating habits lifelong are the mainstay of weight control. Though there are significant health benefits from weight loss, long-term weight loss with diet alone may be very difficult to achieve- studies show long-term success with dietary management in less than 10% of people. Attaining a healthy weight may be especially difficult to achieve in those with severe obesity. In some cases, medications, devices and surgical management might be considered.  What can you do?  If you are overweight or obese and are interested in methods for weight loss, you should discuss this with your provider.     Consider reducing daily calorie intake by 500 calories.     Keep a food journal.     Avoiding skipping meals, consider cutting portions instead.    Diet combined with exercise helps maintain muscle while optimizing fat loss. Strength training is particularly important for building and maintaining muscle mass. Exercise helps reduce stress, increase energy, and improves fitness. Increasing exercise without diet control, however, may not burn enough calories to loose weight.       Start walking three days a week 10-20 minutes at a time    Work towards walking thirty minutes five days a week     Eventually, increase the speed of your walking for 1-2 minutes at time    In  addition, we recommend that you review healthy lifestyles and methods for weight loss available through the National Institutes of Health patient information sites:  http://win.niddk.nih.gov/publications/index.htm    And look into health and wellness programs that may be available through your health insurance provider, employer, local community center, or ila club.      1. CPAP-  WHAT DOES IT DO AND HOW CAN I LEARN TO WEAR IT?                               BEFORE I START, CAN I WATCH A MOVIE TO GET A PLAN ON HOW TO USE CPAP?  https://www.[employer].com/watch?p=d6E87or481A      Continuous positive airway pressure, or CPAP, is the most effective treatment for obstructive sleep apnea. It works by blowing room air, through a mask, to hold your throat open. A decision to use CPAP is a major step forward in the pursuit of a healthier life. The successful use of CPAP will help you breathe easier, sleep better and live healthier. You can choose CPAP equipment from any durable medical equipment provider that meets your needs.  Using CPAP can be a positive experience if you keep these cr points in mind:  1. Commitment  CPAP is not a quick fix for your problem. It involves a long-term commitment to improve your sleep and your health.    2. Communication  Stay in close communication with both your sleep doctor and your CPAP supplier. Ask lots of questions and seek help when you need it.    3. Consistency  Use CPAP all night, every night and for every nap. You will receive the maximum health benefits from CPAP when you use it every time that you sleep. This will also make it easier for your body to adjust to the treatment.    4. Correction  The first machine and mask that you try may not be the best ones for you. Work with your sleep doctor and your CPAP supplier to make corrections to your equipment selection. Ask about trying a different type of machine or mask if you have ongoing problems. Make sure that your mask is a good  "fit and learn to use your equipment properly.    5. Challenge  Tell a family member or close friend to ask you each morning if you used your CPAP the previous night. Have someone to challenge you to give it your best effort.    6. Connection   Your adjustment to CPAP will be easier if you are able to connect with others who use the same treatment. Ask your sleep doctor if there is a support group in your area for people who have sleep apnea, or look for one on the Internet.  7. Comfort   Increase your level of comfort by using a saline spray, decongestant or heated humidifier if CPAP irritates your nose, mouth or throat. Use your unit's \"ramp\" setting to slowly get used to the air pressure level. There may be soft pads you can buy that will fit over your mask straps. Look on www.CPAP.com for accessories that can help make CPAP use more comfortable.  8. Cleaning   Clean your mask, tubing and headgear on a regular basis. Put this time in your schedule so that you don't forget to do it. Check and replace the filters for your CPAP unit and humidifier.    9. Completion   Although you are never finished with CPAP therapy, you should reward yourself by celebrating the completion of your first month of treatment. Expect this first month to be your hardest period of adjustment. It will involve some trial and error as you find the machine, mask and pressure settings that are right for you.    10. Continuation  After your first month of treatment, continue to make a daily commitment to use your CPAP all night, every night and for every nap.    CPAP-Tips to starting with success:  Begin using your CPAP for short periods of time during the day while you watch TV or read.    Use CPAP every night and for every nap. Using it less often reduces the health benefits and makes it harder for your body to get used to it.    Make small adjustments to your mask, tubing, straps and headgear until you get the right fit. Tightening the mask " may actually worsen the leak.  If it leaves significant marks on your face or irritates the bridge of your nose, it may not be the best mask for you.  Speak with the person who supplied the mask and consider trying other masks. Insurances will allow you to try different masks during the first month of starting CPAP.  Insurance also covers a new mask, hose and filter about every 6 months.    Use a saline nasal spray to ease mild nasal congestion. Neti-Pot or saline nasal rinses may also help. Nasal gel sprays can help reduce nasal dryness.  Biotene mouthwash can be helpful to protect your teeth if you experience frequent dry mouth.  Dry mouth may be a sign of air escaping out of your mouth or out of the mask in the case of a full face mask.  Speak with your provider if you expect that is the case.     Take a nasal decongestant to relieve more severe nasal or sinus congestion.  Do not use Afrin (oxymetazoline) nasal spray more than 3 days in a row.  Speak with your sleep doctor if your nasal congestion is chronic.    Use a heated humidifier that fits your CPAP model to enhance your breathing comfort. Adjust the heat setting up if you get a dry nose or throat, down if you get condensation in the hose or mask.  Position the CPAP lower than you so that any condensation in the hose drains back into the machine rather than towards the mask.    Try a system that uses nasal pillows if traditional masks give you problems.    Clean your mask, tubing and headgear once a week. Make sure the equipment dries fully.    Regularly check and replace the filters for your CPAP unit and humidifier.    Work closely with your sleep provider and your CPAP supplier to make sure that you have the machine, mask and air pressure setting that works best for you. It is better to stop using it and call your provider to solve problems than to lay awake all night frustrated with the device.    Daytime naps are not advised, but use the PAP device if  taking naps. Many insurances require that we prove you are using the PAP device at least 4 hours on at least 70% of nights over a 30 day period. We have 90 days to meet those criteria.    You can get new supplies (mask, hose and filter) for your device every 3-6 months (covered by insurance). You do not need to get supplies that often, but they are available if you would like them. You may exchange the mask once within the first month if you feel the initial mask does not fit well. Please, contact your medical equipment provider for equipment issues.    Please let me know if you have any snoring, daytime sleepiness, or poor sleep quality. We will want to make sure your PAP device is adequately treating your condition.    There is a website called CPAP.com that has accessories that may make CPAP use easier. Please visit it at your convenience.    Please, schedule sleep study and follow up visit with the nurse (she will coming into the room and meet with you).    Thank you!    Shawn Freitas MD, MPH  Clinical Sleep and Occupational / Environmental Medicine                Follow-ups after your visit        Your next 10 appointments already scheduled     Jun 22, 2018  1:30 PM CDT   Ech Complete with [id number] ([facility])    [address]  [facility] [address] [phone]           1. Please bring or wear a comfortable two-piece outfit. 2. You may eat, drink and take your normal medicines. 3. For any questions that cannot be answered, please contact the ordering physician ***Please check-in at the [place] Registration Office located in [address] in the [facility] building. When you are finished registering, please go to [address] and have a seat. The technician will call your name for the test.            Jun 25, 2018  2:45 PM CDT   Return Visit with Den Pickard MD   [facility]    "[place] ([facility])    [address]  [facility] [id number] [phone]              Future tests that were ordered for you today     Open Future Orders        Priority Expected Expires Ordered    Overnight oximetry study Routine  11/27/2018 5/31/2018            Who to contact     If you have questions or need follow up information about today's clinic visit or your schedule please contact [facility] directly at [phone].  Normal or non-critical lab and imaging results will be communicated to you by MyChart, letter or phone within 4 business days after the clinic has received the results. If you do not hear from us within 7 days, please contact the clinic through MyChart or phone. If you have a critical or abnormal lab result, we will notify you by phone as soon as possible.  Submit refill requests through [employer] or call your pharmacy and they will forward the refill request to us. Please allow 3 business days for your refill to be completed.          Additional Information About Your Visit        Care EveryWhere ID     This is your Care EveryWhere ID. This could be used by other organizations to access your [place] medical records  [id number]        Your Vitals Were     Pulse Respirations Height Pulse Oximetry BMI (Body Mass Index)       78 12 1.638 m (5' 4.49\") 98% 28.91 kg/m2        Blood Pressure from Last 3 Encounters:   05/31/18 114/75   05/17/18 112/62   04/26/18 94/64    Weight from Last 3 Encounters:   05/31/18 77.6 kg (171 lb)   05/17/18 79.6 kg (175 lb 6.4 oz)   04/26/18 79.2 kg (174 lb 8 oz)              We Performed the Following     Comprehensive DME     SLEEP EVALUATION & MANAGEMENT REFERRAL - ADULT -[place]  [phone] (Age 18 and up)        Primary Care Provider Office Phone # Fax #    Arielle Leger [phone]       [employer] [address] [facility] [address]        Equal " Access to Services     [facility]: Hadii corbin byrd tonybrennon Jamiaali, waaxda luqadaha, qaybta kaalmascout ann. So [facility] [phone].    ATENCIÓN: Si habla español, tiene a delgadillo disposición servicios gratuitos de asistencia lingüística. Llame al [phone].    We comply with applicable federal civil rights laws and [place] laws. We do not discriminate on the basis of race, color, national origin, age, disability, sex, sexual orientation, or gender identity.            Thank you!     Thank you for choosing [facility]  for your care. Our goal is always to provide you with excellent care. Hearing back from our patients is one way we can continue to improve our services. Please take a few minutes to complete the written survey that you may receive in the mail after your visit with us. Thank you!             Your Updated Medication List - Protect others around you: Learn how to safely use, store and throw away your medicines at www.disposemymeds.org.          This list is accurate as of 5/31/18 10:36 AM.  Always use your most recent med list.                   Brand Name Dispense Instructions for use Diagnosis    aspirin 81 MG chewable tablet     36 tablet    Take 1 tablet (81 mg) by mouth daily    Acute respiratory failure with hypoxia (H)       digoxin 125 MCG tablet    LANOXIN    90 tablet    Take 1 tablet (125 mcg) by mouth daily    Atrial fibrillation with rapid ventricular response (H)       furosemide 40 MG tablet    LASIX    90 tablet    Take 1 tablet (40 mg) by mouth daily    Acute combined systolic and diastolic CHF, NYHA class 3 (H)       IMODIUM A-D 2 MG capsule   Generic drug:  loperamide      Take 2 mg by mouth as needed for diarrhea        losartan 25 MG tablet    COZAAR    90 tablet    Take 1 tablet (25 mg) by mouth daily    Nonischemic cardiomyopathy (H)       meclizine 12.5 MG tablet    ANTIVERT    60 tablet    Take 1 tablet (12.5  mg) by mouth 3 times daily as needed for dizziness    Labyrinthitis, bilateral       metoprolol succinate 50 MG 24 hr tablet    TOPROL XL    180 tablet    Take 1 tablet (50 mg) by mouth 2 times daily    Persistent atrial fibrillation (H)       MULTIPLE VITAMINS PO      Take 1 tablet by mouth daily        OMEPRAZOLE PO      Take 20 mg by mouth daily as needed        PAROXETINE HCL PO      Take 30 mg by mouth daily        VITAMIN D3 PO      Take 2,000 Units by mouth daily        WARFARIN SODIUM PO      Take by mouth daily As directed

## 2018-05-31 NOTE — PATIENT INSTRUCTIONS
Your blood pressure was checked while you were in clinic today.  Please read the guidelines below about what these numbers mean and what you should do about them.  Your systolic blood pressure is the top number.  This is the pressure when the heart is pumping.  Your diastolic blood pressure is the bottom number.  This is the pressure in between beats.  If your systolic blood pressure is less than 120 and your diastolic blood pressure is less than 80, then your blood pressure is normal. There is nothing more that you need to do about it  If your systolic blood pressure is 120-139 or your diastolic blood pressure is 80-89, your blood pressure may be higher than it should be.  You should have your blood pressure re-checked within a year by a primary care provider.  If your systolic blood pressure is 140 or greater or your diastolic blood pressure is 90 or greater, you may have high blood pressure.  High blood pressure is treatable, but if left untreated over time it can put you at risk for heart attack, stroke, or kidney failure.  You should have your blood pressure re-checked by a primary care provider within the next four weeks.  Your BMI is Body mass index is 28.91 kg/(m^2).  Weight management is a personal decision.  If you are interested in exploring weight loss strategies, the following discussion covers the approaches that may be successful. Body mass index (BMI) is one way to tell whether you are at a healthy weight, overweight, or obese. It measures your weight in relation to your height.  A BMI of 18.5 to 24.9 is in the healthy range. A person with a BMI of 25 to 29.9 is considered overweight, and someone with a BMI of 30 or greater is considered obese. More than two-thirds of American adults are considered overweight or obese.  Being overweight or obese increases the risk for further weight gain. Excess weight may lead to heart disease and diabetes.  Creating and following plans for healthy eating and  physical activity may help you improve your health.  Weight control is part of healthy lifestyle and includes exercise, emotional health, and healthy eating habits. Careful eating habits lifelong are the mainstay of weight control. Though there are significant health benefits from weight loss, long-term weight loss with diet alone may be very difficult to achieve- studies show long-term success with dietary management in less than 10% of people. Attaining a healthy weight may be especially difficult to achieve in those with severe obesity. In some cases, medications, devices and surgical management might be considered.  What can you do?  If you are overweight or obese and are interested in methods for weight loss, you should discuss this with your provider.     Consider reducing daily calorie intake by 500 calories.     Keep a food journal.     Avoiding skipping meals, consider cutting portions instead.    Diet combined with exercise helps maintain muscle while optimizing fat loss. Strength training is particularly important for building and maintaining muscle mass. Exercise helps reduce stress, increase energy, and improves fitness. Increasing exercise without diet control, however, may not burn enough calories to loose weight.       Start walking three days a week 10-20 minutes at a time    Work towards walking thirty minutes five days a week     Eventually, increase the speed of your walking for 1-2 minutes at time    In addition, we recommend that you review healthy lifestyles and methods for weight loss available through the National Institutes of Health patient information sites:  http://win.niddk.nih.gov/publications/index.htm    And look into health and wellness programs that may be available through your health insurance provider, employer, local community center, or ila club.      1. CPAP-  WHAT DOES IT DO AND HOW CAN I LEARN TO WEAR IT?                               BEFORE I START, CAN I WATCH A MOVIE  TO GET A PLAN ON HOW TO USE CPAP?  https://www.you[employer]ube.com/watch?g=l6Y18za642I      Continuous positive airway pressure, or CPAP, is the most effective treatment for obstructive sleep apnea. It works by blowing room air, through a mask, to hold your throat open. A decision to use CPAP is a major step forward in the pursuit of a healthier life. The successful use of CPAP will help you breathe easier, sleep better and live healthier. You can choose CPAP equipment from any durable medical equipment provider that meets your needs.  Using CPAP can be a positive experience if you keep these cr points in mind:  1. Commitment  CPAP is not a quick fix for your problem. It involves a long-term commitment to improve your sleep and your health.    2. Communication  Stay in close communication with both your sleep doctor and your CPAP supplier. Ask lots of questions and seek help when you need it.    3. Consistency  Use CPAP all night, every night and for every nap. You will receive the maximum health benefits from CPAP when you use it every time that you sleep. This will also make it easier for your body to adjust to the treatment.    4. Correction  The first machine and mask that you try may not be the best ones for you. Work with your sleep doctor and your CPAP supplier to make corrections to your equipment selection. Ask about trying a different type of machine or mask if you have ongoing problems. Make sure that your mask is a good fit and learn to use your equipment properly.    5. Challenge  Tell a family member or close friend to ask you each morning if you used your CPAP the previous night. Have someone to challenge you to give it your best effort.    6. Connection   Your adjustment to CPAP will be easier if you are able to connect with others who use the same treatment. Ask your sleep doctor if there is a support group in your area for people who have sleep apnea, or look for one on the Internet.  7. Comfort  "  Increase your level of comfort by using a saline spray, decongestant or heated humidifier if CPAP irritates your nose, mouth or throat. Use your unit's \"ramp\" setting to slowly get used to the air pressure level. There may be soft pads you can buy that will fit over your mask straps. Look on www.CPAP.com for accessories that can help make CPAP use more comfortable.  8. Cleaning   Clean your mask, tubing and headgear on a regular basis. Put this time in your schedule so that you don't forget to do it. Check and replace the filters for your CPAP unit and humidifier.    9. Completion   Although you are never finished with CPAP therapy, you should reward yourself by celebrating the completion of your first month of treatment. Expect this first month to be your hardest period of adjustment. It will involve some trial and error as you find the machine, mask and pressure settings that are right for you.    10. Continuation  After your first month of treatment, continue to make a daily commitment to use your CPAP all night, every night and for every nap.    CPAP-Tips to starting with success:  Begin using your CPAP for short periods of time during the day while you watch TV or read.    Use CPAP every night and for every nap. Using it less often reduces the health benefits and makes it harder for your body to get used to it.    Make small adjustments to your mask, tubing, straps and headgear until you get the right fit. Tightening the mask may actually worsen the leak.  If it leaves significant marks on your face or irritates the bridge of your nose, it may not be the best mask for you.  Speak with the person who supplied the mask and consider trying other masks. Insurances will allow you to try different masks during the first month of starting CPAP.  Insurance also covers a new mask, hose and filter about every 6 months.    Use a saline nasal spray to ease mild nasal congestion. Neti-Pot or saline nasal rinses may also " help. Nasal gel sprays can help reduce nasal dryness.  Biotene mouthwash can be helpful to protect your teeth if you experience frequent dry mouth.  Dry mouth may be a sign of air escaping out of your mouth or out of the mask in the case of a full face mask.  Speak with your provider if you expect that is the case.     Take a nasal decongestant to relieve more severe nasal or sinus congestion.  Do not use Afrin (oxymetazoline) nasal spray more than 3 days in a row.  Speak with your sleep doctor if your nasal congestion is chronic.    Use a heated humidifier that fits your CPAP model to enhance your breathing comfort. Adjust the heat setting up if you get a dry nose or throat, down if you get condensation in the hose or mask.  Position the CPAP lower than you so that any condensation in the hose drains back into the machine rather than towards the mask.    Try a system that uses nasal pillows if traditional masks give you problems.    Clean your mask, tubing and headgear once a week. Make sure the equipment dries fully.    Regularly check and replace the filters for your CPAP unit and humidifier.    Work closely with your sleep provider and your CPAP supplier to make sure that you have the machine, mask and air pressure setting that works best for you. It is better to stop using it and call your provider to solve problems than to lay awake all night frustrated with the device.    Daytime naps are not advised, but use the PAP device if taking naps. Many insurances require that we prove you are using the PAP device at least 4 hours on at least 70% of nights over a 30 day period. We have 90 days to meet those criteria.    You can get new supplies (mask, hose and filter) for your device every 3-6 months (covered by insurance). You do not need to get supplies that often, but they are available if you would like them. You may exchange the mask once within the first month if you feel the initial mask does not fit well.  Please, contact your medical equipment provider for equipment issues.    Please let me know if you have any snoring, daytime sleepiness, or poor sleep quality. We will want to make sure your PAP device is adequately treating your condition.    There is a website called CPAP.com that has accessories that may make CPAP use easier. Please visit it at your convenience.    Please, schedule sleep study and follow up visit with the nurse (she will coming into the room and meet with you).    Thank you!    Shawn Freitas MD, MPH  Clinical Sleep and Occupational / Environmental Medicine

## 2018-05-31 NOTE — PROGRESS NOTES
This nurse faxed orders for mask fitting to [facility] 5/31/18.  Fax# [phone]. Phone# [phone].        Ama Bruno LPN/EDITA

## 2018-05-31 NOTE — NURSING NOTE
"Chief Complaint   Patient presents with     Consult     referral per Dusty Rose, using pap, atrial fib.       Initial /75  Pulse 78  Resp 12  Ht 1.638 m (5' 4.49\")  Wt 77.6 kg (171 lb)  SpO2 98%  BMI 28.91 kg/m2 Estimated body mass index is 28.91 kg/(m^2) as calculated from the following:    Height as of this encounter: 1.638 m (5' 4.49\").    Weight as of this encounter: 77.6 kg (171 lb).    Medication Reconciliation: complete    Neck circumference: 15 inches / 38 centimeters.    DME: yes    ESS 4    Darshana Richmond  "

## 2018-06-21 ENCOUNTER — OFFICE VISIT (OUTPATIENT)
Dept: SLEEP MEDICINE | Facility: CLINIC | Age: 78
End: 2018-06-21
Payer: COMMERCIAL

## 2018-06-21 DIAGNOSIS — I48.91 ATRIAL FIBRILLATION WITH RAPID VENTRICULAR RESPONSE (H): ICD-10-CM

## 2018-06-21 DIAGNOSIS — I50.22 CHRONIC SYSTOLIC CONGESTIVE HEART FAILURE (H): ICD-10-CM

## 2018-06-21 DIAGNOSIS — G47.33 OSA (OBSTRUCTIVE SLEEP APNEA): ICD-10-CM

## 2018-06-21 NOTE — MR AVS SNAPSHOT
After Visit Summary   6/21/2018    Em Richmond    MRN: [id number]           Patient Information     Date Of Birth          1940        Visit Information        Provider Department      6/21/2018 2:00 PM  SLEEP LAB [facility]        Today's Diagnoses     Atrial fibrillation with rapid ventricular response (H)        JERSON (obstructive sleep apnea)        Chronic systolic congestive heart failure (H)           Follow-ups after your visit        Your next 10 appointments already scheduled     Jun 22, 2018  1:30 PM CDT   Ech Complete with [id number] ([facility])    [address]  [facility] [address]   [phone]           1. Please bring or wear a comfortable two-piece outfit. 2. You may eat, drink and take your normal medicines. 3. For any questions that cannot be answered, please contact the ordering physician ***Please check-in at the [facility] located in [address] in the [facility] building. When you are finished registering, please go to [address] and have a seat. The technician will call your name for the test.            Jun 22, 2018  3:00 PM CDT   Actigraphy Drop Off with  SLEEP DME   [facility] [place]    [address]  [place] [id number] [phone]            Jun 25, 2018  2:45 PM CDT   Return Visit with Den Pickard MD   [facility] ([facility])    [address]  [place] [id number] [phone]            Jul 05, 2018 11:30 AM CDT   Return Sleep Patient with Shawn Freitas MD   [facility] [place] [facility]    [address]  [place] [id number] [phone]              Who to contact     If you have questions or need follow  up information about today's clinic visit or your schedule please contact [facility] directly at [phone].  Normal or non-critical lab and imaging results will be communicated to you by MyChart, letter or phone within 4 business days after the clinic has received the results. If you do not hear from us within 7 days, please contact the clinic through MyChart or phone. If you have a critical or abnormal lab result, we will notify you by phone as soon as possible.  Submit refill requests through [employer] or call your pharmacy and they will forward the refill request to us. Please allow 3 business days for your refill to be completed.          Additional Information About Your Visit        Care EveryWhere ID     This is your Care EveryWhere ID. This could be used by other organizations to access your [place] medical records  [id number]         Blood Pressure from Last 3 Encounters:   05/31/18 114/75   05/17/18 112/62   04/26/18 94/64    Weight from Last 3 Encounters:   05/31/18 77.6 kg (171 lb)   05/17/18 79.6 kg (175 lb 6.4 oz)   04/26/18 79.2 kg (174 lb 8 oz)              We Performed the Following     Overnight oximetry study        Primary Care Provider Office Phone # Fax #    Arielle Leger [phone]       [facility] [address] [address] [address]        Equal Access to Services     LUZ MARTINEZ AH: Hadii corbin ku hadasho Sodara, waaxda luqadaha, qaybta kaalmada marcella, scout nixon . So [facility] [phone].    ATENCIÓN: Si habla español, tiene a delgadillo disposición servicios gratuitos de asistencia lingüística. Jovon al [phone].    We comply with applicable federal civil rights laws and [place] laws. We do not discriminate on the basis of race, color, national origin, age, disability, sex, sexual orientation, or gender identity.            Thank you!     Thank you for choosing [facility]  for  your care. Our goal is always to provide you with excellent care. Hearing back from our patients is one way we can continue to improve our services. Please take a few minutes to complete the written survey that you may receive in the mail after your visit with us. Thank you!             Your Updated Medication List - Protect others around you: Learn how to safely use, store and throw away your medicines at www.disposemymeds.org.          This list is accurate as of 6/21/18  2:45 PM.  Always use your most recent med list.                   Brand Name Dispense Instructions for use Diagnosis    aspirin 81 MG chewable tablet     36 tablet    Take 1 tablet (81 mg) by mouth daily    Acute respiratory failure with hypoxia (H)       digoxin 125 MCG tablet    LANOXIN    90 tablet    Take 1 tablet (125 mcg) by mouth daily    Atrial fibrillation with rapid ventricular response (H)       furosemide 40 MG tablet    LASIX    90 tablet    Take 1 tablet (40 mg) by mouth daily    Acute combined systolic and diastolic CHF, NYHA class 3 (H)       IMODIUM A-D 2 MG capsule   Generic drug:  loperamide      Take 2 mg by mouth as needed for diarrhea        losartan 25 MG tablet    COZAAR    90 tablet    Take 1 tablet (25 mg) by mouth daily    Nonischemic cardiomyopathy (H)       meclizine 12.5 MG tablet    ANTIVERT    60 tablet    Take 1 tablet (12.5 mg) by mouth 3 times daily as needed for dizziness    Labyrinthitis, bilateral       metoprolol succinate 50 MG 24 hr tablet    TOPROL XL    180 tablet    Take 1 tablet (50 mg) by mouth 2 times daily    Persistent atrial fibrillation (H)       MULTIPLE VITAMINS PO      Take 1 tablet by mouth daily        OMEPRAZOLE PO      Take 20 mg by mouth daily as needed        PAROXETINE HCL PO      Take 30 mg by mouth daily        VITAMIN D3 PO      Take 2,000 Units by mouth daily        WARFARIN SODIUM PO      Take by mouth daily As directed

## 2018-06-21 NOTE — PROGRESS NOTES
Patient picked up oximeter and was instructed on use. They showed understanding by demonstrating it back. She will return on 6/22/18

## 2018-06-22 ENCOUNTER — DOCUMENTATION ONLY (OUTPATIENT)
Dept: SLEEP MEDICINE | Facility: CLINIC | Age: 78
End: 2018-06-22
Payer: COMMERCIAL

## 2018-06-22 ENCOUNTER — HOSPITAL ENCOUNTER (OUTPATIENT)
Dept: CARDIOLOGY | Facility: CLINIC | Age: 78
Discharge: HOME | End: 2018-06-22
Attending: NURSE PRACTITIONER | Admitting: NURSE PRACTITIONER
Payer: MEDICARE

## 2018-06-22 DIAGNOSIS — I48.91 ATRIAL FIBRILLATION WITH RAPID VENTRICULAR RESPONSE (H): ICD-10-CM

## 2018-06-22 PROCEDURE — 25500064 ZZH RX 255 OP 636: Performed by: NURSE PRACTITIONER

## 2018-06-22 PROCEDURE — 93308 TTE F-UP OR LMTD: CPT | Mod: 26 | Performed by: INTERNAL MEDICINE

## 2018-06-22 PROCEDURE — 93325 DOPPLER ECHO COLOR FLOW MAPG: CPT | Mod: 26 | Performed by: INTERNAL MEDICINE

## 2018-06-22 PROCEDURE — 93321 DOPPLER ECHO F-UP/LMTD STD: CPT

## 2018-06-22 PROCEDURE — 93321 DOPPLER ECHO F-UP/LMTD STD: CPT | Mod: 26 | Performed by: INTERNAL MEDICINE

## 2018-06-22 RX ADMIN — HUMAN ALBUMIN MICROSPHERES AND PERFLUTREN 2 ML: 10; .22 INJECTION, SOLUTION INTRAVENOUS at 14:10

## 2018-06-25 ENCOUNTER — OFFICE VISIT (OUTPATIENT)
Dept: CARDIOLOGY | Facility: CLINIC | Age: 78
End: 2018-06-25
Attending: INTERNAL MEDICINE
Payer: COMMERCIAL

## 2018-06-25 VITALS
BODY MASS INDEX: 28.32 KG/M2 | HEIGHT: 65 IN | WEIGHT: 170 LBS | DIASTOLIC BLOOD PRESSURE: 70 MMHG | HEART RATE: 88 BPM | SYSTOLIC BLOOD PRESSURE: 110 MMHG

## 2018-06-25 DIAGNOSIS — I43 TACHYCARDIA INDUCED CARDIOMYOPATHY (H): ICD-10-CM

## 2018-06-25 DIAGNOSIS — I50.41 ACUTE COMBINED SYSTOLIC AND DIASTOLIC CHF, NYHA CLASS 3 (H): ICD-10-CM

## 2018-06-25 DIAGNOSIS — I42.9 IDIOPATHIC CARDIOMYOPATHY (H): ICD-10-CM

## 2018-06-25 DIAGNOSIS — R00.0 TACHYCARDIA INDUCED CARDIOMYOPATHY (H): ICD-10-CM

## 2018-06-25 DIAGNOSIS — I48.91 ATRIAL FIBRILLATION WITH RAPID VENTRICULAR RESPONSE (H): ICD-10-CM

## 2018-06-25 PROCEDURE — 99214 OFFICE O/P EST MOD 30 MIN: CPT | Performed by: INTERNAL MEDICINE

## 2018-06-25 NOTE — LETTER
6/25/2018      Arielle Leger MD  [facility] [address] [address] [address]      RE: Em Richmond       Dear Colleague,    I had the pleasure of seeing Em Richmond in the [facility].    Service Date: 06/25/2018      PRIMARY CARE PHYSICIAN:  Dr. Arielle Leger.      HISTORY OF PRESENT ILLNESS:  I again had the pleasure of seeing your patient, Em Richmond, at [facility] for evaluation of a nonischemic cardiomyopathy.  The patient was admitted twice in October and November for congestive heart failure and a cardiomyopathy.  Her ejection fraction on echocardiogram in 2014 was 60%-65%.  On 10/21/2017 her ejection fraction had dropped to 30%-35%.  There was mild to moderate tricuspid regurgitation and mild to moderate mitral insufficiency.  The patient had a 2-3 year history of chronic atrial fibrillation.  She has stopped her metoprolol and presented with rapid ventricular response.  Coronary angiography 10/23/2017 showed only mild coronary artery disease with all stenoses less than 30%.  We felt that the idiopathic cardiomyopathy was possibly due to atrial fibrillation with rapid ventricular response.  We initiated beta blockade.  We continued with her diuresis.  She lost 20 pounds in the hospital.  We continue to have difficulty with rate control with beta blocker alone.  Digoxin was used while in the hospital and this was discontinued at discharge.  She was intubated at one point in the hospital due to her shortness of breath.  She remains on warfarin without bleeding diathesis.  We have reinitiated the digoxin with excellent heart rate control.  Her weight has remained fairly stable.  She has a history of sleep apnea but is not using her CPAP due to claustrophobia.  Echocardiogram in March showed an ejection fraction of 35%-40% indicating no need for an ICD.  Her heart rate remained elevated at 100 and 110 beats per minute.  After  initiation of the digoxin we achieved better rate control.  Echocardiogram on  now demonstrates an ejection fraction of 50%-55% with mildly reduced left ventricular function.  Right ventricular systolic function is borderline reduced as well and there is moderate left atrial enlargement.  The patient notes that her energy is better and she is less short of breath.  She is doing very little exercise.      PHYSICAL EXAMINATION:  As listed below.      ASSESSMENT:   1.  Em Dorman is a delightful 77-year-old female with evidence of an idiopathic cardiomyopathy possibly secondary to atrial fibrillation with rapid ventricular response.  Her blood pressure fell precipitously with increased doses of Toprol-XL.  Because of this we added digoxin and her heart rate is now under better control.  We will obtain a digoxin level within the next 6 months.   2.  The patient will follow up again in 4 months.  I have made no medication changes today.   3.  The patient will continue on warfarin for atrial fibrillation.   4.  The patient remains on furosemide for the time being.  Her volume status appears stable.  Her last BMP on 2018 showed creatinine 0.74, BUN 10, sodium 138 and potassium 4.0.  I think it is reasonable to continue with her current medications.   5.  Obstructive sleep apnea.  I have asked the patient to continue to use her CPAP as able.      It is my pleasure to assist in the care of Em Dorman.  All her questions were answered to her satisfaction.      Ann Hutchinson MD      cc:   Arielle Leger MD    [place] [id number]         ANN HUTCHINSON MD, [facility]             D: 2018   T: 2018   MT: [id number]      Name:     EM DORMAN   MRN:      [id number]        Account:      [id number]   :      1940           Service Date: 2018      Document: [id number]         Outpatient Encounter Prescriptions as of 2018   Medication Sig  Dispense Refill     aspirin 81 MG chewable tablet Take 1 tablet (81 mg) by mouth daily 36 tablet 0     Cholecalciferol (VITAMIN D3 PO) Take 2,000 Units by mouth daily        digoxin (LANOXIN) 125 MCG tablet Take 1 tablet (125 mcg) by mouth daily 90 tablet 3     furosemide (LASIX) 40 MG tablet Take 1 tablet (40 mg) by mouth daily 90 tablet 3     loperamide (IMODIUM A-D) 2 MG capsule Take 2 mg by mouth as needed for diarrhea       losartan (COZAAR) 25 MG tablet Take 1 tablet (25 mg) by mouth daily 90 tablet 3     meclizine (ANTIVERT) 12.5 MG tablet Take 1 tablet (12.5 mg) by mouth 3 times daily as needed for dizziness 60 tablet 1     metoprolol succinate (TOPROL-XL) 50 MG 24 hr tablet Take 1 tablet (50 mg) by mouth 2 times daily 180 tablet 3     MULTIPLE VITAMINS PO Take 1 tablet by mouth daily       OMEPRAZOLE PO Take 20 mg by mouth daily as needed        PAROXETINE HCL PO Take 30 mg by mouth daily        WARFARIN SODIUM PO Take by mouth daily As directed       No facility-administered encounter medications on file as of 6/25/2018.        Again, thank you for allowing me to participate in the care of your patient.      Sincerely,    Den Pickard MD     [facility]

## 2018-06-25 NOTE — LETTER
6/25/2018    Arielle Leger MD  [facility] [address] [facility] [address]    RE: Em Richmond       Dear Colleague,    I had the pleasure of seeing Em Richmond in the [facility].    HPI and Plan:   See dictation:[id number]    Orders Placed This Encounter   Procedures     Digoxin level     Follow-Up with Cardiac Advanced Practice Provider       No orders of the defined types were placed in this encounter.      There are no discontinued medications.      Encounter Diagnoses   Name Primary?     Atrial fibrillation with rapid ventricular response (H)      Acute combined systolic and diastolic CHF, NYHA class 3 (H)      Idiopathic cardiomyopathy (H)      Tachycardia induced cardiomyopathy (H)        CURRENT MEDICATIONS:  Current Outpatient Prescriptions   Medication Sig Dispense Refill     aspirin 81 MG chewable tablet Take 1 tablet (81 mg) by mouth daily 36 tablet 0     Cholecalciferol (VITAMIN D3 PO) Take 2,000 Units by mouth daily        digoxin (LANOXIN) 125 MCG tablet Take 1 tablet (125 mcg) by mouth daily 90 tablet 3     furosemide (LASIX) 40 MG tablet Take 1 tablet (40 mg) by mouth daily 90 tablet 3     loperamide (IMODIUM A-D) 2 MG capsule Take 2 mg by mouth as needed for diarrhea       losartan (COZAAR) 25 MG tablet Take 1 tablet (25 mg) by mouth daily 90 tablet 3     meclizine (ANTIVERT) 12.5 MG tablet Take 1 tablet (12.5 mg) by mouth 3 times daily as needed for dizziness 60 tablet 1     metoprolol succinate (TOPROL-XL) 50 MG 24 hr tablet Take 1 tablet (50 mg) by mouth 2 times daily 180 tablet 3     MULTIPLE VITAMINS PO Take 1 tablet by mouth daily       OMEPRAZOLE PO Take 20 mg by mouth daily as needed        PAROXETINE HCL PO Take 30 mg by mouth daily        WARFARIN SODIUM PO Take by mouth daily As directed         ALLERGIES     Allergies   Allergen Reactions     Lisinopril Diarrhea     Sulfa Drugs        PAST MEDICAL HISTORY:  Past Medical History:  "  Diagnosis Date     Acute combined systolic and diastolic CHF, NYHA class 3 (H) 10/24/2017     Atrial fibrillation with rapid ventricular response (H) 10/20/2017     Cardiogenic shock (H) 11/05/2017     Essential hypertension 10/24/2017     Idiopathic cardiomyopathy (H) 12/19/2017     Nonischemic cardiomyopathy (H) 11/7/2017     Syncope 6/20/2014     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 RHC: mild PHTN/NL wedge/ low CI.        FAMILY HISTORY:  Family History   Problem Relation Age of Onset     Diabetes Mother      Other Cancer Father        SOCIAL HISTORY:  Social History     Social History     Marital status:      Spouse name: N/A     Number of children: N/A     Years of education: N/A     Social History Main Topics     Smoking status: Former Smoker     Types: Cigarettes     Quit date: 1/1/1965     Smokeless tobacco: Never Used     Alcohol use 0.0 oz/week      Comment: 8 oz wine daily     Drug use: No     Sexual activity: Yes     Partners: Male     Other Topics Concern     None     Social History Narrative       Review of Systems:  Skin:  Negative       Eyes:  Positive for glasses    ENT:  Positive for postnasal drainage    Respiratory:  Positive for sleep apnea;CPAP     Cardiovascular:    chest pain;Positive for    Gastroenterology: Positive for nausea    Genitourinary:  Negative      Musculoskeletal:  Positive for foot pain R toe  Neurologic:  Negative      Psychiatric:  Positive for      Heme/Lymph/Imm:  Negative      Endocrine:  Negative        Physical Exam:  Vitals: /70 (BP Location: Right arm, Patient Position: Sitting, Cuff Size: Adult Regular)  Pulse 88  Ht 1.638 m (5' 4.5\")  Wt 77.1 kg (170 lb)  Breastfeeding? No  BMI 28.73 kg/m2    Constitutional:  cooperative, alert and oriented, well developed, well " nourished, in no acute distress        Skin:  warm and dry to the touch, no apparent skin lesions or masses noted          Head:  normocephalic, no masses or lesions        Eyes:  pupils equal and round;conjunctivae and lids unremarkable;sclera white;no xanthalasma        Lymph:      ENT:  no pallor or cyanosis, dentition good        Neck:  carotid pulses are full and equal bilaterally, JVP normal, no carotid bruit        Respiratory:  normal breath sounds, clear to auscultation, normal A-P diameter, normal symmetry, normal respiratory excursion, no use of accessory muscles         Cardiac: normal S1 and S2;no S3 or S4;apical impulse not displaced irregularly irregular rhythm;tachycardic   no presence of murmur          pulses full and equal, no bruits auscultated                                        GI:  abdomen soft, non-tender, BS normoactive, no mass, no HSM, no bruits        Extremities and Muscular Skeletal:  no deformities, clubbing, cyanosis, erythema observed;no edema              Neurological:  no gross motor deficits;affect appropriate        Psych:  Alert and Oriented x 3        CC  Den Pickard MD  [address]  [place] [address]                Thank you for allowing me to participate in the care of your patient.      Sincerely,     Den Pickard MD     [facility]    cc:   Den Pickard MD  [address]  [place] [address]

## 2018-06-25 NOTE — PROGRESS NOTES
HPI and Plan:   See dictation:[id number]    Orders Placed This Encounter   Procedures     Digoxin level     Follow-Up with Cardiac Advanced Practice Provider       No orders of the defined types were placed in this encounter.      There are no discontinued medications.      Encounter Diagnoses   Name Primary?     Atrial fibrillation with rapid ventricular response (H)      Acute combined systolic and diastolic CHF, NYHA class 3 (H)      Idiopathic cardiomyopathy (H)      Tachycardia induced cardiomyopathy (H)        CURRENT MEDICATIONS:  Current Outpatient Prescriptions   Medication Sig Dispense Refill     aspirin 81 MG chewable tablet Take 1 tablet (81 mg) by mouth daily 36 tablet 0     Cholecalciferol (VITAMIN D3 PO) Take 2,000 Units by mouth daily        digoxin (LANOXIN) 125 MCG tablet Take 1 tablet (125 mcg) by mouth daily 90 tablet 3     furosemide (LASIX) 40 MG tablet Take 1 tablet (40 mg) by mouth daily 90 tablet 3     loperamide (IMODIUM A-D) 2 MG capsule Take 2 mg by mouth as needed for diarrhea       losartan (COZAAR) 25 MG tablet Take 1 tablet (25 mg) by mouth daily 90 tablet 3     meclizine (ANTIVERT) 12.5 MG tablet Take 1 tablet (12.5 mg) by mouth 3 times daily as needed for dizziness 60 tablet 1     metoprolol succinate (TOPROL-XL) 50 MG 24 hr tablet Take 1 tablet (50 mg) by mouth 2 times daily 180 tablet 3     MULTIPLE VITAMINS PO Take 1 tablet by mouth daily       OMEPRAZOLE PO Take 20 mg by mouth daily as needed        PAROXETINE HCL PO Take 30 mg by mouth daily        WARFARIN SODIUM PO Take by mouth daily As directed         ALLERGIES     Allergies   Allergen Reactions     Lisinopril Diarrhea     Sulfa Drugs        PAST MEDICAL HISTORY:  Past Medical History:   Diagnosis Date     Acute combined systolic and diastolic CHF, NYHA class 3 (H) 10/24/2017     Atrial fibrillation with rapid ventricular response (H) 10/20/2017     Cardiogenic shock (H) 11/05/2017     Essential hypertension 10/24/2017      "Idiopathic cardiomyopathy (H) 12/19/2017     Nonischemic cardiomyopathy (H) 11/7/2017     Syncope 6/20/2014     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 RHC: mild PHTN/NL wedge/ low CI.        FAMILY HISTORY:  Family History   Problem Relation Age of Onset     Diabetes Mother      Other Cancer Father        SOCIAL HISTORY:  Social History     Social History     Marital status:      Spouse name: N/A     Number of children: N/A     Years of education: N/A     Social History Main Topics     Smoking status: Former Smoker     Types: Cigarettes     Quit date: 1/1/1965     Smokeless tobacco: Never Used     Alcohol use 0.0 oz/week      Comment: 8 oz wine daily     Drug use: No     Sexual activity: Yes     Partners: Male     Other Topics Concern     None     Social History Narrative       Review of Systems:  Skin:  Negative       Eyes:  Positive for glasses    ENT:  Positive for postnasal drainage    Respiratory:  Positive for sleep apnea;CPAP     Cardiovascular:    chest pain;Positive for    Gastroenterology: Positive for nausea    Genitourinary:  Negative      Musculoskeletal:  Positive for foot pain R toe  Neurologic:  Negative      Psychiatric:  Positive for      Heme/Lymph/Imm:  Negative      Endocrine:  Negative        Physical Exam:  Vitals: /70 (BP Location: Right arm, Patient Position: Sitting, Cuff Size: Adult Regular)  Pulse 88  Ht 1.638 m (5' 4.5\")  Wt 77.1 kg (170 lb)  Breastfeeding? No  BMI 28.73 kg/m2    Constitutional:  cooperative, alert and oriented, well developed, well nourished, in no acute distress        Skin:  warm and dry to the touch, no apparent skin lesions or masses noted          Head:  normocephalic, no masses or lesions        Eyes:  pupils equal and round;conjunctivae and lids " unremarkable;sclera white;no xanthalasma        Lymph:      ENT:  no pallor or cyanosis, dentition good        Neck:  carotid pulses are full and equal bilaterally, JVP normal, no carotid bruit        Respiratory:  normal breath sounds, clear to auscultation, normal A-P diameter, normal symmetry, normal respiratory excursion, no use of accessory muscles         Cardiac: normal S1 and S2;no S3 or S4;apical impulse not displaced irregularly irregular rhythm;tachycardic   no presence of murmur          pulses full and equal, no bruits auscultated                                        GI:  abdomen soft, non-tender, BS normoactive, no mass, no HSM, no bruits        Extremities and Muscular Skeletal:  no deformities, clubbing, cyanosis, erythema observed;no edema              Neurological:  no gross motor deficits;affect appropriate        Psych:  Alert and Oriented x 3        CC  Den Pickard MD  [address]  [place] [address]

## 2018-06-25 NOTE — MR AVS SNAPSHOT
After Visit Summary   6/25/2018    Em Richmond    MRN: [id number]           Patient Information     Date Of Birth          1940        Visit Information        Provider Department      6/25/2018 2:45 PM Den Pickard MD [facility]        Today's Diagnoses     Atrial fibrillation with rapid ventricular response (H)        Acute combined systolic and diastolic CHF, NYHA class 3 (H)        Idiopathic cardiomyopathy (H)        Tachycardia induced cardiomyopathy (H)           Follow-ups after your visit        Additional Services     Follow-Up with Cardiac Advanced Practice Provider                 Your next 10 appointments already scheduled     Jul 05, 2018 11:30 AM CDT   Return Sleep Patient with Shawn Freitas MD   [facility] ([facility])    [address]  [place] [id number] [phone]            Oct 23, 2018  2:15 PM CDT   LAB with RU LAB   [facility] ([facility])    [address]  [place] [id number] [phone]           Please do not eat 10-12 hours before your appointment if you are coming in fasting for labs on lipids, cholesterol, or glucose (sugar). This does not apply to pregnant women. Water, hot tea and black coffee (with nothing added) are okay. Do not drink other fluids, diet soda or chew gum.            Oct 23, 2018  3:10 PM CDT   Core Return with DOMINIK White CNP   [facility] ([facility])    [address]  [place] [id number] [phone]              Future tests that were ordered for you today     Open Future Orders        Priority Expected Expires Ordered    Follow-Up with Cardiac Advanced Practice Provider Routine 10/23/2018 6/25/2019 6/25/2018    Digoxin level Routine 12/24/2018 6/25/2019  "6/25/2018            Who to contact     If you have questions or need follow up information about today's clinic visit or your schedule please contact [facility] directly at [phone].  Normal or non-critical lab and imaging results will be communicated to you by MyChart, letter or phone within 4 business days after the clinic has received the results. If you do not hear from us within 7 days, please contact the clinic through MyChart or phone. If you have a critical or abnormal lab result, we will notify you by phone as soon as possible.  Submit refill requests through [employer] or call your pharmacy and they will forward the refill request to us. Please allow 3 business days for your refill to be completed.          Additional Information About Your Visit        Care EveryWhere ID     This is your Care EveryWhere ID. This could be used by other organizations to access your [place] medical records  [id number]        Your Vitals Were     Pulse Height Breastfeeding? BMI (Body Mass Index)          88 1.638 m (5' 4.5\") No 28.73 kg/m2         Blood Pressure from Last 3 Encounters:   06/25/18 110/70   05/31/18 114/75   05/17/18 112/62    Weight from Last 3 Encounters:   06/25/18 77.1 kg (170 lb)   05/31/18 77.6 kg (171 lb)   05/17/18 79.6 kg (175 lb 6.4 oz)              We Performed the Following     Follow-Up with Cardiologist        Primary Care Provider Office Phone # Fax #    Arielle Leger [phone]       [facility] [address] [facility] [address]        Equal Access to Services     [facility]: Hadii aad ku hadasho Soomaali, waaxda luqadaha, qaybta kaalmada marcella, scout campbell. So [facility] [phone].    ATENCIÓN: Si habla español, tiene a delgadillo disposición servicios gratuitos de asistencia lingüística. Llame al [phone].    We comply with applicable federal civil rights laws and [place] laws. We do " not discriminate on the basis of race, color, national origin, age, disability, sex, sexual orientation, or gender identity.            Thank you!     Thank you for choosing [facility]  for your care. Our goal is always to provide you with excellent care. Hearing back from our patients is one way we can continue to improve our services. Please take a few minutes to complete the written survey that you may receive in the mail after your visit with us. Thank you!             Your Updated Medication List - Protect others around you: Learn how to safely use, store and throw away your medicines at www.disposemymeds.org.          This list is accurate as of 6/25/18  3:34 PM.  Always use your most recent med list.                   Brand Name Dispense Instructions for use Diagnosis    aspirin 81 MG chewable tablet     36 tablet    Take 1 tablet (81 mg) by mouth daily    Acute respiratory failure with hypoxia (H)       digoxin 125 MCG tablet    LANOXIN    90 tablet    Take 1 tablet (125 mcg) by mouth daily    Atrial fibrillation with rapid ventricular response (H)       furosemide 40 MG tablet    LASIX    90 tablet    Take 1 tablet (40 mg) by mouth daily    Acute combined systolic and diastolic CHF, NYHA class 3 (H)       IMODIUM A-D 2 MG capsule   Generic drug:  loperamide      Take 2 mg by mouth as needed for diarrhea        losartan 25 MG tablet    COZAAR    90 tablet    Take 1 tablet (25 mg) by mouth daily    Nonischemic cardiomyopathy (H)       meclizine 12.5 MG tablet    ANTIVERT    60 tablet    Take 1 tablet (12.5 mg) by mouth 3 times daily as needed for dizziness    Labyrinthitis, bilateral       metoprolol succinate 50 MG 24 hr tablet    TOPROL XL    180 tablet    Take 1 tablet (50 mg) by mouth 2 times daily    Persistent atrial fibrillation (H)       MULTIPLE VITAMINS PO      Take 1 tablet by mouth daily        OMEPRAZOLE PO      Take 20 mg by mouth daily as needed         PAROXETINE HCL PO      Take 30 mg by mouth daily        VITAMIN D3 PO      Take 2,000 Units by mouth daily        WARFARIN SODIUM PO      Take by mouth daily As directed

## 2018-06-26 NOTE — PROGRESS NOTES
Service Date: 06/25/2018      PRIMARY CARE PHYSICIAN:  Dr. Arielle Leger.      HISTORY OF PRESENT ILLNESS:  I again had the pleasure of seeing your patient, Em Richmond, at [facility] for evaluation of a nonischemic cardiomyopathy.  The patient was admitted twice in October and November for congestive heart failure and a cardiomyopathy.  Her ejection fraction on echocardiogram in 2014 was 60%-65%.  On 10/21/2017 her ejection fraction had dropped to 30%-35%.  There was mild to moderate tricuspid regurgitation and mild to moderate mitral insufficiency.  The patient had a 2-3 year history of chronic atrial fibrillation.  She has stopped her metoprolol and presented with rapid ventricular response.  Coronary angiography 10/23/2017 showed only mild coronary artery disease with all stenoses less than 30%.  We felt that the idiopathic cardiomyopathy was possibly due to atrial fibrillation with rapid ventricular response.  We initiated beta blockade.  We continued with her diuresis.  She lost 20 pounds in the hospital.  We continue to have difficulty with rate control with beta blocker alone.  Digoxin was used while in the hospital and this was discontinued at discharge.  She was intubated at one point in the hospital due to her shortness of breath.  She remains on warfarin without bleeding diathesis.  We have reinitiated the digoxin with excellent heart rate control.  Her weight has remained fairly stable.  She has a history of sleep apnea but is not using her CPAP due to claustrophobia.  Echocardiogram in March showed an ejection fraction of 35%-40% indicating no need for an ICD.  Her heart rate remained elevated at 100 and 110 beats per minute.  After initiation of the digoxin we achieved better rate control.  Echocardiogram on 06/22 now demonstrates an ejection fraction of 50%-55% with mildly reduced left ventricular function.  Right ventricular systolic function is borderline reduced as  well and there is moderate left atrial enlargement.  The patient notes that her energy is better and she is less short of breath.  She is doing very little exercise.      PHYSICAL EXAMINATION:  As listed below.      ASSESSMENT:   1.  Em Dorman is a delightful 77-year-old female with evidence of an idiopathic cardiomyopathy possibly secondary to atrial fibrillation with rapid ventricular response.  Her blood pressure fell precipitously with increased doses of Toprol-XL.  Because of this we added digoxin and her heart rate is now under better control.  We will obtain a digoxin level within the next 6 months.   2.  The patient will follow up again in 4 months.  I have made no medication changes today.   3.  The patient will continue on warfarin for atrial fibrillation.   4.  The patient remains on furosemide for the time being.  Her volume status appears stable.  Her last BMP on 2018 showed creatinine 0.74, BUN 10, sodium 138 and potassium 4.0.  I think it is reasonable to continue with her current medications.   5.  Obstructive sleep apnea.  I have asked the patient to continue to use her CPAP as able.      It is my pleasure to assist in the care of Em Dorman.  All her questions were answered to her satisfaction.      Ann Hutchinson MD      cc:   Arielle Leger MD    [place] [id number]         [facility]INSON MD, [facility]             D: 2018   T: 2018   MT: NASEEM      Name:     EM DORMAN   MRN:      [id number]        Account:      [id number]   :      1940           Service Date: 2018      Document: [id number]

## 2018-06-28 NOTE — PROGRESS NOTES
Scan of overnight oximetry on current cpap settings results 6/21/18.  Route to Dr Freitas for his review.  Em uses [facility] for pap, will do manual download at her return visit on 7/5/18.

## 2018-07-31 ENCOUNTER — OFFICE VISIT (OUTPATIENT)
Dept: SLEEP MEDICINE | Facility: CLINIC | Age: 78
End: 2018-07-31
Payer: COMMERCIAL

## 2018-07-31 VITALS
WEIGHT: 171 LBS | HEART RATE: 80 BPM | SYSTOLIC BLOOD PRESSURE: 111 MMHG | DIASTOLIC BLOOD PRESSURE: 61 MMHG | HEIGHT: 65 IN | RESPIRATION RATE: 15 BRPM | BODY MASS INDEX: 28.49 KG/M2 | OXYGEN SATURATION: 100 %

## 2018-07-31 DIAGNOSIS — I50.22 CHRONIC SYSTOLIC CONGESTIVE HEART FAILURE (H): ICD-10-CM

## 2018-07-31 DIAGNOSIS — I48.20 CHRONIC ATRIAL FIBRILLATION (H): ICD-10-CM

## 2018-07-31 DIAGNOSIS — G47.33 OSA (OBSTRUCTIVE SLEEP APNEA): Primary | ICD-10-CM

## 2018-07-31 PROCEDURE — 99215 OFFICE O/P EST HI 40 MIN: CPT | Performed by: FAMILY MEDICINE

## 2018-07-31 NOTE — MR AVS SNAPSHOT
After Visit Summary   7/31/2018    Em Richmond    MRN: [id number]           Patient Information     Date Of Birth          1940        Visit Information        Provider Department      7/31/2018 11:30 AM Shawn Freitas [place] [facility] [place]ructions        Your BMI is Body mass index is 28.9 kg/(m^2).  Weight management is a personal decision.  If you are interested in exploring weight loss strategies, the following discussion covers the approaches that may be successful. Body mass index (BMI) is one way to tell whether you are at a healthy weight, overweight, or obese. It measures your weight in relation to your height.  A BMI of 18.5 to 24.9 is in the healthy range. A person with a BMI of 25 to 29.9 is considered overweight, and someone with a BMI of 30 or greater is considered obese. More than two-thirds of American adults are considered overweight or obese.  Being overweight or obese increases the risk for further weight gain. Excess weight may lead to heart disease and diabetes.  Creating and following plans for healthy eating and physical activity may help you improve your health.  Weight control is part of healthy lifestyle and includes exercise, emotional health, and healthy eating habits. Careful eating habits lifelong are the mainstay of weight control. Though there are significant health benefits from weight loss, long-term weight loss with diet alone may be very difficult to achieve- studies show long-term success with dietary management in less than 10% of people. Attaining a healthy weight may be especially difficult to achieve in those with severe obesity. In some cases, medications, devices and surgical management might be considered.  What can you do?  If you are overweight or obese and are interested in methods for weight loss, you should discuss this with your provider.     Consider reducing daily calorie intake by 500 calories.     Keep  a food journal.     Avoiding skipping meals, consider cutting portions instead.    Diet combined with exercise helps maintain muscle while optimizing fat loss. Strength training is particularly important for building and maintaining muscle mass. Exercise helps reduce stress, increase energy, and improves fitness. Increasing exercise without diet control, however, may not burn enough calories to loose weight.       Start walking three days a week 10-20 minutes at a time    Work towards walking thirty minutes five days a week     Eventually, increase the speed of your walking for 1-2 minutes at time    In addition, we recommend that you review healthy lifestyles and methods for weight loss available through the National Institutes of Health patient information sites:  http://win.niddk.nih.gov/publications/index.htm    And look into health and wellness programs that may be available through your health insurance provider, employer, local community center, or ila club.    Weight management plan: Patient was referred to their PCP to discuss a diet and exercise plan.     Your blood pressure was checked while you were in clinic today.  Please read the guidelines below about what these numbers mean and what you should do about them.  Your systolic blood pressure is the top number.  This is the pressure when the heart is pumping.  Your diastolic blood pressure is the bottom number.  This is the pressure in between beats.  If your systolic blood pressure is less than 120 and your diastolic blood pressure is less than 80, then your blood pressure is normal. There is nothing more that you need to do about it  If your systolic blood pressure is 120-139 or your diastolic blood pressure is 80-89, your blood pressure may be higher than it should be.  You should have your blood pressure re-checked within a year by a primary care provider.  If your systolic blood pressure is 140 or greater or your diastolic blood pressure is 90 or  greater, you may have high blood pressure.  High blood pressure is treatable, but if left untreated over time it can put you at risk for heart attack, stroke, or kidney failure.  You should have your blood pressure re-checked by a primary care provider within the next four weeks.    1. CPAP-  WHAT DOES IT DO AND HOW CAN I LEARN TO WEAR IT?                               BEFORE I START, CAN I WATCH A MOVIE TO GET A PLAN ON HOW TO USE CPAP?  https://www.InvenQuery.com/watch?r=b4U68ku045D      Continuous positive airway pressure, or CPAP, is the most effective treatment for obstructive sleep apnea. It works by blowing room air, through a mask, to hold your throat open. A decision to use CPAP is a major step forward in the pursuit of a healthier life. The successful use of CPAP will help you breathe easier, sleep better and live healthier. You can choose CPAP equipment from any durable medical equipment provider that meets your needs.  Using CPAP can be a positive experience if you keep these cr points in mind:  1. Commitment  CPAP is not a quick fix for your problem. It involves a long-term commitment to improve your sleep and your health.    2. Communication  Stay in close communication with both your sleep doctor and your CPAP supplier. Ask lots of questions and seek help when you need it.    3. Consistency  Use CPAP all night, every night and for every nap. You will receive the maximum health benefits from CPAP when you use it every time that you sleep. This will also make it easier for your body to adjust to the treatment.    4. Correction  The first machine and mask that you try may not be the best ones for you. Work with your sleep doctor and your CPAP supplier to make corrections to your equipment selection. Ask about trying a different type of machine or mask if you have ongoing problems. Make sure that your mask is a good fit and learn to use your equipment properly.    5. Challenge  Tell a family member or close  "friend to ask you each morning if you used your CPAP the previous night. Have someone to challenge you to give it your best effort.    6. Connection   Your adjustment to CPAP will be easier if you are able to connect with others who use the same treatment. Ask your sleep doctor if there is a support group in your area for people who have sleep apnea, or look for one on the Internet.  7. Comfort   Increase your level of comfort by using a saline spray, decongestant or heated humidifier if CPAP irritates your nose, mouth or throat. Use your unit's \"ramp\" setting to slowly get used to the air pressure level. There may be soft pads you can buy that will fit over your mask straps. Look on www.CPAP.com for accessories that can help make CPAP use more comfortable.  8. Cleaning   Clean your mask, tubing and headgear on a regular basis. Put this time in your schedule so that you don't forget to do it. Check and replace the filters for your CPAP unit and humidifier.    9. Completion   Although you are never finished with CPAP therapy, you should reward yourself by celebrating the completion of your first month of treatment. Expect this first month to be your hardest period of adjustment. It will involve some trial and error as you find the machine, mask and pressure settings that are right for you.    10. Continuation  After your first month of treatment, continue to make a daily commitment to use your CPAP all night, every night and for every nap.    CPAP-Tips to starting with success:  Begin using your CPAP for short periods of time during the day while you watch TV or read.    Use CPAP every night and for every nap. Using it less often reduces the health benefits and makes it harder for your body to get used to it.    Make small adjustments to your mask, tubing, straps and headgear until you get the right fit. Tightening the mask may actually worsen the leak.  If it leaves significant marks on your face or irritates the " bridge of your nose, it may not be the best mask for you.  Speak with the person who supplied the mask and consider trying other masks. Insurances will allow you to try different masks during the first month of starting CPAP.  Insurance also covers a new mask, hose and filter about every 6 months.    Use a saline nasal spray to ease mild nasal congestion. Neti-Pot or saline nasal rinses may also help. Nasal gel sprays can help reduce nasal dryness.  Biotene mouthwash can be helpful to protect your teeth if you experience frequent dry mouth.  Dry mouth may be a sign of air escaping out of your mouth or out of the mask in the case of a full face mask.  Speak with your provider if you expect that is the case.     Take a nasal decongestant to relieve more severe nasal or sinus congestion.  Do not use Afrin (oxymetazoline) nasal spray more than 3 days in a row.  Speak with your sleep doctor if your nasal congestion is chronic.    Use a heated humidifier that fits your CPAP model to enhance your breathing comfort. Adjust the heat setting up if you get a dry nose or throat, down if you get condensation in the hose or mask.  Position the CPAP lower than you so that any condensation in the hose drains back into the machine rather than towards the mask.    Try a system that uses nasal pillows if traditional masks give you problems.    Clean your mask, tubing and headgear once a week. Make sure the equipment dries fully.    Regularly check and replace the filters for your CPAP unit and humidifier.    Work closely with your sleep provider and your CPAP supplier to make sure that you have the machine, mask and air pressure setting that works best for you. It is better to stop using it and call your provider to solve problems than to lay awake all night frustrated with the device.    Daytime naps are not advised, but use the PAP device if taking naps. Many insurances require that we prove you are using the PAP device at least 4  hours on at least 70% of nights over a 30 day period. We have 90 days to meet those criteria.    You can get new supplies (mask, hose and filter) for your device every 3-6 months (covered by insurance). You do not need to get supplies that often, but they are available if you would like them. You may exchange the mask once within the first month if you feel the initial mask does not fit well. Please, contact your medical equipment provider for equipment issues.    Please let me know if you have any snoring, daytime sleepiness, or poor sleep quality. We will want to make sure your PAP device is adequately treating your condition.    There is a website called CPAP.com that has accessories that may make CPAP use easier. Please visit it at your convenience.    Follow up with me within two months. Use flonase nasal spray for nasal congestion. Improve CPAP use.    Shawn Freitas MD, MPH  Clinical Sleep and Occupational / Environmental Medicine            Follow-ups after your visit        Your next 10 appointments already scheduled     Oct 23, 2018  2:15 PM CDT   LAB with RU LAB   [facility] [place] ([facility])    [address]  [place] [id number] [phone]           Please do not eat 10-12 hours before your appointment if you are coming in fasting for labs on lipids, cholesterol, or glucose (sugar). This does not apply to pregnant women. Water, hot tea and black coffee (with nothing added) are okay. Do not drink other fluids, diet soda or chew gum.            Oct 23, 2018  3:10 PM CDT   Core Return with DOMINIK White CNP   [facility] ([facility])    [address]  [place] [id number] [phone]              Who to contact     If you have questions or need follow up information about today's clinic visit or your schedule please contact [facility] [facility] directly at [phone].  Normal or non-critical lab and imaging results will be communicated to you by MyChart, letter or phone within 4 business days after the clinic has received the results. If you do not hear from us within 7 days, please contact the clinic through MyChart or phone. If you have a critical or abnormal lab result, we will notify you by phone as soon as possible.  Submit refill requests through [employer] or call your pharmacy and they will forward the refill request to us. Please allow 3 business days for your refill to be completed.          Additional Information About Your Visit        Care EveryWhere ID     This is your Care EveryWhere ID. This could be used by other organizations to access your [facility] medical records  [id number]        Your Vitals Were     Pulse Respirations Height Pulse Oximetry BMI (Body Mass Index)       80 15 1.638 m (5' 4.5\") 100% 28.9 kg/m2        Blood Pressure from Last 3 Encounters:   07/31/18 111/61   06/25/18 110/70   05/31/18 114/75    Weight from Last 3 Encounters:   07/31/18 77.6 kg (171 lb)   06/25/18 77.1 kg (170 lb)   05/31/18 77.6 kg (171 lb)              Today, you had the following     No orders found for display       Primary Care Provider Office Phone # Fax #    Arielle Leger [phone]       [facility] [address] [address] [address]        Equal Access to Services     LUZ MARTINEZ AH: Hadii corbin ku hadasho Soomaali, waaxda luqadaha, qaybta kaalmada adeegyada, scout nixon . So [facility] [phone].    ATENCIÓN: Si habla español, tiene a delgadillo disposición servicios gratuitos de asistencia lingüística. Jovon al [phone].    We comply with applicable federal civil rights laws and [place] laws. We do not discriminate on the basis of race, color, national origin, age, disability, sex, sexual orientation, or gender identity.            Thank you!     Thank you for choosing [facility]  for your care. Our goal is always to provide you with excellent care. Hearing back from our patients is one way we can continue to improve our services. Please take a few minutes to complete the written survey that you may receive in the mail after your visit with us. Thank you!             Your Updated Medication List - Protect others around you: Learn how to safely use, store and throw away your medicines at www.disposemymeds.org.          This list is accurate as of 7/31/18 11:53 AM.  Always use your most recent med list.                   Brand Name Dispense Instructions for use Diagnosis    aspirin 81 MG chewable tablet     36 tablet    Take 1 tablet (81 mg) by mouth daily    Acute respiratory failure with hypoxia (H)       digoxin 125 MCG tablet    LANOXIN    90 tablet    Take 1 tablet (125 mcg) by mouth daily    Atrial fibrillation with rapid ventricular response (H)       furosemide 40 MG tablet    LASIX    90 tablet    Take 1 tablet (40 mg) by mouth daily    Acute combined systolic and diastolic CHF, NYHA class 3 (H)       IMODIUM A-D 2 MG capsule   Generic drug:  loperamide      Take 2 mg by mouth as needed for diarrhea        losartan 25 MG tablet    COZAAR    90 tablet    Take 1 tablet (25 mg) by mouth daily    Nonischemic cardiomyopathy (H)       meclizine 12.5 MG tablet    ANTIVERT    60 tablet    Take 1 tablet (12.5 mg) by mouth 3 times daily as needed for dizziness    Labyrinthitis, bilateral       metoprolol succinate 50 MG 24 hr tablet    TOPROL XL    180 tablet    Take 1 tablet (50 mg) by mouth 2 times daily    Persistent atrial fibrillation (H)       MULTIPLE VITAMINS PO      Take 1 tablet by mouth daily        OMEPRAZOLE PO      Take 20 mg by mouth daily as needed        PAROXETINE HCL PO      Take 30 mg by mouth daily        VITAMIN D3 PO      Take 2,000 Units by mouth daily        WARFARIN SODIUM PO      Take by mouth daily As directed

## 2018-07-31 NOTE — PATIENT INSTRUCTIONS
Your BMI is Body mass index is 28.9 kg/(m^2).  Weight management is a personal decision.  If you are interested in exploring weight loss strategies, the following discussion covers the approaches that may be successful. Body mass index (BMI) is one way to tell whether you are at a healthy weight, overweight, or obese. It measures your weight in relation to your height.  A BMI of 18.5 to 24.9 is in the healthy range. A person with a BMI of 25 to 29.9 is considered overweight, and someone with a BMI of 30 or greater is considered obese. More than two-thirds of [place] adults are considered overweight or obese.  Being overweight or obese increases the risk for further weight gain. Excess weight may lead to heart disease and diabetes.  Creating and following plans for healthy eating and physical activity may help you improve your health.  Weight control is part of healthy lifestyle and includes exercise, emotional health, and healthy eating habits. Careful eating habits lifelong are the mainstay of weight control. Though there are significant health benefits from weight loss, long-term weight loss with diet alone may be very difficult to achieve- studies show long-term success with dietary management in less than 10% of people. Attaining a healthy weight may be especially difficult to achieve in those with severe obesity. In some cases, medications, devices and surgical management might be considered.  What can you do?  If you are overweight or obese and are interested in methods for weight loss, you should discuss this with your provider.     Consider reducing daily calorie intake by 500 calories.     Keep a food journal.     Avoiding skipping meals, consider cutting portions instead.    Diet combined with exercise helps maintain muscle while optimizing fat loss. Strength training is particularly important for building and maintaining muscle mass. Exercise helps reduce stress, increase energy, and improves  fitness. Increasing exercise without diet control, however, may not burn enough calories to loose weight.       Start walking three days a week 10-20 minutes at a time    Work towards walking thirty minutes five days a week     Eventually, increase the speed of your walking for 1-2 minutes at time    In addition, we recommend that you review healthy lifestyles and methods for weight loss available through the National [employer] of [employer] patient information sites:  http://win.niddk.nih.gov/publications/index.htm    And look into health and wellness programs that may be available through your health insurance provider, employer, local community center, or ila club.    Weight management plan: Patient was referred to their PCP to discuss a diet and exercise plan.     Your blood pressure was checked while you were in clinic today.  Please read the guidelines below about what these numbers mean and what you should do about them.  Your systolic blood pressure is the top number.  This is the pressure when the heart is pumping.  Your diastolic blood pressure is the bottom number.  This is the pressure in between beats.  If your systolic blood pressure is less than 120 and your diastolic blood pressure is less than 80, then your blood pressure is normal. There is nothing more that you need to do about it  If your systolic blood pressure is 120-139 or your diastolic blood pressure is 80-89, your blood pressure may be higher than it should be.  You should have your blood pressure re-checked within a year by a primary care provider.  If your systolic blood pressure is 140 or greater or your diastolic blood pressure is 90 or greater, you may have high blood pressure.  High blood pressure is treatable, but if left untreated over time it can put you at risk for heart attack, stroke, or kidney failure.  You should have your blood pressure re-checked by a primary care provider within the next four weeks.    1. CPAP-  WHAT DOES IT  DO AND HOW CAN I LEARN TO WEAR IT?                               BEFORE I START, CAN I WATCH A MOVIE TO GET A PLAN ON HOW TO USE CPAP?  https://www.youtube.com/watch?a=g9K51vo836E      Continuous positive airway pressure, or CPAP, is the most effective treatment for obstructive sleep apnea. It works by blowing room air, through a mask, to hold your throat open. A decision to use CPAP is a major step forward in the pursuit of a healthier life. The successful use of CPAP will help you breathe easier, sleep better and live healthier. You can choose CPAP equipment from any durable medical equipment provider that meets your needs.  Using CPAP can be a positive experience if you keep these cr points in mind:  1. Commitment  CPAP is not a quick fix for your problem. It involves a long-term commitment to improve your sleep and your health.    2. Communication  Stay in close communication with both your sleep doctor and your CPAP supplier. Ask lots of questions and seek help when you need it.    3. Consistency  Use CPAP all night, every night and for every nap. You will receive the maximum health benefits from CPAP when you use it every time that you sleep. This will also make it easier for your body to adjust to the treatment.    4. Correction  The first machine and mask that you try may not be the best ones for you. Work with your sleep doctor and your CPAP supplier to make corrections to your equipment selection. Ask about trying a different type of machine or mask if you have ongoing problems. Make sure that your mask is a good fit and learn to use your equipment properly.    5. Challenge  Tell a family member or close friend to ask you each morning if you used your CPAP the previous night. Have someone to challenge you to give it your best effort.    6. Connection   Your adjustment to CPAP will be easier if you are able to connect with others who use the same treatment. Ask your sleep doctor if there is a support group  "in your area for people who have sleep apnea, or look for one on the Internet.  7. Comfort   Increase your level of comfort by using a saline spray, decongestant or heated humidifier if CPAP irritates your nose, mouth or throat. Use your unit's \"ramp\" setting to slowly get used to the air pressure level. There may be soft pads you can buy that will fit over your mask straps. Look on www.CPAP.com for accessories that can help make CPAP use more comfortable.  8. Cleaning   Clean your mask, tubing and headgear on a regular basis. Put this time in your schedule so that you don't forget to do it. Check and replace the filters for your CPAP unit and humidifier.    9. Completion   Although you are never finished with CPAP therapy, you should reward yourself by celebrating the completion of your first month of treatment. Expect this first month to be your hardest period of adjustment. It will involve some trial and error as you find the machine, mask and pressure settings that are right for you.    10. Continuation  After your first month of treatment, continue to make a daily commitment to use your CPAP all night, every night and for every nap.    CPAP-Tips to starting with success:  Begin using your CPAP for short periods of time during the day while you watch TV or read.    Use CPAP every night and for every nap. Using it less often reduces the health benefits and makes it harder for your body to get used to it.    Make small adjustments to your mask, tubing, straps and headgear until you get the right fit. Tightening the mask may actually worsen the leak.  If it leaves significant marks on your face or irritates the bridge of your nose, it may not be the best mask for you.  Speak with the person who supplied the mask and consider trying other masks. Insurances will allow you to try different masks during the first month of starting CPAP.  Insurance also covers a new mask, hose and filter about every 6 months.    Use a " saline nasal spray to ease mild nasal congestion. Neti-Pot or saline nasal rinses may also help. Nasal gel sprays can help reduce nasal dryness.  Biotene mouthwash can be helpful to protect your teeth if you experience frequent dry mouth.  Dry mouth may be a sign of air escaping out of your mouth or out of the mask in the case of a full face mask.  Speak with your provider if you expect that is the case.     Take a nasal decongestant to relieve more severe nasal or sinus congestion.  Do not use Afrin (oxymetazoline) nasal spray more than 3 days in a row.  Speak with your sleep doctor if your nasal congestion is chronic.    Use a heated humidifier that fits your CPAP model to enhance your breathing comfort. Adjust the heat setting up if you get a dry nose or throat, down if you get condensation in the hose or mask.  Position the CPAP lower than you so that any condensation in the hose drains back into the machine rather than towards the mask.    Try a system that uses nasal pillows if traditional masks give you problems.    Clean your mask, tubing and headgear once a week. Make sure the equipment dries fully.    Regularly check and replace the filters for your CPAP unit and humidifier.    Work closely with your sleep provider and your CPAP supplier to make sure that you have the machine, mask and air pressure setting that works best for you. It is better to stop using it and call your provider to solve problems than to lay awake all night frustrated with the device.    Daytime naps are not advised, but use the PAP device if taking naps. Many insurances require that we prove you are using the PAP device at least 4 hours on at least 70% of nights over a 30 day period. We have 90 days to meet those criteria.    You can get new supplies (mask, hose and filter) for your device every 3-6 months (covered by insurance). You do not need to get supplies that often, but they are available if you would like them. You may  exchange the mask once within the first month if you feel the initial mask does not fit well. Please, contact your medical equipment provider for equipment issues.    Please let me know if you have any snoring, daytime sleepiness, or poor sleep quality. We will want to make sure your PAP device is adequately treating your condition.    There is a website called CPAP.com that has accessories that may make CPAP use easier. Please visit it at your convenience.    Follow up with me within two months. Use flonase nasal spray for nasal congestion. Improve CPAP use.    Shawn Freitas MD, MPH  Clinical Sleep and Occupational / Environmental Medicine

## 2018-07-31 NOTE — NURSING NOTE
"Chief Complaint   Patient presents with     RECHECK     F/u DANIEL on current cpap settings.  Manual d/l [employer]       Initial /61  Pulse 80  Resp 15  Ht 1.638 m (5' 4.5\")  Wt 77.6 kg (171 lb)  SpO2 100%  BMI 28.9 kg/m2 Estimated body mass index is 28.9 kg/(m^2) as calculated from the following:    Height as of this encounter: 1.638 m (5' 4.5\").    Weight as of this encounter: 77.6 kg (171 lb).    Medication Reconciliation: complete    Ama Bruno LPN/EDITA    DME: Y  "

## 2018-07-31 NOTE — PROGRESS NOTES
Sleep [facility]  Outpatient Sleep Medicine Consultation  July 31, 2018    Name: Em Richmond MRN# [id number]   Age: 78 year old YOB: 1940     Date of Follow Up Visit: July 31, 2018  Consultation is requested by: DOMINIK White CNP  [address]  [place] [address]  Primary care provider: Arielle Leger    Patient is accompanied by: Patient presents alone today.       Reason for Sleep Consult:     Em Richmond is a 78 year old female patient that presents here for an JERSON follow up evaluation.         Assessment and Plan:     Summary Sleep Diagnoses:    (1) Moderate JERSON (AHI of 22 events per hour)  (2) Atrial Fibrillation  (3) Chronic Systolic Congestive Heart Failure    Summary Recommendations / Discussion:    This patient was diagnosed in the past with moderate JERSON with an AHI of 22 events per hour. Since the diagnosis, the patient has been on CPAP therapy with a fixed pressure of 10 cmH2O. PAP download during the initial visit showed a non-compliant patient and the device was objectively effective at treating the condition with a residual AHI of 0.9 events per hour. Given that the patient's most recent echocardiogram demonstrated a decreased systolic function with a LVEF estimated at 35 - 40%, the patient should only be treated with CPAP and, if necessary, oxygen supplementation. As such, given that the PAP download showed no elevated residual AHI, no new PSG sleep study was obtained at this point. Instead, an overnight oximetry test with the CPAP device in place was obtained to evaluate for sleep associated hypoxemia. The overnight oximetry showed marginal sleep associated hypoxemia with only 8.9 minutes under the SpO2 of 88%. However, this seemed to be artifactual in nature. Given nasal congestion, the patient has not been compliant with the PAP therapy. Today, use of fluticasone and loratadine discussed and encouraged. Current therapy will be continued  with CPAP at fixed pressure of 10 cmH2O. The patient will follow up with us within one to two months. Today, the nature and pathophysiology of JERSON were discussed. The different treatment options for JERSON were also reviewed and explained today. Lifestyle recommendations including healthy dietary and exercising habits were discussed.    Visit Summary:   -Re-start PAP therapy with CPAP at fixed pressure of 10 cmH2O   -Follow up with me after one to two months of PAP use    Coding:  (G47.33) JERSON (obstructive sleep apnea)  (primary encounter diagnosis)  (I48.91) Atrial fibrillation with rapid ventricular response (H)  (I50.22) Chronic systolic congestive heart failure (H)    Counseling included a comprehensive review of diagnostic and therapeutic strategies as well as risks of inadequate therapy.    Educational materials provided in instructions. The patient was instructed to avoid driving or operating any heavy machinery when experiencing drowsiness.    All questions and concerns were addressed today. Pt agrees and understands the assessment and plan.         History of Present Illness:   Em Richmond is a 78 year old  RHD female pt with PMH of atrial fibrillation, syncope, combined systolic and diastolic congestive heart failure, hypertension, nonischemic / idiopathic cardiomyopathy, and previously diagnosed JERSON presents for an JERSON follow up evaluation today.    As previously explained, this is a medically complex individual with history of nonischemic / idiopathic cardiomyopathy with recent hospitalizations secondary to congestive heart failure and cardiomyopathy. Recent echocardiogram showed a LVEF estimated at 35 - 40%.     The patient underwent a coronary angiography in 2017 and this demonstrated only mild coronary artery disease. As such, it was believed that the cardiomyopathy was probably secondary to atrial fibrillation with rapid ventricular response.    The patient is currently on aspirin 81 mg,  "furosemide 40 mg, losartan 25 mg, metropolol succinate 50 mg, warfarin, and digoxin 125 mg.    The patient completed a PSG sleep study in [facility] in 2014 and this demonstrated moderate JERSON with an AHI of 22 events per hour. The mean oxygen saturation during the study was 93%.    During the initial visit, The patient explained that she was sleeping very good with no \"sleeping problems whatsoever.\" The patient explained that she stopped using the PAP device several months ago due to interface discomfort (pt had a nasal pillow interface with nasal discomfort). The patient explained that she always used the device on and off. The patient explained that she has never noticed any benefits from the PAP device. The patient reported having occasional snoring with no gasping / choking for air or witnessed apneas. The patient reported non-restorative sleep with some mild sleep inertia. The patient denied any EDS with no inadvertent naps. Pt admitted having xerostomia in the morning. The patient denied any drowsiness when driving with no near accidents. No CP, SOB, morning cephalgia, orthopnea, nocturia, or any other sxs or concerns.    The PAP download today shows a non-compliant patient who is using the device 23.3% of the time with 1.7% over 4 hours. The device is effective at treating the condition with a residual AHI of 3.4 events per hour. No significant leaks noted.    CPAP Compliance:   Dates: July 31, 2018       1.7 % > 4 hour use    Average Use: 1 hour 47 mintues   Leak: 2 minutes    Residual AHI: 3.4 events per hour    The patient completed an overnight oximetry test with PAP device in place and, although mild sleep associated hypoxemia was noted, this seemed to be artefactual in nature (pt spent only 8.9 minutes). A pattern consistent with atrial fibrillation was noted throughout the oximetry test.    The patient explains that she is not able to use the PAP machine due to constant nasal congestion. In fact, the last " time she used the PAP device was on 07/05/2018. The patient states that when she uses the PAP device, she sleeps better. At this time, the patient denies any interface discomfort. The patient is not using any medications for the nasal congestion.    PREVIOUS IN- LAB or HOME SLEEP STUDIES: The patient reports the study was conducted in [facility]   Date: 2014   TYPE: PSG   AHI: 22 events per hour   BMI: 32.0 kg/m2   Intervention: CPAP    SLEEP-WAKE SCHEDULE:     Em Richmond      -Describes herself as a night person; prefers to go to sleep at 10:00 PM and wakes up at 9:00 AM.      -The patient takes 2 naps a week and each lasts about 40 minutes. Pt feels refreshed after naps; takes no inadvertent naps.      -ON WEEKDAYS, goes to sleep at 10:30 PM during the week; awakens 9:00 AM with an alarm; falls asleep in 20 minutes; denies difficulty falling asleep.      -ON WEEKENDS, goes to sleep at 10:30 PM and wakes up at 9:00 AM with an alarm; falls asleep in 20 minutes.        -Awakens 1-2 times a night for 15 minutes before falling back to sleep; awakens to go to the bathroom.      BEDTIME ACTIVITIES AND SHIFT WORK:    Emxochitl Richmond     -Bedtime Activities and Other Sleeping Information: Pt lives alone with . Pt sleeps alone on a queen size bed. No pets in the bedroom. Pt sleeps on her sides. No electronics used in bed.     -Occupation: Retired     SCALES        -SLEEP APNEA: [employer] score: 4 / 8        -SLEEPINESS: [place] sleepiness scale (ESS):  4 / 24    Drowsy driving / near accidents: Denies any near accidents    Consequences: Non-restorative sleep    SLEEP COMPLAINTS:  Cardio-respiratory     -Snoring: None with PAP device in place   -Dyspnea: Pt denies having any witnessed apneas with PAP device on   -Morning headaches or confusion: Denies any morning cephalgia   -Coexisting Lung disease: See Above     -Coexisting Heart disease: See Above     -Does patient have a bed partner: Patient sleeps alone   -Has  bed partner been sleeping separately because of snoring:  Yes            RLS Screen:    -When you try to relax in the evening or sleep at night, do you ever have unpleasant, restless feelings in your legs that can be relieved by walking or movement? None Reported     -Periodic limb movement: None Reported    Narcolepsy:     - Denies sudden urges of sleep attacks   - Denies cataplexy   - Denies sleep paralysis    - Denies hallucinations    - Admits feeling refreshed after a nap.    Sleep Behaviors:   - Denies leg symptoms/movements   - Denies motor restlessness   - Denies night terrors   - Admits bruxism   - Denies automatic behaviors    Other Subjective Complaints:   - Denies anxiety or rumination    - Denies pain and discomfort at night   - Denies waking up with heart pounding or racing   - Denies GERD or aspiration         Parasomnia:    -NREM - Denies recurrent persistent confusional arousal, night eating, sleep walking or sleep terrors.      -REM - Denies dream enactment or injuries.          Medications:     Current Outpatient Prescriptions   Medication Sig     aspirin 81 MG chewable tablet Take 1 tablet (81 mg) by mouth daily     Cholecalciferol (VITAMIN D3 PO) Take 2,000 Units by mouth daily      digoxin (LANOXIN) 125 MCG tablet Take 1 tablet (125 mcg) by mouth daily     furosemide (LASIX) 40 MG tablet Take 1 tablet (40 mg) by mouth daily     loperamide (IMODIUM A-D) 2 MG capsule Take 2 mg by mouth as needed for diarrhea     losartan (COZAAR) 25 MG tablet Take 1 tablet (25 mg) by mouth daily     meclizine (ANTIVERT) 12.5 MG tablet Take 1 tablet (12.5 mg) by mouth 3 times daily as needed for dizziness     metoprolol succinate (TOPROL-XL) 50 MG 24 hr tablet Take 1 tablet (50 mg) by mouth 2 times daily     MULTIPLE VITAMINS PO Take 1 tablet by mouth daily     OMEPRAZOLE PO Take 20 mg by mouth daily as needed      PAROXETINE HCL PO Take 30 mg by mouth daily      WARFARIN SODIUM PO Take by mouth daily As directed      No current facility-administered medications for this visit.       Allergies   Allergen Reactions     Lisinopril Diarrhea     Sulfa Drugs           Past Medical History:     Denies needing any 02 supplement at night.    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     Past Surgical History:   Procedure Laterality Date     HEART CATH RIGHT AND LEFT HEART CATH  10/23/2017    Cath no significant obstructive CAD. RHC: mild PHTN/NL wedge/ low CI.           Social History:     Social History   Substance Use Topics     Smoking status: Former Smoker     Types: Cigarettes     Quit date: 1/1/1965     Smokeless tobacco: Never Used     Alcohol use 0.0 oz/week      Comment: 8 oz wine daily     Chemical History:     Tobacco: Quit smoking about 53 yrs ago     Uses 1.5 cups/day of coffee. Last caffeine intake is usually before 10 AM.    Supplements for wakefulness: Patient does not use any supplements to stay awake    EtOH: 5 to 8 drinks a week  Recreational Drugs: Patient denies using any recreational drugs     Psych Hx:   Current dangers to self or others: No. Pt denies any SI / HI, hallucinations, or delusions         Family History:     Family History   Problem Relation Age of Onset     Diabetes Mother      Other Cancer Father       Sleep Family Hx:       RLS - None reported   JERSON - Sister with JERSON  Insomnia - None reported  Parasomnia - None reported         Review of Systems:     A complete 10 point review of systems was negative other than HPI or as commented below:  "    CONSTITUTIONAL: Positive for weight gain, sweats / night sweats, and allergies.  EYES: NEGATIVE for changes in vision, blind spots, double vision.  ENT: NEGATIVE for ear pain, sore throat, bloody nose. Pt reports sinus pain, post-nasal drip, and runny nose.  CARDIAC: NEGATIVE for chest pain or pressure, breathlessness when lying flat, swollen legs or swollen feet. Pt reports fast heartbeats / fluttering in chest.  NEUROLOGIC: NEGATIVE headaches, weakness or numbness in the arms or legs.  DERMATOLOGIC: NEGATIVE for rashes, new moles or change in mole(s)  PULMONARY: NEGATIVE SOB at rest, coughing up blood, wheezing or whistling when breathing. Pt reports SOB with activity, dry cough, productive cough.  GASTROINTESTINAL: NEGATIVE for nausea or vomitting, loose or watery stools, fat or grease in stools, constipation, abdominal pain, bowel movements black in color or blood noted.  GENITOURINARY: NEGATIVE for pain during urination, blood in urine, urinating more frequently than usual, irregular menstrual periods.  MUSCULOSKELETAL: Positive for muscle pain, bone / joint pain, and swollen joints.  ENDOCRINE: NEGATIVE for increased thirst or urination, diabetes.  LYMPHATIC: NEGATIVE for swollen lymph nodes, lumps or bumps in the breasts or nipple discharge.         Physical Examination:   /61  Pulse 80  Resp 15  Ht 1.638 m (5' 4.5\")  Wt 77.6 kg (171 lb)  SpO2 100%  BMI 28.9 kg/m2     VS: Reviewed and normal.  General: Alert, oriented, not in distress. Dressed casually; Good eye contact; Comfortably sitting in a chair; in no apparent distress  HEENT: Normocephalic and atraumatic; NL TM x 2; pupils are isocoric and equally responsive to the light. PERRLA. EOMI. Normal fundoscopic examination; Nasal turbinates are normal with a normal septal alignment;  Mallampati score: Grade IV; Tonsillar hypertrophy: 2  visible at pillars; Pharynx with no erythema or exudates.  NECK: Neck supple; symmetrical; no " lymphadenopathy; no thyromegaly, bruit, JVD noted. Neck circumference of 15 inches (38 cm).  Lungs: Both hemithoraces are symmetrical, normal to palpation, no dullness to percussion, auscultation of lungs revealed normal breath sounds with no expirium prolongation, wheezing, rhonci and crackles.  CVS: Normal S1 and S2 heart sounds with no extra heart sounds. No murmur, rubs, or clicks. Normal peripheral pulses throughout with no obvious peripheral edema. Irregularly irregular rhythm.  Psychiatry: Mood and affect are appropriate. Euthymic with affect congruent with full range and intensity. No SI/HI with adequate insight and judgement.          Data: All pertinent previous laboratory data reviewed     Lab Results   Component Value Date    PH 7.40 11/07/2017    PH 7.39 11/06/2017    PO2 114 (H) 11/07/2017    PO2 105 11/06/2017    PCO2 37 11/07/2017    PCO2 28 (L) 11/06/2017    HCO3 23 11/07/2017    HCO3 17 (L) 11/06/2017    ADAM Canceled, Test credited 11/06/2017     Lab Results   Component Value Date    TSH 3.02 11/20/2017    TSH 1.75 11/06/2017     Lab Results   Component Value Date     (H) 05/17/2018     (H) 04/26/2018     Lab Results   Component Value Date    HGB 13.0 11/20/2017    HGB 10.7 (L) 11/08/2017     Lab Results   Component Value Date    BUN 10 05/17/2018    BUN 15 04/26/2018    CR 0.74 05/17/2018    CR 0.98 04/26/2018     Echocardiology:    -Echocardiogram obtained on 03/06/0218 showed normal left ventricular size and wall thickness. The left ventricular systolic function was moderately reduced with a LVEF estimated at 35 - 40%. Moderate global hypokinesia of the left ventricle was noted. The right ventricle was normal in size with mildly decreased right ventricular systolic function. There was moderate biatrial enlargement. Mild mitral, pulmonic, and tricuspid regurgitation was noted. Normal aortic valve observed. Rhythm was described as atrial fibrillation.    Chest x-ray:             -Chest x-ray films obtained in 2017 showed pulmonary vascular congestion with no infiltrate. The right hemidiaphragm was elevated with slightly blunted right costophrenic angle.    PFT: None Available    Laboratory Studies:   Component Value Flag Ref Range Units Status Collected Lab   Sodium 138  133 - 144 mmol/L Final 05/17/2018 10:57 AM [facility]   Potassium 4.0  3.4 - 5.3 mmol/L Final 05/17/2018 10:57 AM [facility]   Chloride 105  94 - 109 mmol/L Final 05/17/2018 10:57 AM [facility]   Carbon Dioxide 26  20 - 32 mmol/L Final 05/17/2018 10:57 AM [facility]   Anion Gap 7  3 - 14 mmol/L Final 05/17/2018 10:57 AM [facility]   Glucose 116 (H) 70 - 99 mg/dL Final 05/17/2018 10:57 AM [facility]   Urea Nitrogen 10  7 - 30 mg/dL Final 05/17/2018 10:57 AM [facility]   Creatinine 0.74  0.52 - 1.04 mg/dL Final 05/17/2018 10:57 AM [facility]   GFR Estimate 76  >60 mL/min/1.7m2 Final 05/17/2018 10:57 AM [facility]   Comment:   Non  GFR Calc   GFR Estimate If Black >90  >60 mL/min/1.7m2 Final 05/17/2018 10:57 AM [facility]   Comment:   African American GFR Calc   Calcium 8.8  8.5 - 10.1 mg/dL Final 05/17/2018 10:57 AM Rd     Component Value Flag Ref Range Units Status Collected Lab   Hemoglobin 13.0  11.7 - 15.7 g/dL Final 11/20/2017  7:43 AM [facility]     Component Value Flag Ref Range Units Status Collected Lab   TSH 3.02  0.40 - 4.00 mU/L Final 11/20/2017  7:43 AM [facility]     Shawn Freitas MD, MPH  Clinical Sleep Medicine    Total time spent with patient: 40 min. Over >50% of the time was spent for face to face counseling, education, and evaluation.

## 2018-08-07 ENCOUNTER — TELEPHONE (OUTPATIENT)
Dept: CARDIOLOGY | Facility: CLINIC | Age: 78
End: 2018-08-07

## 2018-08-07 NOTE — TELEPHONE ENCOUNTER
Patient called and left  advising that she started a new OTC pro biotic and started to develop hives. RN returned patients call and patient's  advised that patient was sleeping. RN advised patient's  that patient should contact PMD regarding OTC medication if she has developed hives. Patient's  denied that patient has had new onset of SOB associated with hives. Patient's  advised he would have patient contact her PMD regarding hives.

## 2018-10-23 ENCOUNTER — DOCUMENTATION ONLY (OUTPATIENT)
Dept: SLEEP MEDICINE | Facility: CLINIC | Age: 78
End: 2018-10-23

## 2018-10-23 ENCOUNTER — OFFICE VISIT (OUTPATIENT)
Dept: SLEEP MEDICINE | Facility: CLINIC | Age: 78
End: 2018-10-23
Payer: COMMERCIAL

## 2018-10-23 ENCOUNTER — OFFICE VISIT (OUTPATIENT)
Dept: CARDIOLOGY | Facility: CLINIC | Age: 78
End: 2018-10-23
Payer: COMMERCIAL

## 2018-10-23 VITALS
WEIGHT: 173.3 LBS | HEIGHT: 65 IN | SYSTOLIC BLOOD PRESSURE: 102 MMHG | OXYGEN SATURATION: 95 % | BODY MASS INDEX: 28.87 KG/M2 | DIASTOLIC BLOOD PRESSURE: 62 MMHG | HEART RATE: 86 BPM

## 2018-10-23 VITALS
HEART RATE: 75 BPM | WEIGHT: 172.3 LBS | OXYGEN SATURATION: 100 % | BODY MASS INDEX: 29.41 KG/M2 | HEIGHT: 64 IN | SYSTOLIC BLOOD PRESSURE: 130 MMHG | DIASTOLIC BLOOD PRESSURE: 79 MMHG

## 2018-10-23 DIAGNOSIS — G47.33 OSA (OBSTRUCTIVE SLEEP APNEA): Primary | ICD-10-CM

## 2018-10-23 DIAGNOSIS — I48.21 PERMANENT ATRIAL FIBRILLATION (H): ICD-10-CM

## 2018-10-23 DIAGNOSIS — I48.91 ATRIAL FIBRILLATION WITH RAPID VENTRICULAR RESPONSE (H): ICD-10-CM

## 2018-10-23 DIAGNOSIS — I50.9 CHRONIC CONGESTIVE HEART FAILURE, UNSPECIFIED HEART FAILURE TYPE (H): ICD-10-CM

## 2018-10-23 LAB
ANION GAP SERPL CALCULATED.3IONS-SCNC: 8 MMOL/L (ref 3–14)
BUN SERPL-MCNC: 16 MG/DL (ref 7–30)
CALCIUM SERPL-MCNC: 8.5 MG/DL (ref 8.5–10.1)
CHLORIDE SERPL-SCNC: 99 MMOL/L (ref 94–109)
CO2 SERPL-SCNC: 27 MMOL/L (ref 20–32)
CREAT SERPL-MCNC: 0.74 MG/DL (ref 0.52–1.04)
GFR SERPL CREATININE-BSD FRML MDRD: 76 ML/MIN/1.7M2
GLUCOSE SERPL-MCNC: 106 MG/DL (ref 70–99)
POTASSIUM SERPL-SCNC: 4.5 MMOL/L (ref 3.4–5.3)
SODIUM SERPL-SCNC: 134 MMOL/L (ref 133–144)

## 2018-10-23 PROCEDURE — 36415 COLL VENOUS BLD VENIPUNCTURE: CPT | Performed by: NURSE PRACTITIONER

## 2018-10-23 PROCEDURE — 80048 BASIC METABOLIC PNL TOTAL CA: CPT | Performed by: NURSE PRACTITIONER

## 2018-10-23 PROCEDURE — 99215 OFFICE O/P EST HI 40 MIN: CPT | Performed by: FAMILY MEDICINE

## 2018-10-23 PROCEDURE — 99214 OFFICE O/P EST MOD 30 MIN: CPT | Performed by: NURSE PRACTITIONER

## 2018-10-23 NOTE — PATIENT INSTRUCTIONS
Call the [facility] nurse for any questions or concerns:  [phone]    1.  Medication changes from today: no changes    2. Follow up plan: Follow up with Dr. Leger     3.  Weigh yourself daily and write it down.    4.  Call [facility] nurse if your weight is up more than 2 pounds in one day or 5 pounds in one week.    5.  Call [facility] nurse if you feel more short of breath, have more abdominal bloating, or leg swelling.    6.  Continue low sodium diet (less than 2000 mg daily). If you eat less salt, you will retain less fluid.    7.  Alcohol can weaken your heart further. You should avoid alcohol or limit its use to special times, such as a holiday or birthday.     8.  Do NOT take Aleve (naproxen) or Advil (ibuprofen) without talking to your doctor first.     9.  Lab Results:     Component      Latest Ref Rng & Units 10/23/2018   Sodium      133 - 144 mmol/L 134   Potassium      3.4 - 5.3 mmol/L 4.5   Chloride      94 - 109 mmol/L 99   Carbon Dioxide      20 - 32 mmol/L 27   Anion Gap      3 - 14 mmol/L 8   Glucose      70 - 99 mg/dL 106 (H)   Urea Nitrogen      7 - 30 mg/dL 16   Creatinine      0.52 - 1.04 mg/dL 0.74   GFR Estimate      >60 mL/min/1.7m2 76   GFR Estimate If Black      >60 mL/min/1.7m2 >90   Calcium      8.5 - 10.1 mg/dL 8.5     [facility]: Cardiomyopathy, Optimization, Rehabilitation, Education  The [facility] is a heart failure specialty clinic within the [facility] where you will work with your cardiologist, nurse practitioners, physician assistants and registered nurses who specialize in heart failure care. They are dedicated to helping patients with heart failure to carefully adjust medications, receive education, and learn who and when to call if symptoms develop. They specialize in helping you better understand your condition, slow the progression of your disease, improve the length and quality of your life, help you detect future heart problems before they become  life threatening, and avoid hospitalizations.

## 2018-10-23 NOTE — MR AVS SNAPSHOT
After Visit Summary   10/23/2018    Em Richmond    MRN: [id number]           Patient Information     Date Of Birth          1940        Visit Information        Provider Department      10/23/2018 1:30 PM Shawn Freitas MD [facility]        Today's Diagnoses     JERSON (obstructive sleep apnea)    -  1    Chronic congestive heart failure, unspecified heart failure type (H)        Permanent atrial fibrillation (H)          Care Instructions    Your blood pressure was checked while you were in clinic today.  Please read the guidelines below about what these numbers mean and what you should do about them.  Your systolic blood pressure is the top number.  This is the pressure when the heart is pumping.  Your diastolic blood pressure is the bottom number.  This is the pressure in between beats.  If your systolic blood pressure is less than 120 and your diastolic blood pressure is less than 80, then your blood pressure is normal. There is nothing more that you need to do about it  If your systolic blood pressure is 120-139 or your diastolic blood pressure is 80-89, your blood pressure may be higher than it should be.  You should have your blood pressure re-checked within a year by a primary care provider.  If your systolic blood pressure is 140 or greater or your diastolic blood pressure is 90 or greater, you may have high blood pressure.  High blood pressure is treatable, but if left untreated over time it can put you at risk for heart attack, stroke, or kidney failure.  You should have your blood pressure re-checked by a primary care provider within the next four weeks.  Your BMI is There is no height or weight on file to calculate BMI.  Weight management is a personal decision.  If you are interested in exploring weight loss strategies, the following discussion covers the approaches that may be successful. Body mass index (BMI) is one way to tell whether you are at a  healthy weight, overweight, or obese. It measures your weight in relation to your height.  A BMI of 18.5 to 24.9 is in the healthy range. A person with a BMI of 25 to 29.9 is considered overweight, and someone with a BMI of 30 or greater is considered obese. More than two-thirds of American adults are considered overweight or obese.  Being overweight or obese increases the risk for further weight gain. Excess weight may lead to heart disease and diabetes.  Creating and following plans for healthy eating and physical activity may help you improve your health.  Weight control is part of healthy lifestyle and includes exercise, emotional health, and healthy eating habits. Careful eating habits lifelong are the mainstay of weight control. Though there are significant health benefits from weight loss, long-term weight loss with diet alone may be very difficult to achieve- studies show long-term success with dietary management in less than 10% of people. Attaining a healthy weight may be especially difficult to achieve in those with severe obesity. In some cases, medications, devices and surgical management might be considered.  What can you do?  If you are overweight or obese and are interested in methods for weight loss, you should discuss this with your provider.     Consider reducing daily calorie intake by 500 calories.     Keep a food journal.     Avoiding skipping meals, consider cutting portions instead.    Diet combined with exercise helps maintain muscle while optimizing fat loss. Strength training is particularly important for building and maintaining muscle mass. Exercise helps reduce stress, increase energy, and improves fitness. Increasing exercise without diet control, however, may not burn enough calories to loose weight.       Start walking three days a week 10-20 minutes at a time    Work towards walking thirty minutes five days a week     Eventually, increase the speed of your walking for 1-2 minutes at  time    In addition, we recommend that you review healthy lifestyles and methods for weight loss available through the National Institutes of Health patient information sites:  http://win.niddk.nih.gov/publications/index.htm    And look into health and wellness programs that may be available through your health insurance provider, employer, local community center, or ila club.      1. CPAP-  WHAT DOES IT DO AND HOW CAN I LEARN TO WEAR IT?                               BEFORE I START, CAN I WATCH A MOVIE TO GET A PLAN ON HOW TO USE CPAP?  https://www.[employer].com/watch?i=s0B07wu504Y      Continuous positive airway pressure, or CPAP, is the most effective treatment for obstructive sleep apnea. It works by blowing room air, through a mask, to hold your throat open. A decision to use CPAP is a major step forward in the pursuit of a healthier life. The successful use of CPAP will help you breathe easier, sleep better and live healthier. You can choose CPAP equipment from any durable medical equipment provider that meets your needs.  Using CPAP can be a positive experience if you keep these cr points in mind:  1. Commitment  CPAP is not a quick fix for your problem. It involves a long-term commitment to improve your sleep and your health.    2. Communication  Stay in close communication with both your sleep doctor and your CPAP supplier. Ask lots of questions and seek help when you need it.    3. Consistency  Use CPAP all night, every night and for every nap. You will receive the maximum health benefits from CPAP when you use it every time that you sleep. This will also make it easier for your body to adjust to the treatment.    4. Correction  The first machine and mask that you try may not be the best ones for you. Work with your sleep doctor and your CPAP supplier to make corrections to your equipment selection. Ask about trying a different type of machine or mask if you have ongoing problems. Make sure that your mask  "is a good fit and learn to use your equipment properly.    5. Challenge  Tell a family member or close friend to ask you each morning if you used your CPAP the previous night. Have someone to challenge you to give it your best effort.    6. Connection   Your adjustment to CPAP will be easier if you are able to connect with others who use the same treatment. Ask your sleep doctor if there is a support group in your area for people who have sleep apnea, or look for one on the Internet.  7. Comfort   Increase your level of comfort by using a saline spray, decongestant or heated humidifier if CPAP irritates your nose, mouth or throat. Use your unit's \"ramp\" setting to slowly get used to the air pressure level. There may be soft pads you can buy that will fit over your mask straps. Look on www.CPAP.com for accessories that can help make CPAP use more comfortable.  8. Cleaning   Clean your mask, tubing and headgear on a regular basis. Put this time in your schedule so that you don't forget to do it. Check and replace the filters for your CPAP unit and humidifier.    9. Completion   Although you are never finished with CPAP therapy, you should reward yourself by celebrating the completion of your first month of treatment. Expect this first month to be your hardest period of adjustment. It will involve some trial and error as you find the machine, mask and pressure settings that are right for you.    10. Continuation  After your first month of treatment, continue to make a daily commitment to use your CPAP all night, every night and for every nap.    CPAP-Tips to starting with success:  Begin using your CPAP for short periods of time during the day while you watch TV or read.    Use CPAP every night and for every nap. Using it less often reduces the health benefits and makes it harder for your body to get used to it.    Make small adjustments to your mask, tubing, straps and headgear until you get the right fit. Tightening " the mask may actually worsen the leak.  If it leaves significant marks on your face or irritates the bridge of your nose, it may not be the best mask for you.  Speak with the person who supplied the mask and consider trying other masks. Insurances will allow you to try different masks during the first month of starting CPAP.  Insurance also covers a new mask, hose and filter about every 6 months.    Use a saline nasal spray to ease mild nasal congestion. Neti-Pot or saline nasal rinses may also help. Nasal gel sprays can help reduce nasal dryness.  Biotene mouthwash can be helpful to protect your teeth if you experience frequent dry mouth.  Dry mouth may be a sign of air escaping out of your mouth or out of the mask in the case of a full face mask.  Speak with your provider if you expect that is the case.     Take a nasal decongestant to relieve more severe nasal or sinus congestion.  Do not use Afrin (oxymetazoline) nasal spray more than 3 days in a row.  Speak with your sleep doctor if your nasal congestion is chronic.    Use a heated humidifier that fits your CPAP model to enhance your breathing comfort. Adjust the heat setting up if you get a dry nose or throat, down if you get condensation in the hose or mask.  Position the CPAP lower than you so that any condensation in the hose drains back into the machine rather than towards the mask.    Try a system that uses nasal pillows if traditional masks give you problems.    Clean your mask, tubing and headgear once a week. Make sure the equipment dries fully.    Regularly check and replace the filters for your CPAP unit and humidifier.    Work closely with your sleep provider and your CPAP supplier to make sure that you have the machine, mask and air pressure setting that works best for you. It is better to stop using it and call your provider to solve problems than to lay awake all night frustrated with the device.    Daytime naps are not advised, but use the PAP  device if taking naps. Many insurances require that we prove you are using the PAP device at least 4 hours on at least 70% of nights over a 30 day period. We have 90 days to meet those criteria.    You can get new supplies (mask, hose and filter) for your device every 3-6 months (covered by insurance). You do not need to get supplies that often, but they are available if you would like them. You may exchange the mask once within the first month if you feel the initial mask does not fit well. Please, contact your medical equipment provider for equipment issues.    Please let me know if you have any snoring, daytime sleepiness, or poor sleep quality. We will want to make sure your PAP device is adequately treating your condition.    There is a website called CPAP.com that has accessories that may make CPAP use easier. Please visit it at your convenience.    Get the new CPAP and new settings. Follow up with us within 4 weeks.    Thank you!    Shawn Freitas MD, MPH  Clinical Sleep and Occupational / Environmental Medicine              Follow-ups after your visit        Your next 10 appointments already scheduled     Oct 23, 2018  2:15 PM CDT   LAB with RU LAB   [facility] [place] ([facility])    [address]  [facility] [address] [phone]           Please do not eat 10-12 hours before your appointment if you are coming in fasting for labs on lipids, cholesterol, or glucose (sugar). This does not apply to pregnant women. Water, hot tea and black coffee (with nothing added) are okay. Do not drink other fluids, diet soda or chew gum.            Oct 23, 2018  3:10 PM CDT   Core Return with DOIMNIK White CNP   [facility] ([facility])    [address]  [facility] [address] [phone]              Who to contact     If you have questions or need follow up information about  "today's clinic visit or your schedule please contact [facility] directly at [phone].  Normal or non-critical lab and imaging results will be communicated to you by MyChart, letter or phone within 4 business days after the clinic has received the results. If you do not hear from us within 7 days, please contact the clinic through MyChart or phone. If you have a critical or abnormal lab result, we will notify you by phone as soon as possible.  Submit refill requests through [employer] or call your pharmacy and they will forward the refill request to us. Please allow 3 business days for your refill to be completed.          Additional Information About Your Visit        Care EveryWhere ID     This is your Care EveryWhere ID. This could be used by other organizations to access your [place] medical records  [id number]        Your Vitals Were     Pulse Height Pulse Oximetry BMI (Body Mass Index)          75 1.638 m (5' 4.49\") 100% 29.13 kg/m2         Blood Pressure from Last 3 Encounters:   10/23/18 130/79   07/31/18 111/61   06/25/18 110/70    Weight from Last 3 Encounters:   10/23/18 78.2 kg (172 lb 4.8 oz)   07/31/18 77.6 kg (171 lb)   06/25/18 77.1 kg (170 lb)              We Performed the Following     Comprehensive DME        Primary Care Provider Office Phone # Fax #    Arielle Leger [phone]       [facility] [id number] [place] [address]        Equal Access to Services     LUZ MARTINEZ : Hadii aad ku hadasho Soomaali, waaxda luqadaha, qaybta kaalmada addieegyada, scout campbell. So [facility] [phone].    ATENCIÓN: Si habla español, tiene a delgadillo disposición servicios gratuitos de asistencia lingüística. Llame al [phone].    We comply with applicable federal civil rights laws and [place] laws. We do not discriminate on the basis of race, color, national origin, age, disability, sex, sexual orientation, or gender " identity.            Thank you!     Thank you for choosing [facility]  for your care. Our goal is always to provide you with excellent care. Hearing back from our patients is one way we can continue to improve our services. Please take a few minutes to complete the written survey that you may receive in the mail after your visit with us. Thank you!             Your Updated Medication List - Protect others around you: Learn how to safely use, store and throw away your medicines at www.disposemymeds.org.          This list is accurate as of 10/23/18  1:58 PM.  Always use your most recent med list.                   Brand Name Dispense Instructions for use Diagnosis    aspirin 81 MG chewable tablet     36 tablet    Take 1 tablet (81 mg) by mouth daily    Acute respiratory failure with hypoxia (H)       digoxin 125 MCG tablet    LANOXIN    90 tablet    Take 1 tablet (125 mcg) by mouth daily    Atrial fibrillation with rapid ventricular response (H)       furosemide 40 MG tablet    LASIX    90 tablet    Take 1 tablet (40 mg) by mouth daily    Acute combined systolic and diastolic CHF, NYHA class 3 (H)       IMODIUM A-D 2 MG capsule   Generic drug:  loperamide      Take 2 mg by mouth as needed for diarrhea        losartan 25 MG tablet    COZAAR    90 tablet    Take 1 tablet (25 mg) by mouth daily    Nonischemic cardiomyopathy (H)       meclizine 12.5 MG tablet    ANTIVERT    60 tablet    Take 1 tablet (12.5 mg) by mouth 3 times daily as needed for dizziness    Labyrinthitis, bilateral       metoprolol succinate 50 MG 24 hr tablet    TOPROL XL    180 tablet    Take 1 tablet (50 mg) by mouth 2 times daily    Persistent atrial fibrillation (H)       MULTIPLE VITAMINS PO      Take 1 tablet by mouth daily        OMEPRAZOLE PO      Take 20 mg by mouth daily as needed        PAROXETINE HCL PO      Take 30 mg by mouth daily        PROBIOTIC ACIDOPHILUS PO      Take 1 tablet by mouth        VITAMIN D3 PO       Take 2,000 Units by mouth daily        WARFARIN SODIUM PO      Take by mouth daily As directed

## 2018-10-23 NOTE — LETTER
10/23/2018    Arielle Leger MD  [address] [address] [facility] [address]    RE: Em Richmond       Dear Colleague,    I had the pleasure of seeing Em Richmond in the [facility].    HPI and Plan:   See dictation    Orders Placed This Encounter   Procedures     Basic metabolic panel     [facility]       No orders of the defined types were placed in this encounter.      There are no discontinued medications.      Encounter Diagnosis   Name Primary?     Atrial fibrillation with rapid ventricular response (H)        CURRENT MEDICATIONS:  Current Outpatient Prescriptions   Medication Sig Dispense Refill     aspirin 81 MG chewable tablet Take 1 tablet (81 mg) by mouth daily 36 tablet 0     Cholecalciferol (VITAMIN D3 PO) Take 2,000 Units by mouth daily        digoxin (LANOXIN) 125 MCG tablet Take 1 tablet (125 mcg) by mouth daily 90 tablet 3     furosemide (LASIX) 40 MG tablet Take 1 tablet (40 mg) by mouth daily 90 tablet 3     Lactobacillus (PROBIOTIC ACIDOPHILUS PO) Take 1 tablet by mouth       loperamide (IMODIUM A-D) 2 MG capsule Take 2 mg by mouth as needed for diarrhea       losartan (COZAAR) 25 MG tablet Take 1 tablet (25 mg) by mouth daily 90 tablet 3     metoprolol succinate (TOPROL-XL) 50 MG 24 hr tablet Take 1 tablet (50 mg) by mouth 2 times daily 180 tablet 3     MULTIPLE VITAMINS PO Take 1 tablet by mouth daily       OMEPRAZOLE PO Take 20 mg by mouth daily as needed        PAROXETINE HCL PO Take 30 mg by mouth daily        WARFARIN SODIUM PO Take by mouth daily As directed       meclizine (ANTIVERT) 12.5 MG tablet Take 1 tablet (12.5 mg) by mouth 3 times daily as needed for dizziness (Patient not taking: Reported on 10/23/2018) 60 tablet 1       ALLERGIES     Allergies   Allergen Reactions     Lisinopril Diarrhea     Sulfa Drugs        PAST MEDICAL HISTORY:  Past Medical History:   Diagnosis Date     Acute combined systolic and diastolic CHF, NYHA  "class 3 (H) 10/24/2017     Atrial fibrillation with rapid ventricular response (H) 10/20/2017     Cardiogenic shock (H) 11/05/2017     Essential hypertension 10/24/2017     Idiopathic cardiomyopathy (H) 12/19/2017     Nonischemic cardiomyopathy (H) 11/7/2017     Syncope 6/20/2014     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 RHC: mild PHTN/NL wedge/ low CI.        FAMILY HISTORY:  Family History   Problem Relation Age of Onset     Diabetes Mother      Other Cancer Father        SOCIAL HISTORY:  Social History     Social History     Marital status:      Spouse name: N/A     Number of children: N/A     Years of education: N/A     Social History Main Topics     Smoking status: Former Smoker     Types: Cigarettes     Quit date: 1/1/1965     Smokeless tobacco: Never Used     Alcohol use 0.0 oz/week      Comment: 8 oz wine daily     Drug use: No     Sexual activity: Yes     Partners: Male     Other Topics Concern     None     Social History Narrative       Review of Systems:  Skin:  Negative       Eyes:  Positive for glasses    ENT:  Positive for postnasal drainage    Respiratory:  Positive for sleep apnea;CPAP;dyspnea on exertion DILLON rare    Cardiovascular:    chest pain;Positive for;lightheadedness (under L breast, increased since last week ) LH this am more than usual   Gastroenterology: Positive for nausea;excessive gas or bloating;diarrhea    Genitourinary:  Negative      Musculoskeletal:  Positive for foot pain R toe  Neurologic:  Negative      Psychiatric:  Negative      Heme/Lymph/Imm:  Negative      Endocrine:  Negative        Physical Exam:  Vitals: /62 (BP Location: Right arm, Patient Position: Chair, Cuff Size: Adult Small)  Pulse 86  Ht 1.638 m (5' 4.5\")  Wt 78.6 kg (173 lb 4.8 oz)  SpO2 95%  BMI 29.29 " kg/m2    Constitutional:  cooperative, alert and oriented, well developed, well nourished, in no acute distress        Skin:  warm and dry to the touch, no apparent skin lesions or masses noted          Head:  normocephalic, no masses or lesions        Eyes:  pupils equal and round;conjunctivae and lids unremarkable;sclera white;no xanthalasma        Lymph:      ENT:  no pallor or cyanosis, dentition good        Neck:  carotid pulses are full and equal bilaterally, JVP normal, no carotid bruit        Respiratory:  normal breath sounds, clear to auscultation, normal A-P diameter, normal symmetry, normal respiratory excursion, no use of accessory muscles         Cardiac: normal S1 and S2;no S3 or S4;apical impulse not displaced irregularly irregular rhythm;tachycardic   no presence of murmur                                                   GI:  abdomen soft, non-tender, BS normoactive, no mass, no HSM, no bruits        Extremities and Muscular Skeletal:  no deformities, clubbing, cyanosis, erythema observed;no edema              Neurological:  no gross motor deficits;affect appropriate        Psych:  Alert and Oriented x 3        CC  No referring provider defined for this encounter.                Thank you for allowing me to participate in the care of your patient.      Sincerely,     DOMINIK Ritchie [facility]    cc:   No referring provider defined for this encounter.

## 2018-10-23 NOTE — PROGRESS NOTES
This nurse changed ACPAP pressures manually today in clinic to Min 10 cmH20 and Max 13 cmH20.  Per Dr. Freitas.      Ama Bruno LPN

## 2018-10-23 NOTE — LETTER
10/23/2018      Arielle Leger MD  [place] [address] [facility] [address]      RE: Em Richmond       Dear Colleague,    I had the pleasure of seeing Em Richmond in the [facility].    Service Date: 10/23/2018      HISTORY OF PRESENT ILLNESS:  I had the pleasure of seeing Em Richmond, a pleasant 78-year-old patient who has a nonischemic cardiomyopathy.  She was admitted twice in October and November for congestive heart failure and cardiomyopathy.  Ejection fraction in 2014 was 60%-65%.      On 10/21/2017, her ejection fraction dropped to 30%-35% with mild to moderate tricuspid regurgitation and mild to moderate mitral insufficiency.  The patient had a 2-3 year history of chronic atrial fibrillation.  She had stopped her metoprolol and presented with rapid ventricular response.      Coronary angiography 10/23/2017 showed only mild coronary artery disease with all stenosis less than 30%.  We felt that the idiopathic cardiomyopathy was possibly due to atrial fibrillation with rapid ventricular response.  We initiated beta blockade.  She continued with her diuresis.  She lost 20 pounds in the hospital.  We continue to have difficulty with rate control with beta blocker alone.  Digoxin was used while in the hospital and this was discontinued at discharge.  She was intubated at one point in the hospital due to shortness of breath.  She remains on warfarin without bleeding diathesis.  We have reinitiated the digoxin with excellent heart rate control.  Her weight has been fairly stable.  We referred her to Dr. Freitas for poorly treated sleep apnea.      Echocardiogram in March showed an ejection fraction of 35%-40%, indicating no need for ICD.  Her heart rate remained elevated to 100 beats per minute.  After initiation of digoxin, her heart rate was better controlled.      On 06/22/2018, her LVEF improved to 50%-55%.  Right ventricular systolic function  borderline reduced as well and there is moderate left atrial enlargement.      The patient returns today stating she feels well.  No complaints of increased shortness of breath, PND, syncope or near-syncope.      PHYSICAL EXAMINATION:  See below.      ASSESSMENT AND PLAN:   1.  Ricardo Richmond is a delightful 77-year-old female with evidence of idiopathic cardiomyopathy, possibly secondary to atrial fibrillation with rapid ventricular response.  Her blood pressure fell precipitously with increased doses of metoprolol XL.  Because of this, we added digoxin and her heart rate now is under better control.  Digoxin level will be checked in December per Dr. Pickard's request.      I have made no medication changes.  I asked her to return in 4 months.      The patient will continue with warfarin for her atrial fibrillation.      2.  She remains on furosemide for the time being.  Her basic metabolic panel showed a sodium of 134, potassium 4.5, BUN 16, creatinine 0.74, GFR 76.      3.  Follow up with Dr. Pickard as directed.         DOMINIK ELIZABETH, CNP             D: 10/24/2018   T: 10/24/2018   MT: CHARLIE      Name:     RICARDO RICHMOND   MRN:      [id number]        Account:      [id number]   :      1940           Service Date: 10/23/2018      Document: [id number]         Outpatient Encounter Prescriptions as of 10/23/2018   Medication Sig Dispense Refill     aspirin 81 MG chewable tablet Take 1 tablet (81 mg) by mouth daily 36 tablet 0     Cholecalciferol (VITAMIN D3 PO) Take 2,000 Units by mouth daily        digoxin (LANOXIN) 125 MCG tablet Take 1 tablet (125 mcg) by mouth daily 90 tablet 3     furosemide (LASIX) 40 MG tablet Take 1 tablet (40 mg) by mouth daily 90 tablet 3     Lactobacillus (PROBIOTIC ACIDOPHILUS PO) Take 1 tablet by mouth       loperamide (IMODIUM A-D) 2 MG capsule Take 2 mg by mouth as needed for diarrhea       losartan (COZAAR) 25 MG tablet Take 1 tablet (25 mg) by mouth daily 90  tablet 3     metoprolol succinate (TOPROL-XL) 50 MG 24 hr tablet Take 1 tablet (50 mg) by mouth 2 times daily 180 tablet 3     MULTIPLE VITAMINS PO Take 1 tablet by mouth daily       OMEPRAZOLE PO Take 20 mg by mouth daily as needed        PAROXETINE HCL PO Take 30 mg by mouth daily        WARFARIN SODIUM PO Take by mouth daily As directed       meclizine (ANTIVERT) 12.5 MG tablet Take 1 tablet (12.5 mg) by mouth 3 times daily as needed for dizziness (Patient not taking: Reported on 10/23/2018) 60 tablet 1     No facility-administered encounter medications on file as of 10/23/2018.        Again, thank you for allowing me to participate in the care of your patient.      Sincerely,    DOMINIK Ritchie [facility]

## 2018-10-23 NOTE — PROGRESS NOTES
[facility]  Outpatient Sleep Medicine Follow Up Visit  October 23, 2018    Name: Em Richmond MRN# [id number]   Age: 78 year old YOB: 1940     Date of Follow Up Visit: October 23, 2018  Consultation is requested by: DOMINIK White CNP  [address]  [address]  [place] [address]  Primary care provider: Arielle Leger    Patient is accompanied by: Patient presents alone today.       Reason for Sleep Consult:     Em Richmond is a 78 year old female patient that presents here for an JERSON follow up evaluation.         Assessment and Plan:     Summary Sleep Diagnoses:    (1) Moderate JERSON (AHI of 22 events per hour)  (2) Atrial Fibrillation  (3) Chronic Systolic Congestive Heart Failure    Summary Recommendations / Discussion:    This patient was diagnosed in the past with moderate JERSON with an AHI of 22 events per hour. Since the diagnosis, the patient has been on CPAP therapy with a fixed pressure of 10 cmH2O. PAP download during the initial visit showed a non-compliant patient and the device was objectively effective at treating the condition with a residual AHI of 0.9 events per hour. Given that the patient's most recent echocardiogram demonstrated a decreased systolic function with a LVEF estimated at 35 - 40%, the patient should only be treated with CPAP and, if necessary, oxygen supplementation. As such, given that the PAP download showed no elevated residual AHI, no new PSG sleep study was obtained. Instead, an overnight oximetry test with the CPAP device in place was obtained to evaluate for sleep associated hypoxemia. The overnight oximetry showed marginal sleep associated hypoxemia with only 8.9 minutes under the SpO2 of 88%. However, this seemed to be artifactual in nature. During the last visit, therapy with CPAP at fixed pressure of 10 cmH2O continued. Unfortunately, the patient has not been able to meet compliance due to PAP device not operating  correctly. In addition, therapy seemed to be sub-optimal with elevated residual AHI (most events seemed to be obstructive in nature). An in-lab PSG sleep study with TCM was advised, but the patient declined this. She stated that she does not wish to undergo another PSG sleep evaluation. As such, the patient will be prescribed a new PAP device and this will be an APAP with a narrow range (APAP at 10 - 13 cmH2O). The patient will follow up with us within 3 to 4 weeks. Once again today, the nature and pathophysiology of JERSON were discussed. The different treatment options for JERSON were also reviewed and explained again today. Lifestyle recommendations including healthy dietary and exercising habits were discussed.    Visit Summary:   -APAP at 10 - 13 cmH2O   -Follow up with us within 3 to 4 weeks    Coding:  (G47.33) JERSON (obstructive sleep apnea)  (primary encounter diagnosis)  (I48.91) Atrial fibrillation with rapid ventricular response (H)  (I50.22) Chronic systolic congestive heart failure (H)    Counseling included a comprehensive review of diagnostic and therapeutic strategies as well as risks of inadequate therapy.    Educational materials provided in instructions. The patient was instructed to avoid driving or operating any heavy machinery when experiencing drowsiness.    All questions and concerns were addressed today. Pt agrees and understands the assessment and plan.         History of Present Illness:   Em Richmond is a 78 year old  RHD female pt with PMH of atrial fibrillation, syncope, combined systolic and diastolic congestive heart failure, hypertension, nonischemic / idiopathic cardiomyopathy, and previously diagnosed JERSON presents for an JERSON follow up evaluation today.    As previously explained, this is a medically complex individual with history of nonischemic / idiopathic cardiomyopathy with recent hospitalizations secondary to congestive heart failure and cardiomyopathy. Recent  "echocardiogram showed a LVEF estimated at 35 - 40%.     The patient underwent a coronary angiography in 2017 and this demonstrated only mild coronary artery disease. As such, it was believed that the cardiomyopathy was probably secondary to atrial fibrillation with rapid ventricular response.    The patient is currently on aspirin 81 mg, furosemide 40 mg, losartan 25 mg, metropolol succinate 50 mg, warfarin, and digoxin 125 mg.    The patient completed a PSG sleep study in [facility] in 2014 and this demonstrated moderate JERSON with an AHI of 22 events per hour. The mean oxygen saturation during the study was 93%.    During the initial visit, The patient explained that she was sleeping very good with no \"sleeping problems whatsoever.\" The patient explained that she stopped using the PAP device several months ago due to interface discomfort (pt had a nasal pillow interface with nasal discomfort). The patient explained that she always used the device on and off. The patient explained that she has never noticed any benefits from the PAP device. The patient reported having occasional snoring with no gasping / choking for air or witnessed apneas. The patient reported non-restorative sleep with some mild sleep inertia. The patient denied any EDS with no inadvertent naps. Pt admitted having xerostomia in the morning. The patient denied any drowsiness when driving with no near accidents. No CP, SOB, morning cephalgia, orthopnea, nocturia, or any other sxs or concerns.    During the prior visit, the patient was re-started on CPAP at fixed pressure of 10 cmH2O. The PAP download today shows a non-compliant patient who is using the device 76.5% of the time with 2.4% over 4 hours. The device is effective at treating the condition with a residual AHI of 3.4 events per hour. No significant leaks noted.    CPAP Compliance:   Dates: July 31, 2018       1.7 % > 4 hour use    Average Use: 1 hour 47 mintues   Leak: 2 minutes    Residual AHI: " 3.4 events per hour    The patient completed an overnight oximetry test with PAP device in place and, although mild sleep associated hypoxemia was noted, this seemed to be artefactual in nature (pt spent only 8.9 minutes). A pattern consistent with atrial fibrillation was noted throughout the oximetry test.    The patient explains that she is not able to use the PAP machine due to noises (the PAP device is making noises). At this time, the patient denies any interface discomfort.    PREVIOUS IN- LAB or HOME SLEEP STUDIES: The patient reports the study was conducted in [facility]   Date: 2014   TYPE: PSG   AHI: 22 events per hour   BMI: 32.0 kg/m2   Intervention: CPAP    SLEEP-WAKE SCHEDULE:     Em Richmond      -Describes herself as a night person; prefers to go to sleep at 10:00 PM and wakes up at 9:00 AM.      -The patient takes 2 naps a week and each lasts about 40 minutes. Pt feels refreshed after naps; takes no inadvertent naps.      -ON WEEKDAYS, goes to sleep at 10:30 PM during the week; awakens 9:00 AM with an alarm; falls asleep in 20 minutes; denies difficulty falling asleep.      -ON WEEKENDS, goes to sleep at 10:30 PM and wakes up at 9:00 AM with an alarm; falls asleep in 20 minutes.        -Awakens 1-2 times a night for 15 minutes before falling back to sleep; awakens to go to the bathroom.      BEDTIME ACTIVITIES AND SHIFT WORK:    Em Richmond     -Bedtime Activities and Other Sleeping Information: Pt lives alone with . Pt sleeps alone on a queen size bed. No pets in the bedroom. Pt sleeps on her sides. No electronics used in bed.     -Occupation: Retired     SCALES        -SLEEP APNEA: [place] score: 4 / 8        -SLEEPINESS: [place] sleepiness scale (ESS):  4 / 24 (initial score)    Drowsy driving / near accidents: Denies any near accidents    Consequences: Non-restorative sleep    SLEEP COMPLAINTS:  Cardio-respiratory     -Snoring: None with PAP device in place   -Dyspnea: Pt denies  having any witnessed apneas with PAP device on   -Morning headaches or confusion: Denies any morning cephalgia   -Coexisting Lung disease: See Above     -Coexisting Heart disease: See Above     -Does patient have a bed partner: Patient sleeps alone   -Has bed partner been sleeping separately because of snoring:  Yes            RLS Screen:    -When you try to relax in the evening or sleep at night, do you ever have unpleasant, restless feelings in your legs that can be relieved by walking or movement? None Reported     -Periodic limb movement: None Reported    Narcolepsy:     - Denies sudden urges of sleep attacks   - Denies cataplexy   - Denies sleep paralysis    - Denies hallucinations    - Admits feeling refreshed after a nap.    Sleep Behaviors:   - Denies leg symptoms/movements   - Denies motor restlessness   - Denies night terrors   - Admits bruxism   - Denies automatic behaviors    Other Subjective Complaints:   - Denies anxiety or rumination    - Denies pain and discomfort at night   - Denies waking up with heart pounding or racing   - Denies GERD or aspiration         Parasomnia:    -NREM - Denies recurrent persistent confusional arousal, night eating, sleep walking or sleep terrors.      -REM - Denies dream enactment or injuries.          Medications:     Current Outpatient Prescriptions   Medication Sig     aspirin 81 MG chewable tablet Take 1 tablet (81 mg) by mouth daily     Cholecalciferol (VITAMIN D3 PO) Take 2,000 Units by mouth daily      digoxin (LANOXIN) 125 MCG tablet Take 1 tablet (125 mcg) by mouth daily     furosemide (LASIX) 40 MG tablet Take 1 tablet (40 mg) by mouth daily     loperamide (IMODIUM A-D) 2 MG capsule Take 2 mg by mouth as needed for diarrhea     losartan (COZAAR) 25 MG tablet Take 1 tablet (25 mg) by mouth daily     meclizine (ANTIVERT) 12.5 MG tablet Take 1 tablet (12.5 mg) by mouth 3 times daily as needed for dizziness     metoprolol succinate (TOPROL-XL) 50 MG 24 hr tablet  Take 1 tablet (50 mg) by mouth 2 times daily     MULTIPLE VITAMINS PO Take 1 tablet by mouth daily     OMEPRAZOLE PO Take 20 mg by mouth daily as needed      PAROXETINE HCL PO Take 30 mg by mouth daily      WARFARIN SODIUM PO Take by mouth daily As directed     No current facility-administered medications for this visit.       Allergies   Allergen Reactions     Lisinopril Diarrhea     Sulfa Drugs           Past Medical History:     Denies needing any 02 supplement at night.    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     Past Surgical History:   Procedure Laterality Date     HEART CATH RIGHT AND LEFT HEART CATH  10/23/2017    Cath no significant obstructive CAD. RHC: mild PHTN/NL wedge/ low CI.           Social History:     Social History   Substance Use Topics     Smoking status: Former Smoker     Types: Cigarettes     Quit date: 1/1/1965     Smokeless tobacco: Never Used     Alcohol use 0.0 oz/week      Comment: 8 oz wine daily     Chemical History:     Tobacco: Quit smoking about 53 yrs ago     Uses 1.5 cups/day of coffee. Last caffeine intake is usually before 10 AM.    Supplements for wakefulness: Patient does not use any supplements to stay awake    EtOH: 5 to 8 drinks a week  Recreational Drugs: Patient denies using any recreational drugs     Psych Hx:   Current dangers to self or others: No. Pt denies any SI / HI, hallucinations, or delusions         Family History:     Family History   Problem Relation Age of Onset     Diabetes Mother      Other Cancer  "Father       Sleep Family Hx:       RLS - None reported   JERSON - Sister with JERSON  Insomnia - None reported  Parasomnia - None reported         Review of Systems:     A complete 10 point review of systems was negative other than HPI or as commented below:     CONSTITUTIONAL: Positive for weight gain, sweats / night sweats, and allergies.  EYES: NEGATIVE for changes in vision, blind spots, double vision.  ENT: NEGATIVE for ear pain, sore throat, bloody nose. Pt reports sinus pain, post-nasal drip, and runny nose.  CARDIAC: NEGATIVE for chest pain or pressure, breathlessness when lying flat, swollen legs or swollen feet. Pt reports fast heartbeats / fluttering in chest.  NEUROLOGIC: NEGATIVE headaches, weakness or numbness in the arms or legs.  DERMATOLOGIC: NEGATIVE for rashes, new moles or change in mole(s)  PULMONARY: NEGATIVE SOB at rest, coughing up blood, wheezing or whistling when breathing. Pt reports SOB with activity, dry cough, productive cough.  GASTROINTESTINAL: NEGATIVE for nausea or vomitting, loose or watery stools, fat or grease in stools, constipation, abdominal pain, bowel movements black in color or blood noted.  GENITOURINARY: NEGATIVE for pain during urination, blood in urine, urinating more frequently than usual, irregular menstrual periods.  MUSCULOSKELETAL: Positive for muscle pain, bone / joint pain, and swollen joints.  ENDOCRINE: NEGATIVE for increased thirst or urination, diabetes.  LYMPHATIC: NEGATIVE for swollen lymph nodes, lumps or bumps in the breasts or nipple discharge.         Physical Examination:   /79  Pulse 75  Ht 1.638 m (5' 4.49\")  Wt 78.2 kg (172 lb 4.8 oz)  SpO2 100%  BMI 29.13 kg/m2     VS: Reviewed and normal.  General: Alert, oriented, not in distress. Dressed casually; Good eye contact; Comfortably sitting in a chair; in no apparent distress  HEENT: Normocephalic and atraumatic; NL TM x 2; pupils are isocoric and equally responsive to the light. PERRLA. EOMI. " Normal fundoscopic examination; Nasal turbinates are normal with a normal septal alignment;  Mallampati score: Grade IV; Tonsillar hypertrophy: 2  visible at pillars; Pharynx with no erythema or exudates.  NECK: Neck supple; symmetrical; no lymphadenopathy; no thyromegaly, bruit, JVD noted. Neck circumference of 15 inches (38 cm).  Lungs: Both hemithoraces are symmetrical, normal to palpation, no dullness to percussion, auscultation of lungs revealed normal breath sounds with no expirium prolongation, wheezing, rhonci and crackles.  CVS: Normal S1 and S2 heart sounds with no extra heart sounds. No murmur, rubs, or clicks. Normal peripheral pulses throughout with no obvious peripheral edema. Irregularly irregular rhythm.  Psychiatry: Mood and affect are appropriate. Euthymic with affect congruent with full range and intensity. No SI/HI with adequate insight and judgement.          Data: All pertinent previous laboratory data reviewed     Lab Results   Component Value Date    PH 7.40 11/07/2017    PH 7.39 11/06/2017    PO2 114 (H) 11/07/2017    PO2 105 11/06/2017    PCO2 37 11/07/2017    PCO2 28 (L) 11/06/2017    HCO3 23 11/07/2017    HCO3 17 (L) 11/06/2017    ADAM Canceled, Test credited 11/06/2017     Lab Results   Component Value Date    TSH 3.02 11/20/2017    TSH 1.75 11/06/2017     Lab Results   Component Value Date     (H) 05/17/2018     (H) 04/26/2018     Lab Results   Component Value Date    HGB 13.0 11/20/2017    HGB 10.7 (L) 11/08/2017     Lab Results   Component Value Date    BUN 10 05/17/2018    BUN 15 04/26/2018    CR 0.74 05/17/2018    CR 0.98 04/26/2018     Echocardiology:    -Echocardiogram obtained on 03/06/0218 showed normal left ventricular size and wall thickness. The left ventricular systolic function was moderately reduced with a LVEF estimated at 35 - 40%. Moderate global hypokinesia of the left ventricle was noted. The right ventricle was normal in size with mildly decreased right  ventricular systolic function. There was moderate biatrial enlargement. Mild mitral, pulmonic, and tricuspid regurgitation was noted. Normal aortic valve observed. Rhythm was described as atrial fibrillation.    Chest x-ray:            -Chest x-ray films obtained in 2017 showed pulmonary vascular congestion with no infiltrate. The right hemidiaphragm was elevated with slightly blunted right costophrenic angle.    PFT: None Available    Laboratory Studies:   Component Value Flag Ref Range Units Status Collected Lab   Sodium 138  133 - 144 mmol/L Final 05/17/2018 10:57 AM [facility]   Potassium 4.0  3.4 - 5.3 mmol/L Final 05/17/2018 10:57 AM [facility]   Chloride 105  94 - 109 mmol/L Final 05/17/2018 10:57 AM [facility]   Carbon Dioxide 26  20 - 32 mmol/L Final 05/17/2018 10:57 AM [facility]   Anion Gap 7  3 - 14 mmol/L Final 05/17/2018 10:57 AM [facility]   Glucose 116 (H) 70 - 99 mg/dL Final 05/17/2018 10:57 AM [facility]   Urea Nitrogen 10  7 - 30 mg/dL Final 05/17/2018 10:57 AM [facility]   Creatinine 0.74  0.52 - 1.04 mg/dL Final 05/17/2018 10:57 AM [facility]   GFR Estimate 76  >60 mL/min/1.7m2 Final 05/17/2018 10:57 AM [facility]   Comment:   Non  GFR Calc   GFR Estimate If Black >90  >60 mL/min/1.7m2 Final 05/17/2018 10:57 AM [facility]   Comment:   African American GFR Calc   Calcium 8.8  8.5 - 10.1 mg/dL Final 05/17/2018 10:57 AM [facility]     Component Value Flag Ref Range Units Status Collected Lab   Hemoglobin 13.0  11.7 - 15.7 g/dL Final 11/20/2017  7:43 AM [facility]     Component Value Flag Ref Range Units Status Collected Lab   TSH 3.02  0.40 - 4.00 mU/L Final 11/20/2017  7:43 AM [facility]     Shawn Freitas MD, MPH  Clinical Sleep Medicine    Total time spent with patient: 40 min. Over >50% of the time was spent for face to face counseling, education, and evaluation.

## 2018-10-23 NOTE — PATIENT INSTRUCTIONS
Your blood pressure was checked while you were in clinic today.  Please read the guidelines below about what these numbers mean and what you should do about them.  Your systolic blood pressure is the top number.  This is the pressure when the heart is pumping.  Your diastolic blood pressure is the bottom number.  This is the pressure in between beats.  If your systolic blood pressure is less than 120 and your diastolic blood pressure is less than 80, then your blood pressure is normal. There is nothing more that you need to do about it  If your systolic blood pressure is 120-139 or your diastolic blood pressure is 80-89, your blood pressure may be higher than it should be.  You should have your blood pressure re-checked within a year by a primary care provider.  If your systolic blood pressure is 140 or greater or your diastolic blood pressure is 90 or greater, you may have high blood pressure.  High blood pressure is treatable, but if left untreated over time it can put you at risk for heart attack, stroke, or kidney failure.  You should have your blood pressure re-checked by a primary care provider within the next four weeks.  Your BMI is There is no height or weight on file to calculate BMI.  Weight management is a personal decision.  If you are interested in exploring weight loss strategies, the following discussion covers the approaches that may be successful. Body mass index (BMI) is one way to tell whether you are at a healthy weight, overweight, or obese. It measures your weight in relation to your height.  A BMI of 18.5 to 24.9 is in the healthy range. A person with a BMI of 25 to 29.9 is considered overweight, and someone with a BMI of 30 or greater is considered obese. More than two-thirds of American adults are considered overweight or obese.  Being overweight or obese increases the risk for further weight gain. Excess weight may lead to heart disease and diabetes.  Creating and following plans for  healthy eating and physical activity may help you improve your health.  Weight control is part of healthy lifestyle and includes exercise, emotional health, and healthy eating habits. Careful eating habits lifelong are the mainstay of weight control. Though there are significant health benefits from weight loss, long-term weight loss with diet alone may be very difficult to achieve- studies show long-term success with dietary management in less than 10% of people. Attaining a healthy weight may be especially difficult to achieve in those with severe obesity. In some cases, medications, devices and surgical management might be considered.  What can you do?  If you are overweight or obese and are interested in methods for weight loss, you should discuss this with your provider.     Consider reducing daily calorie intake by 500 calories.     Keep a food journal.     Avoiding skipping meals, consider cutting portions instead.    Diet combined with exercise helps maintain muscle while optimizing fat loss. Strength training is particularly important for building and maintaining muscle mass. Exercise helps reduce stress, increase energy, and improves fitness. Increasing exercise without diet control, however, may not burn enough calories to loose weight.       Start walking three days a week 10-20 minutes at a time    Work towards walking thirty minutes five days a week     Eventually, increase the speed of your walking for 1-2 minutes at time    In addition, we recommend that you review healthy lifestyles and methods for weight loss available through the National Institutes of Health patient information sites:  http://win.niddk.nih.gov/publications/index.htm    And look into health and wellness programs that may be available through your health insurance provider, employer, local community center, or ila club.      1. CPAP-  WHAT DOES IT DO AND HOW CAN I LEARN TO WEAR IT?                               BEFORE I START,  CAN I WATCH A MOVIE TO GET A PLAN ON HOW TO USE CPAP?  https://www.youYoungCracks.com/watch?q=n7G78rx198B      Continuous positive airway pressure, or CPAP, is the most effective treatment for obstructive sleep apnea. It works by blowing room air, through a mask, to hold your throat open. A decision to use CPAP is a major step forward in the pursuit of a healthier life. The successful use of CPAP will help you breathe easier, sleep better and live healthier. You can choose CPAP equipment from any durable medical equipment provider that meets your needs.  Using CPAP can be a positive experience if you keep these cr points in mind:  1. Commitment  CPAP is not a quick fix for your problem. It involves a long-term commitment to improve your sleep and your health.    2. Communication  Stay in close communication with both your sleep doctor and your CPAP supplier. Ask lots of questions and seek help when you need it.    3. Consistency  Use CPAP all night, every night and for every nap. You will receive the maximum health benefits from CPAP when you use it every time that you sleep. This will also make it easier for your body to adjust to the treatment.    4. Correction  The first machine and mask that you try may not be the best ones for you. Work with your sleep doctor and your CPAP supplier to make corrections to your equipment selection. Ask about trying a different type of machine or mask if you have ongoing problems. Make sure that your mask is a good fit and learn to use your equipment properly.    5. Challenge  Tell a family member or close friend to ask you each morning if you used your CPAP the previous night. Have someone to challenge you to give it your best effort.    6. Connection   Your adjustment to CPAP will be easier if you are able to connect with others who use the same treatment. Ask your sleep doctor if there is a support group in your area for people who have sleep apnea, or look for one on the  "Internet.  7. Comfort   Increase your level of comfort by using a saline spray, decongestant or heated humidifier if CPAP irritates your nose, mouth or throat. Use your unit's \"ramp\" setting to slowly get used to the air pressure level. There may be soft pads you can buy that will fit over your mask straps. Look on www.CPAP.com for accessories that can help make CPAP use more comfortable.  8. Cleaning   Clean your mask, tubing and headgear on a regular basis. Put this time in your schedule so that you don't forget to do it. Check and replace the filters for your CPAP unit and humidifier.    9. Completion   Although you are never finished with CPAP therapy, you should reward yourself by celebrating the completion of your first month of treatment. Expect this first month to be your hardest period of adjustment. It will involve some trial and error as you find the machine, mask and pressure settings that are right for you.    10. Continuation  After your first month of treatment, continue to make a daily commitment to use your CPAP all night, every night and for every nap.    CPAP-Tips to starting with success:  Begin using your CPAP for short periods of time during the day while you watch TV or read.    Use CPAP every night and for every nap. Using it less often reduces the health benefits and makes it harder for your body to get used to it.    Make small adjustments to your mask, tubing, straps and headgear until you get the right fit. Tightening the mask may actually worsen the leak.  If it leaves significant marks on your face or irritates the bridge of your nose, it may not be the best mask for you.  Speak with the person who supplied the mask and consider trying other masks. Insurances will allow you to try different masks during the first month of starting CPAP.  Insurance also covers a new mask, hose and filter about every 6 months.    Use a saline nasal spray to ease mild nasal congestion. Neti-Pot or saline " nasal rinses may also help. Nasal gel sprays can help reduce nasal dryness.  Biotene mouthwash can be helpful to protect your teeth if you experience frequent dry mouth.  Dry mouth may be a sign of air escaping out of your mouth or out of the mask in the case of a full face mask.  Speak with your provider if you expect that is the case.     Take a nasal decongestant to relieve more severe nasal or sinus congestion.  Do not use Afrin (oxymetazoline) nasal spray more than 3 days in a row.  Speak with your sleep doctor if your nasal congestion is chronic.    Use a heated humidifier that fits your CPAP model to enhance your breathing comfort. Adjust the heat setting up if you get a dry nose or throat, down if you get condensation in the hose or mask.  Position the CPAP lower than you so that any condensation in the hose drains back into the machine rather than towards the mask.    Try a system that uses nasal pillows if traditional masks give you problems.    Clean your mask, tubing and headgear once a week. Make sure the equipment dries fully.    Regularly check and replace the filters for your CPAP unit and humidifier.    Work closely with your sleep provider and your CPAP supplier to make sure that you have the machine, mask and air pressure setting that works best for you. It is better to stop using it and call your provider to solve problems than to lay awake all night frustrated with the device.    Daytime naps are not advised, but use the PAP device if taking naps. Many insurances require that we prove you are using the PAP device at least 4 hours on at least 70% of nights over a 30 day period. We have 90 days to meet those criteria.    You can get new supplies (mask, hose and filter) for your device every 3-6 months (covered by insurance). You do not need to get supplies that often, but they are available if you would like them. You may exchange the mask once within the first month if you feel the initial mask  does not fit well. Please, contact your medical equipment provider for equipment issues.    Please let me know if you have any snoring, daytime sleepiness, or poor sleep quality. We will want to make sure your PAP device is adequately treating your condition.    There is a website called CPAP.com that has accessories that may make CPAP use easier. Please visit it at your convenience.    Get the new CPAP and new settings. Follow up with us within 4 weeks.    Thank you!    Shawn Freitas MD, MPH  Clinical Sleep and Occupational / Environmental Medicine

## 2018-10-23 NOTE — MR AVS SNAPSHOT
After Visit Summary   10/23/2018    Em Richmond    MRN: [id number]           Patient Information     Date Of Birth          1940        Visit Information        Provider Department      10/23/2018 3:10 PM Mandie Rose APRN CNP [facility]        Today's Diagnoses     Atrial fibrillation with rapid ventricular response (H)          Care Instructions    Call the [facility] nurse for any questions or concerns:  [phone]    1.  Medication changes from today: no changes    2. Follow up plan: Follow up with Dr. Leger     3.  Weigh yourself daily and write it down.    4.  Call [facility] nurse if your weight is up more than 2 pounds in one day or 5 pounds in one week.    5.  Call [facility] nurse if you feel more short of breath, have more abdominal bloating, or leg swelling.    6.  Continue low sodium diet (less than 2000 mg daily). If you eat less salt, you will retain less fluid.    7.  Alcohol can weaken your heart further. You should avoid alcohol or limit its use to special times, such as a holiday or birthday.     8.  Do NOT take Aleve (naproxen) or Advil (ibuprofen) without talking to your doctor first.     9.  Lab Results:     Component      Latest Ref Rng & Units 10/23/2018   Sodium      133 - 144 mmol/L 134   Potassium      3.4 - 5.3 mmol/L 4.5   Chloride      94 - 109 mmol/L 99   Carbon Dioxide      20 - 32 mmol/L 27   Anion Gap      3 - 14 mmol/L 8   Glucose      70 - 99 mg/dL 106 (H)   Urea Nitrogen      7 - 30 mg/dL 16   Creatinine      0.52 - 1.04 mg/dL 0.74   GFR Estimate      >60 mL/min/1.7m2 76   GFR Estimate If Black      >60 mL/min/1.7m2 >90   Calcium      8.5 - 10.1 mg/dL 8.5     [facility]: Cardiomyopathy, Optimization, Rehabilitation, Education  The [facility] is a heart failure specialty clinic within the [facility] where you will work with your cardiologist, nurse practitioners, physician  assistants and registered nurses who specialize in heart failure care. They are dedicated to helping patients with heart failure to carefully adjust medications, receive education, and learn who and when to call if symptoms develop. They specialize in helping you better understand your condition, slow the progression of your disease, improve the length and quality of your life, help you detect future heart problems before they become life threatening, and avoid hospitalizations.            Follow-ups after your visit        Additional Services     [facility]                 Your next 10 appointments already scheduled     Dec 11, 2018  1:30 PM CST   LAB with RU LAB   [facility] [place] ([facility])    [address]  [place] [id number] [phone]           Please do not eat 10-12 hours before your appointment if you are coming in fasting for labs on lipids, cholesterol, or glucose (sugar). This does not apply to pregnant women. Water, hot tea and black coffee (with nothing added) are okay. Do not drink other fluids, diet soda or chew gum.              Future tests that were ordered for you today     Open Future Orders        Priority Expected Expires Ordered    Basic metabolic panel Routine 2/20/2019 10/23/2019 10/23/2018    [facility] Routine 2/20/2019 10/23/2019 10/23/2018            Who to contact     If you have questions or need follow up information about today's clinic visit or your schedule please contact [facility] directly at [phone].  Normal or non-critical lab and imaging results will be communicated to you by MyChart, letter or phone within 4 business days after the clinic has received the results. If you do not hear from us within 7 days, please contact the clinic through MyChart or phone. If you have a critical or abnormal lab result, we will notify you by phone as soon as  "possible.  Submit refill requests through [facility] or call your pharmacy and they will forward the refill request to us. Please allow 3 business days for your refill to be completed.          Additional Information About Your Visit        Care EveryWhere ID     This is your Care EveryWhere ID. This could be used by other organizations to access your [place] medical records  [id number]        Your Vitals Were     Pulse Height Pulse Oximetry BMI (Body Mass Index)          86 1.638 m (5' 4.5\") 95% 29.29 kg/m2         Blood Pressure from Last 3 Encounters:   10/23/18 102/62   10/23/18 130/79   07/31/18 111/61    Weight from Last 3 Encounters:   10/23/18 78.6 kg (173 lb 4.8 oz)   10/23/18 78.2 kg (172 lb 4.8 oz)   07/31/18 77.6 kg (171 lb)              We Performed the Following     Follow-Up with [facility]        Primary Care Provider Office Phone # Fax #    Arielle Leger [phone]       [facility] [address] [address]        Equal Access to Services     [facility]: Hadii aad ku hadasho Soomaali, waaxda luqadaha, qaybta kaalmada adesenaityada, scout nixon . So [facility] [phone].    ATENCIÓN: Si habla español, tiene a delgadillo disposición servicios gratuitos de asistencia lingüística. [facility] [phone].    We comply with applicable federal civil rights laws and [place] laws. We do not discriminate on the basis of race, color, national origin, age, disability, sex, sexual orientation, or gender identity.            Thank you!     Thank you for choosing [facility]  for your care. Our goal is always to provide you with excellent care. Hearing back from our patients is one way we can continue to improve our services. Please take a few minutes to complete the written survey that you may receive in the mail after your visit with us. Thank you!             Your Updated Medication List - Protect others around " you: Learn how to safely use, store and throw away your medicines at www.disposemymeds.org.          This list is accurate as of 10/23/18  3:34 PM.  Always use your most recent med list.                   Brand Name Dispense Instructions for use Diagnosis    aspirin 81 MG chewable tablet     36 tablet    Take 1 tablet (81 mg) by mouth daily    Acute respiratory failure with hypoxia (H)       digoxin 125 MCG tablet    LANOXIN    90 tablet    Take 1 tablet (125 mcg) by mouth daily    Atrial fibrillation with rapid ventricular response (H)       furosemide 40 MG tablet    LASIX    90 tablet    Take 1 tablet (40 mg) by mouth daily    Acute combined systolic and diastolic CHF, NYHA class 3 (H)       IMODIUM A-D 2 MG capsule   Generic drug:  loperamide      Take 2 mg by mouth as needed for diarrhea        losartan 25 MG tablet    COZAAR    90 tablet    Take 1 tablet (25 mg) by mouth daily    Nonischemic cardiomyopathy (H)       meclizine 12.5 MG tablet    ANTIVERT    60 tablet    Take 1 tablet (12.5 mg) by mouth 3 times daily as needed for dizziness    Labyrinthitis, bilateral       metoprolol succinate 50 MG 24 hr tablet    TOPROL XL    180 tablet    Take 1 tablet (50 mg) by mouth 2 times daily    Persistent atrial fibrillation (H)       MULTIPLE VITAMINS PO      Take 1 tablet by mouth daily        OMEPRAZOLE PO      Take 20 mg by mouth daily as needed        PAROXETINE HCL PO      Take 30 mg by mouth daily        PROBIOTIC ACIDOPHILUS PO      Take 1 tablet by mouth        VITAMIN D3 PO      Take 2,000 Units by mouth daily        WARFARIN SODIUM PO      Take by mouth daily As directed

## 2018-10-23 NOTE — NURSING NOTE
"Chief Complaint   Patient presents with     RECHECK     f/u cpap        Initial /79  Pulse 75  Ht 1.638 m (5' 4.49\")  Wt 78.2 kg (172 lb 4.8 oz)  SpO2 100%  BMI 29.13 kg/m2 Estimated body mass index is 29.13 kg/(m^2) as calculated from the following:    Height as of this encounter: 1.638 m (5' 4.49\").    Weight as of this encounter: 78.2 kg (172 lb 4.8 oz).    Medication Reconciliation: complete        DME:yes    Ess 1    Darshana Richmond  "

## 2018-10-24 NOTE — PROGRESS NOTES
HPI and Plan:   See dictation    Orders Placed This Encounter   Procedures     Basic metabolic panel     [facility]       No orders of the defined types were placed in this encounter.      There are no discontinued medications.      Encounter Diagnosis   Name Primary?     Atrial fibrillation with rapid ventricular response (H)        CURRENT MEDICATIONS:  Current Outpatient Prescriptions   Medication Sig Dispense Refill     aspirin 81 MG chewable tablet Take 1 tablet (81 mg) by mouth daily 36 tablet 0     Cholecalciferol (VITAMIN D3 PO) Take 2,000 Units by mouth daily        digoxin (LANOXIN) 125 MCG tablet Take 1 tablet (125 mcg) by mouth daily 90 tablet 3     furosemide (LASIX) 40 MG tablet Take 1 tablet (40 mg) by mouth daily 90 tablet 3     Lactobacillus (PROBIOTIC ACIDOPHILUS PO) Take 1 tablet by mouth       loperamide (IMODIUM A-D) 2 MG capsule Take 2 mg by mouth as needed for diarrhea       losartan (COZAAR) 25 MG tablet Take 1 tablet (25 mg) by mouth daily 90 tablet 3     metoprolol succinate (TOPROL-XL) 50 MG 24 hr tablet Take 1 tablet (50 mg) by mouth 2 times daily 180 tablet 3     MULTIPLE VITAMINS PO Take 1 tablet by mouth daily       OMEPRAZOLE PO Take 20 mg by mouth daily as needed        PAROXETINE HCL PO Take 30 mg by mouth daily        WARFARIN SODIUM PO Take by mouth daily As directed       meclizine (ANTIVERT) 12.5 MG tablet Take 1 tablet (12.5 mg) by mouth 3 times daily as needed for dizziness (Patient not taking: Reported on 10/23/2018) 60 tablet 1       ALLERGIES     Allergies   Allergen Reactions     Lisinopril Diarrhea     Sulfa Drugs        PAST MEDICAL HISTORY:  Past Medical History:   Diagnosis Date     Acute combined systolic and diastolic CHF, NYHA class 3 (H) 10/24/2017     Atrial fibrillation with rapid ventricular response (H) 10/20/2017     Cardiogenic shock (H) 11/05/2017     Essential hypertension 10/24/2017     Idiopathic cardiomyopathy (H) 12/19/2017     Nonischemic  "cardiomyopathy (H) 11/7/2017     Syncope 6/20/2014     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 RHC: mild PHTN/NL wedge/ low CI.        FAMILY HISTORY:  Family History   Problem Relation Age of Onset     Diabetes Mother      Other Cancer Father        SOCIAL HISTORY:  Social History     Social History     Marital status:      Spouse name: N/A     Number of children: N/A     Years of education: N/A     Social History Main Topics     Smoking status: Former Smoker     Types: Cigarettes     Quit date: 1/1/1965     Smokeless tobacco: Never Used     Alcohol use 0.0 oz/week      Comment: 8 oz wine daily     Drug use: No     Sexual activity: Yes     Partners: Male     Other Topics Concern     None     Social History Narrative       Review of Systems:  Skin:  Negative       Eyes:  Positive for glasses    ENT:  Positive for postnasal drainage    Respiratory:  Positive for sleep apnea;CPAP;dyspnea on exertion DILLON rare    Cardiovascular:    chest pain;Positive for;lightheadedness (under L breast, increased since last week ) LH this am more than usual   Gastroenterology: Positive for nausea;excessive gas or bloating;diarrhea    Genitourinary:  Negative      Musculoskeletal:  Positive for foot pain R toe  Neurologic:  Negative      Psychiatric:  Negative      Heme/Lymph/Imm:  Negative      Endocrine:  Negative        Physical Exam:  Vitals: /62 (BP Location: Right arm, Patient Position: Chair, Cuff Size: Adult Small)  Pulse 86  Ht 1.638 m (5' 4.5\")  Wt 78.6 kg (173 lb 4.8 oz)  SpO2 95%  BMI 29.29 kg/m2    Constitutional:  cooperative, alert and oriented, well developed, well nourished, in no acute distress        Skin:  warm and dry to the touch, no apparent skin lesions or masses noted          Head:  normocephalic, no masses or " lesions        Eyes:  pupils equal and round;conjunctivae and lids unremarkable;sclera white;no xanthalasma        Lymph:      ENT:  no pallor or cyanosis, dentition good        Neck:  carotid pulses are full and equal bilaterally, JVP normal, no carotid bruit        Respiratory:  normal breath sounds, clear to auscultation, normal A-P diameter, normal symmetry, normal respiratory excursion, no use of accessory muscles         Cardiac: normal S1 and S2;no S3 or S4;apical impulse not displaced irregularly irregular rhythm;tachycardic   no presence of murmur                                                   GI:  abdomen soft, non-tender, BS normoactive, no mass, no HSM, no bruits        Extremities and Muscular Skeletal:  no deformities, clubbing, cyanosis, erythema observed;no edema              Neurological:  no gross motor deficits;affect appropriate        Psych:  Alert and Oriented x 3        CC  No referring provider defined for this encounter.

## 2018-10-24 NOTE — PROGRESS NOTES
Service Date: 10/23/2018      HISTORY OF PRESENT ILLNESS:  I had the pleasure of seeing Em Richmond, a pleasant 78-year-old patient who has a nonischemic cardiomyopathy.  She was admitted twice in October and November for congestive heart failure and cardiomyopathy.  Ejection fraction in 2014 was 60%-65%.      On 10/21/2017, her ejection fraction dropped to 30%-35% with mild to moderate tricuspid regurgitation and mild to moderate mitral insufficiency.  The patient had a 2-3 year history of chronic atrial fibrillation.  She had stopped her metoprolol and presented with rapid ventricular response.      Coronary angiography 10/23/2017 showed only mild coronary artery disease with all stenosis less than 30%.  We felt that the idiopathic cardiomyopathy was possibly due to atrial fibrillation with rapid ventricular response.  We initiated beta blockade.  She continued with her diuresis.  She lost 20 pounds in the hospital.  We continue to have difficulty with rate control with beta blocker alone.  Digoxin was used while in the hospital and this was discontinued at discharge.  She was intubated at one point in the hospital due to shortness of breath.  She remains on warfarin without bleeding diathesis.  We have reinitiated the digoxin with excellent heart rate control.  Her weight has been fairly stable.  We referred her to Dr. Freitas for poorly treated sleep apnea.      Echocardiogram in March showed an ejection fraction of 35%-40%, indicating no need for ICD.  Her heart rate remained elevated to 100 beats per minute.  After initiation of digoxin, her heart rate was better controlled.      On 06/22/2018, her LVEF improved to 50%-55%.  Right ventricular systolic function borderline reduced as well and there is moderate left atrial enlargement.      The patient returns today stating she feels well.  No complaints of increased shortness of breath, PND, syncope or near-syncope.      PHYSICAL EXAMINATION:  See below.       ASSESSMENT AND PLAN:   1.  Ricardo Richmond is a delightful 77-year-old female with evidence of idiopathic cardiomyopathy, possibly secondary to atrial fibrillation with rapid ventricular response.  Her blood pressure fell precipitously with increased doses of metoprolol XL.  Because of this, we added digoxin and her heart rate now is under better control.  Digoxin level will be checked in December per Dr. Pickard's request.      I have made no medication changes.  I asked her to return in 4 months.      The patient will continue with warfarin for her atrial fibrillation.      2.  She remains on furosemide for the time being.  Her basic metabolic panel showed a sodium of 134, potassium 4.5, BUN 16, creatinine 0.74, GFR 76.      3.  Follow up with Dr. Pickard as directed.         DOMINIK ELIZABETH, CNP             D: 10/24/2018   T: 10/24/2018   MT: CHARLIE      Name:     RICARDO RICHMOND   MRN:      [id number]        Account:      [id number]   :      1940           Service Date: 10/23/2018      Document: [id number]

## 2018-10-29 DIAGNOSIS — Z13.6 SCREENING FOR CARDIOVASCULAR CONDITION: Primary | ICD-10-CM

## 2018-10-29 NOTE — PROGRESS NOTES
Orders entered: lipids (lipids were due in Oct-Dec 2017). Patient cancelled her appt and did not reschedule. New order needed.     TGamehul RN

## 2018-12-18 ENCOUNTER — HOSPITAL ENCOUNTER (OUTPATIENT)
Dept: MAMMOGRAPHY | Facility: CLINIC | Age: 78
Discharge: HOME | End: 2018-12-18
Attending: FAMILY MEDICINE | Admitting: FAMILY MEDICINE
Payer: MEDICARE

## 2018-12-18 DIAGNOSIS — Z12.39 BREAST CANCER SCREENING: ICD-10-CM

## 2018-12-18 DIAGNOSIS — Z13.6 SCREENING FOR CARDIOVASCULAR CONDITION: ICD-10-CM

## 2018-12-18 DIAGNOSIS — I48.91 ATRIAL FIBRILLATION WITH RAPID VENTRICULAR RESPONSE (H): ICD-10-CM

## 2018-12-18 DIAGNOSIS — I42.9 IDIOPATHIC CARDIOMYOPATHY (H): ICD-10-CM

## 2018-12-18 DIAGNOSIS — I43 TACHYCARDIA INDUCED CARDIOMYOPATHY (H): ICD-10-CM

## 2018-12-18 DIAGNOSIS — R00.0 TACHYCARDIA INDUCED CARDIOMYOPATHY (H): ICD-10-CM

## 2018-12-18 LAB
CHOLEST SERPL-MCNC: 209 MG/DL
DIGOXIN SERPL-MCNC: 0.7 UG/L (ref 0.5–2)
HDLC SERPL-MCNC: 41 MG/DL
LDLC SERPL CALC-MCNC: 110 MG/DL
NONHDLC SERPL-MCNC: 168 MG/DL
TRIGL SERPL-MCNC: 288 MG/DL

## 2018-12-18 PROCEDURE — 80061 LIPID PANEL: CPT | Performed by: INTERNAL MEDICINE

## 2018-12-18 PROCEDURE — 36415 COLL VENOUS BLD VENIPUNCTURE: CPT | Performed by: INTERNAL MEDICINE

## 2018-12-18 PROCEDURE — 77063 BREAST TOMOSYNTHESIS BI: CPT

## 2018-12-18 PROCEDURE — 80162 ASSAY OF DIGOXIN TOTAL: CPT | Performed by: INTERNAL MEDICINE

## 2018-12-19 ENCOUNTER — TELEPHONE (OUTPATIENT)
Dept: CARDIOLOGY | Facility: CLINIC | Age: 78
End: 2018-12-19

## 2018-12-19 NOTE — TELEPHONE ENCOUNTER
Den Pickard MD  [facility] Heart Team 4             Normal digoxin level.  No changes in dosing.  Den Pickard MD, FACC   December 19, 2018 7:44 AM      Writer attempted to call pt with results, but no answer. VM left to return our phone call. CASANDRA Abreu RN.

## 2018-12-21 NOTE — TELEPHONE ENCOUNTER
Second attempt at trying to reach pt with results, and again, no answer. Brief message left with normal results. CASANDRA Abreu RN.

## 2018-12-27 ENCOUNTER — HOSPITAL ENCOUNTER (OUTPATIENT)
Dept: ULTRASOUND IMAGING | Facility: CLINIC | Age: 78
End: 2018-12-27
Attending: FAMILY MEDICINE
Payer: MEDICARE

## 2018-12-27 ENCOUNTER — HOSPITAL ENCOUNTER (OUTPATIENT)
Dept: MAMMOGRAPHY | Facility: CLINIC | Age: 78
Discharge: HOME | End: 2018-12-27
Attending: FAMILY MEDICINE | Admitting: FAMILY MEDICINE
Payer: MEDICARE

## 2018-12-27 DIAGNOSIS — R92.8 ABNORMAL MAMMOGRAM: ICD-10-CM

## 2018-12-27 PROCEDURE — 77065 DX MAMMO INCL CAD UNI: CPT | Mod: LT

## 2018-12-27 PROCEDURE — 76642 ULTRASOUND BREAST LIMITED: CPT | Mod: LT

## 2018-12-28 ENCOUNTER — TELEPHONE (OUTPATIENT)
Dept: MAMMOGRAPHY | Facility: CLINIC | Age: 78
End: 2018-12-28

## 2018-12-28 NOTE — TELEPHONE ENCOUNTER
Patient was seen at [facility] on 12/27/2018 for Left Breast Diagnostic Mammogram and Left Breast Ultrasound, and our reading radiologist is recommending patient have a left breast US biopsy for a 1.2cm left breast mass.    Patient informed Maria Luz, our Ultrasound Tech that she is taking Warfarin, but doesn't get her INR checked that often.  Em also informed Maria Luz that she needs to check with her insurance co. Because she is uncertain after the first of the year 2019, whether she can come to [place] or not.      I spoke with patient today, and she informed me that she called her insurance company and they informed her that she would have to pay a $25 copay, but she could come to [place].       I scheduled patient for her Left Breast Ultrasound Guided Core Needle Biopsy on 1/3/2019 @ 10:45, with a check in time of 10:30a.m.    Patient also informed me that her primary provider is Dr. Arielle Leger @ [facility]([phone]), but she goes to the INR nurse at [facility]([phone]) who manages her Warfarin.    I called over to Dr. Arielle Leger's office and left a message with the  asking for orders to be faxed to [facility] for the Left Breast US guided Core Needle Biopsy, and an order for INR to be checked the morning of her US Lt. Breast Biopsy.    I also called over to the [facility] and spoke with a  who would send a message to the INR nurse informing of patient's need for an INR check prior to breast biopsy on 1/3/2019.  I am awaiting a call back from both clinics.    I called patient back to inform that I have her scheduled for the Left US breast biopsy on 1/3/19 @ 10:30a.m., and that I am waiting to hear back from the INR nurse at [facility].  Patient verbalized understanding and agrees with the plan of care.  Sherrill Taylor, RN, BSN

## 2018-12-31 ENCOUNTER — TELEPHONE (OUTPATIENT)
Dept: MAMMOGRAPHY | Facility: CLINIC | Age: 78
End: 2018-12-31

## 2018-12-31 NOTE — TELEPHONE ENCOUNTER
Confirmed with [facility] that patient is scheduled for INR draw at 930 am, morning of procedure.  Results will be faxed to breast center.  Dr Montelongo notified of  plan, and will proceed with biopsy if INR 3 or below.

## 2019-01-02 NOTE — TELEPHONE ENCOUNTER
Bere Reed, RN   Registered Nurse      Telephone Encounter   Signed   Encounter Date:  12/31/2018                        []Hide copied text    []Aakash for details      Confirmed with [facility] that patient is scheduled for INR draw at 930 am, morning of procedure.  Results will be faxed to breast center.  Dr Montelongo notified of  plan, and will proceed with biopsy if INR 3 or below.

## 2019-01-03 ENCOUNTER — TELEPHONE (OUTPATIENT)
Dept: CARDIOLOGY | Facility: CLINIC | Age: 79
End: 2019-01-03

## 2019-01-03 ENCOUNTER — HOSPITAL ENCOUNTER (OUTPATIENT)
Dept: ULTRASOUND IMAGING | Facility: CLINIC | Age: 79
Discharge: HOME | End: 2019-01-03
Attending: FAMILY MEDICINE | Admitting: FAMILY MEDICINE
Payer: COMMERCIAL

## 2019-01-03 ENCOUNTER — HOSPITAL ENCOUNTER (OUTPATIENT)
Dept: MAMMOGRAPHY | Facility: CLINIC | Age: 79
End: 2019-01-03
Attending: FAMILY MEDICINE
Payer: COMMERCIAL

## 2019-01-03 DIAGNOSIS — R92.8 ABNORMAL MAMMOGRAM: ICD-10-CM

## 2019-01-03 PROCEDURE — 25000125 ZZHC RX 250: Performed by: FAMILY MEDICINE

## 2019-01-03 PROCEDURE — 88305 TISSUE EXAM BY PATHOLOGIST: CPT | Mod: 26 | Performed by: FAMILY MEDICINE

## 2019-01-03 PROCEDURE — 40000986 MA POST PROCEDURE LEFT

## 2019-01-03 PROCEDURE — 88305 TISSUE EXAM BY PATHOLOGIST: CPT | Performed by: FAMILY MEDICINE

## 2019-01-03 PROCEDURE — 27210206 US BREAST BIOPSY CORE NEEDLE LEFT

## 2019-01-03 RX ADMIN — LIDOCAINE HYDROCHLORIDE 5 ML: 10 INJECTION, SOLUTION INFILTRATION; PERINEURAL at 11:13

## 2019-01-03 NOTE — LETTER
January 3, 2019       TO: Em Richmond   [address]  [place] [address]     Dear Ms. Richmond,    Lipid panel and digoxin level lab results:    Component      Latest Ref Rng & Units 12/18/2018   Cholesterol      <200 mg/dL 209 (H)   Triglycerides      <150 mg/dL 288 (H)   HDL Cholesterol      >49 mg/dL 41 (L)   LDL Cholesterol Calculated      <100 mg/dL 110 (H)   Non HDL Cholesterol      <130 mg/dL 168 (H)   Digoxin Level      0.5 - 2.0 ug/L 0.7     As we reviewed over the phone, your cholesterol levels are abnormal. See the Guidelines for Lowering Triglycerides and Raising HDL sheet enclosed. Follow up with Dr. Leger in three months to continue monitoring your cholesterol.    Sincerely,    Danica MARSHALLN, RN, CHFN  [phone]   [facility]

## 2019-01-03 NOTE — DISCHARGE INSTRUCTIONS
Page 1 of 1  For informational purposes only. Not to replace the advice of your health care provider. Copyright   2010 [facility]. All rights reserved. [employer] [id number] - REV 02/16.  After Your Breast Biopsy   Bleeding or bruising  Slight bruising is normal. If you bleed through the bandage, put direct pressure on the breast for 10 minutes.   If the breast begins to swell, or you have a lot of bleeding after 10 minutes of pressure, call the doctor who ordered your exam. Or, go to the emergency room.   Bandages  Keep your bandage in place until tomorrow morning. Do not get it wet.   If you have small pieces of tape on the skin, leave them in place. They will fall off on their own, or you can remove them after 5 days.   Activity  You may shower the morning after the exam. No heavy activity (lifting, vacuuming) on the day of your exam. You may go back to normal activity the next day, unless you had a lot of bleeding or pain.  Discomfort  You may take Tylenol (acetaminophen) today for pain. Tomorrow, you may take an anti-inflammatory medicine (aspirin, ibuprofen, Motrin, Aleve, Advil), unless your doctor tells you not to.  Wear your bra overnight to support the breast. You may also use an ice pack: Place it over the area for 15-20 minutes several times a day.  Infection  Infection is rare. Symptoms include fever, redness, increasing pain and fluid draining from the biopsy site. If you have any of these symptoms, please call the doctor who ordered your exam.  Results  Results may take up to 5 business days. A nurse or doctor from the Breast Center will call with your results. We will also send the results to the doctor who ordered your biopsy.  If you have not heard your results in 5 days, please call the Breast Center.   Other instructions  ______________________________________________________________________________________________________________________________  Call your doctor if:    You have  bleeding that lasts more than 10 minutes.    You have pain that cannot be controlled.   You have signs of infection (fever, redness, drainage or other signs).   You have not received your results within 5 days.    Please call the Breast Center nurse navigator at [phone] if you have questions or concerns about your biopsy.

## 2019-01-03 NOTE — TELEPHONE ENCOUNTER
Called patient per lipid panel results note per Mandie Rose CNP:      Reviewing Care Everywhere, patient just had a lipid panel checked 12/6/18 with similar results at [facility]. Reviewed tips for lowering cholesterol. Mailed a letter with the lab results and enclosed patient education on lowering triglycerides and raising HDL. Patient agrees to see PCP in 3 months for follow up after making lifestyle changes. Dyan ANDERSEN

## 2019-01-04 ENCOUNTER — TELEPHONE (OUTPATIENT)
Dept: MAMMOGRAPHY | Facility: CLINIC | Age: 79
End: 2019-01-04

## 2019-01-04 LAB — COPATH REPORT: NORMAL

## 2019-01-04 NOTE — TELEPHONE ENCOUNTER
Pathology report reviewed with breast radiologist Сергей. I phoned and informed patient of results showing benign fibroadenoma. Recommended follow up is routine screening.  Patient states no problems with biopsy site. Questions were answered and my phone number given if she has further questions or concerns.

## 2019-03-08 DIAGNOSIS — I48.91 ATRIAL FIBRILLATION WITH RAPID VENTRICULAR RESPONSE (H): ICD-10-CM

## 2019-03-08 RX ORDER — DIGOXIN 125 MCG
125 TABLET ORAL DAILY
Qty: 90 TABLET | Refills: 3 | Status: SHIPPED | OUTPATIENT
Start: 2019-03-08 | End: 2020-03-04

## 2019-04-01 DIAGNOSIS — I50.41 ACUTE COMBINED SYSTOLIC AND DIASTOLIC CHF, NYHA CLASS 3 (H): ICD-10-CM

## 2019-04-01 RX ORDER — FUROSEMIDE 40 MG
40 TABLET ORAL DAILY
Qty: 90 TABLET | Refills: 0 | Status: SHIPPED | OUTPATIENT
Start: 2019-04-01 | End: 2019-07-16

## 2019-04-01 RX ORDER — FUROSEMIDE 40 MG
40 TABLET ORAL DAILY
Qty: 90 TABLET | Refills: 0 | Status: SHIPPED | OUTPATIENT
Start: 2019-04-01 | End: 2019-04-01

## 2019-04-01 NOTE — TELEPHONE ENCOUNTER
Last Office Visit: 10.23/2018    Last Labs/EKG: 10/23/2018    Follow-up Scheduled: It was recommended by Dusty Rose CNP to follow-up in 4 months. A reminder letter was sent on 1/3/2019. I will resend a 2nd reminder letter today and provide a 90 day supply of Lasix at this time.     Prescription Sent to: [facility]

## 2019-04-01 NOTE — LETTER
April 1, 2019       TO: Em Richmond  [address]  [place] [address]       Dear Em Richmond,    You have requested a medication refill and our records indicate that you have not been seen by one of our providers for your 4 month follow-up office visit.  We will refill your medication for 3 months, which will allow you enough time to be seen by one of our providers.    Please call our clinic at [phone] to schedule your appointment as soon as possible.    Thank you,    [facility]

## 2019-04-25 DIAGNOSIS — I48.19 PERSISTENT ATRIAL FIBRILLATION (H): ICD-10-CM

## 2019-04-25 RX ORDER — METOPROLOL SUCCINATE 50 MG/1
50 TABLET, EXTENDED RELEASE ORAL 2 TIMES DAILY
Qty: 180 TABLET | Refills: 0 | Status: SHIPPED | OUTPATIENT
Start: 2019-04-25 | End: 2019-07-26

## 2019-04-25 NOTE — TELEPHONE ENCOUNTER
Received refill request for:  Metoprolol succinate  Last OV was: 10/23/2018 with LEESA Lacy  Labs/EKG: n/a  F/U scheduled: 5/23/2019 with LEESA Lacy  New script sent to: [facility]

## 2019-04-30 ENCOUNTER — HOSPITAL ENCOUNTER (INPATIENT)
Facility: CLINIC | Age: 79
LOS: 3 days | Discharge: HOME HEALTH | DRG: 683 | End: 2019-05-03
Attending: EMERGENCY MEDICINE | Admitting: HOSPITALIST
Payer: COMMERCIAL

## 2019-04-30 ENCOUNTER — APPOINTMENT (OUTPATIENT)
Dept: GENERAL RADIOLOGY | Facility: CLINIC | Age: 79
DRG: 683 | End: 2019-04-30
Attending: EMERGENCY MEDICINE
Payer: COMMERCIAL

## 2019-04-30 DIAGNOSIS — J96.01 ACUTE RESPIRATORY FAILURE WITH HYPOXIA (H): ICD-10-CM

## 2019-04-30 DIAGNOSIS — R19.7 DIARRHEA, UNSPECIFIED TYPE: ICD-10-CM

## 2019-04-30 DIAGNOSIS — E87.6 HYPOKALEMIA: ICD-10-CM

## 2019-04-30 DIAGNOSIS — R53.1 GENERALIZED WEAKNESS: ICD-10-CM

## 2019-04-30 DIAGNOSIS — N17.9 ACUTE RENAL FAILURE, UNSPECIFIED ACUTE RENAL FAILURE TYPE (H): ICD-10-CM

## 2019-04-30 DIAGNOSIS — E86.0 DEHYDRATION: ICD-10-CM

## 2019-04-30 LAB
ALBUMIN SERPL-MCNC: 4 G/DL (ref 3.4–5)
ALP SERPL-CCNC: 68 U/L (ref 40–150)
ALT SERPL W P-5'-P-CCNC: 33 U/L (ref 0–50)
ANION GAP SERPL CALCULATED.3IONS-SCNC: 12 MMOL/L (ref 3–14)
AST SERPL W P-5'-P-CCNC: 23 U/L (ref 0–45)
BASOPHILS # BLD AUTO: 0 10E9/L (ref 0–0.2)
BASOPHILS NFR BLD AUTO: 0.4 %
BILIRUB SERPL-MCNC: 1 MG/DL (ref 0.2–1.3)
BUN SERPL-MCNC: 41 MG/DL (ref 7–30)
CALCIUM SERPL-MCNC: 9 MG/DL (ref 8.5–10.1)
CHLORIDE SERPL-SCNC: 101 MMOL/L (ref 94–109)
CO2 SERPL-SCNC: 20 MMOL/L (ref 20–32)
CREAT SERPL-MCNC: 1.37 MG/DL (ref 0.52–1.04)
CREAT SERPL-MCNC: 1.64 MG/DL (ref 0.52–1.04)
CREAT UR-MCNC: 58 MG/DL
DIFFERENTIAL METHOD BLD: ABNORMAL
DIGOXIN SERPL-MCNC: 0.9 UG/L (ref 0.5–2)
EOSINOPHIL # BLD AUTO: 0.1 10E9/L (ref 0–0.7)
EOSINOPHIL NFR BLD AUTO: 1.5 %
ERYTHROCYTE [DISTWIDTH] IN BLOOD BY AUTOMATED COUNT: 12.4 % (ref 10–15)
FRACT EXCRET NA UR+SERPL-RTO: 0.1 %
GFR SERPL CREATININE-BSD FRML MDRD: 30 ML/MIN/{1.73_M2}
GFR SERPL CREATININE-BSD FRML MDRD: 37 ML/MIN/{1.73_M2}
GLUCOSE SERPL-MCNC: 158 MG/DL (ref 70–99)
HCT VFR BLD AUTO: 45.5 % (ref 35–47)
HGB BLD-MCNC: 16.2 G/DL (ref 11.7–15.7)
IMM GRANULOCYTES # BLD: 0 10E9/L (ref 0–0.4)
IMM GRANULOCYTES NFR BLD: 0.4 %
INR PPP: 2.24 (ref 0.86–1.14)
INTERPRETATION ECG - MUSE: NORMAL
LYMPHOCYTES # BLD AUTO: 1.8 10E9/L (ref 0.8–5.3)
LYMPHOCYTES NFR BLD AUTO: 19.1 %
MAGNESIUM SERPL-MCNC: 2.1 MG/DL (ref 1.6–2.3)
MCH RBC QN AUTO: 33.7 PG (ref 26.5–33)
MCHC RBC AUTO-ENTMCNC: 35.6 G/DL (ref 31.5–36.5)
MCV RBC AUTO: 95 FL (ref 78–100)
MONOCYTES # BLD AUTO: 0.8 10E9/L (ref 0–1.3)
MONOCYTES NFR BLD AUTO: 8.2 %
NEUTROPHILS # BLD AUTO: 6.7 10E9/L (ref 1.6–8.3)
NEUTROPHILS NFR BLD AUTO: 70.4 %
NRBC # BLD AUTO: 0 10*3/UL
NRBC BLD AUTO-RTO: 0 /100
NT-PROBNP SERPL-MCNC: 1211 PG/ML (ref 0–1800)
PLATELET # BLD AUTO: 234 10E9/L (ref 150–450)
POTASSIUM SERPL-SCNC: 3.1 MMOL/L (ref 3.4–5.3)
POTASSIUM SERPL-SCNC: 3.4 MMOL/L (ref 3.4–5.3)
PROT SERPL-MCNC: 8.5 G/DL (ref 6.8–8.8)
RBC # BLD AUTO: 4.81 10E12/L (ref 3.8–5.2)
SODIUM SERPL-SCNC: 133 MMOL/L (ref 133–144)
SODIUM SERPL-SCNC: 135 MMOL/L (ref 133–144)
SODIUM UR-SCNC: 6 MMOL/L
TROPONIN I SERPL-MCNC: <0.015 UG/L (ref 0–0.04)
WBC # BLD AUTO: 9.5 10E9/L (ref 4–11)

## 2019-04-30 PROCEDURE — 71046 X-RAY EXAM CHEST 2 VIEWS: CPT

## 2019-04-30 PROCEDURE — 0DB38ZX EXCISION OF LOWER ESOPHAGUS, VIA NATURAL OR ARTIFICIAL OPENING ENDOSCOPIC, DIAGNOSTIC: ICD-10-PCS | Performed by: INTERNAL MEDICINE

## 2019-04-30 PROCEDURE — 25000132 ZZH RX MED GY IP 250 OP 250 PS 637: Performed by: HOSPITALIST

## 2019-04-30 PROCEDURE — 83880 ASSAY OF NATRIURETIC PEPTIDE: CPT | Performed by: EMERGENCY MEDICINE

## 2019-04-30 PROCEDURE — 0DBL8ZX EXCISION OF TRANSVERSE COLON, VIA NATURAL OR ARTIFICIAL OPENING ENDOSCOPIC, DIAGNOSTIC: ICD-10-PCS | Performed by: INTERNAL MEDICINE

## 2019-04-30 PROCEDURE — 93005 ELECTROCARDIOGRAM TRACING: CPT

## 2019-04-30 PROCEDURE — 85025 COMPLETE CBC W/AUTO DIFF WBC: CPT | Performed by: EMERGENCY MEDICINE

## 2019-04-30 PROCEDURE — 82570 ASSAY OF URINE CREATININE: CPT | Performed by: HOSPITALIST

## 2019-04-30 PROCEDURE — 84300 ASSAY OF URINE SODIUM: CPT | Performed by: HOSPITALIST

## 2019-04-30 PROCEDURE — 84132 ASSAY OF SERUM POTASSIUM: CPT | Performed by: HOSPITALIST

## 2019-04-30 PROCEDURE — 25000132 ZZH RX MED GY IP 250 OP 250 PS 637: Performed by: EMERGENCY MEDICINE

## 2019-04-30 PROCEDURE — 0DB28ZX EXCISION OF MIDDLE ESOPHAGUS, VIA NATURAL OR ARTIFICIAL OPENING ENDOSCOPIC, DIAGNOSTIC: ICD-10-PCS | Performed by: INTERNAL MEDICINE

## 2019-04-30 PROCEDURE — 85610 PROTHROMBIN TIME: CPT | Performed by: EMERGENCY MEDICINE

## 2019-04-30 PROCEDURE — 80053 COMPREHEN METABOLIC PANEL: CPT | Performed by: EMERGENCY MEDICINE

## 2019-04-30 PROCEDURE — 36415 COLL VENOUS BLD VENIPUNCTURE: CPT | Performed by: HOSPITALIST

## 2019-04-30 PROCEDURE — 99223 1ST HOSP IP/OBS HIGH 75: CPT | Mod: AI | Performed by: HOSPITALIST

## 2019-04-30 PROCEDURE — 84295 ASSAY OF SERUM SODIUM: CPT | Performed by: HOSPITALIST

## 2019-04-30 PROCEDURE — 99285 EMERGENCY DEPT VISIT HI MDM: CPT | Mod: 25

## 2019-04-30 PROCEDURE — 12000000 ZZH R&B MED SURG/OB

## 2019-04-30 PROCEDURE — 87493 C DIFF AMPLIFIED PROBE: CPT | Performed by: HOSPITALIST

## 2019-04-30 PROCEDURE — 0DBG8ZX EXCISION OF LEFT LARGE INTESTINE, VIA NATURAL OR ARTIFICIAL OPENING ENDOSCOPIC, DIAGNOSTIC: ICD-10-PCS | Performed by: INTERNAL MEDICINE

## 2019-04-30 PROCEDURE — 80162 ASSAY OF DIGOXIN TOTAL: CPT | Performed by: EMERGENCY MEDICINE

## 2019-04-30 PROCEDURE — 84484 ASSAY OF TROPONIN QUANT: CPT | Performed by: EMERGENCY MEDICINE

## 2019-04-30 PROCEDURE — 25800030 ZZH RX IP 258 OP 636: Performed by: HOSPITALIST

## 2019-04-30 PROCEDURE — 25800030 ZZH RX IP 258 OP 636: Performed by: EMERGENCY MEDICINE

## 2019-04-30 PROCEDURE — 83735 ASSAY OF MAGNESIUM: CPT | Performed by: HOSPITALIST

## 2019-04-30 PROCEDURE — 82565 ASSAY OF CREATININE: CPT | Performed by: HOSPITALIST

## 2019-04-30 RX ORDER — ASPIRIN 81 MG/1
81 TABLET, CHEWABLE ORAL DAILY
Status: DISCONTINUED | OUTPATIENT
Start: 2019-04-30 | End: 2019-05-03

## 2019-04-30 RX ORDER — HYDROMORPHONE HYDROCHLORIDE 1 MG/ML
0.2 INJECTION, SOLUTION INTRAMUSCULAR; INTRAVENOUS; SUBCUTANEOUS
Status: DISCONTINUED | OUTPATIENT
Start: 2019-04-30 | End: 2019-05-03 | Stop reason: HOSPADM

## 2019-04-30 RX ORDER — AMOXICILLIN 250 MG
1 CAPSULE ORAL 2 TIMES DAILY PRN
Status: DISCONTINUED | OUTPATIENT
Start: 2019-04-30 | End: 2019-05-03 | Stop reason: HOSPADM

## 2019-04-30 RX ORDER — METOPROLOL SUCCINATE 50 MG/1
50 TABLET, EXTENDED RELEASE ORAL 2 TIMES DAILY
Status: DISCONTINUED | OUTPATIENT
Start: 2019-04-30 | End: 2019-05-03 | Stop reason: HOSPADM

## 2019-04-30 RX ORDER — ONDANSETRON 4 MG/1
4 TABLET, ORALLY DISINTEGRATING ORAL EVERY 6 HOURS PRN
Status: DISCONTINUED | OUTPATIENT
Start: 2019-04-30 | End: 2019-05-03 | Stop reason: HOSPADM

## 2019-04-30 RX ORDER — SODIUM CHLORIDE 9 MG/ML
INJECTION, SOLUTION INTRAVENOUS CONTINUOUS
Status: DISCONTINUED | OUTPATIENT
Start: 2019-04-30 | End: 2019-05-02

## 2019-04-30 RX ORDER — NALOXONE HYDROCHLORIDE 0.4 MG/ML
.1-.4 INJECTION, SOLUTION INTRAMUSCULAR; INTRAVENOUS; SUBCUTANEOUS
Status: DISCONTINUED | OUTPATIENT
Start: 2019-04-30 | End: 2019-05-03 | Stop reason: HOSPADM

## 2019-04-30 RX ORDER — POLYETHYLENE GLYCOL 3350 17 G/17G
17 POWDER, FOR SOLUTION ORAL DAILY PRN
Status: DISCONTINUED | OUTPATIENT
Start: 2019-04-30 | End: 2019-05-03 | Stop reason: HOSPADM

## 2019-04-30 RX ORDER — LIDOCAINE 40 MG/G
CREAM TOPICAL
Status: DISCONTINUED | OUTPATIENT
Start: 2019-04-30 | End: 2019-05-03 | Stop reason: HOSPADM

## 2019-04-30 RX ORDER — ACETAMINOPHEN 325 MG/1
650 TABLET ORAL EVERY 4 HOURS PRN
Status: DISCONTINUED | OUTPATIENT
Start: 2019-04-30 | End: 2019-05-03 | Stop reason: HOSPADM

## 2019-04-30 RX ORDER — CARBOXYMETHYLCELLULOSE SODIUM 5 MG/ML
1 SOLUTION/ DROPS OPHTHALMIC 3 TIMES DAILY PRN
Status: DISCONTINUED | OUTPATIENT
Start: 2019-04-30 | End: 2019-05-03 | Stop reason: HOSPADM

## 2019-04-30 RX ORDER — LACTOBACILLUS RHAMNOSUS GG 10B CELL
1 CAPSULE ORAL DAILY
Status: DISCONTINUED | OUTPATIENT
Start: 2019-04-30 | End: 2019-05-03 | Stop reason: HOSPADM

## 2019-04-30 RX ORDER — ONDANSETRON 2 MG/ML
4 INJECTION INTRAMUSCULAR; INTRAVENOUS EVERY 6 HOURS PRN
Status: DISCONTINUED | OUTPATIENT
Start: 2019-04-30 | End: 2019-05-03 | Stop reason: HOSPADM

## 2019-04-30 RX ORDER — ACETAMINOPHEN 650 MG/1
650 SUPPOSITORY RECTAL EVERY 4 HOURS PRN
Status: DISCONTINUED | OUTPATIENT
Start: 2019-04-30 | End: 2019-05-03 | Stop reason: HOSPADM

## 2019-04-30 RX ORDER — AMOXICILLIN 250 MG
2 CAPSULE ORAL 2 TIMES DAILY PRN
Status: DISCONTINUED | OUTPATIENT
Start: 2019-04-30 | End: 2019-05-03 | Stop reason: HOSPADM

## 2019-04-30 RX ORDER — BISACODYL 10 MG
10 SUPPOSITORY, RECTAL RECTAL DAILY PRN
Status: DISCONTINUED | OUTPATIENT
Start: 2019-04-30 | End: 2019-05-03 | Stop reason: HOSPADM

## 2019-04-30 RX ORDER — POTASSIUM CHLORIDE 1500 MG/1
40 TABLET, EXTENDED RELEASE ORAL ONCE
Status: COMPLETED | OUTPATIENT
Start: 2019-04-30 | End: 2019-04-30

## 2019-04-30 RX ORDER — WARFARIN SODIUM 2.5 MG/1
2.5 TABLET ORAL
Status: COMPLETED | OUTPATIENT
Start: 2019-04-30 | End: 2019-04-30

## 2019-04-30 RX ORDER — HYDROCODONE BITARTRATE AND ACETAMINOPHEN 5; 325 MG/1; MG/1
1-2 TABLET ORAL EVERY 4 HOURS PRN
Status: DISCONTINUED | OUTPATIENT
Start: 2019-04-30 | End: 2019-05-03 | Stop reason: HOSPADM

## 2019-04-30 RX ORDER — DIGOXIN 125 MCG
125 TABLET ORAL DAILY
Status: DISCONTINUED | OUTPATIENT
Start: 2019-04-30 | End: 2019-05-03 | Stop reason: HOSPADM

## 2019-04-30 RX ORDER — PAROXETINE 30 MG/1
30 TABLET, FILM COATED ORAL DAILY
Status: DISCONTINUED | OUTPATIENT
Start: 2019-04-30 | End: 2019-05-03 | Stop reason: HOSPADM

## 2019-04-30 RX ORDER — SODIUM CHLORIDE 9 MG/ML
INJECTION, SOLUTION INTRAVENOUS CONTINUOUS
Status: DISCONTINUED | OUTPATIENT
Start: 2019-04-30 | End: 2019-04-30 | Stop reason: DRUGHIGH

## 2019-04-30 RX ORDER — CARBOXYMETHYLCELLULOSE SODIUM 5 MG/ML
1 SOLUTION/ DROPS OPHTHALMIC 3 TIMES DAILY PRN
COMMUNITY

## 2019-04-30 RX ORDER — LANOLIN ALCOHOL/MO/W.PET/CERES
1000 CREAM (GRAM) TOPICAL DAILY
COMMUNITY

## 2019-04-30 RX ADMIN — SODIUM CHLORIDE: 9 INJECTION, SOLUTION INTRAVENOUS at 13:44

## 2019-04-30 RX ADMIN — PAROXETINE HYDROCHLORIDE 30 MG: 30 TABLET, FILM COATED ORAL at 17:21

## 2019-04-30 RX ADMIN — ASPIRIN 81 MG 81 MG: 81 TABLET ORAL at 16:21

## 2019-04-30 RX ADMIN — Medication 1 CAPSULE: at 16:21

## 2019-04-30 RX ADMIN — WARFARIN SODIUM 2.5 MG: 2.5 TABLET ORAL at 17:21

## 2019-04-30 RX ADMIN — POTASSIUM CHLORIDE 40 MEQ: 1500 TABLET, EXTENDED RELEASE ORAL at 14:46

## 2019-04-30 RX ADMIN — SODIUM CHLORIDE: 9 INJECTION, SOLUTION INTRAVENOUS at 16:24

## 2019-04-30 RX ADMIN — METOPROLOL SUCCINATE 50 MG: 50 TABLET, EXTENDED RELEASE ORAL at 22:07

## 2019-04-30 RX ADMIN — DIGOXIN 125 MCG: 0.12 TABLET ORAL at 17:21

## 2019-04-30 ASSESSMENT — ACTIVITIES OF DAILY LIVING (ADL)
SWALLOWING: 0-->SWALLOWS FOODS/LIQUIDS WITHOUT DIFFICULTY
AMBULATION: 0-->INDEPENDENT
COGNITION: 0 - NO COGNITION ISSUES REPORTED
TRANSFERRING: 0-->INDEPENDENT
RETIRED_COMMUNICATION: 0-->UNDERSTANDS/COMMUNICATES WITHOUT DIFFICULTY
TOILETING: 0-->INDEPENDENT
FALL_HISTORY_WITHIN_LAST_SIX_MONTHS: NO
ADLS_ACUITY_SCORE: 17
ADLS_ACUITY_SCORE: 15
RETIRED_EATING: 0-->INDEPENDENT
DRESS: 0-->INDEPENDENT
BATHING: 0-->INDEPENDENT

## 2019-04-30 ASSESSMENT — ENCOUNTER SYMPTOMS
SHORTNESS OF BREATH: 1
VOMITING: 0
CONFUSION: 1
BLOOD IN STOOL: 0
NAUSEA: 1
DIARRHEA: 1
ABDOMINAL PAIN: 1

## 2019-04-30 ASSESSMENT — MIFFLIN-ST. JEOR: SCORE: 1214.14

## 2019-04-30 NOTE — PROGRESS NOTES
RECEIVING UNIT ED HANDOFF REVIEW    ED Nurse Handoff Report was reviewed by: Gertrudis Rivero on April 30, 2019 at 3:14 PM

## 2019-04-30 NOTE — PLAN OF CARE
ED admit with diarrhea and SHUN. A/O but forgetful. Up with A1 to BR. No stool sample yet for c diff rule out. Tele shows baseline A fib. Plan for IVF and monitor creat.

## 2019-04-30 NOTE — ED NOTES
"[facility]  ED Nurse Handoff Report    ED Chief complaint: Shortness of Breath (DILLON. Worsening over last 2 weeks. Having diarrhea, believes she is losing weight.) and Diarrhea      ED Diagnosis:   Final diagnoses:   Generalized weakness   Dehydration   Acute renal failure, unspecified acute renal failure type (H)   Diarrhea, unspecified type   Hypokalemia       Code Status: Full Code    Allergies:   Allergies   Allergen Reactions     Lisinopril Diarrhea     Sulfa Drugs        Activity level - Baseline/Home:  Independent    Activity Level - Current:   Stand with Assist     Needed?: No    Isolation: No  Infection: Not Applicable  Bariatric?: No    Vital Signs:   Vitals:    04/30/19 1319 04/30/19 1320 04/30/19 1400   BP: 112/74     Pulse: 86     Resp: 18     Temp: 95.7  F (35.4  C)     TempSrc: Temporal     SpO2:  96% 99%       Cardiac Rhythm: ,   Cardiac  Cardiac Rhythm: Atrial fibrillation    Pain level:      Is this patient confused?: No   Does this patient have a guardian?  No         If yes, is there guardianship documents in the Epic \"Code/ACP\" activity?  N/A         Guardian Notified?  N/A  [place] - Suicide Severity Rating Scale Completed?  Yes  If yes, what color did the patient score?  White    Patient Report: Initial Complaint: Pt presents to the ED complaining of SOB x 5 days and diarrhea.    Focused Assessment: Pt has extensive health history including a-fib with RVR, htn, cardiogenic shock, and idopathic cardiomyopathy.  Pt states she has felt SOB and states this is worse with activty over the last several days.  Pt in a-fib, vital signs unremarkable.    Tests Performed:   Labs Ordered and Resulted from Time of ED Arrival Up to the Time of Departure from the ED   COMPREHENSIVE METABOLIC PANEL - Abnormal; Notable for the following components:       Result Value    Potassium 3.1 (*)     Glucose 158 (*)     Urea Nitrogen 41 (*)     Creatinine 1.64 (*)     GFR Estimate 30 (*)     " GFR Estimate If Black 35 (*)     All other components within normal limits   CBC WITH PLATELETS DIFFERENTIAL - Abnormal; Notable for the following components:    Hemoglobin 16.2 (*)     MCH 33.7 (*)     All other components within normal limits   NT PROBNP INPATIENT   TROPONIN I   PERIPHERAL IV CATHETER   Chest X-ray unremarkable     Abnormal Results: see above  Treatments provided: Pt givne 40 meq PO K+, and 1 liter NS    Family Comments: and daughter at bedside, involved in cares.     OBS brochure/video discussed/provided to patient/family: N/A              Name of person given brochure if not patient: NA              Relationship to patient: NA    ED Medications:   Medications   sodium chloride 0.9% infusion ( Intravenous New Bag 4/30/19 1344)   potassium chloride ER (K-DUR/KLOR-CON M) CR tablet 40 mEq (40 mEq Oral Given 4/30/19 1446)       Drips infusing?:  No    For the majority of the shift this patient was Green.   Interventions performed were na.    Severe Sepsis OR Septic Shock Diagnosis Present: No    To be done/followed up on inpatient unit:  na    ED NURSE PHONE NUMBER: [phone]

## 2019-04-30 NOTE — ED PROVIDER NOTES
History     Chief Complaint:  Shortness of Breath and Diarrhea    HPI   Em Richmond is a 78 year old female, with extensive cardiac history including atrial fibrillation with rapid ventricular response, cardiogenic shock, hypertension, idiopathic cardiomyopathy, CHF amongst others as noted below, currently anticoagulated on Warfarin, who presents with her spouse and daughter for evaluation of worsening shortness of breath over the last five days as well as multiple episodes of diarrhea. Patient reports that she initially was having 10+ episodes of diarrhea and while she still currently is experiencing this, the frequency of diarrhea is slowly improving. Daughter notes that patient has been increasingly short of breath that even walking to the bathroom, patient needs to sit down. She also endorses that the patient has been increasingly confused, unable to form sentences intermittently, which is unusual. Patient reports some nausea, abdominal pain and decreased appetite, though secondary to concern for possible diarrhea if she were to have any PO intake. She denies any chest pain, bloody stools or vomiting.     Allergies:  Lisinopril  Sulfa drugs     Medications:    Aspirin 81  Vitamin D3  Lanoxin  Lasix  Lactobacillus  Imodium  Cozaar  Antivert  Metoprolol  Multiple vitamins  Omeprazole  Paroxetine  Warfarin     Past Medical History:    Acute combined systolic and diastolic CHF, NYHA class 3  Atrial fibrillation with Rapid ventricular response  Cardiogenic shock  Essential hypertension  Idiopathic cardiomyopathy  Nonischemic cardiomyopathy  Syncope  Tachycardia induced cardiomyopathy  Acute pulmonary edema    Past Surgical History:    Heart cath right and left heart cath    Family History:    Mother - diabetes  Father - other cancer    Social History:  The patient was accompanied to the ED by spouse and daughter.  Smoking Status: Former  Smokeless Tobacco: No  Alcohol Use: Yes  Drug Use: No   Marital Status:    [2]     Review of Systems   Respiratory: Positive for shortness of breath.    Cardiovascular: Negative for chest pain.   Gastrointestinal: Positive for abdominal pain, diarrhea and nausea. Negative for blood in stool and vomiting.   Psychiatric/Behavioral: Positive for confusion.   All other systems reviewed and are negative.        Physical Exam   Vitals:  Patient Vitals for the past 24 hrs:   BP Temp Temp src Pulse Heart Rate Resp SpO2   04/30/19 1400 -- -- -- -- 90 -- 99 %   04/30/19 1320 -- -- -- -- -- -- 96 %   04/30/19 1319 112/74 95.7  F (35.4  C) Temporal 86 86 18 --      Physical Exam  Nursing note and vitals reviewed.    Constitutional:  Appears well-developed and well-nourished, comfortable.    HENT:    Nose normal.  No discharge.      Oropharynx is clear and moist.  Eyes:    Conjunctivae are normal without injection.  Pupils are equal.  Lymph:  No enlarged or tender cervical or submandibular lymph nodes.   Cardiovascular:  Irregular rate, regular rhythm with normal S1 and S2.      Normal heart sounds and peripheral pulses 2+ and equal.       No murmur or farhad.  Pulmonary:  Effort normal and breath sounds clear to auscultation bilaterally     No respiratory distress.  No stridor.     No wheezes. No rales.     GI:    Soft. No distension and no mass. No tenderness.      No rebound and no guarding.   Musculoskeletal:  Normal range of motion. No extremity deformity.     No edema and no tenderness.  No cyanosis.  Neurological:   Alert and oriented. No cranial nerve deficit, no facial droop.  No hand drift.     Exhibits good muscle tone. Coordination normal.  No focal weakness.     GCS eye subscore is 4. GCS verbal subscore is 5.      GCS motor subscore is 6.   Skin:    Skin is warm and dry. No rash noted. No diaphoresis.      No erythema. No pallor.  No lesions.  Psychiatric:   Behavior is normal. Appropriate mood and affect.     Judgment and thought content normal.     Emergency Department Course      ECG:  ECG taken at 1322, ECG read at 1325 by Dr. Britt Arambula MD  Atrial fibrillation  Marked ST abnormality, possible inferior subendocardial injury  Abnormal ECG  Rate 94 bpm. SD interval *. QRS duration 84. QT/QTc 332/415. P-R-T axes * 8 240.     Imaging:  Radiology findings were communicated with the patient and family who voiced understanding of the findings.  Chest XR, PA & LAT:  IMPRESSION: No radiographic evidence of acute chest abnormality.   Reading per radiology.     Laboratory:  Laboratory findings were communicated with the patient and family who voiced understanding of the findings.  CBC: HGB 16.2 (H) o/w WNL (WBC 9.5, )  CMP: Potassium 3.1 (L), Glucose 158 (H), Bun 41 (H), Creatinine 1.64 (H), GFR Estimate 30 (L) o/w WNL  BNP: 1211   Troponin (Collected 1343): <0.015     Interventions:  1344 0.9% NaCl Infusion IV  1446 Potassium chloride 40 mEq IV     Emergency Department Course:  Nursing notes and vitals reviewed.  EKG obtained in the ED, see results above.    IV was inserted and blood was drawn for laboratory testing, results above.   The patient was sent for a Chest XR, PA & LAT while in the emergency department, results above.      1322   I performed an exam of the patient as documented above. History obtained from patient and daughter.     1425   Updated patient/family regarding results. Discussed plan of care and they are agreeable with admission.     1429   I spoke with Dr. Rojo of the Hospitalist service regarding patient's presentation, findings, and plan of care. They have agreed to accept patient for further care.     I discussed the treatment plan with the patient. They expressed understanding of this plan and consented to admission. I discussed the patient with Dr. Rojo, who will admit the patient to a monitored bed for further evaluation and treatment.     I personally reviewed the laboratory results with the Patient and spouse and daughter and answered all related  questions prior to admission.    Impression & Plan      Medical Decision Making:  Patient comes in with some generalized weakness and dehydration.  Her family says she has been a little confused recently as well, but was oriented here in the ER.  Her exam is benign, no abdominal tenderness.  Lab work came back showing acute renal failure with a GFR of 30, low potassium at 3.1 but the rest of her comp panel is normal.  Her BNP and troponin are normal, but her EKG showed A. fib and there are inverted depressed T waves throughout.  No ST elevation to suggest acute ischemic event and she has no chest pain at this time.  She has felt more winded when she exerts herself recently, but her sats are 99% on room air.  She will need serial troponins overnight to be sure there was no evidence of ischemia.  Her white counts normal.  Chest x-ray is normal.  The patient needs to come in for hydration which is probably the primary cause for her acute renal failure, potassium replacement, and serial troponins to rule out coronary disease.  I talked with Dr. Rojo will be admitting the patient to a monitored bed for further work-up.  The patient has not been on antibiotics recently, no history of C. difficile.  Can send off a stool study if she has further diarrhea which she has not had in the emergency room.    Diagnosis:    ICD-10-CM    1. Generalized weakness R53.1    2. Dehydration E86.0    3. Acute renal failure, unspecified acute renal failure type (H) N17.9    4. Diarrhea, unspecified type R19.7    5. Hypokalemia E87.6         Disposition:   Admission.  IV fluids, potassium replacement, serial troponins.    Scribe Disclosure:  Eneida KAMINSKI, am serving as a scribe at 1:22 PM on 4/30/2019 to document services personally performed by Britt Arambula MD, based on my observations and the provider's statements to me.  4/30/2019    EMERGENCY DEPARTMENT       Britt Arambula MD  04/30/19 0491

## 2019-04-30 NOTE — PHARMACY-ADMISSION MEDICATION HISTORY
Admission medication history interview status for the 4/30/2019  admission is complete. See EPIC admission navigator for prior to admission medications     Medication history source reliability:Good    Actions taken by pharmacist (provider contacted, etc):None     Additional medication history information not noted on PTA med list :patient interview, medication bottles. Patient last took 2.5 mg of warfarin on 4/29/19 in the afternoon.     Medication reconciliation/reorder completed by provider prior to medication history? No    Time spent in this activity: 10    Prior to Admission medications    Medication Sig Last Dose Taking? Auth Provider   aspirin 81 MG chewable tablet Take 1 tablet (81 mg) by mouth daily 4/29/2019 at Unknown time Yes Heather Ferrera MD   carboxymethylcellulose PF (REFRESH PLUS) 0.5 % SOLN ophthalmic solution Place 1 drop into both eyes 3 times daily as needed for dry eyes  Yes Unknown, Entered By History   Cholecalciferol (VITAMIN D3 PO) Take 2,000 Units by mouth daily  4/29/2019 at Unknown time Yes Unknown, Entered By History   cyanocobalamin (VITAMIN B-12) 1000 MCG tablet Take 1,000 mcg by mouth daily 4/29/2019 at Unknown time Yes Unknown, Entered By History   digoxin (LANOXIN) 125 MCG tablet Take 1 tablet (125 mcg) by mouth daily 4/29/2019 at Unknown time Yes Den Pickard MD   furosemide (LASIX) 40 MG tablet Take 1 tablet (40 mg) by mouth daily 4/29/2019 at Unknown time Yes Den Pickard MD   Lactobacillus (PROBIOTIC ACIDOPHILUS) TABS Take 1 tablet by mouth daily  4/29/2019 at Unknown time Yes Reported, Patient   loperamide (IMODIUM A-D) 2 MG capsule Take 2 mg by mouth as needed for diarrhea  Yes Reported, Patient   metoprolol succinate ER (TOPROL-XL) 50 MG 24 hr tablet Take 1 tablet (50 mg) by mouth 2 times daily 4/29/2019 at Unknown time Yes Mandie Rose APRN CNP   Multiple vitamin  s TABS Take 1 tablet by mouth daily  4/29/2019 at Unknown time Yes Unknown, Entered By  History   omeprazole (PRILOSEC) 20 MG DR capsule Take 20 mg by mouth daily as needed (heartburn)  Past Month at Unknown time Yes Unknown, Entered By History   PARoxetine (PAXIL) 30 MG tablet Take 30 mg by mouth daily  4/29/2019 at Unknown time Yes Reported, Patient   warfarin (COUMADIN) 2.5 MG tablet Take two tablets (5 mg) by mouth on Tuesday and Friday. Take one tablet (2.5 mg) all other days of the week. 4/29/2019 at Unknown time Yes Unknown, Entered By History

## 2019-04-30 NOTE — H&P
[facility]    History and Physical  Hospitalist       Date of Admission:  4/30/2019  Date of Service (when I saw the patient): 04/30/19    Assessment & Plan   Em Richmond is a 78 year old female who presents with shortness of breath and diarrhea.  Admitted for further evaluation and treatment.    Acute kidney injury, stage II-III, likely secondary to dehydration: Patient with elevated creatinine on admission.  Per report, patient with extensive cardiac history, multiple episodes of diarrhea over the past 5 days, still having diarrhea that is slowly improving, shortness of breath, back with some confusion, nausea, abdominal pain, decreased appetite, weakness.  Patient denied chest pain, blood in the stool or urine, vomiting, rash, fever, sore throat, headache.  Patient was brought in due to worsening fatigue and shortness of breath and feeling more tired than normal.  -IV fluids.  -Fractional excretion of sodium.  -Hold diuretic.  -Avoid nephrotoxins.  -Hold PTA Losartan.     Shortness of breath: Patient with 96 to 99% oximetry without oxygen on admission.  -Pulmonary hygiene.    Diarrhea: Patient reports diarrhea.  -C. difficile testing.  -We will need isolation precautions.    Cardiac, CHF, HTN, Cardiomyopathy: Patient maintained on ASA, Digoxin, Furosemide, and metoprolol, and losartan prior to admission.   - PTA ASA 81mg  - PTA Digoxin.   - Hold PTA Furosemide.   - PTA Metoprolol.     GERD: Stable.   - PTA Omeprazole.   - PTA Warfarin, pharmacy to dose.   - PTA Paroxetine.     DVT Prophylaxis: Warfarin  Code Status: Full Code    Disposition: Inpatient.    Dr. Aguilar Rojo D.O.  [facility] Hospitalist  Pager [phone]    Primary Care Physician   Arielle Leger    Chief Complaint    fatigue    History is obtained from the patient and medical records.     History of Present Illness   Em Richmond is a 78 year old female who presents with shortness of breath and  diarrhea.  Admitted for further evaluation and treatment. Patient with elevated creatinine on admission.  Per report, patient with extensive cardiac history, multiple episodes of diarrhea over the past 5 days, still having diarrhea that is slowly improving, shortness of breath, back with some confusion, nausea, abdominal pain, decreased appetite, weakness.  Patient denied chest pain, blood in the stool or urine, vomiting, rash, fever, sore throat, headache.  Patient was brought in due to worsening fatigue and shortness of breath and feeling more tired than normal.    Past Medical History    I have reviewed this patient's medical history and updated it with pertinent information if needed.   Past Medical History:   Diagnosis Date     Acute combined systolic and diastolic CHF, NYHA class 3 (H) 10/24/2017     Atrial fibrillation with rapid ventricular response (H) 10/20/2017     Cardiogenic shock (H) 11/05/2017     Essential hypertension 10/24/2017     Idiopathic cardiomyopathy (H) 12/19/2017     Nonischemic cardiomyopathy (H) 11/7/2017     Syncope 6/20/2014     Tachycardia induced cardiomyopathy (H) 10/24/2017       Past Surgical History   I have reviewed this patient's surgical history and updated it with pertinent information if needed.  Past Surgical History:   Procedure Laterality Date     HEART CATH RIGHT AND LEFT HEART CATH  10/23/2017    Cath no significant obstructive CAD. RHC: mild PHTN/NL wedge/ low CI.        Prior to Admission Medications   Prior to Admission Medications   Prescriptions Last Dose Informant Patient Reported? Taking?   Cholecalciferol (VITAMIN D3 PO)  Other Yes No   Sig: Take 2,000 Units by mouth daily    Lactobacillus (PROBIOTIC ACIDOPHILUS PO)   Yes No   Sig: Take 1 tablet by mouth   MULTIPLE VITAMINS PO  Other Yes No   Sig: Take 1 tablet by mouth daily   OMEPRAZOLE PO  Other Yes No   Sig: Take 20 mg by mouth daily as needed    PAROXETINE HCL PO  Other Yes No   Sig: Take 30 mg by mouth  daily    WARFARIN SODIUM PO  Other Yes No   Sig: Take by mouth daily As directed   aspirin 81 MG chewable tablet   No No   Sig: Take 1 tablet (81 mg) by mouth daily   digoxin (LANOXIN) 125 MCG tablet   No No   Sig: Take 1 tablet (125 mcg) by mouth daily   furosemide (LASIX) 40 MG tablet   No No   Sig: Take 1 tablet (40 mg) by mouth daily   loperamide (IMODIUM A-D) 2 MG capsule   Yes No   Sig: Take 2 mg by mouth as needed for diarrhea   losartan (COZAAR) 25 MG tablet   No No   Sig: Take 1 tablet (25 mg) by mouth daily   meclizine (ANTIVERT) 12.5 MG tablet  Other No No   Sig: Take 1 tablet (12.5 mg) by mouth 3 times daily as needed for dizziness   Patient not taking: Reported on 10/23/2018   metoprolol succinate ER (TOPROL-XL) 50 MG 24 hr tablet   No No   Sig: Take 1 tablet (50 mg) by mouth 2 times daily      Facility-Administered Medications: None     Allergies   Allergies   Allergen Reactions     Lisinopril Diarrhea     Sulfa Drugs        Social History   I have reviewed this patient's social history and updated it with pertinent information if needed. Em Richmond  reports that she quit smoking about 54 years ago. Her smoking use included cigarettes. She has never used smokeless tobacco. She reports that she drinks alcohol. She reports that she does not use drugs.    Family History   I have reviewed this patient's family history and updated it with pertinent information if needed.   Family History   Problem Relation Age of Onset     Diabetes Mother      Other Cancer Father        Review of Systems   The 10 point Review of Systems is negative other than noted in the HPI or here.     Physical Exam   Temp: 95.7  F (35.4  C) Temp src: Temporal BP: 112/74 Pulse: 86 Heart Rate: 90 Resp: 18 SpO2: 99 %      Vital Signs with Ranges  Temp:  [95.7  F (35.4  C)] 95.7  F (35.4  C)  Pulse:  [86] 86  Heart Rate:  [86-90] 90  Resp:  [18] 18  BP: (112)/(74) 112/74  SpO2:  [96 %-99 %] 99 %  0 lbs 0 oz    GENERAL: Alert. NAD.  Conversational, appropriate.   HEENT: Normocephalic. EOMI. No icterus or injection. Nares normal.   LUNGS: Clear to auscultation. No dyspnea at rest.   HEART: Irregular rate. Extremities perfused.   ABDOMEN: Soft, mildly tender, and minimally distended. Positive bowel sounds.   EXTREMITIES: No LE edema noted.   NEUROLOGIC: Moves extremities x4 on command. No acute focal neurologic abnormalities noted.     Data   Data reviewed today:  I personally reviewed both laboratory and imaging data.   Recent Labs   Lab 04/30/19  1343   WBC 9.5   HGB 16.2*   MCV 95         POTASSIUM 3.1*   CHLORIDE 101   CO2 20   BUN 41*   CR 1.64*   ANIONGAP 12   ALFIE 9.0   *   ALBUMIN 4.0   PROTTOTAL 8.5   BILITOTAL 1.0   ALKPHOS 68   ALT 33   AST 23   TROPI <0.015       Recent Results (from the past 24 hour(s))   Chest XR,  PA & LAT    Narrative    CHEST TWO VIEWS  4/30/2019 2:04 PM     HISTORY:  Shortness of breath.    COMPARISON: 11/5/2017    FINDINGS: Heart size and pulmonary vascularity are within normal  limits. The lungs are clear. No pneumothorax or pleural effusion.       Impression    IMPRESSION: No radiographic evidence of acute chest abnormality.     SHARON VERAS MD

## 2019-05-01 ENCOUNTER — APPOINTMENT (OUTPATIENT)
Dept: OCCUPATIONAL THERAPY | Facility: CLINIC | Age: 79
DRG: 683 | End: 2019-05-01
Attending: HOSPITALIST
Payer: COMMERCIAL

## 2019-05-01 LAB
ANION GAP SERPL CALCULATED.3IONS-SCNC: 7 MMOL/L (ref 3–14)
BUN SERPL-MCNC: 33 MG/DL (ref 7–30)
C DIFF TOX B STL QL: NEGATIVE
CALCIUM SERPL-MCNC: 8.5 MG/DL (ref 8.5–10.1)
CHLORIDE SERPL-SCNC: 110 MMOL/L (ref 94–109)
CO2 SERPL-SCNC: 21 MMOL/L (ref 20–32)
CREAT SERPL-MCNC: 1.11 MG/DL (ref 0.52–1.04)
DIGOXIN SERPL-MCNC: 1.5 UG/L (ref 0.5–2)
ERYTHROCYTE [DISTWIDTH] IN BLOOD BY AUTOMATED COUNT: 12.5 % (ref 10–15)
GFR SERPL CREATININE-BSD FRML MDRD: 48 ML/MIN/{1.73_M2}
GLUCOSE SERPL-MCNC: 130 MG/DL (ref 70–99)
HCT VFR BLD AUTO: 38 % (ref 35–47)
HGB BLD-MCNC: 13.3 G/DL (ref 11.7–15.7)
INR PPP: 2.73 (ref 0.86–1.14)
MCH RBC QN AUTO: 33.4 PG (ref 26.5–33)
MCHC RBC AUTO-ENTMCNC: 35 G/DL (ref 31.5–36.5)
MCV RBC AUTO: 96 FL (ref 78–100)
PLATELET # BLD AUTO: 201 10E9/L (ref 150–450)
POTASSIUM SERPL-SCNC: 3.3 MMOL/L (ref 3.4–5.3)
RBC # BLD AUTO: 3.98 10E12/L (ref 3.8–5.2)
SODIUM SERPL-SCNC: 138 MMOL/L (ref 133–144)
SPECIMEN SOURCE: NORMAL
WBC # BLD AUTO: 7.5 10E9/L (ref 4–11)

## 2019-05-01 PROCEDURE — 85610 PROTHROMBIN TIME: CPT | Performed by: HOSPITALIST

## 2019-05-01 PROCEDURE — 36415 COLL VENOUS BLD VENIPUNCTURE: CPT | Performed by: HOSPITALIST

## 2019-05-01 PROCEDURE — 97530 THERAPEUTIC ACTIVITIES: CPT | Mod: GO

## 2019-05-01 PROCEDURE — 25000132 ZZH RX MED GY IP 250 OP 250 PS 637: Performed by: HOSPITALIST

## 2019-05-01 PROCEDURE — 12000000 ZZH R&B MED SURG/OB

## 2019-05-01 PROCEDURE — 99233 SBSQ HOSP IP/OBS HIGH 50: CPT | Performed by: HOSPITALIST

## 2019-05-01 PROCEDURE — 80048 BASIC METABOLIC PNL TOTAL CA: CPT | Performed by: HOSPITALIST

## 2019-05-01 PROCEDURE — 80162 ASSAY OF DIGOXIN TOTAL: CPT | Performed by: HOSPITALIST

## 2019-05-01 PROCEDURE — 84439 ASSAY OF FREE THYROXINE: CPT | Performed by: HOSPITALIST

## 2019-05-01 PROCEDURE — 85027 COMPLETE CBC AUTOMATED: CPT | Performed by: HOSPITALIST

## 2019-05-01 PROCEDURE — 97535 SELF CARE MNGMENT TRAINING: CPT | Mod: GO

## 2019-05-01 PROCEDURE — 25800030 ZZH RX IP 258 OP 636: Performed by: HOSPITALIST

## 2019-05-01 PROCEDURE — 97165 OT EVAL LOW COMPLEX 30 MIN: CPT | Mod: GO

## 2019-05-01 RX ORDER — POTASSIUM CHLORIDE 1500 MG/1
20 TABLET, EXTENDED RELEASE ORAL
Status: COMPLETED | OUTPATIENT
Start: 2019-05-01 | End: 2019-05-01

## 2019-05-01 RX ORDER — WARFARIN SODIUM 2.5 MG/1
2.5 TABLET ORAL
Status: COMPLETED | OUTPATIENT
Start: 2019-05-01 | End: 2019-05-01

## 2019-05-01 RX ADMIN — WARFARIN SODIUM 2.5 MG: 2.5 TABLET ORAL at 17:49

## 2019-05-01 RX ADMIN — POTASSIUM CHLORIDE 20 MEQ: 1500 TABLET, EXTENDED RELEASE ORAL at 11:59

## 2019-05-01 RX ADMIN — SODIUM CHLORIDE: 9 INJECTION, SOLUTION INTRAVENOUS at 09:40

## 2019-05-01 RX ADMIN — POTASSIUM CHLORIDE 20 MEQ: 1500 TABLET, EXTENDED RELEASE ORAL at 10:45

## 2019-05-01 RX ADMIN — ACETAMINOPHEN 650 MG: 325 TABLET, FILM COATED ORAL at 00:10

## 2019-05-01 RX ADMIN — METOPROLOL SUCCINATE 50 MG: 50 TABLET, EXTENDED RELEASE ORAL at 21:27

## 2019-05-01 RX ADMIN — METOPROLOL SUCCINATE 50 MG: 50 TABLET, EXTENDED RELEASE ORAL at 09:40

## 2019-05-01 RX ADMIN — PAROXETINE HYDROCHLORIDE 30 MG: 30 TABLET, FILM COATED ORAL at 08:49

## 2019-05-01 RX ADMIN — DIGOXIN 125 MCG: 0.12 TABLET ORAL at 08:49

## 2019-05-01 RX ADMIN — ASPIRIN 81 MG 81 MG: 81 TABLET ORAL at 08:49

## 2019-05-01 RX ADMIN — Medication 1 CAPSULE: at 08:49

## 2019-05-01 RX ADMIN — ACETAMINOPHEN 650 MG: 325 TABLET, FILM COATED ORAL at 12:02

## 2019-05-01 ASSESSMENT — ACTIVITIES OF DAILY LIVING (ADL)
ADLS_ACUITY_SCORE: 13
PREVIOUS_RESPONSIBILITIES: MEAL PREP;HOUSEKEEPING;LAUNDRY;MEDICATION MANAGEMENT;FINANCES;DRIVING
ADLS_ACUITY_SCORE: 13
ADLS_ACUITY_SCORE: 13
IADL_COMMENTS: PT USES PILLBOX FOR MED MGMT
ADLS_ACUITY_SCORE: 13

## 2019-05-01 ASSESSMENT — MIFFLIN-ST. JEOR: SCORE: 1226.94

## 2019-05-01 NOTE — PROVIDER NOTIFICATION
MD Notification    Notified Person: MD    Notified Person Name:  Oliviaantoni    Notification Date/Time: 5/1/19 @ 1600    Notification Interaction: Text page    Purpose of Notification: Family request acute on chronic diarrhea addressed prior to discharge. Pt reports loose stool at least BID for at least a year, takes immodium everyday at home.    Orders Received: Pending    Comments:

## 2019-05-01 NOTE — PROGRESS NOTES
[facility]    Hospitalist Progress Note  Date of Service (when I saw the patient): 05/01/2019    Assessment & Plan   Em Richmond is a 78 year old female with HFpEF on diuretic, HTN, GERD was brought to the ER due to diarrhea of breath.  Noted to have acute kidney injury and was admitted 4/30/2019 for further evaluation and treatment.    Acute kidney injury, stage II-III, likely secondary to dehydration: Patient with CHF on diuretic and Aldactone, presented with elevated creatinine at 1.6, baseline 0.7. Multiple episodes of diarrhea over the 5 days PTA but slowly improving, nausea, abdominal pain, decreased appetite, weakness.  Patient doubt chest pain, blood in the stool or urine, vomiting, rash, fever, sore throat, headache.  Patient was brought in due to worsening fatigue and shortness of breath and feeling more tired than normal.  Lab also consistent with hemoconcentration  -FENa 0.1, clinically dehydrated, PTA Lasix and losartan on hold, getting IV fluids, continue for next 24 hours.ic.  -Avoid nephrotoxins.  -Creatinine has improved to 1.1 today.  Monitor BMP.   - Intake and output is able and daily weight      Hypokalemia  -Potassium replaced with one-time dose, not on protocol given SHUN.    Shortness of breath: Patient with subjective dyspnea suspect due to dehydration and fatigue.  Denies chest pain.  Lungs sound clear.  No wheezing or crackles.  Remains on room air.  Patient with 96 to 99% oximetry without oxygen on admission.  -Pulmonary hygiene.    Diarrhea: Patient reports diarrhea.  -C. difficile negative.  -Improving.    Cardiac, CHF, HTN, Cardiomyopathy: 2D echo from June 2018 shows LVEF of 50-55%.  Patient maintained on ASA, Digoxin, Furosemide, and metoprolol, and losartan prior to admission.   - PTA ASA 81mg,  Digoxin and metoprolol resumed.  -Check digoxin level given acute kidney injury  -Continue to hold Lasix and losartan.  -Monitor for any sign of fluid overload given  continued IV hydration.    GERD  Dysphagia    -Patient with long-standing history of GERD , continued on PTA Omeprazole.   -Reports food sticking in her esophagus.  -Will ask GI for evaluation, possibly needs barium esophagogram/EGD.    Atrial fibrillation, tonic  -PTA warfarin continued, pharmacy to dose Coumadin.  -On Afib but rate controlled with metoprolol.     Anxiety  - Continued PTA paroxetine    DVT Prophylaxis: Warfarin  Code Status: Full Code    Disposition: Expected discharge: Anticipate at least 1 more day with ongoing IV hydration, and then GI eval.  Care plan discussed with patient.  Expect she will be able to go back to her prior living arrangements, lives with her  at home.  Therapy eval.    Stan Vargas MD  Hospitalist    Interval History   Patient was seen and evaluated. Feels abdomen is bloated, denies pain. Feels food is stuck in her esophagus. Nauseous but tolerating diet without vomiting.  Still has diarrhea.  She reports her  also has diarrhea.  Remains afebrile.  -Remains on IV hydration, denies dyspnea, reports he was making less urine but urine output has improved now.       -Data reviewed today: I reviewed all new labs and imaging results over the last 24 hours. I personally reviewed no images or EKG's today.    Physical Exam   Temp: 95.6  F (35.3  C) Temp src: Oral BP: 116/57 Pulse: 65 Heart Rate: 71 Resp: 18 SpO2: 100 % O2 Device: None (Room air)    Vitals:    04/30/19 1610 05/01/19 0601   Weight: 73.1 kg (161 lb 3.2 oz) 74.4 kg (164 lb 0.4 oz)     Vital Signs with Ranges  Temp:  [95.5  F (35.3  C)-95.7  F (35.4  C)] 95.6  F (35.3  C)  Pulse:  [54-86] 65  Heart Rate:  [71-90] 71  Resp:  [16-18] 18  BP: ()/(57-74) 116/57  SpO2:  [96 %-100 %] 100 %  I/O last 3 completed shifts:  In: 1371 [P.O.:480; I.V.:891]  Out: 1400 [Urine:900; Other:500]    Constitutional: Alert, awake.  Up in bed.  Not in distress.   HEENT: PERRLA EOMI  Respiratory: Bilateral equal air entry,  clear to auscultation. No respiratory distress.  Cardiovascular:  s1s2 irregular, soft systolic murmur noted. No tachycardia.  GI: Soft, obese/ distended, non tender, bowel tones active.  Skin/Integumen: No rash, no blister.  Neuro/Psych: Alert awake oriented x3, nonfocal.         Medications     - MEDICATION INSTRUCTIONS -       sodium chloride 75 mL/hr at 05/01/19 0940     Warfarin Therapy Reminder         aspirin  81 mg Oral Daily     digoxin  125 mcg Oral Daily     lactobacillus rhamnosus (GG)  1 capsule Oral Daily     metoprolol succinate ER  50 mg Oral BID     PARoxetine  30 mg Oral Daily     potassium chloride  20 mEq Oral Q2H     sodium chloride (PF)  3 mL Intracatheter Q8H       Data   Recent Labs   Lab 05/01/19  0746 04/30/19  1624 04/30/19  1343   WBC 7.5  --  9.5   HGB 13.3  --  16.2*   MCV 96  --  95     --  234   INR 2.73*  --  2.24*    135 133   POTASSIUM 3.3* 3.4 3.1*   CHLORIDE 110*  --  101   CO2 21  --  20   BUN 33*  --  41*   CR 1.11* 1.37* 1.64*   ANIONGAP 7  --  12   ALFIE 8.5  --  9.0   *  --  158*   ALBUMIN  --   --  4.0   PROTTOTAL  --   --  8.5   BILITOTAL  --   --  1.0   ALKPHOS  --   --  68   ALT  --   --  33   AST  --   --  23   TROPI  --   --  <0.015       Recent Results (from the past 24 hour(s))   Chest XR,  PA & LAT    Narrative    CHEST TWO VIEWS  4/30/2019 2:04 PM     HISTORY:  Shortness of breath.    COMPARISON: 11/5/2017    FINDINGS: Heart size and pulmonary vascularity are within normal  limits. The lungs are clear. No pneumothorax or pleural effusion.       Impression    IMPRESSION: No radiographic evidence of acute chest abnormality.     SHARON VERAS MD

## 2019-05-01 NOTE — PLAN OF CARE
[id number] shift. Vitals stable. Tele- A fib w/ CVR. A&O, forgetful. Up SBA. Good urine out put. Loose watery brown stool x1, c diff sample sent. PT/OT consulted, continuing IVF, plan pending.

## 2019-05-01 NOTE — PLAN OF CARE
"Patient is A+OX4 but forgetful. VSS on RA. Tele afib CVR. Complaining of non-specific \"foot pain,\" managed with tylenol. Up with SBA. Heart healthy diet, tolerating well. Cdiff sample has come back negative, enteric precautions discontinued. Discharge pending improvement of SHUN.   "

## 2019-05-01 NOTE — PROGRESS NOTES
05/01/19 1040   Quick Adds   Type of Visit Initial Occupational Therapy Evaluation   Living Environment   Lives With spouse   Living Arrangements house   Home Accessibility stairs to enter home;stairs within home   Number of Stairs, Main Entrance 3   Number of Stairs, Within Home, Primary   (split level)   Transportation Anticipated car, drives self;family or friend will provide  ( can provide for now if needed)   Living Environment Comment Bathtub    Self-Care   Usual Activity Tolerance good   Current Activity Tolerance moderate   Equipment Currently Used at Home grab bar, tub/shower;raised toilet   Functional Level   Ambulation 0-->independent   Transferring 0-->independent   Toileting 0-->independent   Bathing 1-->assistive equipment   Dressing 0-->independent   Eating 0-->independent   Communication 0-->understands/communicates without difficulty   Swallowing 0-->swallows foods/liquids without difficulty   Cognition 0 - no cognition issues reported   Fall history within last six months no   General Information   Onset of Illness/Injury or Date of Surgery - Date 04/30/19   Referring Physician SABINO Rojo DO   Patient/Family Goals Statement Pt plans to return home at discharge.    Additional Occupational Profile Info/Pertinent History of Current Problem Admitted for dehydration and diarrhea due to possible acute kidney injury.    Precautions/Limitations fall precautions   Cognitive Status Examination   Orientation orientation to person, place and time   Level of Consciousness alert;confused   Follows Commands (Cognition) WNL   Memory   (will continue to monitor and assess as needed)   Visual Perception   Visual Perception Wears glasses  (for reading only)   Pain Assessment   Patient Currently in Pain No   Range of Motion (ROM)   ROM Quick Adds No deficits were identified   ROM Comment B UE WNL   Strength   Strength Comments B UE 5/5 on MMT   Mobility   Bed Mobility Bed mobility skill: Rolling/Turning;Bed  mobility skill: Scooting/Bridging;Bed mobility skill: Sit to supine;Bed mobility skill: Supine to sit;Bed mobility analysis   Bed Mobility Skill: Rolling/Turning   Level of [place] - Bed Mobility Skill Rolling Turning stand-by assist   Bed Mobility Skill: Scooting/Bridging   Level of [place]: Scooting/Bridging stand-by assist   Bed Mobility Skill: Sit to Supine   Level of [place]: Sit/Supine stand-by assist   Bed Mobility Skill: Supine to Sit   Level of [place]: Supine/Sit stand-by assist   Bed Mobility Analysis   Impairments Contributing to Impaired Bed Mobility   (decreased activity tolerance)   Transfer Skills   Transfer Transfer Safety Analysis Bed/Chair;Transfer Skill: Stand to Sit;Transfer Safety Analysis Sit/Stand   Transfer Skill: Bed to Chair/Chair to Bed   Level of [place]: Bed to Chair stand-by assist   Transfer Safety Analysis Bed/Chair   Impairments Contributing to Impaired Transfers   (decreased activity tolerance)   Transfer Skill: Sit to Stand   Level of [place]: Sit/Stand stand-by assist   Transfer Safety Analysis Sit/Stand   Impaired Transfers: Sit/Stand   (decreased activity tolerance)   Toilet Transfer   Toilet Transfer Toilet Transfer Skill;Toilet Transfer Safety Analysis   Transfer Skill: Toilet Transfer   Level of [place]: Toilet stand-by assist   Transfer Safety Analysis Toilet   Transfer Safety Analysis Toilet   (decreased activity tolerance)   Upper Body Dressing   Level of [place]: Dress Upper Body stand-by assist   Lower Body Dressing   Level of [place]: Dress Lower Body stand-by assist   Toileting   Level of [place]: Toilet stand-by assist   Grooming   Level of [place]: Grooming stand-by assist   Eating/Self Feeding   Level of [place]: Eating independent   Instrumental Activities of Daily Living (IADL)   Previous Responsibilities meal prep;housekeeping;laundry;medication management;finances;driving   IADL Comments Pt uses  "[employer] for med mgmt   Activities of Daily Living Analysis   Impairments Contributing to Impaired Activities of Daily Living cognition impaired;fear and anxiety   ADL Comments decreased activity tolerance   General Therapy Interventions   Planned Therapy Interventions ADL retraining;cognition;transfer training   Clinical Impression   Criteria for Skilled Therapeutic Interventions Met yes, treatment indicated   OT Diagnosis Decreased I and safety with ADLs   Influenced by the following impairments Impaired cognition and safety; Decreased activity tolerance   Assessment of Occupational Performance 1-3 Performance Deficits   Identified Performance Deficits IADLs   Clinical Decision Making (Complexity) Low complexity   Therapy Frequency 5 times/wk   Predicted Duration of Therapy Intervention (days/wks) 5 days   Anticipated Discharge Disposition Home with Assist   Risks and Benefits of Treatment have been explained. Yes   Patient, Family & other staff in agreement with plan of care Yes   [facility] [employer] \"6 Clicks\"   2016, Trustees of [facility], under license to [employer].  All rights reserved.   6 Clicks Short Forms Daily Activity Inpatient Short Form   [facility]  \"6 Clicks\" Daily Activity Inpatient Short Form   1. Putting on and taking off regular lower body clothing? 3 - A Little   2. Bathing (including washing, rinsing, drying)? 3 - A Little   3. Toileting, which includes using toilet, bedpan or urinal? 3 - A Little   4. Putting on and taking off regular upper body clothing? 4 - None   5. Taking care of personal grooming such as brushing teeth? 4 - None   6. Eating meals? 4 - None   Daily Activity Raw Score (Score out of 24.Lower scores equate to lower levels of function) 21   Total Evaluation Time   Total Evaluation Time (Minutes) 10     "

## 2019-05-01 NOTE — PROGRESS NOTES
Per PT note, home care RN for med management recommended.  Met with pt, pt's spouse and daughter to discuss discharge plans.  Informed that pt that PT is recommending home care RN.  Explained home care and that it would be covered by her insurance if she is home bound-explained home bound status.  Per pt, she wanted to think about whether she would want this at discharge.  Informed pt that CTS-RN would follow-up with patient in the am.

## 2019-05-01 NOTE — PLAN OF CARE
Discharge Planner PT   Patient plan for discharge: Unknown  Current status: Per discussion with OT and chart review, patient currently requiring SBA for transfers and standing activities, limited by impaired activity tolerance and anxiety. OT is addressing impairments during hospital stay.  Barriers to return to prior living situation: Defer to OT  Recommendations for discharge: Defer to OT  Rationale for recommendations: PT evaluation is not indicated as pt is currently limited by impaired cognition and decreased activity tolerance, which is being addressed by OT.        Entered by: Tania Cruz 05/01/2019 1:47 PM

## 2019-05-01 NOTE — PLAN OF CARE
OT: Eval complete and Tx initiated. Pt admitted for dehydration and diarrhea with acute kidney injury. Prior to admit, pt lives with  in house and reports I with ADL/IADLs, including med mgmt and driving.     Discharge Planner OT   Patient plan for discharge: Home    Current status: Pt required SBA for toilet transfer, standing ADLs, and LE dressing.     Barriers to return to prior living situation: Stairs to enter and within home; Bathtub    Recommendations for discharge: Home with A from  initially for IADLs, such as driving, and possible home RN for med mgmt    Rationale for recommendations: Pt limited by impaired cognition and safety, decreased activity tolerance, and anxiety; thus, OT will continue with intervention during hospitalization to ensure safety with ADL/IADLs. Pt has A from  upon return home.        Entered by: Jessica August 05/01/2019 11:07 AM

## 2019-05-01 NOTE — CONSULTS
[facility] - GASTROENTEROLOGY CONSULTATION      Em Richmond  [address]  [place] [address]  78 year old female     Admission Date/Time: 4/30/2019  Primary Care Provider: Arielle Leger     We were asked to see the patient in consultation by Dr. Vargas for evaluation of dysphagia and diarrhea.    ASSESSMENT:    1. Dysphagia - liquids and solids. DDx stricture, EoE, reflux esophagitis, malignancy, altered motility, etc.  2. Chronic diarrhea - twice a day, but did have acute kidney injury secondary to the diarrhea. DDx is broad, microscopic colitis, Crohn's, UC, functional, bacterial overgrowth, med-induced, etc. Reasonable to pursue initial work-up with colonoscopy while patient is an inpatient. Will allow kidney function to recover today and pursue prep tomorrow for Friday colonoscopy.  3. A-fib/CHF - on warfarin, ok to continue warfarin, need INR less than 3.0 for procedure.    RECOMMENDATIONS:  1. EGD (Friday, at same time as colonoscopy)  2. Clear diet tomorrow   3. Golytely prep tomorrow  4. Colonoscopy - Friday  5. Need INR less than 3.0 for EGD/Colon,     Juve Damon MD   [facility] - [facility]  Cell [phone]  After 5 PM, please call [phone]  ________________________________________________________________________        HPI:  Em Richmond is a 78 year old female who has a history of CHF, a-fib on warfarin. She reports chronic diarrhea, two loose stools daily (sometimes three) for the last year. She will have post-prandial loose stools at times. It has impacted her quality of life, does not leave the house much. She takes 1 imodium and she will not have a stool the rest of the day. She has tried a daily fiber supplement without improvement. C. Diff negative. No recent antibiotics. Increased loose stools the last week with some abdominal pain, but both are improving.     Also, she reports history of reflux, on omeprazole. Recent dysphagia was solids and liquids. No melena or  hematochezia. No prior EGD.    Last colonoscopy was  - this showed left-sided diverticulosis, otherwise this was normal.     ROS: A comprehensive ten point review of systems was negative aside from those in mentioned in the HPI.      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     SOCIAL HISTORY:  Social History     Tobacco Use     Smoking status: Former Smoker     Types: Cigarettes     Last attempt to quit: 1965     Years since quittin.3     Smokeless tobacco: Never Used   Substance Use Topics     Alcohol use: Yes     Alcohol/week: 0.0 oz     Comment: 8 oz wine daily     Drug use: No     FAMILY HISTORY:  Family History   Problem Relation Age of Onset     Diabetes Mother      Other Cancer Father      ALLERGIES:   Allergies   Allergen Reactions     Lisinopril Diarrhea     Sulfa Drugs      MEDICATIONS:   Prior to Admission medications    Medication Sig Start Date End Date Taking? Authorizing Provider   aspirin 81 MG chewable tablet Take 1 tablet (81 mg) by mouth daily 17  Yes Heather Ferrera MD   carboxymethylcellulose PF (REFRESH PLUS) 0.5 % SOLN ophthalmic solution Place 1 drop into both eyes 3 times daily as needed for dry eyes   Yes Unknown, Entered By History   Cholecalciferol (VITAMIN D3 PO) Take 2,000 Units by mouth daily    Yes Unknown, Entered By History   cyanocobalamin (VITAMIN B-12) 1000 MCG tablet Take 1,000 mcg by mouth daily   Yes Unknown, Entered By History   digoxin (LANOXIN) 125 MCG tablet Take 1 tablet (125 mcg) by mouth daily 3/8/19  Yes Den Pickard MD   furosemide (LASIX)  "40 MG tablet Take 1 tablet (40 mg) by mouth daily 4/1/19  Yes Den Pickard MD   Lactobacillus (PROBIOTIC ACIDOPHILUS) TABS Take 1 tablet by mouth daily    Yes Reported, Patient   loperamide (IMODIUM A-D) 2 MG capsule Take 2 mg by mouth as needed for diarrhea   Yes Reported, Patient   metoprolol succinate ER (TOPROL-XL) 50 MG 24 hr tablet Take 1 tablet (50 mg) by mouth 2 times daily 4/25/19  Yes Mandie Rose APRN CNP   Multiple vitamin  s TABS Take 1 tablet by mouth daily    Yes Unknown, Entered By History   omeprazole (PRILOSEC) 20 MG DR capsule Take 20 mg by mouth daily as needed (heartburn)    Yes Unknown, Entered By History   PARoxetine (PAXIL) 30 MG tablet Take 30 mg by mouth daily    Yes Reported, Patient   warfarin (COUMADIN) 2.5 MG tablet Take two tablets (5 mg) by mouth on Tuesday and Friday. Take one tablet (2.5 mg) all other days of the week.   Yes Unknown, Entered By History     PHYSICAL EXAM:   /67   Pulse 65   Temp 95.7  F (35.4  C) (Oral)   Resp 18   Ht 1.638 m (5' 4.5\")   Wt 74.4 kg (164 lb 0.4 oz)   SpO2 99%   BMI 27.72 kg/m     GEN: Alert, oriented x3, NAD.  MONTSE: AT, anicteric, OP without erythema, exudate, or ulcers.    NECK: Supple.    LYMPH: No LAD noted.  HRT: RRR, no WIL  LUNGS: CTA  ABD: +BS, non-tender, non-distended, no rebound or guarding.  SKIN: No rash, jaundice   NEURO: MS intact       ADDITIONAL DATA:   I reviewed the patient's new clinical lab test results.   Recent Labs   Lab Test 05/01/19  0746 04/30/19  1343 11/20/17  0743 11/13/17  0525  11/11/17  1212  11/08/17  0500   WBC 7.5 9.5  --   --   --   --   --  8.6   HGB 13.3 16.2* 13.0  --   --   --   --  10.7*   MCV 96 95  --   --   --   --   --  96    234  --   --   --  202  --  260   INR 2.73* 2.24*  --  2.57*   < >  --    < >  --     < > = values in this interval not displayed.     Recent Labs   Lab Test 05/01/19  0746 04/30/19  1624 04/30/19  1343 10/23/18  1429   POTASSIUM 3.3* 3.4 3.1* 4.5 "   CHLORIDE 110*  --  101 99   CO2 21  --  20 27   BUN 33*  --  41* 16   ANIONGAP 7  --  12 8     Recent Labs   Lab Test 04/30/19  1343 11/20/17  0743 11/13/17  0525  11/06/17  0920  11/06/17  0440 11/05/17  2315  10/25/17  1039 06/20/14  1720   ALBUMIN 4.0 3.0* 2.5*   < >  --    < > 2.2* 2.9*   < >  --   --    BILITOTAL 1.0 1.0 1.1   < >  --    < > 2.3* 1.8*   < >  --   --    ALT 33 99* 385*   < >  --    < > 1,890* 769*   < >  --   --    AST 23 71* 197*   < >  --    < > 3,720* 1,480*   < >  --   --    PROTEIN  --   --   --   --  100*  --   --   --   --  30* Negative   LIPASE  --   --   --   --   --   --  189 455*  --   --   --     < > = values in this interval not displayed.        I reviewed the patient's new imaging results.     CHEST TWO VIEWS  4/30/2019 2:04 PM      HISTORY:  Shortness of breath.     COMPARISON: 11/5/2017     FINDINGS: Heart size and pulmonary vascularity are within normal  limits. The lungs are clear. No pneumothorax or pleural effusion.                                                                       IMPRESSION: No radiographic evidence of acute chest abnormality.

## 2019-05-01 NOTE — PLAN OF CARE
Alert and oriented X 4, forgetful. Up with SBA. Denies SOB at rest, VSS on RA. Cr improved to 1.1. IVF's running. Loose stool X 2 today, negative for c. Diff. GI consulted, plan for EGD + colonoscopy Friday. Clear liquid diet and bowel prep tomorrow. K+ 3.3, replaced. A fib CVR on tele. Continue to monitor.

## 2019-05-02 ENCOUNTER — APPOINTMENT (OUTPATIENT)
Dept: OCCUPATIONAL THERAPY | Facility: CLINIC | Age: 79
DRG: 683 | End: 2019-05-02
Payer: COMMERCIAL

## 2019-05-02 LAB
ANION GAP SERPL CALCULATED.3IONS-SCNC: 8 MMOL/L (ref 3–14)
BUN SERPL-MCNC: 24 MG/DL (ref 7–30)
CALCIUM SERPL-MCNC: 8.4 MG/DL (ref 8.5–10.1)
CHLORIDE SERPL-SCNC: 117 MMOL/L (ref 94–109)
CO2 SERPL-SCNC: 19 MMOL/L (ref 20–32)
CREAT SERPL-MCNC: 0.79 MG/DL (ref 0.52–1.04)
GFR SERPL CREATININE-BSD FRML MDRD: 72 ML/MIN/{1.73_M2}
GLUCOSE SERPL-MCNC: 110 MG/DL (ref 70–99)
INR PPP: 2.69 (ref 0.86–1.14)
INR PPP: NORMAL (ref 0.86–1.14)
MAGNESIUM SERPL-MCNC: 2.3 MG/DL (ref 1.6–2.3)
POTASSIUM SERPL-SCNC: 3.4 MMOL/L (ref 3.4–5.3)
SODIUM SERPL-SCNC: 144 MMOL/L (ref 133–144)
T4 FREE SERPL-MCNC: 1.06 NG/DL (ref 0.76–1.46)
TSH SERPL DL<=0.005 MIU/L-ACNC: 0.03 MU/L (ref 0.4–4)

## 2019-05-02 PROCEDURE — 25800030 ZZH RX IP 258 OP 636: Performed by: HOSPITALIST

## 2019-05-02 PROCEDURE — 99233 SBSQ HOSP IP/OBS HIGH 50: CPT | Performed by: HOSPITALIST

## 2019-05-02 PROCEDURE — 83735 ASSAY OF MAGNESIUM: CPT | Performed by: HOSPITALIST

## 2019-05-02 PROCEDURE — 36415 COLL VENOUS BLD VENIPUNCTURE: CPT | Performed by: HOSPITALIST

## 2019-05-02 PROCEDURE — 85610 PROTHROMBIN TIME: CPT | Performed by: HOSPITALIST

## 2019-05-02 PROCEDURE — 97535 SELF CARE MNGMENT TRAINING: CPT | Mod: GO | Performed by: OCCUPATIONAL THERAPY ASSISTANT

## 2019-05-02 PROCEDURE — 12000000 ZZH R&B MED SURG/OB

## 2019-05-02 PROCEDURE — 25000132 ZZH RX MED GY IP 250 OP 250 PS 637: Performed by: INTERNAL MEDICINE

## 2019-05-02 PROCEDURE — 25000132 ZZH RX MED GY IP 250 OP 250 PS 637: Performed by: HOSPITALIST

## 2019-05-02 PROCEDURE — 80048 BASIC METABOLIC PNL TOTAL CA: CPT | Performed by: HOSPITALIST

## 2019-05-02 PROCEDURE — 84443 ASSAY THYROID STIM HORMONE: CPT | Performed by: HOSPITALIST

## 2019-05-02 RX ORDER — POTASSIUM CHLORIDE 1500 MG/1
20-40 TABLET, EXTENDED RELEASE ORAL
Status: DISCONTINUED | OUTPATIENT
Start: 2019-05-02 | End: 2019-05-03 | Stop reason: HOSPADM

## 2019-05-02 RX ORDER — POTASSIUM CHLORIDE 7.45 MG/ML
10 INJECTION INTRAVENOUS
Status: DISCONTINUED | OUTPATIENT
Start: 2019-05-02 | End: 2019-05-03 | Stop reason: HOSPADM

## 2019-05-02 RX ORDER — POTASSIUM CL/LIDO/0.9 % NACL 10MEQ/0.1L
10 INTRAVENOUS SOLUTION, PIGGYBACK (ML) INTRAVENOUS
Status: DISCONTINUED | OUTPATIENT
Start: 2019-05-02 | End: 2019-05-03 | Stop reason: HOSPADM

## 2019-05-02 RX ORDER — POTASSIUM CHLORIDE 1.5 G/1.58G
20-40 POWDER, FOR SOLUTION ORAL
Status: DISCONTINUED | OUTPATIENT
Start: 2019-05-02 | End: 2019-05-03 | Stop reason: HOSPADM

## 2019-05-02 RX ORDER — POTASSIUM CHLORIDE 1500 MG/1
40 TABLET, EXTENDED RELEASE ORAL ONCE
Status: COMPLETED | OUTPATIENT
Start: 2019-05-02 | End: 2019-05-02

## 2019-05-02 RX ORDER — MAGNESIUM SULFATE HEPTAHYDRATE 40 MG/ML
4 INJECTION, SOLUTION INTRAVENOUS EVERY 4 HOURS PRN
Status: DISCONTINUED | OUTPATIENT
Start: 2019-05-02 | End: 2019-05-03 | Stop reason: HOSPADM

## 2019-05-02 RX ORDER — POTASSIUM CHLORIDE 29.8 MG/ML
20 INJECTION INTRAVENOUS
Status: DISCONTINUED | OUTPATIENT
Start: 2019-05-02 | End: 2019-05-03 | Stop reason: HOSPADM

## 2019-05-02 RX ORDER — WARFARIN SODIUM 1 MG/1
1 TABLET ORAL
Status: DISCONTINUED | OUTPATIENT
Start: 2019-05-02 | End: 2019-05-02

## 2019-05-02 RX ADMIN — ASPIRIN 81 MG 81 MG: 81 TABLET ORAL at 10:03

## 2019-05-02 RX ADMIN — POTASSIUM CHLORIDE 40 MEQ: 1500 TABLET, EXTENDED RELEASE ORAL at 17:24

## 2019-05-02 RX ADMIN — Medication 1 CAPSULE: at 10:03

## 2019-05-02 RX ADMIN — POLYETHYLENE GLYCOL 3350, SODIUM SULFATE ANHYDROUS, SODIUM BICARBONATE, SODIUM CHLORIDE, POTASSIUM CHLORIDE 4000 ML: 236; 22.74; 6.74; 5.86; 2.97 POWDER, FOR SOLUTION ORAL at 15:09

## 2019-05-02 RX ADMIN — PAROXETINE HYDROCHLORIDE 30 MG: 30 TABLET, FILM COATED ORAL at 10:04

## 2019-05-02 RX ADMIN — SODIUM CHLORIDE: 9 INJECTION, SOLUTION INTRAVENOUS at 00:06

## 2019-05-02 RX ADMIN — DIGOXIN 125 MCG: 0.12 TABLET ORAL at 10:03

## 2019-05-02 ASSESSMENT — ACTIVITIES OF DAILY LIVING (ADL)
ADLS_ACUITY_SCORE: 12
ADLS_ACUITY_SCORE: 12
ADLS_ACUITY_SCORE: 15
ADLS_ACUITY_SCORE: 12
ADLS_ACUITY_SCORE: 14
ADLS_ACUITY_SCORE: 12

## 2019-05-02 ASSESSMENT — MIFFLIN-ST. JEOR: SCORE: 1239.54

## 2019-05-02 NOTE — PLAN OF CARE
A/Ox4, forgetful. VSS. Denies pain. Up SBA. Started bowel prep at 1500. Multiple BMs this shift. PIV SL. One time potassium dose given. Plan for EGD and coloscopy tomorrow

## 2019-05-02 NOTE — PROGRESS NOTES
[facility]    Hospitalist Progress Note  Date of Service (when I saw the patient): 05/02/2019    Assessment & Plan   Em Richmond is a 78 year old female with HFpEF on diuretic, HTN, GERD was brought to the ER due to diarrhea of breath.  Noted to have acute kidney injury and was admitted 4/30/2019 for further evaluation and treatment.    Acute kidney injury, stage II-III, likely secondary to dehydration: Patient with CHF on diuretic and Aldactone, presented with elevated creatinine at 1.6, baseline 0.7. Multiple episodes of diarrhea over the 5 days PTA but slowly improving, nausea, abdominal pain, decreased appetite, weakness.  Patient doubt chest pain, blood in the stool or urine, vomiting, rash, fever, sore throat, headache.  Patient was brought in due to worsening fatigue and shortness of breath and feeling more tired than normal.  Lab also consistent with hemoconcentration  -FENa 0.1, clinically dehydrated, PTA Lasix and losartan on hold, getting IV fluids, continue for next 24 hours.ic.  -Avoid nephrotoxins.  -Creatinine has improved. 1.64-->1.37-->1.11-->0.79  - Intake and output as able and daily weight      Hypokalemia  -Potassium replaced   -On protocol now that Rani resolved    -one time dose for K 3.4    Shortness of breath: Patient with subjective dyspnea suspect due to dehydration and fatigue.  Denies chest pain.  Lungs sound clear.  No wheezing or crackles.  Remains on room air.  Patient with 96 to 99% oximetry without oxygen on admission.  -Pulmonary hygiene.    Diarrhea, chronic: Patient reports diarrhea for approx 1 year, two loose BM almost every day, worse just prior to admission.  -C. difficile negative.  -GI consulted given dysphagia as well. Plan is colonoscopy 5/3.    GERD  Dysphagia    -Patient with long-standing history of GERD, continued on PTA Omeprazole.   -Reports food sticking in her esophagus.  -GI consulted for evaluation, plan is EGD 5/2.    Cardiac, CHF, HTN,  Cardiomyopathy: 2D echo from June 2018 shows LVEF of 50-55%.  Patient maintained on ASA, Digoxin, Furosemide, and metoprolol, and losartan prior to admission.   - PTA ASA 81mg, Digoxin and metoprolol resumed.  - digoxin level therapeutic.  - Continue to hold Lasix and losartan. IV fluid discontinued. Plan will be to resume diuretic after EGD/colonoscopy when placed on regular diet.    Atrial fibrillation, tonic  -PTA warfarin continued, pharmacy to dose Coumadin.  -On Afib but rate controlled with metoprolol.     Anxiety  - Continued PTA paroxetine    DVT Prophylaxis: Warfarin  Code Status: Full Code    Disposition: Expected discharge: pending GI work up and when diuretic resumed/addressed. possibly 2 days. Care plan discussed with patient.  Expect she will be able to go back to her prior living arrangements, lives with her  at home.  Therapy eval.    Stan Vargas MD  Hospitalist    Interval History   Patient was seen and evaluated. Dines nausea today. Feels legs are swollen, no calf pain. Denies nausea. Intermittent sensation of food sticking in chest. Denies chest pain or dyspnea. On room air.   -had 2 episodes of loose stools, ongoing for >1 year per patient/her daughter.     -Data reviewed today: I reviewed all new labs results over the last 24 hours. I personally reviewed no images or EKG's today.    Physical Exam   Temp: 97  F (36.1  C) Temp src: Oral BP: 114/67 Pulse: 68 Heart Rate: 66 Resp: 16 SpO2: 98 % O2 Device: None (Room air)    Vitals:    04/30/19 1610 05/01/19 0601 05/02/19 0637   Weight: 73.1 kg (161 lb 3.2 oz) 74.4 kg (164 lb 0.4 oz) 75.7 kg (166 lb 12.8 oz)     Vital Signs with Ranges  Temp:  [95.9  F (35.5  C)-97.3  F (36.3  C)] 97  F (36.1  C)  Pulse:  [68] 68  Heart Rate:  [53-66] 66  Resp:  [16] 16  BP: (101-115)/(54-67) 114/67  SpO2:  [98 %-100 %] 98 %  I/O last 3 completed shifts:  In: 1633 [P.O.:360; I.V.:1273]  Out: 350 [Urine:350]    Constitutional: Alert, awake. Up in bed.   Not in distress.   HEENT: PERRLA EOMI  Respiratory: Bilateral equal air entry, clear to auscultation. No respiratory distress.  Cardiovascular:  s1s2 irregular, soft systolic murmur noted. No tachycardia. Trace edema.   GI: Soft, obese/ distended, non tender, bowel tones active.  Skin/Integumen: No rash, no blister.  Neuro/Psych: Alert awake oriented x3, nonfocal.         Medications     - MEDICATION INSTRUCTIONS -       Warfarin Therapy Reminder         aspirin  81 mg Oral Daily     digoxin  125 mcg Oral Daily     lactobacillus rhamnosus (GG)  1 capsule Oral Daily     metoprolol succinate ER  50 mg Oral BID     PARoxetine  30 mg Oral Daily     sodium chloride (PF)  3 mL Intracatheter Q8H     warfarin  1 mg Oral ONCE at 18:00       Data   Recent Labs   Lab 05/02/19  0818 05/02/19  0744 05/01/19  0746 04/30/19  1624 04/30/19  1343   WBC  --   --  7.5  --  9.5   HGB  --   --  13.3  --  16.2*   MCV  --   --  96  --  95   PLT  --   --  201  --  234   INR 2.69* Canceled, Test credited 2.73*  --  2.24*   NA  --  144 138 135 133   POTASSIUM  --  3.4 3.3* 3.4 3.1*   CHLORIDE  --  117* 110*  --  101   CO2  --  19* 21  --  20   BUN  --  24 33*  --  41*   CR  --  0.79 1.11* 1.37* 1.64*   ANIONGAP  --  8 7  --  12   ALFIE  --  8.4* 8.5  --  9.0   GLC  --  110* 130*  --  158*   ALBUMIN  --   --   --   --  4.0   PROTTOTAL  --   --   --   --  8.5   BILITOTAL  --   --   --   --  1.0   ALKPHOS  --   --   --   --  68   ALT  --   --   --   --  33   AST  --   --   --   --  23   TROPI  --   --   --   --  <0.015       No results found for this or any previous visit (from the past 24 hour(s)).

## 2019-05-02 NOTE — PLAN OF CARE
Discharge Planner OT   Patient plan for discharge: Home     Current status:  pt completed cognitive assessment SLUMS with score of 21/30 indicates moderate cognitive deficits, impairments noted with STM, attention, completed supine to sit EOB independent, SBA amb with pushing IV pole to/from bathroom, mod I toilet transfer with RTS and independent clothing management and pericares.   Barriers to return to prior living situation: Stairs to enter and within home; Bathtub     Recommendations for discharge: Home with A from  initially for IADLs, such as driving, and possible home RN for med mgmt per plan established by the occupational Therapist     Rationale for recommendations: Pt limited by impaired cognition and safety, decreased activity tolerance, and anxiety; thus, OT will continue with intervention during hospitalization to ensure safety with ADL/IADLs. Pt has A from  upon return home         Entered by: Grisel Arguelles 05/02/2019 8:05 AM

## 2019-05-02 NOTE — PLAN OF CARE
Pt A&Ox4, VSS on RA, up ind/SBA in room. Diarrhea x3-4 minimum. Clear liquid diet started at 0000. Bowel prep to start at 1500 later today to do colonoscopy and endoscopy tomorrow. Consults with GI, Endoscopy. Next steps to be determined by test results.

## 2019-05-02 NOTE — PLAN OF CARE
Oriented but forgetful.  IV leaking and removed.  IV fluids stopped as pt is drinking oral liquids adequately.  Ambulated to bathroom with standby assistance.  Faint BS, No BM this shift.  Tolerating clear liquid diet.  Metoprolol held for hypotension.  Telemetry A fib with CVR.  Plan to begin golytely at 1500 for colonoscopy in AM.  Nursing will continue to monitor.

## 2019-05-02 NOTE — PROGRESS NOTES
"GASTROENTEROLOGY PROGRESS NOTE     SUBJECTIVE: Has started prep. Having some numbness in fingertips without any other neuro changes. No other complaints.     OBJECTIVE:   /67 (BP Location: Right arm)   Pulse 68   Temp 97  F (36.1  C) (Oral)   Resp 16   Ht 1.638 m (5' 4.5\")   Wt 75.7 kg (166 lb 12.8 oz)   SpO2 98%   BMI 28.19 kg/m     Temp (24hrs), Av.7  F (35.9  C), Min:95.9  F (35.5  C), Max:97.3  F (36.3  C)     Patient Vitals for the past 72 hrs:   Weight   19 0637 75.7 kg (166 lb 12.8 oz)   19 0601 74.4 kg (164 lb 0.4 oz)   19 1610 73.1 kg (161 lb 3.2 oz)        Intake/Output Summary (Last 24 hours) at 2019 1744  Last data filed at 2019 1500  Gross per 24 hour   Intake 2008 ml   Output 100 ml   Net 1908 ml      PHYSICAL EXAM   Constitutional: Healthy, in bed, no acute distress  Cardiovascular: Regular rate and rhythm. No murmurs rubs or gallops  Respiratory: Clear to auscultation bilaterally  Abdomen: Soft, non-tender, non-distended, normally active bowel sounds. No guarding or rebound tenderness.    Additional Comments:   ROS, FH, SH: See initial GI consult for details.     I have reviewed the patient's new clinical lab results:   Recent Labs   Lab Test 19  0818 19  0744 19  0746 19  1343 17  0743  17  1212  17  0500   WBC  --   --  7.5 9.5  --   --   --   --  8.6   HGB  --   --  13.3 16.2* 13.0  --   --   --  10.7*   MCV  --   --  96 95  --   --   --   --  96   PLT  --   --  201 234  --   --  202  --  260   INR 2.69* Canceled, Test credited 2.73* 2.24*  --    < >  --    < >  --     < > = values in this interval not displayed.      Recent Labs   Lab Test 19  0744 19  0746 19  1624 19  1343    138 135 133   POTASSIUM 3.4 3.3* 3.4 3.1*   CHLORIDE 117* 110*  --  101   CO2 19* 21  --  20   BUN 24 33*  --  41*   CR 0.79 1.11* 1.37* 1.64*   ANIONGAP 8 7  --  12   ALFIE 8.4* 8.5  --  9.0      Recent Labs "   Lab Test 04/30/19  1343 11/20/17  0743 11/13/17  0525  11/06/17  0920  11/06/17  0440 11/05/17  2315  10/25/17  1039 06/20/14  1720   ALBUMIN 4.0 3.0* 2.5*   < >  --    < > 2.2* 2.9*   < >  --   --    BILITOTAL 1.0 1.0 1.1   < >  --    < > 2.3* 1.8*   < >  --   --    ALT 33 99* 385*   < >  --    < > 1,890* 769*   < >  --   --    AST 23 71* 197*   < >  --    < > 3,720* 1,480*   < >  --   --    ALKPHOS 68 144 256*   < >  --    < > 314* 405*   < >  --   --    PROTEIN  --   --   --   --  100*  --   --   --   --  30* Negative   LIPASE  --   --   --   --   --   --  189 455*  --   --   --     < > = values in this interval not displayed.          Assessment:    Dysphagia. Liquids and solids. DDx stricture, EoE, reflux esophagitis, malignancy, altered motility, etc.  Chronic diarrhea. DDx is broad, microscopic colitis, Crohn's, UC, functional, bacterial overgrowth, med-induced, etc.       Plan:    -Prep tonight  -NPO at midnight  -EGD/Colonoscopy tomorrow  -Check celiac panel, Vitamin B12, thyroid function     Ama Parson [facility]  Cell: [phone]  Monday through Friday [id number]  Office: [phone]

## 2019-05-03 ENCOUNTER — ANESTHESIA (OUTPATIENT)
Dept: GASTROENTEROLOGY | Facility: CLINIC | Age: 79
DRG: 683 | End: 2019-05-03
Payer: COMMERCIAL

## 2019-05-03 ENCOUNTER — APPOINTMENT (OUTPATIENT)
Dept: OCCUPATIONAL THERAPY | Facility: CLINIC | Age: 79
DRG: 683 | End: 2019-05-03
Payer: COMMERCIAL

## 2019-05-03 ENCOUNTER — ANESTHESIA EVENT (OUTPATIENT)
Dept: GASTROENTEROLOGY | Facility: CLINIC | Age: 79
DRG: 683 | End: 2019-05-03
Payer: COMMERCIAL

## 2019-05-03 VITALS
HEART RATE: 95 BPM | BODY MASS INDEX: 27.96 KG/M2 | DIASTOLIC BLOOD PRESSURE: 43 MMHG | RESPIRATION RATE: 19 BRPM | HEIGHT: 65 IN | OXYGEN SATURATION: 99 % | SYSTOLIC BLOOD PRESSURE: 96 MMHG | WEIGHT: 167.8 LBS | TEMPERATURE: 96.3 F

## 2019-05-03 LAB
COLONOSCOPY: NORMAL
IGA SERPL-MCNC: 312 MG/DL (ref 70–380)
INR PPP: 2.49 (ref 0.86–1.14)
UPPER GI ENDOSCOPY: NORMAL
VIT B12 SERPL-MCNC: 670 PG/ML (ref 193–986)

## 2019-05-03 PROCEDURE — 37000008 ZZH ANESTHESIA TECHNICAL FEE, 1ST 30 MIN: Performed by: INTERNAL MEDICINE

## 2019-05-03 PROCEDURE — 83516 IMMUNOASSAY NONANTIBODY: CPT | Performed by: HOSPITALIST

## 2019-05-03 PROCEDURE — 85610 PROTHROMBIN TIME: CPT | Performed by: HOSPITALIST

## 2019-05-03 PROCEDURE — 25000125 ZZHC RX 250: Performed by: NURSE ANESTHETIST, CERTIFIED REGISTERED

## 2019-05-03 PROCEDURE — 37000009 ZZH ANESTHESIA TECHNICAL FEE, EACH ADDTL 15 MIN: Performed by: INTERNAL MEDICINE

## 2019-05-03 PROCEDURE — 43239 EGD BIOPSY SINGLE/MULTIPLE: CPT | Performed by: INTERNAL MEDICINE

## 2019-05-03 PROCEDURE — 86256 FLUORESCENT ANTIBODY TITER: CPT | Performed by: HOSPITALIST

## 2019-05-03 PROCEDURE — 45380 COLONOSCOPY AND BIOPSY: CPT | Performed by: INTERNAL MEDICINE

## 2019-05-03 PROCEDURE — 81376 HLA II TYPING 1 LOCUS LR: CPT | Performed by: HOSPITALIST

## 2019-05-03 PROCEDURE — 97535 SELF CARE MNGMENT TRAINING: CPT | Mod: GO | Performed by: OCCUPATIONAL THERAPY ASSISTANT

## 2019-05-03 PROCEDURE — 99239 HOSP IP/OBS DSCHRG MGMT >30: CPT | Performed by: HOSPITALIST

## 2019-05-03 PROCEDURE — 40000010 ZZH STATISTIC ANES STAT CODE-CRNA PER MINUTE: Performed by: INTERNAL MEDICINE

## 2019-05-03 PROCEDURE — 25000132 ZZH RX MED GY IP 250 OP 250 PS 637: Performed by: HOSPITALIST

## 2019-05-03 PROCEDURE — 88305 TISSUE EXAM BY PATHOLOGIST: CPT | Performed by: INTERNAL MEDICINE

## 2019-05-03 PROCEDURE — 25800030 ZZH RX IP 258 OP 636: Performed by: NURSE ANESTHETIST, CERTIFIED REGISTERED

## 2019-05-03 PROCEDURE — 82607 VITAMIN B-12: CPT | Performed by: HOSPITALIST

## 2019-05-03 PROCEDURE — 36415 COLL VENOUS BLD VENIPUNCTURE: CPT | Performed by: HOSPITALIST

## 2019-05-03 PROCEDURE — 25000128 H RX IP 250 OP 636: Performed by: NURSE ANESTHETIST, CERTIFIED REGISTERED

## 2019-05-03 PROCEDURE — 82784 ASSAY IGA/IGD/IGG/IGM EACH: CPT | Performed by: HOSPITALIST

## 2019-05-03 RX ORDER — NALOXONE HYDROCHLORIDE 0.4 MG/ML
.1-.4 INJECTION, SOLUTION INTRAMUSCULAR; INTRAVENOUS; SUBCUTANEOUS
Status: DISCONTINUED | OUTPATIENT
Start: 2019-05-03 | End: 2019-05-03 | Stop reason: HOSPADM

## 2019-05-03 RX ORDER — SODIUM CHLORIDE, SODIUM LACTATE, POTASSIUM CHLORIDE, CALCIUM CHLORIDE 600; 310; 30; 20 MG/100ML; MG/100ML; MG/100ML; MG/100ML
INJECTION, SOLUTION INTRAVENOUS CONTINUOUS PRN
Status: DISCONTINUED | OUTPATIENT
Start: 2019-05-03 | End: 2019-05-03

## 2019-05-03 RX ORDER — ONDANSETRON 2 MG/ML
INJECTION INTRAMUSCULAR; INTRAVENOUS PRN
Status: DISCONTINUED | OUTPATIENT
Start: 2019-05-03 | End: 2019-05-03

## 2019-05-03 RX ORDER — LIDOCAINE 40 MG/G
CREAM TOPICAL
Status: DISCONTINUED | OUTPATIENT
Start: 2019-05-03 | End: 2019-05-03 | Stop reason: HOSPADM

## 2019-05-03 RX ORDER — PROPOFOL 10 MG/ML
INJECTION, EMULSION INTRAVENOUS PRN
Status: DISCONTINUED | OUTPATIENT
Start: 2019-05-03 | End: 2019-05-03

## 2019-05-03 RX ORDER — ASPIRIN 81 MG/1
81 TABLET, CHEWABLE ORAL DAILY
Qty: 36 TABLET | Refills: 0 | COMMUNITY
Start: 2019-05-10 | End: 2019-07-16

## 2019-05-03 RX ORDER — LIDOCAINE HYDROCHLORIDE 20 MG/ML
INJECTION, SOLUTION INFILTRATION; PERINEURAL PRN
Status: DISCONTINUED | OUTPATIENT
Start: 2019-05-03 | End: 2019-05-03

## 2019-05-03 RX ORDER — WARFARIN SODIUM 1 MG/1
1 TABLET ORAL
Status: COMPLETED | OUTPATIENT
Start: 2019-05-03 | End: 2019-05-03

## 2019-05-03 RX ORDER — FLUMAZENIL 0.1 MG/ML
0.2 INJECTION, SOLUTION INTRAVENOUS
Status: DISCONTINUED | OUTPATIENT
Start: 2019-05-03 | End: 2019-05-03 | Stop reason: HOSPADM

## 2019-05-03 RX ORDER — PROPOFOL 10 MG/ML
INJECTION, EMULSION INTRAVENOUS CONTINUOUS PRN
Status: DISCONTINUED | OUTPATIENT
Start: 2019-05-03 | End: 2019-05-03

## 2019-05-03 RX ADMIN — SODIUM CHLORIDE, POTASSIUM CHLORIDE, SODIUM LACTATE AND CALCIUM CHLORIDE: 600; 310; 30; 20 INJECTION, SOLUTION INTRAVENOUS at 10:58

## 2019-05-03 RX ADMIN — PHENYLEPHRINE HYDROCHLORIDE 100 MCG: 10 INJECTION, SOLUTION INTRAMUSCULAR; INTRAVENOUS; SUBCUTANEOUS at 11:34

## 2019-05-03 RX ADMIN — DIGOXIN 125 MCG: 0.12 TABLET ORAL at 13:42

## 2019-05-03 RX ADMIN — TOPICAL ANESTHETIC 4 SPRAY: 200 SPRAY DENTAL; PERIODONTAL at 10:58

## 2019-05-03 RX ADMIN — Medication 1 CAPSULE: at 13:42

## 2019-05-03 RX ADMIN — ONDANSETRON 4 MG: 2 INJECTION INTRAMUSCULAR; INTRAVENOUS at 11:07

## 2019-05-03 RX ADMIN — PROPOFOL 150 MCG/KG/MIN: 10 INJECTION, EMULSION INTRAVENOUS at 11:01

## 2019-05-03 RX ADMIN — LIDOCAINE HYDROCHLORIDE 100 MG: 20 INJECTION, SOLUTION INFILTRATION; PERINEURAL at 11:02

## 2019-05-03 RX ADMIN — WARFARIN SODIUM 1 MG: 1 TABLET ORAL at 16:52

## 2019-05-03 RX ADMIN — PAROXETINE HYDROCHLORIDE 30 MG: 30 TABLET, FILM COATED ORAL at 13:42

## 2019-05-03 RX ADMIN — PHENYLEPHRINE HYDROCHLORIDE 100 MCG: 10 INJECTION, SOLUTION INTRAMUSCULAR; INTRAVENOUS; SUBCUTANEOUS at 11:26

## 2019-05-03 RX ADMIN — PROPOFOL 40 MG: 10 INJECTION, EMULSION INTRAVENOUS at 11:01

## 2019-05-03 ASSESSMENT — ENCOUNTER SYMPTOMS
DYSRHYTHMIAS: 1
SEIZURES: 0

## 2019-05-03 ASSESSMENT — MIFFLIN-ST. JEOR: SCORE: 1244.08

## 2019-05-03 ASSESSMENT — ACTIVITIES OF DAILY LIVING (ADL)
ADLS_ACUITY_SCORE: 14

## 2019-05-03 NOTE — DISCHARGE SUMMARY
[facility]    Discharge Summary  Hospitalist    Date of Admission:  4/30/2019  Date of Discharge:  5/3/2019  Discharging Provider: Stan Vargas MD  Date of Service (when I saw the patient): 05/03/19    Discharge Diagnoses     Acute kidney injury, stage II-III, likely secondary to dehydration    Hypokalemia    Dysphagia    Acute on chronic diarrhea    GERD and dysphagia    HFpEF, HTN    Chronic Atrial fibrillation    Anxiety      History of Present Illness   Em Richmond is a 78 year old female with HFpEF on diuretic, HTN, GERD was brought to the ER due to diarrhea, and also shortness of breath.  Noted to have acute kidney injury and was admitted 4/30/2019 for further evaluation and treatment.    Hospital Course   Em Richmond was admitted on 4/30/2019.  The following problems were addressed during her hospitalization:     Acute kidney injury, stage II-III, likely secondary to dehydration: Patient with CHF on lasix 40 mg daily and Aldactone, presented with elevated creatinine at 1.6, baseline 0.7. Multiple episodes of diarrhea over the 5 days PTA but slowly improving, nausea, abdominal pain, decreased appetite, weakness.  Patient denied chest pain, blood in the stool or urine, vomiting, rash, fever, sore throat, headache.  Patient was brought in due to worsening fatigue and shortness of breath and feeling more tired than normal.  Lab also consistent with hemoconcentration  -FENa 0.1, clinically dehydrated, PTA Lasix and losartan held, hydrated with IVF, and Cr trended down to normal.  -Creatinine has improved. 1.64-->1.37-->1.11-->0.79  -Resuming PTA Lasix and aldactone as diarrhea is better (has chronic diarrhea that was acutely worsened) and PO intake is adequate. Needs BMP within a week to ensure renal function does not deteriorate.      Hypokalemia  -Potassium replaced       Shortness of breath: Patient with subjective dyspnea suspect due to dehydration and fatigue. Denies chest  pain. Lungs sound clear. No wheezing or crackles. Remains on room air.  Patient with 96 to 99% oximetry without oxygen on admission.  -Pulmonary hygiene.     Diarrhea, chronic: Patient reports diarrhea for approx 1 year, two loose BM almost every day, worse just prior to admission.  -C. difficile negative.  -GI consulted given dysphagia as well. Ordered multiple serological tests as below, pending at this time, will be followed by MYESHA TEJEDA. She was given outpatient follow up.  -GI recommended metamucil and PRN immodium      GERD  Dysphagia    -Patient with long-standing history of GERD, continued on PTA Omeprazole.   -Reports food sticking in her esophagus.  -GI consulted for evaluation, s/p EGD, no abnormal findings. Follow up at esophageal clinic.      Cardiac, CHF, HTN, Cardiomyopathy: 2D echo from June 2018 shows LVEF of 50-55%.  Patient maintained on ASA, Digoxin, Furosemide, and metoprolol, and losartan prior to admission.   - PTA ASA 81mg, Digoxin and metoprolol resumed. GI recommended holding ASA for a week.   - digoxin level therapeutic.  - resuming diuretic and aldactone as above.      Atrial fibrillation, tonic  -PTA warfarin continued, pharmacy to dose Coumadin.  -On Afib but rate controlled with metoprolol and digoxin.     Anxiety  - Continued PTA paroxetine    Stan Vargas MD  Hospitalist    Significant Results and Procedures   Labs  EGD and Colonoscopy    Pending Results   These results will be followed up by MYESHA TEJEDA  Unresulted Labs Ordered in the Past 30 Days of this Admission     Date and Time Order Name Status Description    5/3/2019 1116 Surgical pathology exam In process     5/3/2019 0000 HLA DQB1 for Celiac Disease In process     5/3/2019 0000 Endomysial Antibody IgA by IFA In process     5/3/2019 0000 Tissue transglutaminase naomie IgA and IgG In process     5/3/2019 0000 Deamidated Gliadin Peptide Naomie IgA IgG In process           Code Status   Full Code       Primary Care Physician   Arielle  Nicole Leger    Constitutional: Alert, awake. Up in bed.  Not in distress.   HEENT: PERRLA EOMI  Respiratory: Bilateral equal air entry, clear to auscultation. No respiratory distress.  Cardiovascular:  s1s2 irregular, soft systolic murmur 2/6 noted. No tachycardia. Trace edema.   GI: Soft, obese/ distended, non tender, bowel tones active.  Skin/Integumen: No rash, no blister.  Neuro/Psych: Alert awake oriented x3, nonfocal.         Discharge Disposition   Discharged to home  Condition at discharge: Stable    Consultations This Hospital Stay   PHYSICAL THERAPY ADULT IP CONSULT  OCCUPATIONAL THERAPY ADULT IP CONSULT  PHARMACY TO DOSE WARFARIN  CARE TRANSITION RN/SW IP CONSULT  GASTROENTEROLOGY IP CONSULT    Time Spent on this Encounter   IStan, personally saw the patient today and spent greater than 30 minutes discharging this patient.    Discharge Orders      Reason for your hospital stay    Acute kidney injury, diarrhea.     Activity    Your activity upon discharge: activity as tolerated     Follow-up and recommended labs and tests     Follow up with primary care provider, Arielle Leger, within 7 days to evaluate medication change and for hospital follow- up.  The following labs/tests are recommended: INR 5/6.  BMP 1 week.  Thyroid function test in 4 weeks.     Full Code     Diet    Follow this diet upon discharge: Orders Placed This Encounter      Low Saturated Fat Na <2400 mg     Discharge Medications   Current Discharge Medication List      START taking these medications    Details   psyllium (METAMUCIL SMOOTH TEXTURE) 28 % packet 1 packet BID.    Associated Diagnoses: Diarrhea, unspecified type         CONTINUE these medications which have CHANGED    Details   aspirin (ASA) 81 MG chewable tablet Take 1 tablet (81 mg) by mouth daily  Qty: 36 tablet, Refills: 0    Comments: Resume after 1 week.  Associated Diagnoses: Acute respiratory failure with hypoxia (H)         CONTINUE these  medications which have NOT CHANGED    Details   carboxymethylcellulose PF (REFRESH PLUS) 0.5 % SOLN ophthalmic solution Place 1 drop into both eyes 3 times daily as needed for dry eyes      Cholecalciferol (VITAMIN D3 PO) Take 2,000 Units by mouth daily       cyanocobalamin (VITAMIN B-12) 1000 MCG tablet Take 1,000 mcg by mouth daily      digoxin (LANOXIN) 125 MCG tablet Take 1 tablet (125 mcg) by mouth daily  Qty: 90 tablet, Refills: 3    Associated Diagnoses: Atrial fibrillation with rapid ventricular response (H)      furosemide (LASIX) 40 MG tablet Take 1 tablet (40 mg) by mouth daily  Qty: 90 tablet, Refills: 0    Associated Diagnoses: Acute combined systolic and diastolic CHF, NYHA class 3 (H)      Lactobacillus (PROBIOTIC ACIDOPHILUS) TABS Take 1 tablet by mouth daily       loperamide (IMODIUM A-D) 2 MG capsule Take 2 mg by mouth as needed for diarrhea      metoprolol succinate ER (TOPROL-XL) 50 MG 24 hr tablet Take 1 tablet (50 mg) by mouth 2 times daily  Qty: 180 tablet, Refills: 0    Associated Diagnoses: Persistent atrial fibrillation (H)      Multiple vitamin  s TABS Take 1 tablet by mouth daily       omeprazole (PRILOSEC) 20 MG DR capsule Take 20 mg by mouth daily as needed (heartburn)       PARoxetine (PAXIL) 30 MG tablet Take 30 mg by mouth daily       warfarin (COUMADIN) 2.5 MG tablet Take two tablets (5 mg) by mouth on Tuesday and Friday. Take one tablet (2.5 mg) all other days of the week.           Allergies   Allergies   Allergen Reactions     Lisinopril Diarrhea     Sulfa Drugs      Data   Most Recent 3 CBC's:  Recent Labs   Lab Test 05/01/19  0746 04/30/19  1343 11/20/17  0743 11/11/17  1212 11/08/17  0500   WBC 7.5 9.5  --   --  8.6   HGB 13.3 16.2* 13.0  --  10.7*   MCV 96 95  --   --  96    234  --  202 260      Most Recent 3 BMP's:  Recent Labs   Lab Test 05/02/19  0744 05/01/19  0746 04/30/19  1624 04/30/19  1343    138 135 133   POTASSIUM 3.4 3.3* 3.4 3.1*   CHLORIDE 117*  110*  --  101   CO2 19* 21  --  20   BUN 24 33*  --  41*   CR 0.79 1.11* 1.37* 1.64*   ANIONGAP 8 7  --  12   ALFIE 8.4* 8.5  --  9.0   * 130*  --  158*     Most Recent 2 LFT's:  Recent Labs   Lab Test 04/30/19  1343 11/20/17  0743   AST 23 71*   ALT 33 99*   ALKPHOS 68 144   BILITOTAL 1.0 1.0     Most Recent INR's and Anticoagulation Dosing History:  Anticoagulation Dose History     Recent Dosing and Labs Latest Ref Rng & Units 11/12/2017 11/13/2017 4/30/2019 5/1/2019 5/2/2019 5/2/2019 5/3/2019    Warfarin 2.5 mg - 2.5 mg - 2.5 mg 2.5 mg - - -    INR 0.86 - 1.14 2.62(H) 2.57(H) 2.24(H) 2.73(H) Canceled, Test credited 2.69(H) 2.49(H)        Most Recent 3 Troponin's:  Recent Labs   Lab Test 04/30/19  1343 11/06/17  0440 11/05/17  2315   TROPI <0.015 0.020 <0.015     Most Recent Cholesterol Panel:  Recent Labs   Lab Test 12/18/18  1013   CHOL 209*   *   HDL 41*   TRIG 288*     Most Recent 6 Bacteria Isolates From Any Culture (See EPIC Reports for Culture Details):  Recent Labs   Lab Test 11/06/17  0139 11/05/17  2315 10/25/17  1039   CULT No growth No growth >100,000 colonies/mL  Klebsiella pneumoniae  *     Most Recent TSH, T4 and A1c Labs:  Recent Labs   Lab Test 05/02/19  0744 05/01/19  0746   TSH 0.03*  --    T4  --  1.06     Results for orders placed or performed during the hospital encounter of 04/30/19   Chest XR,  PA & LAT    Narrative    CHEST TWO VIEWS  4/30/2019 2:04 PM     HISTORY:  Shortness of breath.    COMPARISON: 11/5/2017    FINDINGS: Heart size and pulmonary vascularity are within normal  limits. The lungs are clear. No pneumothorax or pleural effusion.       Impression    IMPRESSION: No radiographic evidence of acute chest abnormality.     SHARON VERAS MD

## 2019-05-03 NOTE — PROVIDER NOTIFICATION
MD Notification    Notified Person: MD    Notified Person Name: Dr. Vargas    Notification Date/Time: 5/3/2019 at 1615    Notification Interaction: Web page    Purpose of Notification: Patient hoping to discharge at 1700. Please sign discharge order if appropriate.    Orders Received:    Comments:

## 2019-05-03 NOTE — PROGRESS NOTES
Met with patient regarding OT/PT recommendation for HHC RN (Medication management). She wishes to wait until after procedures today to discuss with . She is not sure regarding homebound status. She would like to use [facility] if discharging with HHC RN.  CTS RN  will follow over weekend

## 2019-05-03 NOTE — ANESTHESIA CARE TRANSFER NOTE
Patient: Em Richmond    Procedure(s):  COLONOSCOPY  ESOPHAGOGASTRODUODENOSCOPY, WITH BIOPSY    Diagnosis: Dysphasia & Diarrhea  Diagnosis Additional Information: No value filed.    Anesthesia Type:   MAC     Note:  Airway :Room Air  Patient transferred to:PACU  Comments: Anesthesia Care Note    Patient: Em Richmond    Transferred to: PACU    Patient vital signs: Stable    Airway: None    Monitors placed. VSS. PIV patent. No change in dentition. Report given to NATHALY.      Анна Mckinney CRNA   5/3/2019  Handoff Report: Identifed the Patient, Identified the Reponsible Provider, Reviewed the pertinent medical history, Discussed the surgical course, Reviewed Intra-OP anesthesia mangement and issues during anesthesia, Set expectations for post-procedure period and Allowed opportunity for questions and acknowledgement of understanding      Vitals: (Last set prior to Anesthesia Care Transfer)    CRNA VITALS  5/3/2019 1114 - 5/3/2019 1147      5/3/2019             Pulse:  122    SpO2:  98 %    Resp Rate (set):  10                Electronically Signed By: DOMINIK Cowan CRNA  May 3, 2019  11:47 AM

## 2019-05-03 NOTE — PLAN OF CARE
Discharge Planner OT   Patient plan for discharge: Home     Current status:  pt independent supine to/from sit EOB, pt participated with medication management assessment, pt required several cues to follow instructions on pill bottle and to setup accordingly for one week, pt had difficulty with twice daily, PRN and every other day, pt was able to self correct one every other day medication recommend pt have assist with completing medication setup to ensure accuracy for safety in taking meds.   Barriers to return to prior living situation: Stairs to enter and within home; Bathtub     Recommendations for discharge: Home with A from  initially for IADLs, such as driving, and possible home RN for med mgmt per plan established by the occupational Therapist     Rationale for recommendations: Pt limited by impaired cognition and safety, decreased activity tolerance, and anxiety; thus, OT will continue with intervention during hospitalization to ensure safety with ADL/IADLs. Pt has A from  upon return home            Entered by: Grisel Arguelles 05/03/2019 10:04 AM

## 2019-05-03 NOTE — PLAN OF CARE
A/Ox4. VSS, slight bradycardia-held metoprolol. Up independently in room. Denies pain. BS active. Completed bowel prep, output clear/yellow. Plan for edg/colonoscopy today.

## 2019-05-03 NOTE — PLAN OF CARE
Oriented x4 but forgetful.  VSS.  No BM this shift.  Borderline tachycardic.  NPO this morning for colonoscopy with Dr. Dumont which showed some diverticulitis and a small hiatal hernia.  Biopsies taken and plan for pt to follow up with GI as outpatient.  Advanced to low fat, low sodium diet and if tolerates well can discharge to home today.  Ambulated independently within room.   at bedside.  Nursing will continue to monitor.

## 2019-05-03 NOTE — ANESTHESIA PREPROCEDURE EVALUATION
Anesthesia Pre-Procedure Evaluation    Patient: Em Richmond   MRN: [id number] : 1940          Preoperative Diagnosis: Dysphasia & Diarrhea    Procedure(s):  COLONOSCOPY  ESOPHAGOGASTRODUODENOSCOPY (EGD)    Past Medical History:   Diagnosis Date     Acute combined systolic and diastolic CHF, NYHA class 3 (H) 10/24/2017     Atrial fibrillation with rapid ventricular response (H) 10/20/2017     Cardiogenic shock (H) 2017     Essential hypertension 10/24/2017     GERD (gastroesophageal reflux disease)      Idiopathic cardiomyopathy (H) 2017     Nonischemic cardiomyopathy (H) 2017     Syncope 2014     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 RHC: mild PHTN/NL wedge/ low CI.        Anesthesia Evaluation     . Pt has had prior anesthetic.     No history of anesthetic complications          ROS/MED HX    ENT/Pulmonary:     (+)sleep apnea, doesn't use CPAP , . .    Neurologic:      (-) seizures and CVA   Cardiovascular:     (+) Dyslipidemia, hypertension----. Taking blood thinners : . CHF . fainting (syncope). :. dysrhythmias a-fib, . Previous cardiac testing Echodate:2018results:Left Ventricle  The left ventricle is normal in size. There is normal left ventricular wall  thickness. The visual ejection fraction is estimated at 50-55%. Left  ventricular systolic function is mildly reduced. Normal left ventricular wall  motion.     Right Ventricle  The right ventricle is normal size. The right ventricular systolic function is  borderline reduced.     Atria  The left atrium is moderately dilated. Borderline right atrial enlargement.     Mitral Valve  There is trace mitral regurgitation.        Tricuspid Valve  There is mild (1+) tricuspid regurgitation. The right ventricular systolic  pressure is approximated at 22.5 mmHg plus the right  atrial pressure.     Aortic Valve  The aortic valve is trileaflet with aortic valve sclerosis.     Pericardium  The pericardium appears normal.     Rhythm  The rhythm was atrial fibrillation with controlled ventricular rate at rest.date: results: date: results: date: results:          METS/Exercise Tolerance:     Hematologic:         Musculoskeletal:         GI/Hepatic:     (+) GERD Asymptomatic on medication,      (-) liver disease   Renal/Genitourinary:     (+) chronic renal disease, type: CRI,       Endo:      (-) Type II DM and thyroid disease   Psychiatric:         Infectious Disease:         Malignancy:         Other:                          Physical Exam  Normal systems: cardiovascular, pulmonary and dental    Airway   Mallampati: II  TM distance: >3 FB  Neck ROM: full    Dental     Cardiovascular       Pulmonary             Lab Results   Component Value Date    WBC 7.5 05/01/2019    HGB 13.3 05/01/2019    HCT 38.0 05/01/2019     05/01/2019     05/02/2019    POTASSIUM 3.4 05/02/2019    CHLORIDE 117 (H) 05/02/2019    CO2 19 (L) 05/02/2019    BUN 24 05/02/2019    CR 0.79 05/02/2019     (H) 05/02/2019    ALFIE 8.4 (L) 05/02/2019    PHOS 3.7 11/13/2017    MAG 2.3 05/02/2019    ALBUMIN 4.0 04/30/2019    PROTTOTAL 8.5 04/30/2019    ALT 33 04/30/2019    AST 23 04/30/2019    ALKPHOS 68 04/30/2019    BILITOTAL 1.0 04/30/2019    LIPASE 189 11/06/2017    BETH 72 (H) 11/06/2017    PTT 47 (H) 11/05/2017    INR 2.49 (H) 05/03/2019    TSH 0.03 (L) 05/02/2019    T4 1.06 05/01/2019       Preop Vitals  BP Readings from Last 3 Encounters:   05/03/19 120/84   10/23/18 102/62   10/23/18 130/79    Pulse Readings from Last 3 Encounters:   05/03/19 89   10/23/18 86   10/23/18 75      Resp Readings from Last 3 Encounters:   05/03/19 16   07/31/18 15   05/31/18 12    SpO2 Readings from Last 3 Encounters:   05/03/19 97%   10/23/18 95%   10/23/18 100%      Temp Readings from Last 1 Encounters:   05/03/19 37.2  C (99  " F) (Oral)    Ht Readings from Last 1 Encounters:   04/30/19 1.638 m (5' 4.5\")      Wt Readings from Last 1 Encounters:   05/03/19 76.1 kg (167 lb 12.8 oz)    Estimated body mass index is 28.36 kg/m  as calculated from the following:    Height as of this encounter: 1.638 m (5' 4.5\").    Weight as of this encounter: 76.1 kg (167 lb 12.8 oz).       Anesthesia Plan      History & Physical Review  History and physical reviewed and following examination; no interval change.    ASA Status:  3 .    NPO Status:  > 8 hours    Plan for MAC Reason for MAC:  Deep or markedly invasive procedure (G8)  PONV prophylaxis:  Ondansetron (or other 5HT-3)       Postoperative Care      Consents  Anesthetic plan, risks, benefits and alternatives discussed with:  Patient..                 Sanjay Ernandez MD  "

## 2019-05-03 NOTE — ANESTHESIA POSTPROCEDURE EVALUATION
Patient: Em Richmond    Procedure(s):  COLONOSCOPY, WITH POLYPECTOMY AND BIOPSY  ESOPHAGOGASTRODUODENOSCOPY, WITH BIOPSY    Diagnosis:Dysphasia & Diarrhea  Diagnosis Additional Information: No value filed.    Anesthesia Type:  MAC    Note:  Anesthesia Post Evaluation    Patient location during evaluation: PACU  Patient participation: Able to fully participate in evaluation  Level of consciousness: awake and alert  Pain management: adequate  Airway patency: patent  Cardiovascular status: acceptable and hemodynamically stable  Respiratory status: acceptable and unassisted  Hydration status: acceptable  PONV: none             Last vitals:  Vitals:    05/03/19 1240 05/03/19 1340 05/03/19 1606   BP: 101/61 98/48 96/43   Pulse: 95     Resp:  20 19   Temp:  36.3  C (97.4  F) 35.7  C (96.3  F)   SpO2: 99% 91% 99%         Electronically Signed By: Giovani Rojo DO  May 3, 2019  4:24 PM

## 2019-05-04 NOTE — PLAN OF CARE
[id number]: A&O, but forgetful. Up in room independently. Low fat/low sodium diet, tolerating.  at bedside. Discharge instructions discussed with patient and spouse. Personal belongings with patient.  to provide transportation home.    Patient left floor at about 1830 via wheelchair escort.

## 2019-05-04 NOTE — PLAN OF CARE
Occupational Therapy Discharge Summary    Reason for therapy discharge:    Discharged to home with home therapy.    Progress towards therapy goal(s). See goals on Care Plan in [facility] electronic health record for goal details.  Goals not met.  Barriers to achieving goals:   discharge from facility.    Therapy recommendation(s):    Continued therapy is recommended.  Rationale/Recommendations:  Home with A from  initially for IADLs, such as driving, and possible home RN for med mgmt per plan established by the occupational Therapist.

## 2019-05-05 LAB — ENDOMYSIUM IGA TITR SER IF: NORMAL {TITER}

## 2019-05-06 LAB
COPATH REPORT: NORMAL
GLIADIN IGA SER-ACNC: 1 U/ML
GLIADIN IGG SER-ACNC: 1 U/ML
TTG IGA SER-ACNC: <1 U/ML
TTG IGG SER-ACNC: 1 U/ML

## 2019-05-08 LAB
DQA1*LOCUS: NORMAL
DQB1* LOCUS: NORMAL
DQB1*: NORMAL
DRSSO COMMENTS: NORMAL
DRSSO TEST METHOD: NORMAL

## 2019-05-16 ENCOUNTER — OFFICE VISIT (OUTPATIENT)
Dept: CARDIOLOGY | Facility: CLINIC | Age: 79
End: 2019-05-16
Attending: NURSE PRACTITIONER
Payer: COMMERCIAL

## 2019-05-16 VITALS
BODY MASS INDEX: 28.31 KG/M2 | WEIGHT: 169.9 LBS | OXYGEN SATURATION: 96 % | DIASTOLIC BLOOD PRESSURE: 72 MMHG | HEART RATE: 86 BPM | SYSTOLIC BLOOD PRESSURE: 116 MMHG | HEIGHT: 65 IN

## 2019-05-16 DIAGNOSIS — I48.91 ATRIAL FIBRILLATION WITH RAPID VENTRICULAR RESPONSE (H): ICD-10-CM

## 2019-05-16 LAB
ANION GAP SERPL CALCULATED.3IONS-SCNC: 5 MMOL/L (ref 3–14)
BUN SERPL-MCNC: 12 MG/DL (ref 7–30)
CALCIUM SERPL-MCNC: 8.4 MG/DL (ref 8.5–10.1)
CHLORIDE SERPL-SCNC: 103 MMOL/L (ref 94–109)
CO2 SERPL-SCNC: 30 MMOL/L (ref 20–32)
CREAT SERPL-MCNC: 0.86 MG/DL (ref 0.52–1.04)
GFR SERPL CREATININE-BSD FRML MDRD: 65 ML/MIN/{1.73_M2}
GLUCOSE SERPL-MCNC: 124 MG/DL (ref 70–99)
POTASSIUM SERPL-SCNC: 4 MMOL/L (ref 3.4–5.3)
SODIUM SERPL-SCNC: 138 MMOL/L (ref 133–144)

## 2019-05-16 PROCEDURE — 80048 BASIC METABOLIC PNL TOTAL CA: CPT | Performed by: NURSE PRACTITIONER

## 2019-05-16 PROCEDURE — 36415 COLL VENOUS BLD VENIPUNCTURE: CPT | Performed by: NURSE PRACTITIONER

## 2019-05-16 PROCEDURE — 99214 OFFICE O/P EST MOD 30 MIN: CPT | Performed by: NURSE PRACTITIONER

## 2019-05-16 RX ORDER — LOSARTAN POTASSIUM 25 MG/1
25 TABLET ORAL DAILY
Qty: 90 TABLET | Refills: 1 | Status: SHIPPED | OUTPATIENT
Start: 2019-05-16 | End: 2019-06-18

## 2019-05-16 ASSESSMENT — MIFFLIN-ST. JEOR: SCORE: 1253.6

## 2019-05-16 NOTE — LETTER
5/16/2019    Arielle Leger MD  [facility] [address] [address] [facility] [address]    RE: Em Richmond       Dear Colleague,    I had the pleasure of seeing Em Richmond in the [facility].    HPI and Plan:   See dictation    Orders Placed This Encounter   Procedures     Basic metabolic panel     [facility]     Follow-Up with Cardiologist     Echocardiogram Complete       Orders Placed This Encounter   Medications     NONFORMULARY     Sig: OTC potassium supplement     losartan (COZAAR) 25 MG tablet     Sig: Take 1 tablet (25 mg) by mouth daily     Dispense:  90 tablet     Refill:  1       There are no discontinued medications.      Encounter Diagnosis   Name Primary?     Atrial fibrillation with rapid ventricular response (H)        CURRENT MEDICATIONS:  Current Outpatient Medications   Medication Sig Dispense Refill     aspirin (ASA) 81 MG chewable tablet Take 1 tablet (81 mg) by mouth daily 36 tablet 0     carboxymethylcellulose PF (REFRESH PLUS) 0.5 % SOLN ophthalmic solution Place 1 drop into both eyes 3 times daily as needed for dry eyes       Cholecalciferol (VITAMIN D3 PO) Take 2,000 Units by mouth daily        cyanocobalamin (VITAMIN B-12) 1000 MCG tablet Take 1,000 mcg by mouth daily       digoxin (LANOXIN) 125 MCG tablet Take 1 tablet (125 mcg) by mouth daily 90 tablet 3     furosemide (LASIX) 40 MG tablet Take 1 tablet (40 mg) by mouth daily 90 tablet 0     Lactobacillus (PROBIOTIC ACIDOPHILUS) TABS Take 1 tablet by mouth daily        loperamide (IMODIUM A-D) 2 MG capsule Take 2 mg by mouth as needed for diarrhea       losartan (COZAAR) 25 MG tablet Take 1 tablet (25 mg) by mouth daily 90 tablet 1     metoprolol succinate ER (TOPROL-XL) 50 MG 24 hr tablet Take 1 tablet (50 mg) by mouth 2 times daily 180 tablet 0     Multiple vitamin  s TABS Take 1 tablet by mouth daily        NONFORMULARY OTC potassium supplement       omeprazole (PRILOSEC) 20 MG  DR capsule Take 20 mg by mouth daily as needed (heartburn)        PARoxetine (PAXIL) 30 MG tablet Take 30 mg by mouth daily        psyllium (METAMUCIL SMOOTH TEXTURE) 28 % packet 1 packet BID.       warfarin (COUMADIN) 2.5 MG tablet Take two tablets (5 mg) by mouth on Tuesday and Friday. Take one tablet (2.5 mg) all other days of the week.         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     ESOPHAGOSCOPY, GASTROSCOPY, DUODENOSCOPY (EGD), COMBINED N/A 5/3/2019    Procedure: ESOPHAGOGASTRODUODENOSCOPY, WITH BIOPSY;  Surgeon: Juve Damon MD;  Location: [facility]     HEART CATH RIGHT AND LEFT HEART CATH  10/23/2017    Cath no significant obstructive CAD. RHC: mild PHTN/NL wedge/ low CI.        FAMILY HISTORY:  Family History   Problem Relation Age of Onset     Diabetes Mother      Other Cancer Father        SOCIAL HISTORY:  Social History     Socioeconomic History     Marital status:      Spouse name: None     Number of children: None     Years of education: None     Highest education level: None   Occupational History     None   Social Needs     Financial resource strain: None     Food insecurity:     Worry: None     Inability: None     Transportation needs:  "    Medical: None     Non-medical: None   Tobacco Use     Smoking status: Former Smoker     Types: Cigarettes     Last attempt to quit: 1965     Years since quittin.4     Smokeless tobacco: Never Used   Substance and Sexual Activity     Alcohol use: Yes     Alcohol/week: 0.0 oz     Comment: 8 oz wine daily     Drug use: No     Sexual activity: Yes     Partners: Male   Lifestyle     Physical activity:     Days per week: None     Minutes per session: None     Stress: None   Relationships     Social connections:     Talks on phone: None     Gets together: None     Attends Buddhism service: None     Active member of club or organization: None     Attends meetings of clubs or organizations: None     Relationship status: None     Intimate partner violence:     Fear of current or ex partner: None     Emotionally abused: None     Physically abused: None     Forced sexual activity: None   Other Topics Concern     Parent/sibling w/ CABG, MI or angioplasty before 65F 55M? Not Asked   Social History Narrative     None       Review of Systems:  Skin:  Negative       Eyes:  Positive for glasses    ENT:  Positive for postnasal drainage    Respiratory:  Positive for sleep apnea;CPAP;dyspnea on exertion(noncompliant w/CPAP >6 months ) DILLON rare    Cardiovascular:    Positive for;lightheadedness;edema;fatigue(under L breast, increased since last week ) edema increased, does have compressions but doesn't always wear them ; LH \"comes and goes\" ; energy \"fair\" but improving   Gastroenterology: Positive for nausea;excessive gas or bloating;diarrhea abd bloat better   Genitourinary:  Negative      Musculoskeletal:  Positive for foot pain R toe  Neurologic:  Positive for headaches afternoon time  Psychiatric:  Positive for sleep disturbances;excessive stress latey; family health issues   Heme/Lymph/Imm:  Negative      Endocrine:  Negative        Physical Exam:  Vitals: /72 (BP Location: Right arm, Patient Position: Chair, " "Cuff Size: Adult Small)   Pulse 86   Ht 1.638 m (5' 4.5\")   Wt 77.1 kg (169 lb 14.4 oz)   SpO2 96%   BMI 28.71 kg/m       Constitutional:  cooperative, alert and oriented, well developed, well nourished, in no acute distress        Skin:  warm and dry to the touch, no apparent skin lesions or masses noted          Head:  normocephalic, no masses or lesions        Eyes:  pupils equal and round;conjunctivae and lids unremarkable;sclera white;no xanthalasma        Lymph:      ENT:  no pallor or cyanosis, dentition good        Neck:  carotid pulses are full and equal bilaterally, JVP normal, no carotid bruit        Respiratory:  normal breath sounds, clear to auscultation, normal A-P diameter, normal symmetry, normal respiratory excursion, no use of accessory muscles         Cardiac: normal S1 and S2;no S3 or S4;apical impulse not displaced irregularly irregular rhythm;tachycardic   no presence of murmur          pulses full and equal, no bruits auscultated                                        GI:  abdomen soft, non-tender, BS normoactive, no mass, no HSM, no bruits        Extremities and Muscular Skeletal:  no deformities, clubbing, cyanosis, erythema observed   bilateral LE edema;1+          Neurological:  no gross motor deficits;affect appropriate        Psych:  Alert and Oriented x 3        CC  DOMINIK White CNP  [address]  [place] [address]                Thank you for allowing me to participate in the care of your patient.      Sincerely,     DOMINIK Ritchie CNP     [facility]    cc:   DOMINIK White CNP  [address]  [address]  [place] [address]        "

## 2019-05-16 NOTE — LETTER
5/16/2019      Arielle Leger MD  [facility] [address] [address] [facility] [address]      RE: Em Richmond       Dear Colleague,    I had the pleasure of seeing Em Richmond in the [facility].    Service Date: 05/16/2019      PRIMARY CARDIOLOGIST:     Den Pickard MD       PRIMARY CARE PHYSICIAN:     Arielle Leger MD      REASON FOR VISIT:     Post-hospitalization for a noncardiac issue.      HISTORY OF PRESENT ILLNESS:     I had the pleasure of seeing Em Richmond, a pleasant 78-year-old patient, who has a nonischemic cardiomyopathy.  She was admitted twice in October and 11/2018  for congestive heart failure and cardiomyopathy.  Her ejection fraction in 2014 was 60%-65%.      On 10/21/2017, her ejection fraction dropped to 30%-35% with mild to moderate tricuspid regurgitation and mild to moderate mitral insufficiency.  The patient had a 2-3 year history of chronic atrial fibrillation.  She had stopped her metoprolol and presented with rapid ventricular response.      Coronary angiography 10/23/2017 showed only mild coronary artery disease with all stenosis less than 30%.  We felt that idiopathic cardiomyopathy was possibly due to atrial fibrillation with rapid ventricular response.  We initiated beta blockade.  She continued with diuresis.  She lost 20 pounds in the hospital.  We continued to have difficulty with rate control and with beta blocker alone.  Digoxin was used while in the hospital and discontinued at discharge.  She was intubated at one point in the hospital due to shortness of breath.  She remained on warfarin without bleeding, diathesis.  We reinitiated digoxin was excellent blood pressure control.      She was referred to Dr. Freitas for poorly treated sleep apnea.  She received a new CPAP.  She received a mask that did not work for her.  Unfortunately, Dr. Freitas left the practice and she had no followup in trying to help her  find a mask that works.  Therefore, she is not using CPAP at all.  This is probably contributing to her CHF symptoms.      Echocardiogram 03/2018 showed an LVEF of 35%-40%, indicating no need for ICD.  Her heart rate remained elevated at 100 beats per minute.  After initiation of digoxin, her heart rate was better controlled in the 80s.      Repeat echocardiogram 06/2018 showed an improvement in her LVEF to 50%-55%.  Right ventricular systolic function borderline reduced as well and there was moderate left atrial enlargement.      The patient was recently admitted 04/30, presented with shortness of breath and diarrhea.  The discharge summary indicates that she was on spironolactone, but in the past year, I do not see spironolactone.  She presented with 5 days of significant diarrhea.  It appeared that she took medications.  This included Lasix.  In the hospital, Lasix and losartan were held and she was hydrated with IV fluids.  Creatinine trended down to normal.  She was hypokalemic and received potassium replacement.  She was short of breath and it was thought it was due to dehydration and fatigue.      She reported that she had diarrhea for over a year, loose BMs almost every day.  C. difficile negative.   GI consult recommended given dysphagia as well.  Ordered multiple serological tests.  She was asked to follow up with GI as an outpatient.  GI recommended Metamucil and p.r.n. Imodium.      She complained of dysphagia.  She has a longstanding history of GERD, continued on PTA omeprazole.  GI consulted for evaluation, status post EGD, no abnormal findings.      She continued to have atrial fibrillation.  Continued with metoprolol and digoxin.      She returns today in followup.  She states that she has significant peripheral edema.  She continues to have loose stools.  She continues with Imodium and Metamucil on a daily basis.  She has not made her appointment with her primary care doctor.      She complains of  shortness of breath with exertion.  She eats a general to occasional high salt diet including pizza and Chinese food.  She wears her supports socks at times.      PHYSICAL EXAMINATION:   VITAL SIGNS:  Blood pressure 116/72, pulse is 86, weight is 169 pounds.  Please see complete physical examination below.      DIAGNOSTICS:   Basic metabolic panel:  Sodium 138, potassium 4.0, BUN 12, creatinine 0.86, GFR 65.      ASSESSMENT AND PLAN:   1.  Em Richmond is a delightful 78-year-old female with evidence of idiopathic cardiomyopathy, possibly secondary to atrial fibrillation with rapid ventricular response.  She did have a cardiomyopathy with an EF of 30%-35% in 2017, improving with rate control and her medication regimen.  Losartan was held in the hospital due to her diarrhea and dehydration.  It was not restarted at discharge.  I have restarted it today at 25 mg a day.  I have asked her to follow up with Dr. Leger in 1 week.  She has not made this appointment post-hospital and was contemplating not making that the appointment.  I asked her to make the appointment and explained why this is important.  She did have a TSH in the hospital that was 0.03.  I explained to her that she needs to follow up with Dr. Leger on this as well.  She tells me that her  encouraged her to take over-the-counter potassium.  I instructed her not to take over-the-counter potassium.  I explained to her that losartan holds onto potassium and we are restarting it now.  She agrees not to take over-the-counter potassium tablets.  I have asked her to return in 1 month with an echocardiogram and a return visit.  She is due to see Dr. Pickard for her yearly visit so she will see Dr. Pickard if there is availability or myself.   2.  Atrial fibrillation.  Continue warfarin therapy.   3.  Return to see Dr. Parikh in Sleep as followup to help her with a new mask.   4.  Follow up with Dr. Leger in 1 week.      cc:   Arielle Leger MD     [facility]   [address] [place] [address]         DOMINIK ELIZABETH, CNP             D: 2019   T: 2019   MT:       Name:     RICARDO DORMAN   MRN:      [id number]        Account:      [id number]   :      1940           Service Date: 2019      Document: [id number]         Outpatient Encounter Medications as of 2019   Medication Sig Dispense Refill     aspirin (ASA) 81 MG chewable tablet Take 1 tablet (81 mg) by mouth daily 36 tablet 0     carboxymethylcellulose PF (REFRESH PLUS) 0.5 % SOLN ophthalmic solution Place 1 drop into both eyes 3 times daily as needed for dry eyes       Cholecalciferol (VITAMIN D3 PO) Take 2,000 Units by mouth daily        cyanocobalamin (VITAMIN B-12) 1000 MCG tablet Take 1,000 mcg by mouth daily       digoxin (LANOXIN) 125 MCG tablet Take 1 tablet (125 mcg) by mouth daily 90 tablet 3     furosemide (LASIX) 40 MG tablet Take 1 tablet (40 mg) by mouth daily 90 tablet 0     Lactobacillus (PROBIOTIC ACIDOPHILUS) TABS Take 1 tablet by mouth daily        loperamide (IMODIUM A-D) 2 MG capsule Take 2 mg by mouth as needed for diarrhea       losartan (COZAAR) 25 MG tablet Take 1 tablet (25 mg) by mouth daily 90 tablet 1     metoprolol succinate ER (TOPROL-XL) 50 MG 24 hr tablet Take 1 tablet (50 mg) by mouth 2 times daily 180 tablet 0     Multiple vitamin  s TABS Take 1 tablet by mouth daily        NONFORMULARY OTC potassium supplement       omeprazole (PRILOSEC) 20 MG DR capsule Take 20 mg by mouth daily as needed (heartburn)        PARoxetine (PAXIL) 30 MG tablet Take 30 mg by mouth daily        psyllium (METAMUCIL SMOOTH TEXTURE) 28 % packet 1 packet BID.       warfarin (COUMADIN) 2.5 MG tablet Take two tablets (5 mg) by mouth on Tuesday and Friday. Take one tablet (2.5 mg) all other days of the week.       No facility-administered encounter medications on file as of 2019.        Again, thank you for allowing me to  participate in the care of your patient.      Sincerely,    DOMINIK Ritchie [facility]

## 2019-05-16 NOTE — PROGRESS NOTES
Service Date: 05/16/2019      PRIMARY CARDIOLOGIST:   Den Pickard MD       PRIMARY CARE PHYSICIAN:   Arielle Leger MD      REASON FOR VISIT:   Post-hospitalization for a noncardiac issue.      HISTORY OF PRESENT ILLNESS:     I had the pleasure of seeing Em Richmond, a pleasant 78-year-old patient, who has a nonischemic cardiomyopathy.  She was admitted twice in October and 11/2018  for congestive heart failure and cardiomyopathy.  Her ejection fraction in 2014 was 60%-65%.      On 10/21/2017, her ejection fraction dropped to 30%-35% with mild to moderate tricuspid regurgitation and mild to moderate mitral insufficiency.  The patient had a 2-3 year history of chronic atrial fibrillation.  She had stopped her metoprolol and presented with rapid ventricular response.      Coronary angiography 10/23/2017 showed only mild coronary artery disease with all stenosis less than 30%.  We felt that idiopathic cardiomyopathy was possibly due to atrial fibrillation with rapid ventricular response.  We initiated beta blockade.  She continued with diuresis.  She lost 20 pounds in the hospital.  We continued to have difficulty with rate control and with beta blocker alone.  Digoxin was used while in the hospital and discontinued at discharge.  She was intubated at one point in the hospital due to shortness of breath.  She remained on warfarin without bleeding, diathesis.  We reinitiated digoxin was excellent blood pressure control.      She was referred to Dr. Freitas for poorly treated sleep apnea.  She received a new CPAP.  She received a mask that did not work for her.  Unfortunately, Dr. Freitas left the practice and she had no followup in trying to help her find a mask that works.  Therefore, she is not using CPAP at all.  This is probably contributing to her CHF symptoms.      Echocardiogram 03/2018 showed an LVEF of 35%-40%, indicating no need for ICD.  Her heart rate remained elevated at 100 beats per minute.   After initiation of digoxin, her heart rate was better controlled in the 80s.      Repeat echocardiogram 06/2018 showed an improvement in her LVEF to 50%-55%.  Right ventricular systolic function borderline reduced as well and there was moderate left atrial enlargement.      The patient was recently admitted 04/30, presented with shortness of breath and diarrhea.  The discharge summary indicates that she was on spironolactone, but in the past year, I do not see spironolactone.  She presented with 5 days of significant diarrhea.  It appeared that she took medications.  This included Lasix.  In the hospital, Lasix and losartan were held and she was hydrated with IV fluids.  Creatinine trended down to normal.  She was hypokalemic and received potassium replacement.  She was short of breath and it was thought it was due to dehydration and fatigue.      She reported that she had diarrhea for over a year, loose BMs almost every day.  C. difficile negative.   GI consult recommended given dysphagia as well.  Ordered multiple serological tests.  She was asked to follow up with GI as an outpatient.  GI recommended Metamucil and p.r.n. Imodium.      She complained of dysphagia.  She has a longstanding history of GERD, continued on PTA omeprazole.  GI consulted for evaluation, status post EGD, no abnormal findings.      She continued to have atrial fibrillation.  Continued with metoprolol and digoxin.      She returns today in followup.  She states that she has significant peripheral edema.  She continues to have loose stools.  She continues with Imodium and Metamucil on a daily basis.  She has not made her appointment with her primary care doctor.      She complains of shortness of breath with exertion.  She eats a general to occasional high salt diet including pizza and Chinese food.  She wears her supports socks at times.      PHYSICAL EXAMINATION:   VITAL SIGNS:  Blood pressure 116/72, pulse is 86, weight is 169 pounds.   Please see complete physical examination below.      DIAGNOSTICS:   Basic metabolic panel:  Sodium 138, potassium 4.0, BUN 12, creatinine 0.86, GFR 65.      ASSESSMENT AND PLAN:   1.  Em Dorman is a delightful 78-year-old female with evidence of idiopathic cardiomyopathy, possibly secondary to atrial fibrillation with rapid ventricular response.  She did have a cardiomyopathy with an EF of 30%-35% in 2017, improving with rate control and her medication regimen.  Losartan was held in the hospital due to her diarrhea and dehydration.  It was not restarted at discharge.  I have restarted it today at 25 mg a day.  I have asked her to follow up with Dr. Leger in 1 week.  She has not made this appointment post-hospital and was contemplating not making that the appointment.  I asked her to make the appointment and explained why this is important.  She did have a TSH in the hospital that was 0.03.  I explained to her that she needs to follow up with Dr. Leger on this as well.  She tells me that her  encouraged her to take over-the-counter potassium.  I instructed her not to take over-the-counter potassium.  I explained to her that losartan holds onto potassium and we are restarting it now.  She agrees not to take over-the-counter potassium tablets.  I have asked her to return in 1 month with an echocardiogram and a return visit.  She is due to see Dr. Pickard for her yearly visit so she will see Dr. Pickard if there is availability or myself.   2.  Atrial fibrillation.  Continue warfarin therapy.   3.  Return to see Dr. Parikh in Sleep as followup to help her with a new mask.   4.  Follow up with Dr. Leger in 1 week.      cc:   Arielle Leger MD    [facility]   [address] [place] [address]         ERASTO CARSON, APRN, CNP             D: 2019   T: 2019   MT: GHASSAN      Name:     EM DORMAN   MRN:      [id number]        Account:      [id number]   :       1940           Service Date: 05/16/2019      Document: [id number]

## 2019-05-16 NOTE — PATIENT INSTRUCTIONS
Call [facility] nurse for any questions or concerns Mon-Fri 8am-4pm: [phone] For concerns after hours: [phone]    Medication changes:   Restart losartan 25mg tablet: 1 tablet daily  Support socks on am and off pm  No pizza or chinese for a few weeks  Low salt diet  No over the counter potassium          Plan from today: Call today to see Dr. Leger in the next week

## 2019-05-16 NOTE — PROGRESS NOTES
HPI and Plan:   See dictation    Orders Placed This Encounter   Procedures     Basic metabolic panel     [facility]     Follow-Up with Cardiologist     Echocardiogram Complete       Orders Placed This Encounter   Medications     NONFORMULARY     Sig: OTC potassium supplement     losartan (COZAAR) 25 MG tablet     Sig: Take 1 tablet (25 mg) by mouth daily     Dispense:  90 tablet     Refill:  1       There are no discontinued medications.      Encounter Diagnosis   Name Primary?     Atrial fibrillation with rapid ventricular response (H)        CURRENT MEDICATIONS:  Current Outpatient Medications   Medication Sig Dispense Refill     aspirin (ASA) 81 MG chewable tablet Take 1 tablet (81 mg) by mouth daily 36 tablet 0     carboxymethylcellulose PF (REFRESH PLUS) 0.5 % SOLN ophthalmic solution Place 1 drop into both eyes 3 times daily as needed for dry eyes       Cholecalciferol (VITAMIN D3 PO) Take 2,000 Units by mouth daily        cyanocobalamin (VITAMIN B-12) 1000 MCG tablet Take 1,000 mcg by mouth daily       digoxin (LANOXIN) 125 MCG tablet Take 1 tablet (125 mcg) by mouth daily 90 tablet 3     furosemide (LASIX) 40 MG tablet Take 1 tablet (40 mg) by mouth daily 90 tablet 0     Lactobacillus (PROBIOTIC ACIDOPHILUS) TABS Take 1 tablet by mouth daily        loperamide (IMODIUM A-D) 2 MG capsule Take 2 mg by mouth as needed for diarrhea       losartan (COZAAR) 25 MG tablet Take 1 tablet (25 mg) by mouth daily 90 tablet 1     metoprolol succinate ER (TOPROL-XL) 50 MG 24 hr tablet Take 1 tablet (50 mg) by mouth 2 times daily 180 tablet 0     Multiple vitamin  s TABS Take 1 tablet by mouth daily        NONFORMULARY OTC potassium supplement       omeprazole (PRILOSEC) 20 MG DR capsule Take 20 mg by mouth daily as needed (heartburn)        PARoxetine (PAXIL) 30 MG tablet Take 30 mg by mouth daily        psyllium (METAMUCIL SMOOTH TEXTURE) 28 % packet 1 packet BID.       warfarin (COUMADIN) 2.5 MG tablet Take two tablets  (5 mg) by mouth on Tuesday and Friday. Take one tablet (2.5 mg) all other days of the week.         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 RHC: mild PHTN/NL wedge/ low CI.        FAMILY HISTORY:  Family History   Problem Relation Age of Onset     Diabetes Mother      Other Cancer Father        SOCIAL HISTORY:  Social History     Socioeconomic History     Marital status:      Spouse name: None     Number of children: None     Years of education: None     Highest education level: None   Occupational History     None   Social Needs     Financial resource strain: None     Food insecurity:     Worry: None     Inability: None     Transportation needs:     Medical: None     Non-medical: None   Tobacco Use     Smoking status: Former Smoker     Types: Cigarettes     Last attempt to quit: 1965     Years since quittin.4     Smokeless tobacco: Never Used   Substance and Sexual Activity      "Alcohol use: Yes     Alcohol/week: 0.0 oz     Comment: 8 oz wine daily     Drug use: No     Sexual activity: Yes     Partners: Male   Lifestyle     Physical activity:     Days per week: None     Minutes per session: None     Stress: None   Relationships     Social connections:     Talks on phone: None     Gets together: None     Attends Restorationist service: None     Active member of club or organization: None     Attends meetings of clubs or organizations: None     Relationship status: None     Intimate partner violence:     Fear of current or ex partner: None     Emotionally abused: None     Physically abused: None     Forced sexual activity: None   Other Topics Concern     Parent/sibling w/ CABG, MI or angioplasty before 65F 55M? Not Asked   Social History Narrative     None       Review of Systems:  Skin:  Negative       Eyes:  Positive for glasses    ENT:  Positive for postnasal drainage    Respiratory:  Positive for sleep apnea;CPAP;dyspnea on exertion(noncompliant w/CPAP >6 months ) DILLON rare    Cardiovascular:    Positive for;lightheadedness;edema;fatigue(under L breast, increased since last week ) edema increased, does have compressions but doesn't always wear them ; LH \"comes and goes\" ; energy \"fair\" but improving   Gastroenterology: Positive for nausea;excessive gas or bloating;diarrhea abd bloat better   Genitourinary:  Negative      Musculoskeletal:  Positive for foot pain R toe  Neurologic:  Positive for headaches afternoon time  Psychiatric:  Positive for sleep disturbances;excessive stress latey; family health issues   Heme/Lymph/Imm:  Negative      Endocrine:  Negative        Physical Exam:  Vitals: /72 (BP Location: Right arm, Patient Position: Chair, Cuff Size: Adult Small)   Pulse 86   Ht 1.638 m (5' 4.5\")   Wt 77.1 kg (169 lb 14.4 oz)   SpO2 96%   BMI 28.71 kg/m      Constitutional:  cooperative, alert and oriented, well developed, well nourished, in no acute distress        Skin:  warm " and dry to the touch, no apparent skin lesions or masses noted          Head:  normocephalic, no masses or lesions        Eyes:  pupils equal and round;conjunctivae and lids unremarkable;sclera white;no xanthalasma        Lymph:      ENT:  no pallor or cyanosis, dentition good        Neck:  carotid pulses are full and equal bilaterally, JVP normal, no carotid bruit        Respiratory:  normal breath sounds, clear to auscultation, normal A-P diameter, normal symmetry, normal respiratory excursion, no use of accessory muscles         Cardiac: normal S1 and S2;no S3 or S4;apical impulse not displaced irregularly irregular rhythm;tachycardic   no presence of murmur          pulses full and equal, no bruits auscultated                                        GI:  abdomen soft, non-tender, BS normoactive, no mass, no HSM, no bruits        Extremities and Muscular Skeletal:  no deformities, clubbing, cyanosis, erythema observed   bilateral LE edema;1+          Neurological:  no gross motor deficits;affect appropriate        Psych:  Alert and Oriented x 3        CC  Mandie Rose, APRN CNP  [address]  [id number]  [place] [id number]

## 2019-05-22 ENCOUNTER — CARE COORDINATION (OUTPATIENT)
Dept: CARDIOLOGY | Facility: CLINIC | Age: 79
End: 2019-05-22

## 2019-05-22 ENCOUNTER — TELEPHONE (OUTPATIENT)
Dept: CARDIOLOGY | Facility: CLINIC | Age: 79
End: 2019-05-22

## 2019-05-22 DIAGNOSIS — I50.41 ACUTE COMBINED SYSTOLIC AND DIASTOLIC CHF, NYHA CLASS 3 (H): ICD-10-CM

## 2019-05-22 DIAGNOSIS — I10 ESSENTIAL HYPERTENSION: Primary | ICD-10-CM

## 2019-05-22 NOTE — TELEPHONE ENCOUNTER
Recently seen and started losartan. Asked her to have a bmp drawn this week in follow up.thanks Scotty

## 2019-05-22 NOTE — PROGRESS NOTES
Em does not have any labs scheduled with her PCP within the next week.     I scheduled her for a BMP on 5/24/2019 @ 1300 per Dusty Rose CNP recommendation for follow-up after restarting her Losartan on 5/16/2019. I asked her how she was feeling after restarting her Losartan and she said she is doing fine.    Felisha Tilley RN    Heart Failure Coordinator, [place]  05/22/19 11:54 AM

## 2019-05-24 DIAGNOSIS — I50.41 ACUTE COMBINED SYSTOLIC AND DIASTOLIC CHF, NYHA CLASS 3 (H): ICD-10-CM

## 2019-05-24 DIAGNOSIS — I10 ESSENTIAL HYPERTENSION: ICD-10-CM

## 2019-05-24 LAB
ANION GAP SERPL CALCULATED.3IONS-SCNC: 7 MMOL/L (ref 3–14)
BUN SERPL-MCNC: 18 MG/DL (ref 7–30)
CALCIUM SERPL-MCNC: 8.8 MG/DL (ref 8.5–10.1)
CHLORIDE SERPL-SCNC: 103 MMOL/L (ref 94–109)
CO2 SERPL-SCNC: 26 MMOL/L (ref 20–32)
CREAT SERPL-MCNC: 0.84 MG/DL (ref 0.52–1.04)
GFR SERPL CREATININE-BSD FRML MDRD: 67 ML/MIN/{1.73_M2}
GLUCOSE SERPL-MCNC: 136 MG/DL (ref 70–99)
POTASSIUM SERPL-SCNC: 4.6 MMOL/L (ref 3.4–5.3)
SODIUM SERPL-SCNC: 136 MMOL/L (ref 133–144)

## 2019-05-24 PROCEDURE — 36415 COLL VENOUS BLD VENIPUNCTURE: CPT | Performed by: NURSE PRACTITIONER

## 2019-05-24 PROCEDURE — 80048 BASIC METABOLIC PNL TOTAL CA: CPT | Performed by: NURSE PRACTITIONER

## 2019-05-28 NOTE — RESULT ENCOUNTER NOTE
I reviewed labs with Em and she scheduled on 6/14/19 for follow-up labs.    Felisha Tilley RN    Heart Failure Coordinator, [place]  05/28/19 12:01 PM

## 2019-06-14 ENCOUNTER — HOSPITAL ENCOUNTER (OUTPATIENT)
Dept: CARDIOLOGY | Facility: CLINIC | Age: 79
Discharge: HOME | End: 2019-06-14
Attending: NURSE PRACTITIONER | Admitting: NURSE PRACTITIONER
Payer: COMMERCIAL

## 2019-06-14 DIAGNOSIS — I48.91 ATRIAL FIBRILLATION WITH RAPID VENTRICULAR RESPONSE (H): ICD-10-CM

## 2019-06-14 LAB
ANION GAP SERPL CALCULATED.3IONS-SCNC: 4 MMOL/L (ref 3–14)
BUN SERPL-MCNC: 15 MG/DL (ref 7–30)
CALCIUM SERPL-MCNC: 8.7 MG/DL (ref 8.5–10.1)
CHLORIDE SERPL-SCNC: 103 MMOL/L (ref 94–109)
CO2 SERPL-SCNC: 27 MMOL/L (ref 20–32)
CREAT SERPL-MCNC: 0.73 MG/DL (ref 0.52–1.04)
GFR SERPL CREATININE-BSD FRML MDRD: 80 ML/MIN/{1.73_M2}
GLUCOSE SERPL-MCNC: 126 MG/DL (ref 70–99)
POTASSIUM SERPL-SCNC: 4.7 MMOL/L (ref 3.4–5.3)
SODIUM SERPL-SCNC: 134 MMOL/L (ref 133–144)

## 2019-06-14 PROCEDURE — 36415 COLL VENOUS BLD VENIPUNCTURE: CPT | Performed by: NURSE PRACTITIONER

## 2019-06-14 PROCEDURE — 93306 TTE W/DOPPLER COMPLETE: CPT

## 2019-06-14 PROCEDURE — 80048 BASIC METABOLIC PNL TOTAL CA: CPT | Performed by: NURSE PRACTITIONER

## 2019-06-14 PROCEDURE — 93306 TTE W/DOPPLER COMPLETE: CPT | Mod: 26 | Performed by: INTERNAL MEDICINE

## 2019-06-18 ENCOUNTER — DOCUMENTATION ONLY (OUTPATIENT)
Dept: CARDIOLOGY | Facility: CLINIC | Age: 79
End: 2019-06-18

## 2019-06-18 ENCOUNTER — OFFICE VISIT (OUTPATIENT)
Dept: CARDIOLOGY | Facility: CLINIC | Age: 79
End: 2019-06-18
Attending: NURSE PRACTITIONER
Payer: COMMERCIAL

## 2019-06-18 VITALS
DIASTOLIC BLOOD PRESSURE: 72 MMHG | HEART RATE: 50 BPM | WEIGHT: 168.1 LBS | BODY MASS INDEX: 28.01 KG/M2 | SYSTOLIC BLOOD PRESSURE: 106 MMHG | OXYGEN SATURATION: 94 % | HEIGHT: 65 IN

## 2019-06-18 DIAGNOSIS — I48.91 ATRIAL FIBRILLATION WITH RAPID VENTRICULAR RESPONSE (H): ICD-10-CM

## 2019-06-18 PROCEDURE — 99214 OFFICE O/P EST MOD 30 MIN: CPT | Performed by: NURSE PRACTITIONER

## 2019-06-18 ASSESSMENT — MIFFLIN-ST. JEOR: SCORE: 1245.44

## 2019-06-18 NOTE — LETTER
6/18/2019      Arielle Leger MD  [facility] [address] [address] [facility] [address]      RE: Em Richmond       Dear Colleague,    I had the pleasure of seeing Em Richmond in the [facility].    Service Date: 06/18/2019      REASON FOR VISIT:     Nonischemic cardiomyopathy, CHF, mild coronary artery disease, sleep apnea and atrial fibrillation.      HISTORY OF PRESENT ILLNESS:     I had the pleasure of seeing Em Richmond, a pleasant 78-year-old patient who has a nonischemic cardiomyopathy.  She was admitted twice in October and 11/2018 for congestive heart failure and cardiomyopathy.  Ejection fraction in 2014 was 60%-65%.      On 10/21/2017, ejection fraction dropped to 30%-35% with mild to moderate tricuspid regurgitation and mild to moderate mitral insufficiency.  The patient had a 2-3 year history of chronic atrial fibrillation.  She had stopped her metoprolol and presented with rapid ventricular response.      Coronary angiography 10/23/2017 showed only mild coronary artery disease with all stenosis less than 30%.  We felt that idiopathic cardiomyopathy was possibly due to atrial fibrillation with rapid ventricular response.  We initiated beta blockade.  She continued to diurese.  She lost 20 pounds in the hospital.  We continued to have difficulty with rate control and with beta blocker alone.  Digoxin was used while in the hospital and discontinued at discharge.  As an outpatient, we reinitiated digoxin with excellent heart rate control.      She was referred to Dr. Freitas for poorly treated sleep apnea.  She received a new CPAP and a new mask but unfortunately she is not using it.      Echocardiogram 03/2018 showed an LVEF of 35%-40%, indicating no need for ICD.  Her heart rate remained elevated at 100 beats per minute.  After initiation of digoxin, her heart rate has been under better control.      In 06/2018, her LV and LVEF improved a via  echocardiogram to 50%-55%.  Right ventricular systolic function, borderline reduced, as well as moderate left atrial enlargement.      She was admitted 04/30/2019 with shortness of breath and diarrhea.  She had 5 days of significant diarrhea.  It appeared that she took all her medications including Lasix.  In the hospital, Lasix and losartan were held and she was hydrated with IV fluids.  Her creatinine trended down to normal.      She reported that she had diarrhea for over a year, loose stools almost every day.  C. difficile was negative.  GI consult recommended followup as an outpatient.  GI recommended Metamucil and p.r.n. Imodium.      She also complained of dysphagia.  She has longstanding history of GERD, continued on PTA omeprazole.  GI consulted for evaluation, status post EGD, no abnormal findings.      She continued to have atrial fibrillation, continued on metoprolol and digoxin.      She returns today in followup.  She states she feels fantastic.  She cannot believe how well she feels.  She has been more active and enjoying life.  She has no complaints of increased shortness of breath, PND, syncope or near-syncope.        In May, I restarted losartan.  She tells me she is taking her blood pressure at home and at times in the high 80s/60s blood pressure  She denies dizziness or lightheadedness.      PHYSICAL EXAMINATION:   VITAL SIGNS:  Blood pressure 106/72, pulse is 50, weight is 168 pounds in the clinic and 165 pounds at home.  Please see complete physical examination below.      DIAGNOSTICS:   Basic metabolic panel:  Sodium 134, potassium 4.7, BUN 15, creatinine 0.73, GFR 80.      Echocardiogram 06/14/2019 showed an LVEF of 50%-55%, mild biatrial enlargement, mild mitral regurgitation.      ASSESSMENT AND PLAN:   1.  Em Richmond is a delightful 78-year-old patient with evidence of idiopathic cardiomyopathy, possibly secondary to atrial fibrillation with rapid ventricular response.  She did have a  cardiomyopathy with an LVEF of 30%-35% in 2017, improving with rate control and medication regimen.  Losartan was held in the hospital due to diarrhea and dehydration.  I restarted the dose at 25 mg a day.  She was not on prescribed potassium at that time; however, she was taking Lasix 40 mg daily.  Based on her blood pressures at home and her blood pressure today, I have discontinued losartan.  I have decreased Lasix to 20 mg daily.  We will repeat basic metabolic panel in 2 weeks to look at her potassium level.   I have reviewed her echocardiogram with her.  She is doing quite well.   2.  Atrial fibrillation.  Continue warfarin therapy.   3.  Follow up with Dr. Pickard, ordered for July.   4.  Follow up with myself, Erasto Carson, in December.         ERASTO CARSON, DOMINIK, CNP             D: 2019   T: 2019   MT: GHASSAN      Name:     RICARDO DORMAN   MRN:      [id number]        Account:      [id number]   :      1940           Service Date: 2019      Document: [id number]           Outpatient Encounter Medications as of 2019   Medication Sig Dispense Refill     aspirin (ASA) 81 MG chewable tablet Take 1 tablet (81 mg) by mouth daily 36 tablet 0     carboxymethylcellulose PF (REFRESH PLUS) 0.5 % SOLN ophthalmic solution Place 1 drop into both eyes 3 times daily as needed for dry eyes       Cholecalciferol (VITAMIN D3 PO) Take 2,000 Units by mouth daily        cyanocobalamin (VITAMIN B-12) 1000 MCG tablet Take 1,000 mcg by mouth daily       digoxin (LANOXIN) 125 MCG tablet Take 1 tablet (125 mcg) by mouth daily 90 tablet 3     furosemide (LASIX) 40 MG tablet Take 1 tablet (40 mg) by mouth daily 90 tablet 0     Lactobacillus (PROBIOTIC ACIDOPHILUS) TABS Take 1 tablet by mouth daily        loperamide (IMODIUM A-D) 2 MG capsule Take 2 mg by mouth as needed for diarrhea       metoprolol succinate ER (TOPROL-XL) 50 MG 24 hr tablet Take 1 tablet (50 mg) by mouth 2 times daily 180  tablet 0     Multiple vitamin  s TABS Take 1 tablet by mouth daily        omeprazole (PRILOSEC) 20 MG DR capsule Take 20 mg by mouth daily as needed (heartburn)        PARoxetine (PAXIL) 30 MG tablet Take 30 mg by mouth daily        psyllium (METAMUCIL SMOOTH TEXTURE) 28 % packet 1 packet BID.       warfarin (COUMADIN) 2.5 MG tablet Take two tablets (5 mg) by mouth on Tuesday and Friday. Take one tablet (2.5 mg) all other days of the week.       NONFORMULARY OTC potassium supplement       [DISCONTINUED] losartan (COZAAR) 25 MG tablet Take 1 tablet (25 mg) by mouth daily 90 tablet 1     No facility-administered encounter medications on file as of 6/18/2019.        Again, thank you for allowing me to participate in the care of your patient.      Sincerely,    DOMINIK Ritchie CNP     [facility]

## 2019-06-18 NOTE — PROGRESS NOTES
Service Date: 06/18/2019      REASON FOR VISIT:     Nonischemic cardiomyopathy, CHF, mild coronary artery disease, sleep apnea and atrial fibrillation.      HISTORY OF PRESENT ILLNESS:     I had the pleasure of seeing Em Richmond, a pleasant 78-year-old patient who has a nonischemic cardiomyopathy.  She was admitted twice in October and 11/2018 for congestive heart failure and cardiomyopathy.  Ejection fraction in 2014 was 60%-65%.      On 10/21/2017, ejection fraction dropped to 30%-35% with mild to moderate tricuspid regurgitation and mild to moderate mitral insufficiency.  The patient had a 2-3 year history of chronic atrial fibrillation.  She had stopped her metoprolol and presented with rapid ventricular response.      Coronary angiography 10/23/2017 showed only mild coronary artery disease with all stenosis less than 30%.  We felt that idiopathic cardiomyopathy was possibly due to atrial fibrillation with rapid ventricular response.  We initiated beta blockade.  She continued to diurese.  She lost 20 pounds in the hospital.  We continued to have difficulty with rate control and with beta blocker alone.  Digoxin was used while in the hospital and discontinued at discharge.  As an outpatient, we reinitiated digoxin with excellent heart rate control.      She was referred to Dr. Freitas for poorly treated sleep apnea.  She received a new CPAP and a new mask but unfortunately she is not using it.      Echocardiogram 03/2018 showed an LVEF of 35%-40%, indicating no need for ICD.  Her heart rate remained elevated at 100 beats per minute.  After initiation of digoxin, her heart rate has been under better control.      In 06/2018, her LV and LVEF improved a via echocardiogram to 50%-55%.  Right ventricular systolic function, borderline reduced, as well as moderate left atrial enlargement.      She was admitted 04/30/2019 with shortness of breath and diarrhea.  She had 5 days of significant diarrhea.  It appeared  that she took all her medications including Lasix.  In the hospital, Lasix and losartan were held and she was hydrated with IV fluids.  Her creatinine trended down to normal.      She reported that she had diarrhea for over a year, loose stools almost every day.  C. difficile was negative.  GI consult recommended followup as an outpatient.  GI recommended Metamucil and p.r.n. Imodium.      She also complained of dysphagia.  She has longstanding history of GERD, continued on PTA omeprazole.  GI consulted for evaluation, status post EGD, no abnormal findings.      She continued to have atrial fibrillation, continued on metoprolol and digoxin.      She returns today in followup.  She states she feels fantastic.  She cannot believe how well she feels.  She has been more active and enjoying life.  She has no complaints of increased shortness of breath, PND, syncope or near-syncope.        In May, I restarted losartan.  She tells me she is taking her blood pressure at home and at times in the high 80s/60s blood pressure  She denies dizziness or lightheadedness.      PHYSICAL EXAMINATION:   VITAL SIGNS:  Blood pressure 106/72, pulse is 50, weight is 168 pounds in the clinic and 165 pounds at home.  Please see complete physical examination below.      DIAGNOSTICS:   Basic metabolic panel:  Sodium 134, potassium 4.7, BUN 15, creatinine 0.73, GFR 80.      Echocardiogram 06/14/2019 showed an LVEF of 50%-55%, mild biatrial enlargement, mild mitral regurgitation.      ASSESSMENT AND PLAN:   1.  Em Richmond is a delightful 78-year-old patient with evidence of idiopathic cardiomyopathy, possibly secondary to atrial fibrillation with rapid ventricular response.  She did have a cardiomyopathy with an LVEF of 30%-35% in 2017, improving with rate control and medication regimen.  Losartan was held in the hospital due to diarrhea and dehydration.  I restarted the dose at 25 mg a day.  She was not on prescribed potassium at that  time; however, she was taking Lasix 40 mg daily.  Based on her blood pressures at home and her blood pressure today, I have discontinued losartan.  I have decreased Lasix to 20 mg daily.  We will repeat basic metabolic panel in 2 weeks to look at her potassium level.   I have reviewed her echocardiogram with her.  She is doing quite well.   2.  Atrial fibrillation.  Continue warfarin therapy.   3.  Follow up with Dr. Pickard, ordered for July.   4.  Follow up with myself, Erasto Carson, in December.         ERASTO CARSON, DOMINIK, CNP             D: 2019   T: 2019   MT: GHASSAN      Name:     RICARDO DORMAN   MRN:      [id number]        Account:      [id number]   :      1940           Service Date: 2019      Document: [id number]

## 2019-06-18 NOTE — LETTER
6/18/2019    Arielle Leger MD  [facility] [address]  [facility] [address]    RE: Em Richmond       Dear Colleague,    I had the pleasure of seeing Em Richmond in the [facility].    HPI and Plan:   See [id number]    Orders Placed This Encounter   Procedures     Basic metabolic panel     [facility]       No orders of the defined types were placed in this encounter.      Medications Discontinued During This Encounter   Medication Reason     losartan (COZAAR) 25 MG tablet          Encounter Diagnosis   Name Primary?     Atrial fibrillation with rapid ventricular response (H)        CURRENT MEDICATIONS:  Current Outpatient Medications   Medication Sig Dispense Refill     aspirin (ASA) 81 MG chewable tablet Take 1 tablet (81 mg) by mouth daily 36 tablet 0     carboxymethylcellulose PF (REFRESH PLUS) 0.5 % SOLN ophthalmic solution Place 1 drop into both eyes 3 times daily as needed for dry eyes       Cholecalciferol (VITAMIN D3 PO) Take 2,000 Units by mouth daily        cyanocobalamin (VITAMIN B-12) 1000 MCG tablet Take 1,000 mcg by mouth daily       digoxin (LANOXIN) 125 MCG tablet Take 1 tablet (125 mcg) by mouth daily 90 tablet 3     furosemide (LASIX) 40 MG tablet Take 1 tablet (40 mg) by mouth daily 90 tablet 0     Lactobacillus (PROBIOTIC ACIDOPHILUS) TABS Take 1 tablet by mouth daily        loperamide (IMODIUM A-D) 2 MG capsule Take 2 mg by mouth as needed for diarrhea       metoprolol succinate ER (TOPROL-XL) 50 MG 24 hr tablet Take 1 tablet (50 mg) by mouth 2 times daily 180 tablet 0     Multiple vitamin  s TABS Take 1 tablet by mouth daily        omeprazole (PRILOSEC) 20 MG DR capsule Take 20 mg by mouth daily as needed (heartburn)        PARoxetine (PAXIL) 30 MG tablet Take 30 mg by mouth daily        psyllium (METAMUCIL SMOOTH TEXTURE) 28 % packet 1 packet BID.       warfarin (COUMADIN) 2.5 MG tablet Take two tablets (5 mg) by mouth on  Tuesday and Friday. Take one tablet (2.5 mg) all other days of the week.       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 RHC: mild PHTN/NL wedge/ low CI.        FAMILY HISTORY:  Family History   Problem Relation Age of Onset     Diabetes Mother      Other Cancer Father        SOCIAL HISTORY:  Social History     Socioeconomic History     Marital status:      Spouse name: None     Number of children: None     Years of education: None     Highest education level: None   Occupational History     None   Social Needs     Financial resource strain: None     Food insecurity:     Worry: None     Inability: None     Transportation needs:     Medical: None     Non-medical: None   Tobacco Use     Smoking status: Former Smoker     Types: Cigarettes     Last attempt to quit: 1965     Years since quittin.4     Smokeless tobacco: Never Used   Substance and  "Sexual Activity     Alcohol use: Yes     Alcohol/week: 0.0 oz     Comment: 8 oz wine daily     Drug use: No     Sexual activity: Yes     Partners: Male   Lifestyle     Physical activity:     Days per week: None     Minutes per session: None     Stress: None   Relationships     Social connections:     Talks on phone: None     Gets together: None     Attends Restorationism service: None     Active member of club or organization: None     Attends meetings of clubs or organizations: None     Relationship status: None     Intimate partner violence:     Fear of current or ex partner: None     Emotionally abused: None     Physically abused: None     Forced sexual activity: None   Other Topics Concern     Parent/sibling w/ CABG, MI or angioplasty before 65F 55M? Not Asked   Social History Narrative     None       Review of Systems:  Skin:  Negative       Eyes:  Positive for glasses    ENT:  Positive for postnasal drainage    Respiratory:  Positive for sleep apnea;CPAP;dyspnea on exertion(noncompliant w/CPAP >6 months ) DILLON rare    Cardiovascular:    Positive for;lightheadedness;edema;fatigue(under L breast, increased since last week ) edema increased, does have compressions but doesn't always wear them ; LH \"comes and goes\" ; energy \"fair\" but improving   Gastroenterology: Positive for excessive gas or bloating;diarrhea    Genitourinary:  Negative      Musculoskeletal:  Positive for foot pain R toe  Neurologic:  Positive for headaches afternoon time  Psychiatric:  Positive for sleep disturbances;excessive stress latey; family health issues   Heme/Lymph/Imm:  Negative      Endocrine:  Negative        Physical Exam:  Vitals: /72 (BP Location: Right arm, Patient Position: Chair, Cuff Size: Adult Small)   Pulse 50   Ht 1.638 m (5' 4.5\")   Wt 76.2 kg (168 lb 1.6 oz)   SpO2 94%   BMI 28.41 kg/m       Constitutional:  cooperative, alert and oriented, well developed, well nourished, in no acute distress        Skin:  warm and " dry to the touch, no apparent skin lesions or masses noted          Head:  normocephalic, no masses or lesions        Eyes:  pupils equal and round;conjunctivae and lids unremarkable;sclera white;no xanthalasma        Lymph:      ENT:  no pallor or cyanosis, dentition good        Neck:  carotid pulses are full and equal bilaterally, JVP normal, no carotid bruit        Respiratory:  normal breath sounds, clear to auscultation, normal A-P diameter, normal symmetry, normal respiratory excursion, no use of accessory muscles         Cardiac: normal S1 and S2;no S3 or S4;apical impulse not displaced irregularly irregular rhythm;tachycardic   no presence of murmur          pulses full and equal, no bruits auscultated                                        GI:  abdomen soft, non-tender, BS normoactive, no mass, no HSM, no bruits        Extremities and Muscular Skeletal:  no deformities, clubbing, cyanosis, erythema observed              Neurological:  no gross motor deficits;affect appropriate        Psych:  Alert and Oriented x 3        CC  ODMINIK White CNP  [address]  [address]  [place] [address]                Thank you for allowing me to participate in the care of your patient.      Sincerely,     DOMINIK Ritchie CNP     [facility]    cc:   DOMINIK White CNP  [address]  [address]  [place] [address]

## 2019-06-18 NOTE — PATIENT INSTRUCTIONS
Call [facility] nurse for any questions or concerns Mon-Fri 8am-4pm:                                                [phone]                                For concerns after hours: [phone]    Medication changes: Stop losartan   Plan from today: Next appt. Is with Dr. Pickard

## 2019-06-18 NOTE — PROGRESS NOTES
Instruct patient to decrease lasix to 20 mg daily. ( she has a 40 mg tablet) Please ask her to have a bmp in 1 week. Thanks Scotty

## 2019-06-18 NOTE — PROGRESS NOTES
HPI and Plan:   See [id number]    Orders Placed This Encounter   Procedures     Basic metabolic panel     [facility]       No orders of the defined types were placed in this encounter.      Medications Discontinued During This Encounter   Medication Reason     losartan (COZAAR) 25 MG tablet          Encounter Diagnosis   Name Primary?     Atrial fibrillation with rapid ventricular response (H)        CURRENT MEDICATIONS:  Current Outpatient Medications   Medication Sig Dispense Refill     aspirin (ASA) 81 MG chewable tablet Take 1 tablet (81 mg) by mouth daily 36 tablet 0     carboxymethylcellulose PF (REFRESH PLUS) 0.5 % SOLN ophthalmic solution Place 1 drop into both eyes 3 times daily as needed for dry eyes       Cholecalciferol (VITAMIN D3 PO) Take 2,000 Units by mouth daily        cyanocobalamin (VITAMIN B-12) 1000 MCG tablet Take 1,000 mcg by mouth daily       digoxin (LANOXIN) 125 MCG tablet Take 1 tablet (125 mcg) by mouth daily 90 tablet 3     furosemide (LASIX) 40 MG tablet Take 1 tablet (40 mg) by mouth daily 90 tablet 0     Lactobacillus (PROBIOTIC ACIDOPHILUS) TABS Take 1 tablet by mouth daily        loperamide (IMODIUM A-D) 2 MG capsule Take 2 mg by mouth as needed for diarrhea       metoprolol succinate ER (TOPROL-XL) 50 MG 24 hr tablet Take 1 tablet (50 mg) by mouth 2 times daily 180 tablet 0     Multiple vitamin  s TABS Take 1 tablet by mouth daily        omeprazole (PRILOSEC) 20 MG DR capsule Take 20 mg by mouth daily as needed (heartburn)        PARoxetine (PAXIL) 30 MG tablet Take 30 mg by mouth daily        psyllium (METAMUCIL SMOOTH TEXTURE) 28 % packet 1 packet BID.       warfarin (COUMADIN) 2.5 MG tablet Take two tablets (5 mg) by mouth on Tuesday and Friday. Take one tablet (2.5 mg) all other days of the week.       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 RHC: mild PHTN/NL wedge/ low CI.        FAMILY HISTORY:  Family History   Problem Relation Age of Onset     Diabetes Mother      Other Cancer Father        SOCIAL HISTORY:  Social History     Socioeconomic History     Marital status:      Spouse name: None     Number of children: None     Years of education: None     Highest education level: None   Occupational History     None   Social Needs     Financial resource strain: None     Food insecurity:     Worry: None     Inability: None     Transportation needs:     Medical: None     Non-medical: None   Tobacco Use     Smoking status: Former Smoker     Types: Cigarettes     Last attempt to quit: 1965     Years since quittin.4     Smokeless tobacco: Never Used   Substance and Sexual Activity     Alcohol use: Yes     Alcohol/week: 0.0 oz     Comment: 8 oz wine daily     Drug use: No     Sexual activity: Yes     Partners: Male   Lifestyle     Physical activity:     Days per week: None     Minutes per session: None     Stress:  "None   Relationships     Social connections:     Talks on phone: None     Gets together: None     Attends Shinto service: None     Active member of club or organization: None     Attends meetings of clubs or organizations: None     Relationship status: None     Intimate partner violence:     Fear of current or ex partner: None     Emotionally abused: None     Physically abused: None     Forced sexual activity: None   Other Topics Concern     Parent/sibling w/ CABG, MI or angioplasty before 65F 55M? Not Asked   Social History Narrative     None       Review of Systems:  Skin:  Negative       Eyes:  Positive for glasses    ENT:  Positive for postnasal drainage    Respiratory:  Positive for sleep apnea;CPAP;dyspnea on exertion(noncompliant w/CPAP >6 months ) DILLON rare    Cardiovascular:    Positive for;lightheadedness;edema;fatigue(under L breast, increased since last week ) edema increased, does have compressions but doesn't always wear them ; LH \"comes and goes\" ; energy \"fair\" but improving   Gastroenterology: Positive for excessive gas or bloating;diarrhea    Genitourinary:  Negative      Musculoskeletal:  Positive for foot pain R toe  Neurologic:  Positive for headaches afternoon time  Psychiatric:  Positive for sleep disturbances;excessive stress latey; family health issues   Heme/Lymph/Imm:  Negative      Endocrine:  Negative        Physical Exam:  Vitals: /72 (BP Location: Right arm, Patient Position: Chair, Cuff Size: Adult Small)   Pulse 50   Ht 1.638 m (5' 4.5\")   Wt 76.2 kg (168 lb 1.6 oz)   SpO2 94%   BMI 28.41 kg/m      Constitutional:  cooperative, alert and oriented, well developed, well nourished, in no acute distress        Skin:  warm and dry to the touch, no apparent skin lesions or masses noted          Head:  normocephalic, no masses or lesions        Eyes:  pupils equal and round;conjunctivae and lids unremarkable;sclera white;no xanthalasma        Lymph:      ENT:  no pallor or " cyanosis, dentition good        Neck:  carotid pulses are full and equal bilaterally, JVP normal, no carotid bruit        Respiratory:  normal breath sounds, clear to auscultation, normal A-P diameter, normal symmetry, normal respiratory excursion, no use of accessory muscles         Cardiac: normal S1 and S2;no S3 or S4;apical impulse not displaced irregularly irregular rhythm;tachycardic   no presence of murmur          pulses full and equal, no bruits auscultated                                        GI:  abdomen soft, non-tender, BS normoactive, no mass, no HSM, no bruits        Extremities and Muscular Skeletal:  no deformities, clubbing, cyanosis, erythema observed              Neurological:  no gross motor deficits;affect appropriate        Psych:  Alert and Oriented x 3        CC  Mandie Rose, APRN CNP  [address]  [address]  [place] [address]

## 2019-06-25 DIAGNOSIS — I50.41 ACUTE COMBINED SYSTOLIC AND DIASTOLIC CHF, NYHA CLASS 3 (H): Primary | ICD-10-CM

## 2019-06-25 NOTE — PROGRESS NOTES
[facility]        BMP scheduled for 7/1/19 @1015 per Dusty Rose CNP note on 6/18/19.    Felisha Tilley RN    Heart Failure Coordinator, [place]  06/25/19 10:05 AM

## 2019-07-01 DIAGNOSIS — I50.41 ACUTE COMBINED SYSTOLIC AND DIASTOLIC CHF, NYHA CLASS 3 (H): ICD-10-CM

## 2019-07-01 LAB
ANION GAP SERPL CALCULATED.3IONS-SCNC: 5 MMOL/L (ref 3–14)
BUN SERPL-MCNC: 15 MG/DL (ref 7–30)
CALCIUM SERPL-MCNC: 8.9 MG/DL (ref 8.5–10.1)
CHLORIDE SERPL-SCNC: 102 MMOL/L (ref 94–109)
CO2 SERPL-SCNC: 28 MMOL/L (ref 20–32)
CREAT SERPL-MCNC: 0.76 MG/DL (ref 0.52–1.04)
GFR SERPL CREATININE-BSD FRML MDRD: 76 ML/MIN/{1.73_M2}
GLUCOSE SERPL-MCNC: 123 MG/DL (ref 70–99)
POTASSIUM SERPL-SCNC: 4.2 MMOL/L (ref 3.4–5.3)
SODIUM SERPL-SCNC: 135 MMOL/L (ref 133–144)

## 2019-07-01 PROCEDURE — 80048 BASIC METABOLIC PNL TOTAL CA: CPT | Performed by: NURSE PRACTITIONER

## 2019-07-01 PROCEDURE — 36415 COLL VENOUS BLD VENIPUNCTURE: CPT | Performed by: NURSE PRACTITIONER

## 2019-07-16 ENCOUNTER — OFFICE VISIT (OUTPATIENT)
Dept: CARDIOLOGY | Facility: CLINIC | Age: 79
End: 2019-07-16
Attending: NURSE PRACTITIONER
Payer: COMMERCIAL

## 2019-07-16 VITALS
SYSTOLIC BLOOD PRESSURE: 102 MMHG | OXYGEN SATURATION: 96 % | BODY MASS INDEX: 28.53 KG/M2 | WEIGHT: 167.1 LBS | HEIGHT: 64 IN | DIASTOLIC BLOOD PRESSURE: 74 MMHG | HEART RATE: 84 BPM

## 2019-07-16 DIAGNOSIS — I42.9 IDIOPATHIC CARDIOMYOPATHY (H): Primary | ICD-10-CM

## 2019-07-16 DIAGNOSIS — I48.91 ATRIAL FIBRILLATION WITH RAPID VENTRICULAR RESPONSE (H): ICD-10-CM

## 2019-07-16 DIAGNOSIS — I43 TACHYCARDIA INDUCED CARDIOMYOPATHY (H): ICD-10-CM

## 2019-07-16 DIAGNOSIS — I50.41 ACUTE COMBINED SYSTOLIC AND DIASTOLIC CHF, NYHA CLASS 3 (H): ICD-10-CM

## 2019-07-16 DIAGNOSIS — R00.0 TACHYCARDIA INDUCED CARDIOMYOPATHY (H): ICD-10-CM

## 2019-07-16 PROCEDURE — 99214 OFFICE O/P EST MOD 30 MIN: CPT | Performed by: INTERNAL MEDICINE

## 2019-07-16 RX ORDER — FUROSEMIDE 20 MG
20 TABLET ORAL DAILY
Qty: 90 TABLET | Refills: 0 | Status: SHIPPED | OUTPATIENT
Start: 2019-07-16 | End: 2019-10-24

## 2019-07-16 ASSESSMENT — MIFFLIN-ST. JEOR: SCORE: 1240.71

## 2019-07-16 NOTE — TELEPHONE ENCOUNTER
[facility]  Medication Refill        Last Office Visit: 19 with Dusty Rose CNP    Last Labs/EK19    Follow-up Scheduled: 19 with Dr. Pickard    Prescription Sent to: Mich Tilley RN    Heart Failure Coordinator, [place]  19 9:02 AM

## 2019-07-16 NOTE — PROGRESS NOTES
HPI and Plan:   See dictation:[id number]    Orders Placed This Encounter   Procedures     Follow-Up with Cardiologist       No orders of the defined types were placed in this encounter.      Medications Discontinued During This Encounter   Medication Reason     aspirin (ASA) 81 MG chewable tablet          Encounter Diagnoses   Name Primary?     Idiopathic cardiomyopathy (H) Yes     Tachycardia induced cardiomyopathy (H)      Atrial fibrillation with rapid ventricular response (H)      Acute combined systolic and diastolic CHF, NYHA class 3 (H)        CURRENT MEDICATIONS:  Current Outpatient Medications   Medication Sig Dispense Refill     carboxymethylcellulose PF (REFRESH PLUS) 0.5 % SOLN ophthalmic solution Place 1 drop into both eyes 3 times daily as needed for dry eyes       Cholecalciferol (VITAMIN D3 PO) Take 2,000 Units by mouth daily        cyanocobalamin (VITAMIN B-12) 1000 MCG tablet Take 1,000 mcg by mouth daily       digoxin (LANOXIN) 125 MCG tablet Take 1 tablet (125 mcg) by mouth daily 90 tablet 3     furosemide (LASIX) 20 MG tablet Take 1 tablet (20 mg) by mouth daily 90 tablet 0     Lactobacillus (PROBIOTIC ACIDOPHILUS) TABS Take 1 tablet by mouth daily        loperamide (IMODIUM A-D) 2 MG capsule Take 2 mg by mouth as needed for diarrhea       metoprolol succinate ER (TOPROL-XL) 50 MG 24 hr tablet Take 1 tablet (50 mg) by mouth 2 times daily 180 tablet 0     Multiple vitamin  s TABS Take 1 tablet by mouth daily        omeprazole (PRILOSEC) 20 MG DR capsule Take 20 mg by mouth daily as needed (heartburn)        PARoxetine (PAXIL) 30 MG tablet Take 30 mg by mouth daily        warfarin (COUMADIN) 2.5 MG tablet Take two tablets (5 mg) by mouth on Tuesday and Friday. Take one tablet (2.5 mg) all other days of the week.       NONFORMULARY OTC potassium supplement       psyllium (METAMUCIL SMOOTH TEXTURE) 28 % packet 1 packet BID. 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 RHC: mild PHTN/NL wedge/ low CI.        FAMILY HISTORY:  Family History   Problem Relation Age of Onset     Diabetes Mother      Other Cancer Father        SOCIAL HISTORY:  Social History     Socioeconomic History     Marital status:      Spouse name: None     Number of children: None     Years of education: None     Highest education level: None   Occupational History     None   Social Needs     Financial resource strain: None     Food insecurity:     Worry: None     Inability: None     Transportation needs:     Medical: None     Non-medical: None   Tobacco Use     Smoking status: Former Smoker     Types: Cigarettes     Last attempt to quit: 1965     Years since quittin.5     Smokeless tobacco: Never Used   Substance and Sexual Activity     Alcohol use: Yes     Alcohol/week: 0.0 oz     Comment: 8 oz wine daily     Drug use: No     Sexual activity: Yes     Partners:  "Male   Lifestyle     Physical activity:     Days per week: None     Minutes per session: None     Stress: None   Relationships     Social connections:     Talks on phone: None     Gets together: None     Attends Zoroastrian service: None     Active member of club or organization: None     Attends meetings of clubs or organizations: None     Relationship status: None     Intimate partner violence:     Fear of current or ex partner: None     Emotionally abused: None     Physically abused: None     Forced sexual activity: None   Other Topics Concern     Parent/sibling w/ CABG, MI or angioplasty before 65F 55M? Not Asked   Social History Narrative     None       Review of Systems:  Skin:  Negative       Eyes:  Negative      ENT:  Positive for postnasal drainage    Respiratory:  Positive for cough;sleep apnea at night when laying down    Cardiovascular:  Negative for;palpitations;chest pain;fatigue;lightheadedness edema;Positive for;dizziness takes furosemide   Gastroenterology: Positive for reflux    Genitourinary:  Negative      Musculoskeletal:    joint pain pain in feet and ankles   Neurologic:  Negative      Psychiatric:  Positive for   occsional in the afternoon   Heme/Lymph/Imm:  Negative      Endocrine:  Negative        Physical Exam:  Vitals: /74 (BP Location: Right arm, Patient Position: Sitting, Cuff Size: Adult Regular)   Pulse 84   Ht 1.638 m (5' 4.49\")   Wt 75.8 kg (167 lb 1.6 oz)   SpO2 96%   BMI 28.25 kg/m      Constitutional:  cooperative, alert and oriented, well developed, well nourished, in no acute distress        Skin:  warm and dry to the touch, no apparent skin lesions or masses noted          Head:  normocephalic, no masses or lesions        Eyes:  pupils equal and round;conjunctivae and lids unremarkable;sclera white;no xanthalasma;EOMS intact        Lymph:No Cervical lymphadenopathy present     ENT:  no pallor or cyanosis, dentition good        Neck:  carotid pulses are full and equal " bilaterally, JVP normal, no carotid bruit        Respiratory:  normal breath sounds, clear to auscultation, normal A-P diameter, normal symmetry, normal respiratory excursion, no use of accessory muscles         Cardiac: normal S1 and S2;no S3 or S4;apical impulse not displaced irregularly irregular rhythm;tachycardic   no presence of murmur          pulses full and equal, no bruits auscultated                                        GI:  abdomen soft, non-tender, BS normoactive, no mass, no HSM, no bruits        Extremities and Muscular Skeletal:  no deformities, clubbing, cyanosis, erythema observed;no edema              Neurological:  no gross motor deficits;affect appropriate        Psych:  Alert and Oriented x 3        CC  Mandie Rose, APRN CNP  [address]  [address]  [place] [address]

## 2019-07-16 NOTE — PROGRESS NOTES
Service Date: 07/16/2019      PRIMARY CARE PHYSICIAN:  Arielle Leger MD      HISTORY OF PRESENT ILLNESS:  I again had the pleasure of seeing your patient, Em Richmond, at [facility] for evaluation of a nonischemic cardiomyopathy.  The patient was admitted in 10/2017 and in 11/2017 for congestive heart failure and a cardiomyopathy.  Her ejection fraction on echo in 2014 was 60%-65%.  By 10/21/2017, her ejection fraction had dropped to 30%-35%.  There was mild to moderate tricuspid regurgitation and mild to moderate mitral insufficiency.  The patient had a 2-3 year history of chronic atrial fibrillation at that time.  She had stopped her metoprolol and presented with rapid ventricular response.  Coronary angiography 10/23/2017 showed only mild coronary artery disease with all stenoses less than 30%.  We felt that the idiopathic cardiomyopathy was possibly due to atrial fibrillation with rapid ventricular response.  We again initiated beta blockade.  We continued with her diuresis.  She lost 20 pounds in the hospital.  We continued to have difficulty with rate control with beta blocker alone due to hypotension at higher doses.  Digoxin was used while in hospital and then discontinued at discharge.  We reinitiated the digoxin with excellent heart rate control.  Her weight has been fairly stable.  She has a history of sleep apnea but is not using her CPAP due to claustrophobia.   Echocardiography in 03/2018 showed an ejection fraction of 35%-40%, indicating no need for an ICD implantation.  Her heart rate remained elevated at 100-110 beats per minute.  We then reinitiated the digoxin with better heart rates and in 06/2018, her ejection fraction was 50%-55%.  We then repeated her echocardiogram on 06/14/2019 with an ejection fraction of 50%-55%.  Mild biatrial enlargement and 1+ mitral regurgitation.  The patient was admitted 04/30/2019 with shortness of breath and diarrhea.  She had 5 days  of significant diarrhea and continued to take her medications including Lasix.  While hospitalized, Lasix and losartan were held and she was hydrated with IV fluids.  Her creatinine trended back to normal.  She had had diarrhea for over a year.  GI recommended Metamucil and p.r.n. Imodium.  She also has a longstanding history of GERD and continues on p.r.n. omeprazole.  The patient feels well and denies PND, orthopnea, or peripheral edema.  She denies any bleeding diathesis on warfarin.  She is not exercising to any major extent.      PHYSICAL EXAMINATION:  As listed below.      ASSESSMENT:   1.  Em Richmond is a delightful 78-year-old female with evidence of an idiopathic cardiomyopathy, possibly secondary to atrial fibrillation with rapid ventricular response.  Her blood pressure fell precipitously with increased doses of Toprol-XL.  Because of this, we added digoxin with a heart rate generally .  Her last digoxin level was performed on 05/01/2019 at 1.5.  This can be repeated in 1 year.   2.  The patient continues on warfarin for her chronic atrial fibrillation.  She denies bleeding diathesis.  INR goal is 2-2.5.   3.  The patient remains on furosemide.  Her volume status appears stable.  Her last BMP on 07/01/2019 was normal.   4.  The patient remains on aspirin 81 mg per day.  Given the studies on aspirin in primary prevention patients, we will now discontinue this aspirin.  She is over 72 years old and has a high risk of bleeding.   5.  Obstructive sleep apnea.  The patient has found it difficult to use her CPAP due to claustrophobia.      It is my pleasure to assist in the care of Em Richmond.  All her questions were answered to her satisfaction.  I will see her again in a year or earlier on a p.r.n. basis.      Den Pickard MD      cc:   Arielle Leger MD   [facility]   [address] [place] [address]         DEN PICKARD MD, [facility]             D: 07/16/2019   T:  2019   MT: al      Name:     RICARDO DORMAN   MRN:      [id number]        Account:      [id number]   :      1940           Service Date: 2019      Document: [id number]

## 2019-07-16 NOTE — LETTER
7/16/2019    Arielle Leger MD  [facility] [address]  [facility] [address]    RE: Em Richmond       Dear Colleague,    I had the pleasure of seeing Em Richmond in the [facility].    HPI and Plan:   See dictation:[id number]    Orders Placed This Encounter   Procedures     Follow-Up with Cardiologist       No orders of the defined types were placed in this encounter.      Medications Discontinued During This Encounter   Medication Reason     aspirin (ASA) 81 MG chewable tablet          Encounter Diagnoses   Name Primary?     Idiopathic cardiomyopathy (H) Yes     Tachycardia induced cardiomyopathy (H)      Atrial fibrillation with rapid ventricular response (H)      Acute combined systolic and diastolic CHF, NYHA class 3 (H)        CURRENT MEDICATIONS:  Current Outpatient Medications   Medication Sig Dispense Refill     carboxymethylcellulose PF (REFRESH PLUS) 0.5 % SOLN ophthalmic solution Place 1 drop into both eyes 3 times daily as needed for dry eyes       Cholecalciferol (VITAMIN D3 PO) Take 2,000 Units by mouth daily        cyanocobalamin (VITAMIN B-12) 1000 MCG tablet Take 1,000 mcg by mouth daily       digoxin (LANOXIN) 125 MCG tablet Take 1 tablet (125 mcg) by mouth daily 90 tablet 3     furosemide (LASIX) 20 MG tablet Take 1 tablet (20 mg) by mouth daily 90 tablet 0     Lactobacillus (PROBIOTIC ACIDOPHILUS) TABS Take 1 tablet by mouth daily        loperamide (IMODIUM A-D) 2 MG capsule Take 2 mg by mouth as needed for diarrhea       metoprolol succinate ER (TOPROL-XL) 50 MG 24 hr tablet Take 1 tablet (50 mg) by mouth 2 times daily 180 tablet 0     Multiple vitamin  s TABS Take 1 tablet by mouth daily        omeprazole (PRILOSEC) 20 MG DR capsule Take 20 mg by mouth daily as needed (heartburn)        PARoxetine (PAXIL) 30 MG tablet Take 30 mg by mouth daily        warfarin (COUMADIN) 2.5 MG tablet Take two tablets (5 mg) by mouth on Tuesday and  Friday. Take one tablet (2.5 mg) all other days of the week.       NONFORMULARY OTC potassium supplement       psyllium (METAMUCIL SMOOTH TEXTURE) 28 % packet 1 packet BID. (Patient not taking: Reported on 7/16/2019)         ALLERGIES     Allergies   Allergen Reactions     Lisinopril Diarrhea     Sulfa Drugs        PAST MEDICAL HISTORY:  Past Medical History:   Diagnosis Date     Acute combined systolic and diastolic CHF, NYHA class 3 (H) 10/24/2017     Atrial fibrillation with rapid ventricular response (H) 10/20/2017     Cardiogenic shock (H) 11/05/2017     Essential hypertension 10/24/2017     GERD (gastroesophageal reflux disease)      Idiopathic cardiomyopathy (H) 12/19/2017     Nonischemic cardiomyopathy (H) 11/7/2017     Syncope 6/20/2014     Tachycardia induced cardiomyopathy (H) 10/24/2017       PAST SURGICAL HISTORY:  Past Surgical History:   Procedure Laterality Date     COLONOSCOPY N/A 5/3/2019    Procedure: COLONOSCOPY, WITH POLYPECTOMY AND BIOPSY;  Surgeon: Juve Damon MD;  Location: [facility]     ESOPHAGOSCOPY, GASTROSCOPY, DUODENOSCOPY (EGD), COMBINED N/A 5/3/2019    Procedure: ESOPHAGOGASTRODUODENOSCOPY, WITH BIOPSY;  Surgeon: Juve Damon MD;  Location:  GI     HEART CATH RIGHT AND LEFT HEART CATH  10/23/2017    Cath no significant obstructive CAD. RHC: mild PHTN/NL wedge/ low CI.        FAMILY HISTORY:  Family History   Problem Relation Age of Onset     Diabetes Mother      Other Cancer Father        SOCIAL HISTORY:  Social History     Socioeconomic History     Marital status:      Spouse name: None     Number of children: None     Years of education: None     Highest education level: None   Occupational History     None   Social Needs     Financial resource strain: None     Food insecurity:     Worry: None     Inability: None     Transportation needs:     Medical: None     Non-medical: None   Tobacco Use     Smoking status: Former Smoker     Types: Cigarettes     Last  "attempt to quit: 1965     Years since quittin.5     Smokeless tobacco: Never Used   Substance and Sexual Activity     Alcohol use: Yes     Alcohol/week: 0.0 oz     Comment: 8 oz wine daily     Drug use: No     Sexual activity: Yes     Partners: Male   Lifestyle     Physical activity:     Days per week: None     Minutes per session: None     Stress: None   Relationships     Social connections:     Talks on phone: None     Gets together: None     Attends [employer] service: None     Active member of club or organization: None     Attends meetings of clubs or organizations: None     Relationship status: None     Intimate partner violence:     Fear of current or ex partner: None     Emotionally abused: None     Physically abused: None     Forced sexual activity: None   Other Topics Concern     Parent/sibling w/ CABG, MI or angioplasty before 65F 55M? Not Asked   Social History Narrative     None       Review of Systems:  Skin:  Negative       Eyes:  Negative      ENT:  Positive for postnasal drainage    Respiratory:  Positive for cough;sleep apnea at night when laying down    Cardiovascular:  Negative for;palpitations;chest pain;fatigue;lightheadedness edema;Positive for;dizziness takes furosemide   Gastroenterology: Positive for reflux    Genitourinary:  Negative      Musculoskeletal:    joint pain pain in feet and ankles   Neurologic:  Negative      Psychiatric:  Positive for   occsional in the afternoon   Heme/Lymph/Imm:  Negative      Endocrine:  Negative        Physical Exam:  Vitals: /74 (BP Location: Right arm, Patient Position: Sitting, Cuff Size: Adult Regular)   Pulse 84   Ht 1.638 m (5' 4.49\")   Wt 75.8 kg (167 lb 1.6 oz)   SpO2 96%   BMI 28.25 kg/m       Constitutional:  cooperative, alert and oriented, well developed, well nourished, in no acute distress        Skin:  warm and dry to the touch, no apparent skin lesions or masses noted          Head:  normocephalic, no masses or lesions    "     Eyes:  pupils equal and round;conjunctivae and lids unremarkable;sclera white;no xanthalasma;EOMS intact        Lymph:No Cervical lymphadenopathy present     ENT:  no pallor or cyanosis, dentition good        Neck:  carotid pulses are full and equal bilaterally, JVP normal, no carotid bruit        Respiratory:  normal breath sounds, clear to auscultation, normal A-P diameter, normal symmetry, normal respiratory excursion, no use of accessory muscles         Cardiac: normal S1 and S2;no S3 or S4;apical impulse not displaced irregularly irregular rhythm;tachycardic   no presence of murmur          pulses full and equal, no bruits auscultated                                        GI:  abdomen soft, non-tender, BS normoactive, no mass, no HSM, no bruits        Extremities and Muscular Skeletal:  no deformities, clubbing, cyanosis, erythema observed;no edema              Neurological:  no gross motor deficits;affect appropriate        Psych:  Alert and Oriented x 3        CC  DOMINIK White CNP  [address]  [address]  [place] [address]                Thank you for allowing me to participate in the care of your patient.      Sincerely,     Den Pickard MD     [facility]    cc:   DOMINIK White CNP  [address]  [address]  [place] [address]

## 2019-07-16 NOTE — LETTER
7/16/2019      Arielle Leger MD  [facility] [address] [address] [facility] [facility] [address]      RE: Em Richmond       Dear Colleague,    I had the pleasure of seeing Em Richmond in the [facility].    Service Date: 07/16/2019      PRIMARY CARE PHYSICIAN:  Arielle Leger MD      HISTORY OF PRESENT ILLNESS:  I again had the pleasure of seeing your patient, Em Richmond, at [facility] for evaluation of a nonischemic cardiomyopathy.  The patient was admitted in 10/2017 and in 11/2017 for congestive heart failure and a cardiomyopathy.  Her ejection fraction on echo in 2014 was 60%-65%.  By 10/21/2017, her ejection fraction had dropped to 30%-35%.  There was mild to moderate tricuspid regurgitation and mild to moderate mitral insufficiency.  The patient had a 2-3 year history of chronic atrial fibrillation at that time.  She had stopped her metoprolol and presented with rapid ventricular response.  Coronary angiography 10/23/2017 showed only mild coronary artery disease with all stenoses less than 30%.  We felt that the idiopathic cardiomyopathy was possibly due to atrial fibrillation with rapid ventricular response.  We again initiated beta blockade.  We continued with her diuresis.  She lost 20 pounds in the hospital.  We continued to have difficulty with rate control with beta blocker alone due to hypotension at higher doses.  Digoxin was used while in hospital and then discontinued at discharge.  We reinitiated the digoxin with excellent heart rate control.  Her weight has been fairly stable.  She has a history of sleep apnea but is not using her CPAP due to claustrophobia.   Echocardiography in 03/2018 showed an ejection fraction of 35%-40%, indicating no need for an ICD implantation.  Her heart rate remained elevated at 100-110 beats per minute.  We then reinitiated the digoxin with better heart rates and in 06/2018, her ejection  fraction was 50%-55%.  We then repeated her echocardiogram on 06/14/2019 with an ejection fraction of 50%-55%.  Mild biatrial enlargement and 1+ mitral regurgitation.  The patient was admitted 04/30/2019 with shortness of breath and diarrhea.  She had 5 days of significant diarrhea and continued to take her medications including Lasix.  While hospitalized, Lasix and losartan were held and she was hydrated with IV fluids.  Her creatinine trended back to normal.  She had had diarrhea for over a year.  GI recommended Metamucil and p.r.n. Imodium.  She also has a longstanding history of GERD and continues on p.r.n. omeprazole.  The patient feels well and denies PND, orthopnea, or peripheral edema.  She denies any bleeding diathesis on warfarin.  She is not exercising to any major extent.      PHYSICAL EXAMINATION:  As listed below.      ASSESSMENT:   1.  Em Richmond is a delightful 78-year-old female with evidence of an idiopathic cardiomyopathy, possibly secondary to atrial fibrillation with rapid ventricular response.  Her blood pressure fell precipitously with increased doses of Toprol-XL.  Because of this, we added digoxin with a heart rate generally .  Her last digoxin level was performed on 05/01/2019 at 1.5.  This can be repeated in 1 year.   2.  The patient continues on warfarin for her chronic atrial fibrillation.  She denies bleeding diathesis.  INR goal is 2-2.5.   3.  The patient remains on furosemide.  Her volume status appears stable.  Her last BMP on 07/01/2019 was normal.   4.  The patient remains on aspirin 81 mg per day.  Given the studies on aspirin in primary prevention patients, we will now discontinue this aspirin.  She is over 72 years old and has a high risk of bleeding.   5.  Obstructive sleep apnea.  The patient has found it difficult to use her CPAP due to claustrophobia.      It is my pleasure to assist in the care of Em Richmond.  All her questions were answered to her  satisfaction.  I will see her again in a year or earlier on a p.r.n. basis.      Den Pickard MD      cc:   Arielle Leger MD   [facility]   [id number] [place], [place] [id number]         DEN PICKARD MD, [facility]             D: 2019   T: 2019   MT: al      Name:     RICARDO DORMAN   MRN:      [id number]        Account:      [id number]   :      1940           Service Date: 2019      Document: [id number]         Outpatient Encounter Medications as of 2019   Medication Sig Dispense Refill     carboxymethylcellulose PF (REFRESH PLUS) 0.5 % SOLN ophthalmic solution Place 1 drop into both eyes 3 times daily as needed for dry eyes       Cholecalciferol (VITAMIN D3 PO) Take 2,000 Units by mouth daily        cyanocobalamin (VITAMIN B-12) 1000 MCG tablet Take 1,000 mcg by mouth daily       digoxin (LANOXIN) 125 MCG tablet Take 1 tablet (125 mcg) by mouth daily 90 tablet 3     furosemide (LASIX) 20 MG tablet Take 1 tablet (20 mg) by mouth daily 90 tablet 0     Lactobacillus (PROBIOTIC ACIDOPHILUS) TABS Take 1 tablet by mouth daily        loperamide (IMODIUM A-D) 2 MG capsule Take 2 mg by mouth as needed for diarrhea       metoprolol succinate ER (TOPROL-XL) 50 MG 24 hr tablet Take 1 tablet (50 mg) by mouth 2 times daily 180 tablet 0     Multiple vitamin  s TABS Take 1 tablet by mouth daily        omeprazole (PRILOSEC) 20 MG DR capsule Take 20 mg by mouth daily as needed (heartburn)        PARoxetine (PAXIL) 30 MG tablet Take 30 mg by mouth daily        warfarin (COUMADIN) 2.5 MG tablet Take two tablets (5 mg) by mouth on Tuesday and Friday. Take one tablet (2.5 mg) all other days of the week.       NONFORMULARY OTC potassium supplement       psyllium (METAMUCIL SMOOTH TEXTURE) 28 % packet 1 packet BID. (Patient not taking: Reported on 2019)       [DISCONTINUED] aspirin (ASA) 81 MG chewable tablet Take 1 tablet (81 mg) by mouth daily 36 tablet 0     [DISCONTINUED]  furosemide (LASIX) 40 MG tablet Take 1 tablet (40 mg) by mouth daily 90 tablet 0     No facility-administered encounter medications on file as of 7/16/2019.        Again, thank you for allowing me to participate in the care of your patient.      Sincerely,    Den Pickard MD     [facility]

## 2019-07-26 DIAGNOSIS — I48.19 PERSISTENT ATRIAL FIBRILLATION (H): ICD-10-CM

## 2019-07-26 RX ORDER — METOPROLOL SUCCINATE 50 MG/1
50 TABLET, EXTENDED RELEASE ORAL 2 TIMES DAILY
Qty: 180 TABLET | Refills: 3 | Status: SHIPPED | OUTPATIENT
Start: 2019-07-26 | End: 2020-07-24

## 2019-07-26 NOTE — TELEPHONE ENCOUNTER
Medication Refilled: Metoprolol   Last office visit: 7/16/19  Last Labs/EKG: NA  Next office visit: 7/2020 orders in [facility]  Pharmacy sent to: Mich Mirza RN

## 2019-10-24 DIAGNOSIS — I50.41 ACUTE COMBINED SYSTOLIC AND DIASTOLIC CHF, NYHA CLASS 3 (H): ICD-10-CM

## 2019-10-24 RX ORDER — FUROSEMIDE 20 MG
20 TABLET ORAL DAILY
Qty: 90 TABLET | Refills: 0 | Status: SHIPPED | OUTPATIENT
Start: 2019-10-24 | End: 2020-02-28

## 2019-10-24 NOTE — TELEPHONE ENCOUNTER
Received refill request for:  furosemide  Last OV was: 7/16/2019 with Dr. Pickard  Labs/EKG: last BMP 7/1/2019  F/U scheduled: orders in Epic for 12/2019, not yet scheduled  New script sent to: [facility]

## 2020-02-28 ENCOUNTER — TELEPHONE (OUTPATIENT)
Dept: CARDIOLOGY | Facility: CLINIC | Age: 80
End: 2020-02-28

## 2020-02-28 DIAGNOSIS — I50.41 ACUTE COMBINED SYSTOLIC AND DIASTOLIC CHF, NYHA CLASS 3 (H): ICD-10-CM

## 2020-02-28 RX ORDER — FUROSEMIDE 20 MG
20 TABLET ORAL DAILY
Qty: 30 TABLET | Refills: 0 | Status: SHIPPED | OUTPATIENT
Start: 2020-02-28 | End: 2020-04-27

## 2020-02-28 NOTE — PROGRESS NOTES
[facility]  Medication Refill      Furosemide 20 mg daily: 30 day supply sent to pharmacy.    Last Office Visit: 19 with Dr. Pickard    Last Labs/EK19    Follow-up Scheduled: 3/17/20 with Dusty Rose CNP    Prescription Sent to: [facility]    Patient was not aware Dusty Rose CNP wanted to follow-up with her in 2019.     Asked her to schedule an appointment with Dusty today, since it is almost 9 months since her last [facility].          Felisha Tilley RN    [facility] [place]  20 4:16 PM

## 2020-03-04 DIAGNOSIS — I48.91 ATRIAL FIBRILLATION WITH RAPID VENTRICULAR RESPONSE (H): ICD-10-CM

## 2020-03-04 RX ORDER — DIGOXIN 125 MCG
125 TABLET ORAL DAILY
Qty: 90 TABLET | Refills: 1 | Status: SHIPPED | OUTPATIENT
Start: 2020-03-04 | End: 2020-08-26

## 2020-03-04 NOTE — TELEPHONE ENCOUNTER
Medication Refilled: digoxin   Last office visit: 2019 with Dr. Pickard  Last Labs/EK2019 digoxin level  Next office visit: 2020 orders in [facility]   Pharmacy sent to: Mich Mirza RN

## 2020-03-16 ENCOUNTER — CARE COORDINATION (OUTPATIENT)
Dept: CARDIOLOGY | Facility: CLINIC | Age: 80
End: 2020-03-16

## 2020-03-16 NOTE — PROGRESS NOTES
Wellness Screening Tool    Symptom Screening:  Called and spoke with Em to review the following questions over the phone prior to her upcoming OV in [facility]:     Do you have a:    Fever?   no    Cough?  No (once in a while, which is normal for her)    Shortness of breath? no    Skin rash?   no      Within the past 14 days, have you been in contact with someone who:    Is currently being ruled out for COVID-19 (novel coronavirus)?  no    Has tested positive for COVID-19?  no    Has symptoms of a respiratory illness (fever, cough, shortness of breath)? no    Have you or someone you have been in contact with traveled to an area with COVID-19:    Refer to the CDC Coronavirus webpage for COVID-19 areas:  no    Within the past 3 weeks, have you been exposed to the following:      Pertussis?  no    Chicken pox?  no    Measles? no    Patient's appointment status: appointment converted to phone visit  Future Appointments   Date Time Provider Department Center   3/17/2020 10:50 AM Mandie Rose APRN CNP [facility]   Felisha Tilley RN    [facility]  03/16/20 12:15 PM

## 2020-03-17 ENCOUNTER — VIRTUAL VISIT (OUTPATIENT)
Dept: CARDIOLOGY | Facility: CLINIC | Age: 80
End: 2020-03-17
Payer: COMMERCIAL

## 2020-03-17 VITALS
WEIGHT: 162 LBS | HEART RATE: 85 BPM | DIASTOLIC BLOOD PRESSURE: 85 MMHG | SYSTOLIC BLOOD PRESSURE: 118 MMHG | BODY MASS INDEX: 27.39 KG/M2

## 2020-03-17 DIAGNOSIS — I50.22 CHRONIC SYSTOLIC HEART FAILURE (H): Primary | ICD-10-CM

## 2020-03-17 PROCEDURE — 99213 OFFICE O/P EST LOW 20 MIN: CPT | Mod: TEL | Performed by: NURSE PRACTITIONER

## 2020-03-17 NOTE — PATIENT INSTRUCTIONS
Call [facility] nurse for any questions or concerns Mon-Fri 8am-4pm:                                                [phone]                                For concerns after hours: [phone]    Medication changes: No change   Plan from today: Digoxin level and BMP in May  See Dr. Pickard in July.

## 2020-03-17 NOTE — PROGRESS NOTES
"Em Richmond is a 79 year old female who is being evaluated via a billable telephone visit.      The patient has been notified of following:     \"This telephone visit will be conducted via a call between you and your physician/provider. We have found that certain health care needs can be provided without the need for a physical exam.  This service lets us provide the care you need with a short phone conversation.  If a prescription is necessary we can send it directly to your pharmacy.  If lab work is needed we can place an order for that and you can then stop by our lab to have the test done at a later time.    If during the course of the call the physician/provider feels a telephone visit is not appropriate, you will not be charged for this service.\"       Em Richmond complains of  No chief complaint on file.      I have reviewed and updated the patient's Past Medical History, Social History, Family History and Medication List.    ALLERGIES  Lisinopril and Sulfa drugs    Orlando Chakraborty LPN     Review Of Systems  Skin: negative  Eyes: negative  Ears/Nose/Throat: negative  Respiratory: Cough  Cardiovascular: negative  Gastrointestinal: diarrhea - 2 loose stools daily, takes imodium   Genitourinary: negative  Musculoskeletal: neck pain - exercise helps   Neurologic: headache x 2 weeks. Resolved last week.   Psychiatric: anxiety, depression - managed w/medications    Hematologic/Lymphatic/Immunologic: hot and cold flashes - chronic   Endocrine: negative    Additional provider notes:   HISTORY OF PRESENT ILLNESS:     I had the pleasure of speaking to Em Richmond, at [facility] for evaluation of a nonischemic cardiomyopathy.    The patient was admitted in 10/2017 and in 11/2017 for congestive heart failure and a cardiomyopathy.  Her ejection fraction on echo in 2014 was 60%-65%.  By 10/21/2017, her ejection fraction had dropped to 30%-35%.  There was mild to moderate tricuspid " regurgitation and mild to moderate mitral insufficiency.  The patient had a 2-3 year history of chronic atrial fibrillation at that time.  She had stopped her metoprolol and presented with rapid ventricular response.      Coronary angiography 10/23/2017 showed only mild coronary artery disease with all stenoses less than 30%.  We felt that the idiopathic cardiomyopathy was possibly due to atrial fibrillation with rapid ventricular response.  We again initiated beta blockade.  We continued with her diuresis.  She lost 20 pounds in the hospital.  We continued to have difficulty with rate control with beta blocker alone due to hypotension at higher doses.      Digoxin was used while in hospital and then discontinued at discharge.  We reinitiated the digoxin with excellent heart rate control.      Her weight has been fairly stable.  She has a history of sleep apnea but is not using her CPAP due to claustrophobia.     The patient was admitted 04/30/2019 with shortness of breath and diarrhea.  She had 5 days of significant diarrhea and continued to take her medications including Lasix.  While hospitalized, Lasix and losartan were held and she was hydrated with IV fluids.  Her creatinine trended back to normal.  She had had diarrhea for over a year.  GI recommended Metamucil and p.r.n. Imodium.  She also has a longstanding history of GERD and continues on p.r.n. omeprazole.      Echocardiography in 06/2019 showed an ejection fraction of 50%-55%.  Mild biatrial enlargement and 1+ mitral regurgitation.      The patient feels well and denies PND, orthopnea, or peripheral edema.  She denies any bleeding diathesis on warfarin.  She is not exercising to any major extent. Weight stable. Complaining of on and off diarrhea.          Assessment/Plan:  (I50.22) Chronic systolic heart failure (H)  (primary encounter diagnosis)  Comment:   Plan: Digoxin level, Basic metabolic panel in May          ASSESSMENT and PLAN:   1.  Em  Basilio is a delightful 79-year-old female with evidence of an idiopathic cardiomyopathy, possibly secondary to atrial fibrillation with rapid ventricular response.  Her blood pressure fell precipitously with increased doses of Toprol-XL.  Because of this, we added digoxin with a heart rate generally .  Her last digoxin level was performed on 05/01/2019 at 1.5.  This can be repeated in May 2020. Weight stable BP as above and pulse stable.     2.  The patient continues on warfarin for her chronic atrial fibrillation.  She denies bleeding diathesis.  INR goal is 2-2.5. Follows with Dr. Leger.   3.  The patient remains on furosemide. Her BMP will be in May.   4.  The patient remains off aspirin 81 mg per day.    5.  Obstructive sleep apnea.  The patient has found it difficult to use her CPAP due to claustrophobia.   6. Plan to see Dr. Pickard in July 2020.   7. Return to see me in Dec. 2020.          I have reviewed the note as documented above.  This accurately captures the substance of my conversation with the patient.        Phone call contact time  Call Started at 11:02  Call Ended at 11:14    DOMINIK Ritchie CNP

## 2020-04-17 NOTE — PHARMACY-ANTICOAGULATION SERVICE
Clinical Pharmacy - Warfarin Dosing Consult     Pharmacy has been consulted to manage this patient s warfarin therapy.  Indication: Atrial Fibrillation  Therapy Goal: INR 2-3  Warfarin Prior to Admission: Yes  Warfarin PTA Regimen: 5 mg on Tuesday and Friday. 2.5 mg all other days of the week  Significant drug interactions: ASA 81mg daily  Recent documented change in oral intake/nutrition: Yes(diarrhea)    INR   Date Value Ref Range Status   04/30/2019 2.24 (H) 0.86 - 1.14 Final   11/13/2017 2.57 (H) 0.86 - 1.14 Final       Recommend warfarin 2.5 mg today, due to patient's diarrhea.  Pharmacy will monitor Em Richmond daily and order warfarin doses to achieve specified goal.      Please contact pharmacy as soon as possible if the warfarin needs to be held for a procedure or if the warfarin goals change.       Number Of Stages: 2

## 2020-04-27 DIAGNOSIS — I50.41 ACUTE COMBINED SYSTOLIC AND DIASTOLIC CHF, NYHA CLASS 3 (H): ICD-10-CM

## 2020-04-27 RX ORDER — FUROSEMIDE 20 MG
20 TABLET ORAL DAILY
Qty: 90 TABLET | Refills: 1 | Status: SHIPPED | OUTPATIENT
Start: 2020-04-27 | End: 2020-11-19

## 2020-04-27 NOTE — TELEPHONE ENCOUNTER
Medication Refilled: furosemide  Last office visit: 3/17/2020 with regina Rose CNP  Last Labs/EK2020  Next office visit: 2020 orders in [facility]  Pharmacy sent to: Mark Mirza RN

## 2020-05-03 ENCOUNTER — HOSPITAL ENCOUNTER (EMERGENCY)
Facility: CLINIC | Age: 80
Discharge: HOME | End: 2020-05-03
Attending: EMERGENCY MEDICINE | Admitting: EMERGENCY MEDICINE
Payer: COMMERCIAL

## 2020-05-03 VITALS
TEMPERATURE: 97.7 F | OXYGEN SATURATION: 99 % | SYSTOLIC BLOOD PRESSURE: 149 MMHG | DIASTOLIC BLOOD PRESSURE: 89 MMHG | RESPIRATION RATE: 18 BRPM

## 2020-05-03 DIAGNOSIS — R04.0 EPISTAXIS: ICD-10-CM

## 2020-05-03 LAB
ANION GAP SERPL CALCULATED.3IONS-SCNC: 5 MMOL/L (ref 3–14)
BASOPHILS # BLD AUTO: 0.1 10E9/L (ref 0–0.2)
BASOPHILS NFR BLD AUTO: 1 %
BUN SERPL-MCNC: 19 MG/DL (ref 7–30)
CALCIUM SERPL-MCNC: 9 MG/DL (ref 8.5–10.1)
CHLORIDE SERPL-SCNC: 103 MMOL/L (ref 94–109)
CO2 SERPL-SCNC: 26 MMOL/L (ref 20–32)
CREAT SERPL-MCNC: 0.83 MG/DL (ref 0.52–1.04)
DIFFERENTIAL METHOD BLD: ABNORMAL
EOSINOPHIL # BLD AUTO: 0.4 10E9/L (ref 0–0.7)
EOSINOPHIL NFR BLD AUTO: 4 %
ERYTHROCYTE [DISTWIDTH] IN BLOOD BY AUTOMATED COUNT: 13 % (ref 10–15)
GFR SERPL CREATININE-BSD FRML MDRD: 68 ML/MIN/{1.73_M2}
GLUCOSE SERPL-MCNC: 112 MG/DL (ref 70–99)
HCT VFR BLD AUTO: 45.5 % (ref 35–47)
HGB BLD-MCNC: 14.7 G/DL (ref 11.7–15.7)
IMM GRANULOCYTES # BLD: 0 10E9/L (ref 0–0.4)
IMM GRANULOCYTES NFR BLD: 0.4 %
INR PPP: 3.46 (ref 0.86–1.14)
LYMPHOCYTES # BLD AUTO: 2.2 10E9/L (ref 0.8–5.3)
LYMPHOCYTES NFR BLD AUTO: 20.2 %
MCH RBC QN AUTO: 33.4 PG (ref 26.5–33)
MCHC RBC AUTO-ENTMCNC: 32.3 G/DL (ref 31.5–36.5)
MCV RBC AUTO: 103 FL (ref 78–100)
MONOCYTES # BLD AUTO: 0.8 10E9/L (ref 0–1.3)
MONOCYTES NFR BLD AUTO: 7.4 %
NEUTROPHILS # BLD AUTO: 7.1 10E9/L (ref 1.6–8.3)
NEUTROPHILS NFR BLD AUTO: 67 %
NRBC # BLD AUTO: 0 10*3/UL
NRBC BLD AUTO-RTO: 0 /100
PLATELET # BLD AUTO: 262 10E9/L (ref 150–450)
POTASSIUM SERPL-SCNC: 3.8 MMOL/L (ref 3.4–5.3)
RBC # BLD AUTO: 4.4 10E12/L (ref 3.8–5.2)
SODIUM SERPL-SCNC: 134 MMOL/L (ref 133–144)
WBC # BLD AUTO: 10.6 10E9/L (ref 4–11)

## 2020-05-03 PROCEDURE — 80048 BASIC METABOLIC PNL TOTAL CA: CPT | Performed by: EMERGENCY MEDICINE

## 2020-05-03 PROCEDURE — 85025 COMPLETE CBC W/AUTO DIFF WBC: CPT | Performed by: EMERGENCY MEDICINE

## 2020-05-03 PROCEDURE — 30901 CONTROL OF NOSEBLEED: CPT | Mod: RT

## 2020-05-03 PROCEDURE — 99284 EMERGENCY DEPT VISIT MOD MDM: CPT

## 2020-05-03 PROCEDURE — 85610 PROTHROMBIN TIME: CPT | Performed by: EMERGENCY MEDICINE

## 2020-05-03 RX ORDER — OXYMETAZOLINE HYDROCHLORIDE 0.05 G/100ML
SPRAY NASAL
Status: DISCONTINUED
Start: 2020-05-03 | End: 2020-05-04 | Stop reason: HOSPADM

## 2020-05-03 RX ORDER — LIDOCAINE HYDROCHLORIDE 40 MG/ML
INJECTION, SOLUTION RETROBULBAR
Status: DISCONTINUED
Start: 2020-05-03 | End: 2020-05-04 | Stop reason: HOSPADM

## 2020-05-03 RX ORDER — CEPHALEXIN 500 MG/1
500 CAPSULE ORAL 2 TIMES DAILY
Qty: 8 CAPSULE | Refills: 0 | Status: SHIPPED | OUTPATIENT
Start: 2020-05-03 | End: 2020-05-07

## 2020-05-03 ASSESSMENT — ENCOUNTER SYMPTOMS
COUGH: 1
FEVER: 0
LIGHT-HEADEDNESS: 0
SHORTNESS OF BREATH: 0

## 2020-05-03 NOTE — ED AVS SNAPSHOT
[facility] Emergency Department  [address]  [facility] [address]  Phone:  [phone]  Fax:  [phone]                                    Em Richmond   MRN: [id number]    Department:  [facility] Emergency Department   Date of Visit:  5/3/2020           After Visit Summary Signature Page    I have received my discharge instructions, and my questions have been answered. I have discussed any challenges I see with this plan with the nurse or doctor.    ..........................................................................................................................................  Patient/Patient Representative Signature      ..........................................................................................................................................  Patient Representative Print Name and Relationship to Patient    ..................................................               ................................................  Date                                   Time    ..........................................................................................................................................  Reviewed by Signature/Title    ...................................................              ..............................................  Date                                               Time          22EPIC Rev 08/18

## 2020-05-04 NOTE — ED PROVIDER NOTES
History     Chief Complaint:  Nosebleed     HPI   Em Richmond is a 79 year old female with a history of atrial fibrillation currently anticoagulated on Coumadin who presents for evaluation of a nosebleed. This morning around 0900 the patient started to develop right-sided nose bleeding. Throughout the day the patient has had intermittent bleeding from the right side, and tonight her bleeding worsened and she started to develop some blood draining from the left side as well. Due to her persistent nose bleeding tonight the patient called a nurse line and was advised to seek evaluation in the ED. She has not had any recent trauma to the nose. She has not had any lightheadedness or syncope since her nose bleeding began. Otherwise, she reports a two week history of a mild cough but denies any recent fever or shortness of breath, and this cough is unrelated to her symptoms for presentation today.      Allergies:  Sulfa drugs   Lisinopril      Medications:    Vitamin D3  Vitamin B12   Digoxin  Lasix  Probiotic acidophilus  Imodium A-D   Toprol-XL   Omeprazole  Paxil  Coumadin     Past Medical History:    CHF   Atrial fibrillation   Cardiogenic shock  Hypertension   GERD  Cardiomyopathy      Past Surgical History:    Colonoscopy  EGD, combined  Heart cath right and left heart cath     Family History:    Diabetes - Mother  Cancer - Father     Social History:  Tobacco use:    Former smoker, quit 1965  Alcohol use:    Positive   Drug use:    Negative   Marital status:          Review of Systems   Constitutional: Negative for fever.   HENT: Positive for nosebleeds.    Respiratory: Positive for cough. Negative for shortness of breath.    Neurological: Negative for syncope and light-headedness.   All other systems reviewed and are negative.      Physical Exam   First Vitals:  BP: (!) 140/94  Heart Rate: 99  Temp: 97.7  F (36.5  C)  Resp: 16  SpO2: 99 %      Physical Exam  Constitutional:  Oriented to person, place,  and time. In minor distress.   HENT:   Head:    Normocephalic.   Nasal:    Oozing blood coming from right naris. Unable to identify obvious source of bleeding. Left naris dry, no active bleeding.   Mouth/Throat:   Dry blood within the oropharynx.   Eyes:    EOM are normal. Pupils are equal, round, and reactive to light.   Neck:    Neck supple.   Cardiovascular:  Normal rate, regular rhythm and normal heart sounds.      Exam reveals no gallop and no friction rub.       No murmur heard.  Pulmonary/Chest:  Effort normal and breath sounds normal.      No respiratory distress. No wheezes. No rales.      No reproducible chest wall pain.  Abdominal:   Soft. No distension. No tenderness. No rebound and no guarding.   Musculoskeletal:  Normal range of motion.   Neurological:   Alert and oriented to person, place, and time.           Moves all 4 extremities spontaneously    Skin:    No rash noted. No pallor.     Emergency Department Course      Laboratory:  CBC: WNL (WBC 10.6, HGB 14.7, )   BMP: Glucose 112 high, o/w WNL (Creatinine 0.83)   INR: 3.46 high      Procedures:       Rapid Rhino Nasal Packing     PHYSICIAN: Dr. Nava     INDICATION:  Epistaxis    ANESTHESIA: 4% Lidocaine, 0.5 mL, Afrin     TECHNIQUE: 5.5 cm Rapid Rhino was inserted into the right naris to provide pressure. The patient had adequate hemostasis after application of packing, and the patient was generally comfortable after the procedure. The patient tolerated the procedure well with no immediate complications.     Emergency Department Course:  Nursing notes and vitals reviewed.  2220: I performed an exam of the patient as documented above.     A nasal packing procedure was performed as outlined in the procedure note above.  The patient tolerated well and there were no complications.    I personally reviewed the laboratory results with the patient and answered all related questions prior to discharge.     Findings and plan explained to the  Patient. Patient discharged home with instructions regarding supportive care, medications, and reasons to return. The importance of close follow-up was reviewed. The patient was prescribed Keflex.      Impression & Plan      Medical Decision Making:  Em Richmond is a 79 year old female who presents for evaluation of nasal bleeding and epistaxis.  The bleeding was controlled with interventions in the ED and therefore supportive outpatient management is indicated.  Close follow-up with ENT and primary care; see discharge instructions. It was noted that her INR was elevated and she was told to stop her Coumadin for two days and restart and have her INR rechecked after.     The bleeding continued despite attempts at conservative management.  The risks and benefits of packing were discussed with patient verbally and they consented to packing.  See procedure note above for packing.  There was no bleeding after the nasal pack was placed.  I will place patient on prophylactic antibiotics to minimize risk of sinusitis and toxic shock syndrome.      Diagnosis:    ICD-10-CM   1. Epistaxis  R04.0      Disposition:  Discharged to home with Keflex.      Discharge Medications:   Details   cephALEXin (KEFLEX) 500 MG capsule Take 1 capsule (500 mg) by mouth 2 times daily for 4 days, Disp-8 capsule,R-0, Local Print               Lawrence KAMINSKI, am serving as a scribe at 10:20 PM on 5/3/2020 to document services personally performed by Dr. Nava, based on my observations and the provider's statements to me.    [facility] EMERGENCY DEPARTMENT       Jared Nava MD  05/04/20 0207

## 2020-05-04 NOTE — ED TRIAGE NOTES
Pt states intermittent epistaxis since this morning, worse tonight. Pt on coumadin, nasal clamp applied in triage. Pt also c/o cough. ABCs intact GCS 15

## 2020-07-24 DIAGNOSIS — I48.19 PERSISTENT ATRIAL FIBRILLATION (H): ICD-10-CM

## 2020-07-24 RX ORDER — METOPROLOL SUCCINATE 50 MG/1
50 TABLET, EXTENDED RELEASE ORAL 2 TIMES DAILY
Qty: 180 TABLET | Refills: 0 | Status: SHIPPED | OUTPATIENT
Start: 2020-07-24 | End: 2020-10-26

## 2020-07-24 NOTE — LETTER
July 24, 2020       TO: Em Richmond  [address]  [place] [address]       Dear Em Richmond,    You have requested a medication refill for Metoprolol Succinate and our records indicate that you are due for follow up with your cardiologist in July 2020.  We will refill your medication for 3 months, which will allow you enough time to be seen by one of our providers.    Please call our clinic at [phone] to schedule your appointment as soon as possible.    Thank you,    [facility]

## 2020-08-26 DIAGNOSIS — I48.91 ATRIAL FIBRILLATION WITH RAPID VENTRICULAR RESPONSE (H): ICD-10-CM

## 2020-08-26 RX ORDER — DIGOXIN 125 MCG
125 TABLET ORAL DAILY
Qty: 90 TABLET | Refills: 0 | Status: SHIPPED | OUTPATIENT
Start: 2020-08-26 | End: 2020-11-19

## 2020-08-26 NOTE — TELEPHONE ENCOUNTER
Received refill request for:  Digoxin  Last OV was: 3/17/2020 with LEESA Lacy  Labs/EKG: last Digoxin level 5/1/2019  F/U scheduled: overdue orders in Epic - note to pharmacy, refill letter sent  New script sent to: [facility]

## 2020-08-26 NOTE — LETTER
August 26, 2020       TO: Em Richmond  [address]  [place] [address]       Dear Em Richmond,    You have requested a medication refill for Digoxin and our records indicate that you have not been seen by one of our Cardiologist for your annual follow up.  We will refill your medication for 3 months, which will allow you enough time to be seen by one of our providers.    Please call our clinic at [phone] to schedule your appointment as soon as possible.    Thank you,    [facility]

## 2020-08-31 ENCOUNTER — TELEPHONE (OUTPATIENT)
Dept: CARDIOLOGY | Facility: CLINIC | Age: 80
End: 2020-08-31

## 2020-08-31 NOTE — TELEPHONE ENCOUNTER
"Pt was completing wellness screen with CMA. She told the CMA she has a rash. She inquired about labs prior to appt with Dr Hernandez      Future Appointments   Date Time Provider Department Center   9/1/2020  9:45 AM Christina Hernandez MD [facility]       Spoke to pt. Rash has been going on for few weeks. Pt has been eating a lot of tomatoes lately. I do break out around my mouth sometimes when I eat too many\". Ddi recommended pt call her PCP regarding rash.   Did let Pt know labs should be checked. Offered her 0815 lab appt (only one left) she declined saying it is too early. She declines making lab only apt at pcp for today. Offered lab after appt with David, \"No I just want to leave when the appt is done.\"  Asked her to come at 0930 for draw and then see David after.   Radha High, RN 2:11 PM 08/31/20     "

## 2020-09-01 ENCOUNTER — OFFICE VISIT (OUTPATIENT)
Dept: CARDIOLOGY | Facility: CLINIC | Age: 80
End: 2020-09-01
Payer: COMMERCIAL

## 2020-09-01 VITALS
SYSTOLIC BLOOD PRESSURE: 135 MMHG | BODY MASS INDEX: 25.83 KG/M2 | WEIGHT: 155 LBS | DIASTOLIC BLOOD PRESSURE: 66 MMHG | HEART RATE: 69 BPM | HEIGHT: 65 IN

## 2020-09-01 DIAGNOSIS — I50.22 CHRONIC SYSTOLIC HEART FAILURE (H): ICD-10-CM

## 2020-09-01 LAB
ANION GAP SERPL CALCULATED.3IONS-SCNC: 6 MMOL/L (ref 3–14)
BUN SERPL-MCNC: 14 MG/DL (ref 7–30)
CALCIUM SERPL-MCNC: 8.4 MG/DL (ref 8.5–10.1)
CHLORIDE SERPL-SCNC: 106 MMOL/L (ref 94–109)
CO2 SERPL-SCNC: 24 MMOL/L (ref 20–32)
CREAT SERPL-MCNC: 0.96 MG/DL (ref 0.52–1.04)
DIGOXIN SERPL-MCNC: 0.8 UG/L (ref 0.5–2)
GFR SERPL CREATININE-BSD FRML MDRD: 57 ML/MIN/{1.73_M2}
GLUCOSE SERPL-MCNC: 131 MG/DL (ref 70–99)
POTASSIUM SERPL-SCNC: 4 MMOL/L (ref 3.4–5.3)
SODIUM SERPL-SCNC: 136 MMOL/L (ref 133–144)

## 2020-09-01 PROCEDURE — 99214 OFFICE O/P EST MOD 30 MIN: CPT | Performed by: INTERNAL MEDICINE

## 2020-09-01 PROCEDURE — 80162 ASSAY OF DIGOXIN TOTAL: CPT | Performed by: NURSE PRACTITIONER

## 2020-09-01 PROCEDURE — 36415 COLL VENOUS BLD VENIPUNCTURE: CPT | Performed by: NURSE PRACTITIONER

## 2020-09-01 PROCEDURE — 80048 BASIC METABOLIC PNL TOTAL CA: CPT | Performed by: NURSE PRACTITIONER

## 2020-09-01 ASSESSMENT — MIFFLIN-ST. JEOR: SCORE: 1176.02

## 2020-09-01 NOTE — LETTER
9/1/2020      Arielle Leger MD  [facility] [address] [facility] [address]      RE: Em Richmond       Dear Colleague,    I had the pleasure of seeing Em Richmond in the [facility].    Service Date: 09/01/2020      CARDIOLOGY CLINIC CONSULTATION       REASON FOR VISIT:  Nonischemic cardiomyopathy from atrial fibrillation.      HISTORY OF PRESENTING ILLNESS:  A very pleasant 80-year-old female who used to be a patient of Dr. Pickard and now transferring care to me.  She was last seen by Dusty Rose in the clinic in 03/2020.  The patient presented in 2017 with new congestive heart failure symptoms.  EF in 2014 was normal but in 2017 when she presented, it was 30%-35% in the setting of rapid atrial fibrillation.  Mild-to-moderate TR and MR were noted.  Rate control strategy was opted for.  Heart rate got under better control.  Coronary angiography that admission showed trivial coronary disease.  This idiopathic cardiomyopathy was thought to be from AFib.  EF subsequently normalized.  Last echocardiogram last year showed EF of 50%-55%.  She remains on Coumadin, metoprolol and digoxin for her atrial fibrillation.  She takes mild dose of 20 mg of Lasix for mild water retention.      Today, she is here for routine followup.  Denies any cardiovascular symptoms.  No syncope, presyncope, edema, orthopnea, PND.  She says her breathing has been stable for the last few years.  Overall, she has been doing well and feeling well.      PHYSICAL EXAMINATION:   VITAL SIGNS:  Blood pressure is 135/66 with a pulse of 69, irregular.   GENERAL:  Alert, oriented x3, in no acute distress.   NECK:  Supple.  JVP is normal.   LUNGS:  Clear.   CARDIAC:  Sounds are irregular.  Soft systolic murmur, no rubs or gallops.   EXTREMITIES:  Warm without edema.   PSYCHIATRIC:  Appropriate affect.   HEENT:  No icterus, pallor.   ABDOMEN:  Nontender.      ASSESSMENT AND PLAN:  Em  Basilio is 80 years old with a history of atrial fibrillation.  She has atrial fibrillation-induced cardiomyopathy with a recovered ejection fraction after rate control strategy.  She remains in permanent atrial fibrillation.  At this point, I recommend continuing metoprolol and digoxin for rate control.  She does not have any clinical heart failure.  She continues to take 20 mg of Lasix.      In regards to anticoagulation, she has been having difficulty and inconvenience for INR appointments and diet management because she is on Coumadin.  We discussed newer oral anticoagulants.  At this point, I told her to stop the warfarin and after 5 days, she can start Xarelto 20 mg daily.  Risks and benefits explained to her.      I will see her routinely back in about a year, sooner if anything changes clinically.      I see that her TSH in 2019 was quite low and I am wondering if that needs to be worked up further. Will defer to primary team.     cc:   Arielle Leger MD    [facility]    [address] [place] [id number]         REMA AYALA MD             D: 2020   T: 2020   MT: DW      Name:     RICARDO DORMAN   MRN:      [id number]        Account:      [id number]   :      1940           Service Date: 2020      Document: [id number]           Outpatient Encounter Medications as of 2020   Medication Sig Dispense Refill     carboxymethylcellulose PF (REFRESH PLUS) 0.5 % SOLN ophthalmic solution Place 1 drop into both eyes 3 times daily as needed for dry eyes       Cholecalciferol (VITAMIN D3 PO) Take 2,000 Units by mouth daily        cyanocobalamin (VITAMIN B-12) 1000 MCG tablet Take 1,000 mcg by mouth daily       digoxin (LANOXIN) 125 MCG tablet Take 1 tablet (125 mcg) by mouth daily 90 tablet 0     furosemide (LASIX) 20 MG tablet Take 1 tablet (20 mg) by mouth daily 90 tablet 1     loperamide (IMODIUM A-D) 2 MG capsule Take 2 mg by mouth as needed for diarrhea        metoprolol succinate ER (TOPROL-XL) 50 MG 24 hr tablet Take 1 tablet (50 mg) by mouth 2 times daily 180 tablet 0     Multiple vitamin  s TABS Take 1 tablet by mouth daily        NONFORMULARY OTC potassium supplement       PARoxetine (PAXIL) 30 MG tablet Take 10 mg by mouth daily        rivaroxaban ANTICOAGULANT (XARELTO ANTICOAGULANT) 20 MG TABS tablet Take 1 tablet (20 mg) by mouth daily (with dinner) 30 tablet 3     Lactobacillus (PROBIOTIC ACIDOPHILUS) TABS Take 1 tablet by mouth daily        omeprazole (PRILOSEC) 20 MG DR capsule Take 20 mg by mouth daily as needed (heartburn)        psyllium (METAMUCIL SMOOTH TEXTURE) 28 % packet 1 packet BID. (Patient not taking: Reported on 3/17/2020)       [DISCONTINUED] warfarin (COUMADIN) 2.5 MG tablet Take two tablets (5 mg) by mouth on Tuesday and Friday. Take one tablet (2.5 mg) all other days of the week.       No facility-administered encounter medications on file as of 9/1/2020.        Again, thank you for allowing me to participate in the care of your patient.      Sincerely,    Christina Hernandez MD     [facility]

## 2020-09-01 NOTE — LETTER
9/1/2020    Arielle Leger MD  Formerly [facility] [address] [facility] [address]    RE: Em Richmond       Dear Colleague,    I had the pleasure of seeing Em Richmond in the [facility].    HPI and Plan:   See dictation [id number]    Orders Placed This Encounter   Procedures     Follow-Up with Cardiologist     Echocardiogram Limited     Orders Placed This Encounter   Medications     rivaroxaban ANTICOAGULANT (XARELTO ANTICOAGULANT) 20 MG TABS tablet     Sig: Take 1 tablet (20 mg) by mouth daily (with dinner)     Dispense:  30 tablet     Refill:  3     Medications Discontinued During This Encounter   Medication Reason     warfarin (COUMADIN) 2.5 MG tablet          Encounter Diagnosis   Name Primary?     Chronic systolic heart failure (H)        CURRENT MEDICATIONS:  Current Outpatient Medications   Medication Sig Dispense Refill     carboxymethylcellulose PF (REFRESH PLUS) 0.5 % SOLN ophthalmic solution Place 1 drop into both eyes 3 times daily as needed for dry eyes       Cholecalciferol (VITAMIN D3 PO) Take 2,000 Units by mouth daily        cyanocobalamin (VITAMIN B-12) 1000 MCG tablet Take 1,000 mcg by mouth daily       digoxin (LANOXIN) 125 MCG tablet Take 1 tablet (125 mcg) by mouth daily 90 tablet 0     furosemide (LASIX) 20 MG tablet Take 1 tablet (20 mg) by mouth daily 90 tablet 1     loperamide (IMODIUM A-D) 2 MG capsule Take 2 mg by mouth as needed for diarrhea       metoprolol succinate ER (TOPROL-XL) 50 MG 24 hr tablet Take 1 tablet (50 mg) by mouth 2 times daily 180 tablet 0     Multiple vitamin  s TABS Take 1 tablet by mouth daily        NONFORMULARY OTC potassium supplement       PARoxetine (PAXIL) 30 MG tablet Take 10 mg by mouth daily        rivaroxaban ANTICOAGULANT (XARELTO ANTICOAGULANT) 20 MG TABS tablet Take 1 tablet (20 mg) by mouth daily (with dinner) 30 tablet 3     Lactobacillus (PROBIOTIC ACIDOPHILUS) TABS Take 1 tablet by mouth daily         omeprazole (PRILOSEC) 20 MG DR capsule Take 20 mg by mouth daily as needed (heartburn)        psyllium (METAMUCIL SMOOTH TEXTURE) 28 % packet 1 packet BID. 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 RHC: mild PHTN/NL wedge/ low CI.        FAMILY HISTORY:  Family History   Problem Relation Age of Onset     Diabetes Mother      Other Cancer Father        SOCIAL HISTORY:  Social History     Socioeconomic History     Marital status:      Spouse name: None     Number of children: None     Years of education: None     Highest education level: None   Occupational History     None   Social Needs     Financial resource strain: None     Food insecurity     Worry: None     Inability: None     Transportation needs     Medical: None     Non-medical: None   Tobacco Use     Smoking status: Former Smoker     Types: Cigarettes     Last attempt to quit:  "1965     Years since quittin.7     Smokeless tobacco: Never Used   Substance and Sexual Activity     Alcohol use: Yes     Alcohol/week: 0.0 standard drinks     Comment: 8 oz wine daily     Drug use: No     Sexual activity: Yes     Partners: Male   Lifestyle     Physical activity     Days per week: None     Minutes per session: None     Stress: None   Relationships     Social connections     Talks on phone: None     Gets together: None     Attends Christianity service: None     Active member of club or organization: None     Attends meetings of clubs or organizations: None     Relationship status: None     Intimate partner violence     Fear of current or ex partner: None     Emotionally abused: None     Physically abused: None     Forced sexual activity: None   Other Topics Concern     Parent/sibling w/ CABG, MI or angioplasty before 65F 55M? Not Asked   Social History Narrative     None       Review of Systems:  Skin:  Positive for     Eyes:  Negative    ENT:  Positive for    Respiratory:  Negative    Cardiovascular:    lightheadedness;Positive for  Gastroenterology: Positive for diarrhea  Genitourinary:  Negative    Musculoskeletal:  Positive for joint pain  Neurologic:  Positive for headaches  Psychiatric:  Negative for    Heme/Lymph/Imm:  Positive for allergies  Endocrine:  Negative      Physical Exam:  Vitals: /66   Pulse 69   Ht 1.638 m (5' 4.5\")   Wt 70.3 kg (155 lb)   BMI 26.19 kg/m      Constitutional:           Skin:             Head:           Eyes:           Lymph:      ENT:           Neck:           Respiratory:            Cardiac:                                                           GI:           Extremities and Muscular Skeletal:                 Neurological:           Psych:           Recent Lab Results:  LIPID RESULTS:  Lab Results   Component Value Date    CHOL 209 (H) 2018    HDL 41 (L) 2018     (H) 2018    TRIG 288 (H) 2018       LIVER ENZYME " RESULTS:  Lab Results   Component Value Date    AST 23 04/30/2019    ALT 33 04/30/2019       CBC RESULTS:  Lab Results   Component Value Date    WBC 10.6 05/03/2020    RBC 4.40 05/03/2020    HGB 14.7 05/03/2020    HCT 45.5 05/03/2020     (H) 05/03/2020    MCH 33.4 (H) 05/03/2020    MCHC 32.3 05/03/2020    RDW 13.0 05/03/2020     05/03/2020       BMP RESULTS:  Lab Results   Component Value Date     09/01/2020    POTASSIUM 4.0 09/01/2020    CHLORIDE 106 09/01/2020    CO2 24 09/01/2020    ANIONGAP 6 09/01/2020     (H) 09/01/2020    BUN 14 09/01/2020    CR 0.96 09/01/2020    GFRESTIMATED 57 (L) 09/01/2020    GFRESTBLACK 66 09/01/2020    ALFIE 8.4 (L) 09/01/2020        A1C RESULTS:  No results found for: A1C    INR RESULTS:  Lab Results   Component Value Date    INR 3.46 (H) 05/03/2020    INR 2.49 (H) 05/03/2019           CC  No referring provider defined for this encounter.                    Thank you for allowing me to participate in the care of your patient.      Sincerely,     Christina Hernandez MD     [facility]    cc:   No referring provider defined for this encounter.

## 2020-09-01 NOTE — PROGRESS NOTES
Service Date: 09/01/2020      CARDIOLOGY CLINIC CONSULTATION       REASON FOR VISIT:  Nonischemic cardiomyopathy from atrial fibrillation.      HISTORY OF PRESENTING ILLNESS:  A very pleasant 80-year-old female who used to be a patient of Dr. Pickard and now transferring care to me.  She was last seen by Dusty Rose in the clinic in 03/2020.  The patient presented in 2017 with new congestive heart failure symptoms.  EF in 2014 was normal but in 2017 when she presented, it was 30%-35% in the setting of rapid atrial fibrillation.  Mild-to-moderate TR and MR were noted.  Rate control strategy was opted for.  Heart rate got under better control.  Coronary angiography that admission showed trivial coronary disease.  This idiopathic cardiomyopathy was thought to be from AFib.  EF subsequently normalized.  Last echocardiogram last year showed EF of 50%-55%.  She remains on Coumadin, metoprolol and digoxin for her atrial fibrillation.  She takes mild dose of 20 mg of Lasix for mild water retention.      Today, she is here for routine followup.  Denies any cardiovascular symptoms.  No syncope, presyncope, edema, orthopnea, PND.  She says her breathing has been stable for the last few years.  Overall, she has been doing well and feeling well.      PHYSICAL EXAMINATION:   VITAL SIGNS:  Blood pressure is 135/66 with a pulse of 69, irregular.   GENERAL:  Alert, oriented x3, in no acute distress.   NECK:  Supple.  JVP is normal.   LUNGS:  Clear.   CARDIAC:  Sounds are irregular.  Soft systolic murmur, no rubs or gallops.   EXTREMITIES:  Warm without edema.   PSYCHIATRIC:  Appropriate affect.   HEENT:  No icterus, pallor.   ABDOMEN:  Nontender.      ASSESSMENT AND PLAN:  Em Richmond is 80 years old with a history of atrial fibrillation.  She has atrial fibrillation-induced cardiomyopathy with a recovered ejection fraction after rate control strategy.  She remains in permanent atrial fibrillation.  At this point, I recommend  continuing metoprolol and digoxin for rate control.  She does not have any clinical heart failure.  She continues to take 20 mg of Lasix.      In regards to anticoagulation, she has been having difficulty and inconvenience for INR appointments and diet management because she is on Coumadin.  We discussed newer oral anticoagulants.  At this point, I told her to stop the warfarin and after 5 days, she can start Xarelto 20 mg daily.  Risks and benefits explained to her.      I will see her routinely back in about a year, sooner if anything changes clinically.      I see that her TSH in 2019 was quite low and I am wondering if that needs to be worked up further. Will defer to primary team.     cc:   Arielle Leger MD    [facility]    [id number] [place] [address]         REMA AYALA MD             D: 2020   T: 2020   MT: ARON      Name:     RICARDO DORMAN   MRN:      [id number]        Account:      [id number]   :      1940           Service Date: 2020      Document: [id number]

## 2020-10-26 DIAGNOSIS — I48.19 PERSISTENT ATRIAL FIBRILLATION (H): ICD-10-CM

## 2020-10-26 RX ORDER — METOPROLOL SUCCINATE 50 MG/1
50 TABLET, EXTENDED RELEASE ORAL 2 TIMES DAILY
Qty: 180 TABLET | Refills: 3 | Status: SHIPPED | OUTPATIENT
Start: 2020-10-26 | End: 2021-10-18

## 2020-11-19 DIAGNOSIS — I50.41 ACUTE COMBINED SYSTOLIC AND DIASTOLIC CHF, NYHA CLASS 3 (H): ICD-10-CM

## 2020-11-19 DIAGNOSIS — I48.91 ATRIAL FIBRILLATION WITH RAPID VENTRICULAR RESPONSE (H): ICD-10-CM

## 2020-11-19 RX ORDER — DIGOXIN 125 MCG
125 TABLET ORAL DAILY
Qty: 90 TABLET | Refills: 3 | Status: SHIPPED | OUTPATIENT
Start: 2020-11-19

## 2020-11-19 RX ORDER — FUROSEMIDE 20 MG
20 TABLET ORAL DAILY
Qty: 90 TABLET | Refills: 3 | Status: SHIPPED | OUTPATIENT
Start: 2020-11-19 | End: 2021-11-23

## 2020-11-19 NOTE — TELEPHONE ENCOUNTER
Received refill request for:  Furosemide and Digoxin  Last OV was: 9/1/2020 with Dr. Hernandez  Labs/EKG: last dig level and BMP 9/1/2020  F/U scheduled: orders in Epic for 9/2021  New script sent to: [facility]

## 2020-12-28 DIAGNOSIS — I50.22 CHRONIC SYSTOLIC HEART FAILURE (H): ICD-10-CM

## 2021-09-29 DIAGNOSIS — I50.22 CHRONIC SYSTOLIC HEART FAILURE (H): ICD-10-CM

## 2021-10-18 DIAGNOSIS — I48.19 PERSISTENT ATRIAL FIBRILLATION (H): ICD-10-CM

## 2021-10-18 RX ORDER — METOPROLOL SUCCINATE 50 MG/1
50 TABLET, EXTENDED RELEASE ORAL 2 TIMES DAILY
Qty: 60 TABLET | Refills: 0 | Status: SHIPPED | OUTPATIENT
Start: 2021-10-18 | End: 2021-11-23

## 2021-11-23 DIAGNOSIS — I50.22 CHRONIC SYSTOLIC HEART FAILURE (H): ICD-10-CM

## 2021-11-23 DIAGNOSIS — I48.19 PERSISTENT ATRIAL FIBRILLATION (H): ICD-10-CM

## 2021-11-23 DIAGNOSIS — I50.41 ACUTE COMBINED SYSTOLIC AND DIASTOLIC CHF, NYHA CLASS 3 (H): ICD-10-CM

## 2021-11-23 RX ORDER — FUROSEMIDE 20 MG
20 TABLET ORAL DAILY
Qty: 90 TABLET | Refills: 0 | Status: SHIPPED | OUTPATIENT
Start: 2021-11-23 | End: 2022-02-17

## 2021-11-23 RX ORDER — METOPROLOL SUCCINATE 50 MG/1
50 TABLET, EXTENDED RELEASE ORAL 2 TIMES DAILY
Qty: 180 TABLET | Refills: 0 | Status: SHIPPED | OUTPATIENT
Start: 2021-11-23

## 2022-02-17 DIAGNOSIS — I50.41 ACUTE COMBINED SYSTOLIC AND DIASTOLIC CHF, NYHA CLASS 3 (H): ICD-10-CM

## 2022-02-17 RX ORDER — FUROSEMIDE 20 MG
20 TABLET ORAL DAILY
Qty: 90 TABLET | Refills: 0 | Status: SHIPPED | OUTPATIENT
Start: 2022-02-17

## 2022-02-17 NOTE — CONFIDENTIAL NOTE
[facility] Cardiology Refill Guideline reviewed.  Medication meets criteria for refill. Appt is due now. Letter sent.

## 2022-03-31 DIAGNOSIS — I50.22 CHRONIC SYSTOLIC HEART FAILURE (H): ICD-10-CM

## 2022-03-31 NOTE — TELEPHONE ENCOUNTER
[facility] Cardiology Refill Guideline reviewed.  Medication meets criteria for refill. Appt is due now. Letter sent 2/2022.

## 2022-05-23 DIAGNOSIS — I50.41 ACUTE COMBINED SYSTOLIC AND DIASTOLIC CHF, NYHA CLASS 3 (H): ICD-10-CM

## 2022-05-23 NOTE — TELEPHONE ENCOUNTER
[facility] Cardiology Refill Guideline reviewed.  Medication does not meet criteria for refill due to overdue for follow up, letter sent 2/2022, no future OV scheduled..  Messaged to providers team for further review.    Avani Saba RN on 5/23/2022 at 3:24 PM

## 2022-05-27 DIAGNOSIS — I50.41 ACUTE COMBINED SYSTOLIC AND DIASTOLIC CHF, NYHA CLASS 3 (H): Primary | ICD-10-CM

## 2022-05-27 RX ORDER — FUROSEMIDE 20 MG
20 TABLET ORAL DAILY
Qty: 90 TABLET | Refills: 0 | OUTPATIENT
Start: 2022-05-27

## 2022-05-27 NOTE — TELEPHONE ENCOUNTER
Will route message to scheduling to make MD appt with BMP prior. Last was 9/1/2020. Orders in     Radha High RN 9:30 AM 05/27/22

## 2022-05-27 NOTE — PROGRESS NOTES
Updated overdue echo, BMP and MD Follow-up orders.     Messaged scheduling to make appt.     No future appointments.      Radha High RN 10:14 AM 05/27/22

## 2022-05-31 ENCOUNTER — TELEPHONE (OUTPATIENT)
Dept: CARDIOLOGY | Facility: CLINIC | Age: 82
End: 2022-05-31
Payer: COMMERCIAL

## 2022-05-31 NOTE — TELEPHONE ENCOUNTER
I returned a fax sent by [facility]. I asked them not to send any more refill requests as patient sees Dr. Anson Borja at [facility].

## 2022-12-28 ENCOUNTER — LAB REQUISITION (OUTPATIENT)
Dept: LAB | Facility: CLINIC | Age: 82
End: 2022-12-28
Payer: COMMERCIAL

## 2022-12-28 DIAGNOSIS — Z51.81 ENCOUNTER FOR THERAPEUTIC DRUG LEVEL MONITORING: ICD-10-CM

## 2022-12-28 DIAGNOSIS — I10 ESSENTIAL (PRIMARY) HYPERTENSION: ICD-10-CM

## 2022-12-29 LAB
ANION GAP SERPL CALCULATED.3IONS-SCNC: 11 MMOL/L (ref 7–15)
BUN SERPL-MCNC: 11.9 MG/DL (ref 8–23)
CALCIUM SERPL-MCNC: 8.5 MG/DL (ref 8.8–10.2)
CHLORIDE SERPL-SCNC: 103 MMOL/L (ref 98–107)
CREAT SERPL-MCNC: 0.78 MG/DL (ref 0.51–0.95)
DEPRECATED HCO3 PLAS-SCNC: 25 MMOL/L (ref 22–29)
ERYTHROCYTE [DISTWIDTH] IN BLOOD BY AUTOMATED COUNT: 17.2 % (ref 10–15)
GFR SERPL CREATININE-BSD FRML MDRD: 76 ML/MIN/1.73M2
GLUCOSE SERPL-MCNC: 72 MG/DL (ref 70–99)
HCT VFR BLD AUTO: 32.7 % (ref 35–47)
HGB BLD-MCNC: 10.3 G/DL (ref 11.7–15.7)
INR PPP: 2.88 (ref 0.85–1.15)
MCH RBC QN AUTO: 30 PG (ref 26.5–33)
MCHC RBC AUTO-ENTMCNC: 31.5 G/DL (ref 31.5–36.5)
MCV RBC AUTO: 95 FL (ref 78–100)
PLATELET # BLD AUTO: 115 10E3/UL (ref 150–450)
POTASSIUM SERPL-SCNC: 3.8 MMOL/L (ref 3.4–5.3)
RBC # BLD AUTO: 3.43 10E6/UL (ref 3.8–5.2)
SODIUM SERPL-SCNC: 139 MMOL/L (ref 136–145)
WBC # BLD AUTO: 4.5 10E3/UL (ref 4–11)

## 2022-12-29 PROCEDURE — 85610 PROTHROMBIN TIME: CPT | Mod: ORL | Performed by: INTERNAL MEDICINE

## 2022-12-29 PROCEDURE — 85027 COMPLETE CBC AUTOMATED: CPT | Mod: ORL | Performed by: INTERNAL MEDICINE

## 2022-12-29 PROCEDURE — P9603 ONE-WAY ALLOW PRORATED MILES: HCPCS | Performed by: INTERNAL MEDICINE

## 2022-12-29 PROCEDURE — 80048 BASIC METABOLIC PNL TOTAL CA: CPT | Mod: ORL | Performed by: INTERNAL MEDICINE

## 2022-12-29 PROCEDURE — 36415 COLL VENOUS BLD VENIPUNCTURE: CPT | Mod: ORL | Performed by: INTERNAL MEDICINE

## 2023-01-02 ENCOUNTER — LAB REQUISITION (OUTPATIENT)
Dept: LAB | Facility: CLINIC | Age: 83
End: 2023-01-02
Payer: COMMERCIAL

## 2023-01-02 DIAGNOSIS — Z51.81 ENCOUNTER FOR THERAPEUTIC DRUG LEVEL MONITORING: ICD-10-CM

## 2023-01-03 LAB — INR PPP: 1.9 (ref 0.85–1.15)

## 2023-01-03 PROCEDURE — 85610 PROTHROMBIN TIME: CPT | Mod: ORL | Performed by: INTERNAL MEDICINE

## 2023-01-03 PROCEDURE — P9604 ONE-WAY ALLOW PRORATED TRIP: HCPCS | Mod: ORL | Performed by: INTERNAL MEDICINE

## 2023-01-03 PROCEDURE — 36415 COLL VENOUS BLD VENIPUNCTURE: CPT | Mod: ORL | Performed by: INTERNAL MEDICINE

## 2023-01-09 ENCOUNTER — LAB REQUISITION (OUTPATIENT)
Dept: LAB | Facility: CLINIC | Age: 83
End: 2023-01-09
Payer: COMMERCIAL

## 2023-01-09 DIAGNOSIS — Z51.81 ENCOUNTER FOR THERAPEUTIC DRUG LEVEL MONITORING: ICD-10-CM

## 2023-08-10 NOTE — PROGRESS NOTES
HPI and Plan:   See dictation [id number]    Orders Placed This Encounter   Procedures     Follow-Up with Cardiologist     Echocardiogram Limited     Orders Placed This Encounter   Medications     rivaroxaban ANTICOAGULANT (XARELTO ANTICOAGULANT) 20 MG TABS tablet     Sig: Take 1 tablet (20 mg) by mouth daily (with dinner)     Dispense:  30 tablet     Refill:  3     Medications Discontinued During This Encounter   Medication Reason     warfarin (COUMADIN) 2.5 MG tablet          Encounter Diagnosis   Name Primary?     Chronic systolic heart failure (H)        CURRENT MEDICATIONS:  Current Outpatient Medications   Medication Sig Dispense Refill     carboxymethylcellulose PF (REFRESH PLUS) 0.5 % SOLN ophthalmic solution Place 1 drop into both eyes 3 times daily as needed for dry eyes       Cholecalciferol (VITAMIN D3 PO) Take 2,000 Units by mouth daily        cyanocobalamin (VITAMIN B-12) 1000 MCG tablet Take 1,000 mcg by mouth daily       digoxin (LANOXIN) 125 MCG tablet Take 1 tablet (125 mcg) by mouth daily 90 tablet 0     furosemide (LASIX) 20 MG tablet Take 1 tablet (20 mg) by mouth daily 90 tablet 1     loperamide (IMODIUM A-D) 2 MG capsule Take 2 mg by mouth as needed for diarrhea       metoprolol succinate ER (TOPROL-XL) 50 MG 24 hr tablet Take 1 tablet (50 mg) by mouth 2 times daily 180 tablet 0     Multiple vitamin  s TABS Take 1 tablet by mouth daily        NONFORMULARY OTC potassium supplement       PARoxetine (PAXIL) 30 MG tablet Take 10 mg by mouth daily        rivaroxaban ANTICOAGULANT (XARELTO ANTICOAGULANT) 20 MG TABS tablet Take 1 tablet (20 mg) by mouth daily (with dinner) 30 tablet 3     Lactobacillus (PROBIOTIC ACIDOPHILUS) TABS Take 1 tablet by mouth daily        omeprazole (PRILOSEC) 20 MG DR capsule Take 20 mg by mouth daily as needed (heartburn)        psyllium (METAMUCIL SMOOTH TEXTURE) 28 % packet 1 packet BID. 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 RHC: mild PHTN/NL wedge/ low CI.        FAMILY HISTORY:  Family History   Problem Relation Age of Onset     Diabetes Mother      Other Cancer Father        SOCIAL HISTORY:  Social History     Socioeconomic History     Marital status:      Spouse name: None     Number of children: None     Years of education: None     Highest education level: None   Occupational History     None   Social Needs     Financial resource strain: None     Food insecurity     Worry: None     Inability: None     Transportation needs     Medical: None     Non-medical: None   Tobacco Use     Smoking status: Former Smoker     Types: Cigarettes     Last attempt to quit: 1965     Years since quittin.7     Smokeless tobacco: Never Used   Substance and Sexual Activity     Alcohol use: Yes     Alcohol/week: 0.0 standard drinks     Comment: 8 oz wine daily     Drug use: No     Sexual activity: Yes     Partners:  "Male   Lifestyle     Physical activity     Days per week: None     Minutes per session: None     Stress: None   Relationships     Social connections     Talks on phone: None     Gets together: None     Attends [employer] service: None     Active member of club or organization: None     Attends meetings of clubs or organizations: None     Relationship status: None     Intimate partner violence     Fear of current or ex partner: None     Emotionally abused: None     Physically abused: None     Forced sexual activity: None   Other Topics Concern     Parent/sibling w/ CABG, MI or angioplasty before 65F 55M? Not Asked   Social History Narrative     None       Review of Systems:  Skin:  Positive for     Eyes:  Negative    ENT:  Positive for    Respiratory:  Negative    Cardiovascular:    lightheadedness;Positive for  Gastroenterology: Positive for diarrhea  Genitourinary:  Negative    Musculoskeletal:  Positive for joint pain  Neurologic:  Positive for headaches  Psychiatric:  Negative for    Heme/Lymph/Imm:  Positive for allergies  Endocrine:  Negative      Physical Exam:  Vitals: /66   Pulse 69   Ht 1.638 m (5' 4.5\")   Wt 70.3 kg (155 lb)   BMI 26.19 kg/m      Constitutional:           Skin:             Head:           Eyes:           Lymph:      ENT:           Neck:           Respiratory:            Cardiac:                                                           GI:           Extremities and Muscular Skeletal:                 Neurological:           Psych:           Recent Lab Results:  LIPID RESULTS:  Lab Results   Component Value Date    CHOL 209 (H) 12/18/2018    HDL 41 (L) 12/18/2018     (H) 12/18/2018    TRIG 288 (H) 12/18/2018       LIVER ENZYME RESULTS:  Lab Results   Component Value Date    AST 23 04/30/2019    ALT 33 04/30/2019       CBC RESULTS:  Lab Results   Component Value Date    WBC 10.6 05/03/2020    RBC 4.40 05/03/2020    HGB 14.7 05/03/2020    HCT 45.5 05/03/2020     (H) " 05/03/2020    MCH 33.4 (H) 05/03/2020    MCHC 32.3 05/03/2020    RDW 13.0 05/03/2020     05/03/2020       BMP RESULTS:  Lab Results   Component Value Date     09/01/2020    POTASSIUM 4.0 09/01/2020    CHLORIDE 106 09/01/2020    CO2 24 09/01/2020    ANIONGAP 6 09/01/2020     (H) 09/01/2020    BUN 14 09/01/2020    CR 0.96 09/01/2020    GFRESTIMATED 57 (L) 09/01/2020    GFRESTBLACK 66 09/01/2020    ALFIE 8.4 (L) 09/01/2020        A1C RESULTS:  No results found for: A1C    INR RESULTS:  Lab Results   Component Value Date    INR 3.46 (H) 05/03/2020    INR 2.49 (H) 05/03/2019           CC  No referring provider defined for this encounter.                   Xolair Counseling:  Patient informed of potential adverse effects including but not limited to fever, muscle aches, rash and allergic reactions.  The patient verbalized understanding of the proper use and possible adverse effects of Xolair.  All of the patient's questions and concerns were addressed.

## 2025-02-27 NOTE — PROGRESS NOTES
Pt called stating that [facility] pharmacy told her that her Lisinopril and furosemide were discontinued. Per Dr Pickard's note: medications are to be continued. Metoprolol XL was reduced from 75 mg BID to 50 BID due lower blood pressures. Pt was started on digoxin 0.125 mg QD. Pharm called; they are closed for lunch. Will call back.    Ambulatory